# Patient Record
Sex: FEMALE | Race: WHITE | Employment: UNEMPLOYED | URBAN - METROPOLITAN AREA
[De-identification: names, ages, dates, MRNs, and addresses within clinical notes are randomized per-mention and may not be internally consistent; named-entity substitution may affect disease eponyms.]

---

## 2017-02-09 PROBLEM — S62.336A CLOSED DISPLACED FRACTURE OF NECK OF FIFTH METACARPAL BONE OF RIGHT HAND: Status: ACTIVE | Noted: 2017-02-09

## 2018-12-03 ENCOUNTER — OFFICE VISIT (OUTPATIENT)
Dept: ORTHOPEDIC SURGERY | Age: 48
End: 2018-12-03
Payer: COMMERCIAL

## 2018-12-03 VITALS
DIASTOLIC BLOOD PRESSURE: 88 MMHG | WEIGHT: 139 LBS | TEMPERATURE: 98.2 F | BODY MASS INDEX: 26.24 KG/M2 | HEART RATE: 101 BPM | SYSTOLIC BLOOD PRESSURE: 140 MMHG | HEIGHT: 61 IN

## 2018-12-03 DIAGNOSIS — M18.12 ARTHRITIS OF CARPOMETACARPAL (CMC) JOINT OF LEFT THUMB: Primary | ICD-10-CM

## 2018-12-03 DIAGNOSIS — G56.03 BILATERAL CARPAL TUNNEL SYNDROME: ICD-10-CM

## 2018-12-03 PROCEDURE — 99214 OFFICE O/P EST MOD 30 MIN: CPT | Performed by: ORTHOPAEDIC SURGERY

## 2018-12-03 PROCEDURE — 20600 DRAIN/INJ JOINT/BURSA W/O US: CPT | Performed by: ORTHOPAEDIC SURGERY

## 2018-12-03 RX ORDER — PREDNISONE 10 MG/1
TABLET ORAL
Refills: 0 | COMMUNITY
Start: 2018-11-27 | End: 2018-12-28 | Stop reason: ALTCHOICE

## 2018-12-03 RX ORDER — TRIAMCINOLONE ACETONIDE 40 MG/ML
20 INJECTION, SUSPENSION INTRA-ARTICULAR; INTRAMUSCULAR ONCE
Status: COMPLETED | OUTPATIENT
Start: 2018-12-03 | End: 2018-12-03

## 2018-12-03 RX ADMIN — TRIAMCINOLONE ACETONIDE 20 MG: 40 INJECTION, SUSPENSION INTRA-ARTICULAR; INTRAMUSCULAR at 10:10

## 2018-12-03 NOTE — PROGRESS NOTES
Chief Complaint:   Chief Complaint   Patient presents with    Hand Pain     Bilateral hand pain x 1 year  EMG's done 2014. X-rays Lt hand 11/30/2018 @ [facility]. Hx of  Rt CTR years ago in [place].  Wrist Pain     Bilateral wrist pain x 1 year       HPI 49-year-old woman with history of previously diagnosed carpal tunnel syndrome based on EMGs in 2014. She also has lower extremity neurologic involvement lumbar radiculopathy. She presents today complaining of progressing difficulty with left thumb weakness and pain. She had x-rays at [facility]'s last week.     Patient Active Problem List   Diagnosis    Osteoarthritis    Status post right hip replacement    Gait difficulty    Acute blood loss anemia    Leg length discrepancy    Bipolar affective disorder (HCC)    GERD (gastroesophageal reflux disease)    Constipation    Elevated liver enzymes    Protein calorie malnutrition (HCC)    Low back pain    Closed displaced fracture of neck of fifth metacarpal bone of right hand    Arthritis of carpometacarpal (CMC) joint of left thumb    Bilateral carpal tunnel syndrome       Past Medical History:   Diagnosis Date    Anxiety     dr Apoorva Colindres 63 in [facility] counseling    Arthritis     Asthma     [facility] 2 disorder [facility]     Bruising tendency ([facility] [facility]     Chronic back pain     Insomnia        Past Surgical History:   Procedure Laterality Date    CARPAL TUNNEL RELEASE      right    COLONOSCOPY      FINGER SURGERY      right pinkie    HERNIA REPAIR  1970    HIP ARTHROTOMY  1973    right     TOTAL HIP ARTHROPLASTY Right 2/25/14    TUBAL LIGATION  10/1994    TUMOR REMOVAL      right shoulder fatty tumor       Current Outpatient Prescriptions   Medication Sig Dispense Refill    predniSONE (DELTASONE) 10 MG tablet TAKE 1 TABLET BY MOUTH 4 TIMES DAILY WITH FOOD  0    PROVENTIL  (90 Base) MCG/ACT inhaler INHALE 2 PUFFS EVERY 4 HOURS AS NEEDED  2    lamoTRIgine (LAMICTAL) 200 MG

## 2018-12-17 ENCOUNTER — HOSPITAL ENCOUNTER (OUTPATIENT)
Dept: NEUROLOGY | Age: 48
Discharge: HOME | End: 2018-12-17
Payer: COMMERCIAL

## 2018-12-17 PROCEDURE — 95913 NRV CNDJ TEST 13/> STUDIES: CPT

## 2018-12-17 PROCEDURE — 95886 MUSC TEST DONE W/N TEST COMP: CPT

## 2019-01-14 ENCOUNTER — OFFICE VISIT (OUTPATIENT)
Dept: ORTHOPEDIC SURGERY | Age: 49
End: 2019-01-14
Payer: COMMERCIAL

## 2019-01-14 VITALS
BODY MASS INDEX: 27 KG/M2 | SYSTOLIC BLOOD PRESSURE: 140 MMHG | DIASTOLIC BLOOD PRESSURE: 82 MMHG | HEIGHT: 61 IN | WEIGHT: 143 LBS | TEMPERATURE: 98.6 F | HEART RATE: 116 BPM

## 2019-01-14 DIAGNOSIS — M43.16 SPONDYLOLISTHESIS OF LUMBAR REGION: ICD-10-CM

## 2019-01-14 DIAGNOSIS — M18.12 ARTHRITIS OF CARPOMETACARPAL (CMC) JOINT OF LEFT THUMB: Primary | ICD-10-CM

## 2019-01-14 PROCEDURE — L3807 WHFO W/O JOINTS PRE CST: HCPCS | Performed by: ORTHOPAEDIC SURGERY

## 2019-01-14 PROCEDURE — 99214 OFFICE O/P EST MOD 30 MIN: CPT | Performed by: ORTHOPAEDIC SURGERY

## 2019-02-03 ENCOUNTER — APPOINTMENT (OUTPATIENT)
Dept: GENERAL RADIOLOGY | Age: 49
End: 2019-02-03
Payer: COMMERCIAL

## 2019-02-03 ENCOUNTER — HOSPITAL ENCOUNTER (EMERGENCY)
Age: 49
Discharge: HOME | End: 2019-02-03
Payer: COMMERCIAL

## 2019-02-03 ENCOUNTER — APPOINTMENT (OUTPATIENT)
Dept: CT IMAGING | Age: 49
End: 2019-02-03
Payer: COMMERCIAL

## 2019-02-03 VITALS
DIASTOLIC BLOOD PRESSURE: 85 MMHG | HEIGHT: 61 IN | SYSTOLIC BLOOD PRESSURE: 132 MMHG | WEIGHT: 144 LBS | HEART RATE: 78 BPM | OXYGEN SATURATION: 95 % | TEMPERATURE: 98.4 F | RESPIRATION RATE: 16 BRPM | BODY MASS INDEX: 27.19 KG/M2

## 2019-02-03 DIAGNOSIS — S16.1XXA STRAIN OF NECK MUSCLE, INITIAL ENCOUNTER: ICD-10-CM
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
## 2019-02-03 DIAGNOSIS — S39.012A STRAIN OF LUMBAR REGION, INITIAL ENCOUNTER: ICD-10-CM

## 2019-02-03 PROCEDURE — 72125 CT NECK SPINE W/O DYE: CPT

## 2019-02-03 PROCEDURE — 72170 X-RAY EXAM OF PELVIS: CPT

## 2019-02-03 PROCEDURE — 72110 X-RAY EXAM L-2 SPINE 4/>VWS: CPT

## 2019-02-03 PROCEDURE — 99284 EMERGENCY DEPT VISIT MOD MDM: CPT

## 2019-02-03 PROCEDURE — 72074 X-RAY EXAM THORAC SPINE4/>VW: CPT

## 2019-02-03 PROCEDURE — 71046 X-RAY EXAM CHEST 2 VIEWS: CPT

## 2019-02-03 PROCEDURE — 6370000000 HC RX 637 (ALT 250 FOR IP): Performed by: PHYSICIAN ASSISTANT

## 2019-02-03 PROCEDURE — 70450 CT HEAD/BRAIN W/O DYE: CPT

## 2019-02-03 RX ORDER — CYCLOBENZAPRINE HCL 10 MG
10 TABLET ORAL 3 TIMES DAILY PRN
Qty: 30 TABLET | Refills: 0 | Status: SHIPPED | OUTPATIENT
Start: 2019-02-03 | End: 2019-02-13

## 2019-02-03 RX ORDER — OXYCODONE HYDROCHLORIDE AND ACETAMINOPHEN 5; 325 MG/1; MG/1
1 TABLET ORAL ONCE
Status: COMPLETED | OUTPATIENT
Start: 2019-02-03 | End: 2019-02-03

## 2019-02-03 RX ORDER — TRAMADOL HYDROCHLORIDE 50 MG/1
50 TABLET ORAL EVERY 6 HOURS PRN
Qty: 20 TABLET | Refills: 0 | Status: SHIPPED | OUTPATIENT
Start: 2019-02-03 | End: 2019-02-08

## 2019-02-03 RX ADMIN — OXYCODONE AND ACETAMINOPHEN 1 TABLET: 5; 325 TABLET ORAL at 16:00

## 2019-02-03 ASSESSMENT — PAIN DESCRIPTION - LOCATION: LOCATION: BACK;NECK

## 2019-02-03 ASSESSMENT — PAIN SCALES - GENERAL
PAINLEVEL_OUTOF10: 10
PAINLEVEL_OUTOF10: 10

## 2019-02-03 ASSESSMENT — PAIN DESCRIPTION - PAIN TYPE: TYPE: ACUTE PAIN

## 2019-02-22 ENCOUNTER — TELEPHONE (OUTPATIENT)
Dept: ORTHOPEDIC SURGERY | Age: 49
End: 2019-02-22

## 2019-02-22 DIAGNOSIS — M18.12 ARTHRITIS OF CARPOMETACARPAL (CMC) JOINT OF LEFT THUMB: Primary | ICD-10-CM

## 2019-02-25 ENCOUNTER — OFFICE VISIT (OUTPATIENT)
Dept: ORTHOPEDIC SURGERY | Age: 49
End: 2019-02-25
Payer: COMMERCIAL

## 2019-02-25 VITALS
TEMPERATURE: 98.2 F | RESPIRATION RATE: 16 BRPM | HEART RATE: 89 BPM | SYSTOLIC BLOOD PRESSURE: 117 MMHG | DIASTOLIC BLOOD PRESSURE: 82 MMHG

## 2019-02-25 DIAGNOSIS — M18.12 ARTHRITIS OF CARPOMETACARPAL (CMC) JOINT OF LEFT THUMB: Primary | ICD-10-CM

## 2019-02-25 PROCEDURE — 99204 OFFICE O/P NEW MOD 45 MIN: CPT | Performed by: ORTHOPAEDIC SURGERY

## 2019-02-25 PROCEDURE — L3924 HFO WITHOUT JOINTS PRE OTS: HCPCS | Performed by: ORTHOPAEDIC SURGERY

## 2019-03-20 ENCOUNTER — PREP FOR PROCEDURE (OUTPATIENT)
Dept: ORTHOPEDIC SURGERY | Age: 49
End: 2019-03-20

## 2019-03-20 RX ORDER — SODIUM CHLORIDE 0.9 % (FLUSH) 0.9 %
10 SYRINGE (ML) INJECTION PRN
Status: CANCELLED | OUTPATIENT
Start: 2019-03-20

## 2019-03-20 RX ORDER — SODIUM CHLORIDE 0.9 % (FLUSH) 0.9 %
10 SYRINGE (ML) INJECTION EVERY 12 HOURS SCHEDULED
Status: CANCELLED | OUTPATIENT
Start: 2019-03-20

## 2019-03-20 RX ORDER — SODIUM CHLORIDE 9 MG/ML
INJECTION, SOLUTION INTRAVENOUS CONTINUOUS
Status: CANCELLED | OUTPATIENT
Start: 2019-03-20

## 2019-04-10 NOTE — PROGRESS NOTES
[facility] PRE-ADMISSION TESTING INSTRUCTIONS    The Preadmission Testing patient is instructed accordingly using the following criteria (check applicable):    ARRIVAL INSTRUCTIONS:  [x] Parking the day of Surgery is located in the Main Entrance lot. Upon entering the door, make an immediate right to the surgery reception desk    [x] [facility] Park[facility] is available Monday through Friday 6 am to 6 pm    [x] Bring photo ID and insurance card    [] Bring in a copy of Living will or Durable Power of  papers. [x] Please be sure to arrange for responsible adult to provide transportation to and from the hospital    [x] Please arrange for responsible adult to be with you for the 24 hour period post procedure due to having anesthesia      GENERAL INSTRUCTIONS:    [x] Nothing by mouth after midnight, including gum, candy, mints or water    [x] You may brush your teeth, but do not swallow any water    [x] Take medications as instructed with 1-2 oz of water    [x] Stop herbal supplements and vitamins 5 days prior to procedure    [x] Follow preop dosing of blood thinners per physician instructions    [] Take 1/2 dose of evening insulin, but no insulin after midnight    [] No oral diabetic medications after midnight    [] If diabetic and have low blood sugar or feel symptomatic, take 1-2oz apple juice only    [] Bring inhalers day of surgery    [] Bring C-PAP/ Bi-Pap day of surgery    [] Bring urine specimen day of surgery    [x] Shower or bath with soap, lather and rinse well, AM of Surgery, no lotion, powders or creams to surgical site    [] Follow bowel prep as instructed per surgeon    [x] No tobacco products within 24 hours of surgery     [x] No alcohol or illegal drug use within 24 hours of surgery.     [x] Jewelry, body piercing's, eyeglasses, contact lenses and dentures are not permitted into surgery (bring cases)      [x] Please do not wear any nail polish, make up or hair products on the day of surgery    [x] If not already done, you can expect a call from registration    [x] You can expect a call the business day prior to procedure to notify you if your arrival time changes    [x] If you receive a survey after surgery we would greatly appreciate your comments    [] Parent/guardian of a minor must accompany their child and remain on the premises  the entire time they are under our care     [] Pediatric patients may bring favorite toy, blanket or comfort item with them    [] A caregiver or family member must remain with the patient during their stay if they are mentally handicapped, have dementia, disoriented or unable to use a call light or would be a safety concern if left unattended    [x] Please notify surgeon if you develop any illness between now and time of surgery (cold, cough, sore throat, fever, nausea, vomiting) or any signs of infections  including skin, wounds, and dental.    [x]  The Outpatient Pharmacy is available to fill your prescription here on your day of surgery, ask your preop nurse for details    [x] Other instructions  EDUCATIONAL MATERIALS PROVIDED:    [] PAT Preoperative Education Packet/Booklet     [] Medication List    [] Fluoroscopy Information Pamphlet    [] Transfusion bracelet applied with instructions    [] Joint replacement video reviewed    [] Shower with soap, lather and rinse well, and use CHG wipes provided the evening before surgery as instructed

## 2019-04-16 ENCOUNTER — HOSPITAL ENCOUNTER (OUTPATIENT)
Age: 49
Setting detail: OUTPATIENT SURGERY
Discharge: HOME | End: 2019-04-16
Attending: ORTHOPAEDIC SURGERY | Admitting: ORTHOPAEDIC SURGERY
Payer: COMMERCIAL

## 2019-04-16 ENCOUNTER — ANESTHESIA (OUTPATIENT)
Dept: OPERATING ROOM | Age: 49
End: 2019-04-16
Payer: COMMERCIAL

## 2019-04-16 ENCOUNTER — ANESTHESIA EVENT (OUTPATIENT)
Dept: OPERATING ROOM | Age: 49
End: 2019-04-16
Payer: COMMERCIAL

## 2019-04-16 VITALS
TEMPERATURE: 97.6 F | SYSTOLIC BLOOD PRESSURE: 105 MMHG | RESPIRATION RATE: 16 BRPM | HEIGHT: 61 IN | WEIGHT: 135 LBS | BODY MASS INDEX: 25.49 KG/M2 | HEART RATE: 82 BPM | DIASTOLIC BLOOD PRESSURE: 59 MMHG | OXYGEN SATURATION: 94 %

## 2019-04-16 VITALS — DIASTOLIC BLOOD PRESSURE: 63 MMHG | SYSTOLIC BLOOD PRESSURE: 109 MMHG | OXYGEN SATURATION: 97 %

## 2019-04-16 DIAGNOSIS — G89.18 POST-OPERATIVE PAIN: Primary | ICD-10-CM

## 2019-04-16 PROCEDURE — 64721 CARPAL TUNNEL SURGERY: CPT | Performed by: ORTHOPAEDIC SURGERY

## 2019-04-16 PROCEDURE — 6370000000 HC RX 637 (ALT 250 FOR IP): Performed by: ANESTHESIOLOGY

## 2019-04-16 PROCEDURE — 3600000003 HC SURGERY LEVEL 3 BASE: Performed by: ORTHOPAEDIC SURGERY

## 2019-04-16 PROCEDURE — 3700000000 HC ANESTHESIA ATTENDED CARE: Performed by: ORTHOPAEDIC SURGERY

## 2019-04-16 PROCEDURE — 6360000002 HC RX W HCPCS: Performed by: NURSE ANESTHETIST, CERTIFIED REGISTERED

## 2019-04-16 PROCEDURE — 7100000001 HC PACU RECOVERY - ADDTL 15 MIN: Performed by: ORTHOPAEDIC SURGERY

## 2019-04-16 PROCEDURE — 2500000003 HC RX 250 WO HCPCS: Performed by: NURSE ANESTHETIST, CERTIFIED REGISTERED

## 2019-04-16 PROCEDURE — 7100000011 HC PHASE II RECOVERY - ADDTL 15 MIN: Performed by: ORTHOPAEDIC SURGERY

## 2019-04-16 PROCEDURE — 2580000003 HC RX 258: Performed by: PHYSICIAN ASSISTANT

## 2019-04-16 PROCEDURE — 6360000002 HC RX W HCPCS: Performed by: ANESTHESIOLOGY

## 2019-04-16 PROCEDURE — 2500000003 HC RX 250 WO HCPCS: Performed by: ORTHOPAEDIC SURGERY

## 2019-04-16 PROCEDURE — 7100000010 HC PHASE II RECOVERY - FIRST 15 MIN: Performed by: ORTHOPAEDIC SURGERY

## 2019-04-16 PROCEDURE — 3700000001 HC ADD 15 MINUTES (ANESTHESIA): Performed by: ORTHOPAEDIC SURGERY

## 2019-04-16 PROCEDURE — 3600000013 HC SURGERY LEVEL 3 ADDTL 15MIN: Performed by: ORTHOPAEDIC SURGERY

## 2019-04-16 PROCEDURE — 25447 ARTHRP NTRCRP/CRP/MTCR NTRPS: CPT | Performed by: PHYSICIAN ASSISTANT

## 2019-04-16 PROCEDURE — 2709999900 HC NON-CHARGEABLE SUPPLY: Performed by: ORTHOPAEDIC SURGERY

## 2019-04-16 PROCEDURE — 25447 ARTHRP NTRCRP/CRP/MTCR NTRPS: CPT | Performed by: ORTHOPAEDIC SURGERY

## 2019-04-16 PROCEDURE — 6360000002 HC RX W HCPCS: Performed by: PHYSICIAN ASSISTANT

## 2019-04-16 PROCEDURE — 7100000000 HC PACU RECOVERY - FIRST 15 MIN: Performed by: ORTHOPAEDIC SURGERY

## 2019-04-16 RX ORDER — OXYCODONE HYDROCHLORIDE AND ACETAMINOPHEN 5; 325 MG/1; MG/1
1 TABLET ORAL EVERY 6 HOURS PRN
Qty: 28 TABLET | Refills: 0 | Status: SHIPPED | OUTPATIENT
Start: 2019-04-16 | End: 2019-04-26 | Stop reason: SDUPTHER

## 2019-04-16 RX ORDER — LIDOCAINE HYDROCHLORIDE 20 MG/ML
INJECTION, SOLUTION EPIDURAL; INFILTRATION; INTRACAUDAL; PERINEURAL PRN
Status: DISCONTINUED | OUTPATIENT
Start: 2019-04-16 | End: 2019-04-16 | Stop reason: SDUPTHER

## 2019-04-16 RX ORDER — MIDAZOLAM HYDROCHLORIDE 1 MG/ML
INJECTION INTRAMUSCULAR; INTRAVENOUS PRN
Status: DISCONTINUED | OUTPATIENT
Start: 2019-04-16 | End: 2019-04-16 | Stop reason: SDUPTHER

## 2019-04-16 RX ORDER — OXYCODONE HYDROCHLORIDE AND ACETAMINOPHEN 5; 325 MG/1; MG/1
1 TABLET ORAL
Status: COMPLETED | OUTPATIENT
Start: 2019-04-16 | End: 2019-04-16

## 2019-04-16 RX ORDER — PROPOFOL 10 MG/ML
INJECTION, EMULSION INTRAVENOUS PRN
Status: DISCONTINUED | OUTPATIENT
Start: 2019-04-16 | End: 2019-04-16 | Stop reason: SDUPTHER

## 2019-04-16 RX ORDER — SODIUM CHLORIDE 0.9 % (FLUSH) 0.9 %
10 SYRINGE (ML) INJECTION PRN
Status: DISCONTINUED | OUTPATIENT
Start: 2019-04-16 | End: 2019-04-16 | Stop reason: HOSPADM

## 2019-04-16 RX ORDER — FENTANYL CITRATE 50 UG/ML
INJECTION, SOLUTION INTRAMUSCULAR; INTRAVENOUS PRN
Status: DISCONTINUED | OUTPATIENT
Start: 2019-04-16 | End: 2019-04-16 | Stop reason: SDUPTHER

## 2019-04-16 RX ORDER — SODIUM CHLORIDE 9 MG/ML
INJECTION, SOLUTION INTRAVENOUS CONTINUOUS
Status: DISCONTINUED | OUTPATIENT
Start: 2019-04-16 | End: 2019-04-16 | Stop reason: HOSPADM

## 2019-04-16 RX ORDER — BUPIVACAINE HYDROCHLORIDE AND EPINEPHRINE 5; 5 MG/ML; UG/ML
INJECTION, SOLUTION EPIDURAL; INTRACAUDAL; PERINEURAL PRN
Status: DISCONTINUED | OUTPATIENT
Start: 2019-04-16 | End: 2019-04-16 | Stop reason: ALTCHOICE

## 2019-04-16 RX ORDER — PROMETHAZINE HYDROCHLORIDE 25 MG/ML
6.25 INJECTION, SOLUTION INTRAMUSCULAR; INTRAVENOUS
Status: DISCONTINUED | OUTPATIENT
Start: 2019-04-16 | End: 2019-04-16 | Stop reason: HOSPADM

## 2019-04-16 RX ORDER — SODIUM CHLORIDE 0.9 % (FLUSH) 0.9 %
10 SYRINGE (ML) INJECTION EVERY 12 HOURS SCHEDULED
Status: DISCONTINUED | OUTPATIENT
Start: 2019-04-16 | End: 2019-04-16 | Stop reason: HOSPADM

## 2019-04-16 RX ORDER — DEXAMETHASONE SODIUM PHOSPHATE 4 MG/ML
INJECTION, SOLUTION INTRA-ARTICULAR; INTRALESIONAL; INTRAMUSCULAR; INTRAVENOUS; SOFT TISSUE PRN
Status: DISCONTINUED | OUTPATIENT
Start: 2019-04-16 | End: 2019-04-16 | Stop reason: SDUPTHER

## 2019-04-16 RX ORDER — ONDANSETRON 2 MG/ML
INJECTION INTRAMUSCULAR; INTRAVENOUS PRN
Status: DISCONTINUED | OUTPATIENT
Start: 2019-04-16 | End: 2019-04-16 | Stop reason: SDUPTHER

## 2019-04-16 RX ADMIN — FENTANYL CITRATE 100 MCG: 50 INJECTION, SOLUTION INTRAMUSCULAR; INTRAVENOUS at 08:33

## 2019-04-16 RX ADMIN — Medication 2 G: at 08:32

## 2019-04-16 RX ADMIN — LIDOCAINE HYDROCHLORIDE 100 MG: 20 INJECTION, SOLUTION EPIDURAL; INFILTRATION; INTRACAUDAL; PERINEURAL at 08:33

## 2019-04-16 RX ADMIN — ONDANSETRON HYDROCHLORIDE 4 MG: 2 INJECTION, SOLUTION INTRAMUSCULAR; INTRAVENOUS at 08:42

## 2019-04-16 RX ADMIN — DEXAMETHASONE SODIUM PHOSPHATE 10 MG: 4 INJECTION, SOLUTION INTRAMUSCULAR; INTRAVENOUS at 08:42

## 2019-04-16 RX ADMIN — MIDAZOLAM HYDROCHLORIDE 2 MG: 1 INJECTION, SOLUTION INTRAMUSCULAR; INTRAVENOUS at 08:32

## 2019-04-16 RX ADMIN — HYDROMORPHONE HYDROCHLORIDE 0.5 MG: 1 INJECTION, SOLUTION INTRAMUSCULAR; INTRAVENOUS; SUBCUTANEOUS at 10:03

## 2019-04-16 RX ADMIN — OXYCODONE HYDROCHLORIDE AND ACETAMINOPHEN 1 TABLET: 5; 325 TABLET ORAL at 11:44

## 2019-04-16 RX ADMIN — HYDROMORPHONE HYDROCHLORIDE 0.5 MG: 1 INJECTION, SOLUTION INTRAMUSCULAR; INTRAVENOUS; SUBCUTANEOUS at 10:13

## 2019-04-16 RX ADMIN — PROPOFOL 200 MG: 10 INJECTION, EMULSION INTRAVENOUS at 08:33

## 2019-04-16 RX ADMIN — SODIUM CHLORIDE: 9 INJECTION, SOLUTION INTRAVENOUS at 08:32

## 2019-04-16 ASSESSMENT — PAIN DESCRIPTION - ORIENTATION
ORIENTATION: LEFT

## 2019-04-16 ASSESSMENT — PAIN SCALES - GENERAL
PAINLEVEL_OUTOF10: 5
PAINLEVEL_OUTOF10: 0
PAINLEVEL_OUTOF10: 5
PAINLEVEL_OUTOF10: 5
PAINLEVEL_OUTOF10: 7
PAINLEVEL_OUTOF10: 6
PAINLEVEL_OUTOF10: 6
PAINLEVEL_OUTOF10: 7
PAINLEVEL_OUTOF10: 5
PAINLEVEL_OUTOF10: 0
PAINLEVEL_OUTOF10: 4

## 2019-04-16 ASSESSMENT — PULMONARY FUNCTION TESTS
PIF_VALUE: 15
PIF_VALUE: 18
PIF_VALUE: 15
PIF_VALUE: 15
PIF_VALUE: 18
PIF_VALUE: 18
PIF_VALUE: 12
PIF_VALUE: 12
PIF_VALUE: 18
PIF_VALUE: 17
PIF_VALUE: 15
PIF_VALUE: 12
PIF_VALUE: 12
PIF_VALUE: 1
PIF_VALUE: 17
PIF_VALUE: 15
PIF_VALUE: 17
PIF_VALUE: 7
PIF_VALUE: 17
PIF_VALUE: 17
PIF_VALUE: 12
PIF_VALUE: 18
PIF_VALUE: 17
PIF_VALUE: 12
PIF_VALUE: 12
PIF_VALUE: 18
PIF_VALUE: 0
PIF_VALUE: 17
PIF_VALUE: 20
PIF_VALUE: 0
PIF_VALUE: 0
PIF_VALUE: 12
PIF_VALUE: 18
PIF_VALUE: 15
PIF_VALUE: 18
PIF_VALUE: 12
PIF_VALUE: 18
PIF_VALUE: 0
PIF_VALUE: 17
PIF_VALUE: 12
PIF_VALUE: 15
PIF_VALUE: 14
PIF_VALUE: 15
PIF_VALUE: 18
PIF_VALUE: 12
PIF_VALUE: 18
PIF_VALUE: 1
PIF_VALUE: 18
PIF_VALUE: 17
PIF_VALUE: 15
PIF_VALUE: 17
PIF_VALUE: 12
PIF_VALUE: 18
PIF_VALUE: 15
PIF_VALUE: 17
PIF_VALUE: 18
PIF_VALUE: 15
PIF_VALUE: 12
PIF_VALUE: 18
PIF_VALUE: 15
PIF_VALUE: 12
PIF_VALUE: 18
PIF_VALUE: 15
PIF_VALUE: 18
PIF_VALUE: 12
PIF_VALUE: 15
PIF_VALUE: 17
PIF_VALUE: 12

## 2019-04-16 ASSESSMENT — PAIN DESCRIPTION - LOCATION
LOCATION: HAND
LOCATION: WRIST;HAND
LOCATION: HAND;WRIST
LOCATION: HAND
LOCATION: WRIST;HAND
LOCATION: HAND
LOCATION: HAND;WRIST
LOCATION: HAND
LOCATION: WRIST;HAND
LOCATION: WRIST;HAND
LOCATION: HAND

## 2019-04-16 ASSESSMENT — PAIN DESCRIPTION - PROGRESSION
CLINICAL_PROGRESSION: GRADUALLY WORSENING
CLINICAL_PROGRESSION: GRADUALLY IMPROVING
CLINICAL_PROGRESSION: NOT CHANGED
CLINICAL_PROGRESSION: NOT CHANGED

## 2019-04-16 ASSESSMENT — PAIN DESCRIPTION - FREQUENCY
FREQUENCY: CONTINUOUS

## 2019-04-16 ASSESSMENT — PAIN DESCRIPTION - DESCRIPTORS
DESCRIPTORS: DISCOMFORT
DESCRIPTORS: DISCOMFORT
DESCRIPTORS: DISCOMFORT;ACHING
DESCRIPTORS: DISCOMFORT
DESCRIPTORS: ACHING
DESCRIPTORS: DISCOMFORT
DESCRIPTORS: DISCOMFORT;ACHING
DESCRIPTORS: DISCOMFORT;ACHING

## 2019-04-16 ASSESSMENT — PAIN DESCRIPTION - PAIN TYPE
TYPE: SURGICAL PAIN

## 2019-04-16 ASSESSMENT — PAIN - FUNCTIONAL ASSESSMENT
PAIN_FUNCTIONAL_ASSESSMENT: PREVENTS OR INTERFERES SOME ACTIVE ACTIVITIES AND ADLS
PAIN_FUNCTIONAL_ASSESSMENT: 0-10
PAIN_FUNCTIONAL_ASSESSMENT: PREVENTS OR INTERFERES SOME ACTIVE ACTIVITIES AND ADLS

## 2019-04-16 NOTE — ANESTHESIA POSTPROCEDURE EVALUATION
Department of Anesthesiology  Postprocedure Note    Patient: Tova Briscoe  MRN: [id number]  YOB: 1970  Date of evaluation: 4/16/2019  Time:  10:21 AM     Procedure Summary     Date:  04/16/19 Room / Location:  [facility] OR 02 / [facility] OR    Anesthesia Start:  0832 Anesthesia Stop:  7861    Procedure:  LEFT THUMB TRAPEZIECTOMY WITH LIGAMENT RECONSTRUCTION LEFT PARTIAL TRAPEZOIDECTOMY LEFT DEQUERVAINS RELEASE LEFT CARPAL TUNNEL RELEASE (Left ) Diagnosis:  (ARTHRITIS CARPOMETACARPAL JOINT LEFT THUMB LEFT CARPAL TUNNEL SYNDROME )    Surgeon:  Apolinar Israel MD Responsible Provider:  Natalya Suarez MD    Anesthesia Type:  general ASA Status:  2          Anesthesia Type: general    Alistair Phase I: Alistair Score: 9    Alistair Phase II:      Last vitals: Reviewed and per EMR flowsheets.        Anesthesia Post Evaluation    Patient location during evaluation: PACU  Patient participation: complete - patient participated  Level of consciousness: awake and alert  Pain score: 0  Airway patency: patent  Nausea & Vomiting: no vomiting and no nausea  Complications: no  Cardiovascular status: hemodynamically stable  Respiratory status: spontaneous ventilation  Hydration status: stable

## 2019-04-16 NOTE — ANESTHESIA PRE PROCEDURE
Department of Anesthesiology  Preprocedure Note       Name:  Sara Adam   Age:  46 y.o.  :  1970                                          MRN:  [id number]         Date:  2019      Surgeon: Bhavna Stevens):  Quique Wolf MD    Procedure: LEFT THUMB TRAPEZIECTOMY WITH LIGAMENT RECONSTRUCTION LEFT PARTIAL TRAPEZOIDECTOMY LEFT DEQUERVAINS RELEASE LEFT CARPAL TUNNEL RELEASE (Left )    Medications prior to admission:   Prior to Admission medications    Medication Sig Start Date End Date Taking? Authorizing Provider   ibuprofen (ADVIL;MOTRIN) 600 MG tablet TAKE 1 TABLET BY MOUTH TWICE A DAY TAKE WITH FOOD 18  Yes Historical Provider, MD   PROVENTIL  (90 Base) MCG/ACT inhaler INHALE 2 PUFFS EVERY 4 HOURS AS NEEDED 17  Yes Historical Provider, MD   lamoTRIgine (LAMICTAL) 200 MG tablet Take 200 mg by mouth 2 tabs at night 17  Yes Historical Provider, MD   venlafaxine (EFFEXOR XR) 75 MG extended release capsule TAKE ONE CAPSULE BY MOUTH AT BEDTIME 10/25/17  Yes Historical Provider, MD   zolpidem (AMBIEN) 10 MG tablet Take  by mouth nightly as needed for Sleep. Yes Historical Provider, MD   traZODone (DESYREL) 150 MG tablet Take 150 mg by mouth nightly. Yes Historical Provider, MD   alprazolam Onel Seek) 0.5 MG tablet Take 1 mg by mouth 2 times daily as needed.  .   Yes Historical Provider, MD   cyclobenzaprine (FLEXERIL) 5 MG tablet TAKE 1 TABLET BY MOUTH EVERY 8 HOURS AS NEEDED 18   Historical Provider, MD       Current medications:    Current Facility-Administered Medications   Medication Dose Route Frequency Provider Last Rate Last Dose    0.9 % sodium chloride infusion   Intravenous Continuous GLORIA Rankin        ceFAZolin (ANCEF) 2 g in dextrose 5 % 100 mL IVPB  2 g Intravenous On Call to [address]        sodium chloride flush 0.9 % injection 10 mL  10 mL Intravenous 2 times per day GLORIA Rankin        sodium chloride flush 0.9 % injection 10 mL  10 mL Intravenous PRN GLORIA Reynoso           Allergies:  No Known Allergies    Problem List:    Patient Active Problem List   Diagnosis Code    Osteoarthritis M19.90    Status post right hip replacement Z96.641    Gait difficulty R26.9    Acute blood loss anemia D62    Leg length discrepancy M21.70    Bipolar affective disorder (HCC) F31.9    GERD (gastroesophageal reflux disease) K21.9    Constipation K59.00    Elevated liver enzymes R74.8    Protein calorie malnutrition (HCC) E46    Low back pain M54.5    Closed displaced fracture of neck of fifth metacarpal bone of right hand S62.336A    Arthritis of carpometacarpal (CMC) joint of left thumb M18.12    Bilateral carpal tunnel syndrome G56.03       Past Medical History:        Diagnosis Date    Anxiety     dr Nohemy Pittman 63 in [facility]    Arthritis     Asthma     B[facility] 2 disorder ([facility]     Bruising tendency ([facility] [facility]     Chronic back pain     Insomnia     Thumb pain     left - for OR 19        Past Surgical History:        Procedure Laterality Date    ABDOMEN SURGERY      fatty tumor removed     CARPAL TUNNEL RELEASE      right    COLONOSCOPY      FINGER SURGERY      right pinkie    HERNIA REPAIR  1970    HIP ARTHROTOMY  1973    right    NERVE BLOCK      x4     TOTAL HIP ARTHROPLASTY Right 14    TUBAL LIGATION  10/1994    TUMOR REMOVAL      right shoulder fatty tumor       Social History:    Social History     Tobacco Use    Smoking status: Former Smoker     Packs/day: 1.00     Years: 30.00     Pack years: 30.00     Last attempt to quit: 2014     Years since quittin.2    Smokeless tobacco: Never Used    Tobacco comment: stop    Substance Use Topics    Alcohol use:  Yes     Alcohol/week: 1.8 oz     Types: 3 Cans of beer per week     Comment: SOCIAL                                Counseling given: Not Answered  Comment: stop       Vital Signs (Current):   Vitals:    04/10/19 1101 04/16/19 0703   BP:  111/63   Pulse:  80   Resp:  20   Temp:  97.3 °F (36.3 °C)   TempSrc:  Temporal   SpO2:  93%   Weight: 135 lb (61.2 kg) 135 lb (61.2 kg)   Height: 5' 1\" (1.549 m) 5' 1\" (1.549 m)                                              BP Readings from Last 3 Encounters:   04/16/19 111/63   02/25/19 117/82   02/03/19 132/85       NPO Status: Time of last liquid consumption: 2100                        Time of last solid consumption: 2100                        Date of last liquid consumption: 04/15/19                        Date of last solid food consumption: 04/15/19    BMI:   Wt Readings from Last 3 Encounters:   04/16/19 135 lb (61.2 kg)   02/03/19 144 lb (65.3 kg)   01/14/19 143 lb (64.9 kg)     Body mass index is 25.51 kg/m². CBC:   Lab Results   Component Value Date    WBC 6.7 03/03/2014    RBC 3.22 03/03/2014    HGB 9.8 03/03/2014    HCT 28.9 03/03/2014    MCV 89.6 03/03/2014    RDW 14.7 03/03/2014     03/03/2014       CMP:   Lab Results   Component Value Date     03/03/2014    K 4.4 03/03/2014    CL 99 03/03/2014    CO2 29 03/03/2014    BUN 10 03/03/2014    CREATININE 0.8 03/03/2014    LABGLOM >60 03/03/2014    GLUCOSE 104 03/03/2014    PROT 6.4 03/03/2014    CALCIUM 8.8 03/03/2014    BILITOT 0.3 03/03/2014    ALKPHOS 222 03/03/2014    AST 67 03/03/2014    ALT 45 03/03/2014       POC Tests: No results for input(s): POCGLU, POCNA, POCK, POCCL, POCBUN, POCHEMO, POCHCT in the last 72 hours.     Coags: No results found for: PROTIME, INR, APTT    HCG (If Applicable):   Lab Results   Component Value Date    PREGTESTUR NEGATIVE 02/25/2014        ABGs: No results found for: PHART, PO2ART, JQS0MZJ, BFL5DRF, BEART, I9OMPAIE     Type & Screen (If Applicable):  No results found for: LABABO, [address]    Anesthesia Evaluation  Patient summary reviewed and Nursing notes reviewed no history of anesthetic complications:   Airway: Mallampati: II  TM distance: >3 FB   Neck ROM: full  Mouth opening: > = 3 FB Dental:      Comment: \"I have a lot of teeth that are chipped but not loose\"    Pulmonary: breath sounds clear to auscultation  (+) asthma: seasonal asthma,                            Cardiovascular:  Exercise tolerance: good (>4 METS),           Rhythm: regular  Rate: normal                 ROS comment: PACs and PVCs     Neuro/Psych:   (+) neuromuscular disease:, psychiatric history: stable with treatment            GI/Hepatic/Renal:   (+) GERD: no interval change,           Endo/Other: Negative Endo/Other ROS                    Abdominal:           Vascular: negative vascular ROS. Anesthesia Plan      general     ASA 2       Induction: intravenous. MIPS: Postoperative opioids intended and Prophylactic antiemetics administered. Anesthetic plan and risks discussed with patient. Plan discussed with CRNA.                   Loy Calvert MD   4/16/2019

## 2019-04-16 NOTE — OP NOTE
[address] [address]     OPERATIVE REPORT         DATE OF PROCEDURE: 4/16/2019     PREOPERATIVE DIAGNOSIS:   1. LEFT thumb basal joint arthritis. 2. Left [place] Q[place]veins tenosynovitis  3. Left Carpal tunnel syndrome    POSTOPERATIVE DIAGNOSIS:   1. LEFT thumb basal joint arthritis. 2. Left [place] Q[place]veins tenosynovitis  3. Left Carpal tunnel syndrome     PROCEDURES:  1. LEFT thumb trapeziectomy. 2 .LEFT thumb volar oblique ligament reconstruction using a split flexor  carpi radialis tendon transfer. 3. Pella of split flexor carpi radialis tendon for ligament reconstruction. 4. Left partial trapezoidectomy  5. Left carpal tunnel release     ANESTHESIA:  1. General.  2. Local anesthetic by surgeon consisting of approximately 20 mL of 0.25%  Marcaine with epinephrine.     ASSISTANT: Dr Mirela Alexis, orthopedic surgery resident. Mookie Prieto, CAIN. She was present throughout the procedure. Her assistance was used for preoperative positioning, intraoperative retraction, closure, and dressing application. Her assistance expedited the case and decreased the surgical time.      TOURNIQUET TIME: Approximately 45 minutes at 250 mmHg brachial tourniquet.     FINDINGS:  1. Significant end-stage arthrosis involving the thumb basal joint. 2. The thumb scaphotrapezoidal joint was with arthritic  changes. 3. Status post trapeziectomy with volar oblique ligament reconstruction,  intraoperative carpometacarpal grind test was negative. 4. Evidence of median nerve compression beneath the transverse carpal  ligament. It was adequately decompressed with release of the transverse  carpal ligament.   5. Status post decompression, the median nerve was fully decompressed  throughout its course including distal forearm fascia through the carpal  tunnel to its trifurcation distally     COMPLICATIONS: None.     SPECIMENS: None.     DISPOSITION: The patient was stable throughout procedure.     OPERATIVE INDICATIONS: The patient presents with  persistent recalcitrant LEFT thumb basal joint arthrosis. After failing  conservative management, the patient wished to proceed with surgical intervention. The risks, the benefits, alternatives, and complications of surgical  intervention were explained and include but are not limited to the risks of  infection; damage to nerves, vessels, tendons; failure to improve  symptoms; worsening of symptoms; recurrence of symptoms; persistent symptoms;  scar sensitivity; need for compliance with postoperative immobilization and  skilled therapy. The patient voiced understanding and all questions were answered.     SURGERY IN DETAIL: The patient was identified in the holding area. The LEFT  upper extremity was identified as the surgical site. The patient was then seen by  Anesthesia and taken to the operating room, placed supine on the table, underwent  general anesthesia per anesthesia department. All bony prominences were well  padded. A well-padded arm tourniquet was placed. The right upper extremity  was prepped and draped in standard sterile fashion. The arm was exsanguinated  and the tourniquet was inflated to 250 mmHg.     An incision in line with the radial border of the ring finger, extending from  Sloan's cardinal line to within 1 cm of the volar wrist flexion crease was  then created followed by blunt dissection and identification of the  superficial palmar fascia, underlying median nerve, and transverse carpal  ligament. Dissection was performed proximally to identify the contents of  Guyon canal, which was reflected off of the distal forearm fascia and  transverse carpal ligament. The transverse carpal ligament was then clearly  identified and released from a proximal to distal direction, decompressing  the carpal tunnel. The median nerve was inspected. There was flattening and  hyperemia to the nerve, consistent with median nerve compression. The  remaining portion of the proximal transverse carpal ligament and the distal  forearm fascia was then released using blunt-tip Metzenbaum scissors with a  distal to proximal slide technique while visualizing and protecting the  underlying median nerve. The median nerve was fully decompressed  throughout its visualized course, including the distal forearm fascia,  through the carpal tunnel and to its level of trifurcation distally. The wound was copiously irrigated out. The skin was closed with nylon suture followed by a soft sterile dressing.         An incision was made over the radial styloid of the wrist longitudinally  followed by blunt dissection, identification, and preservation of the  radial sensory nerve branches. These were retracted. The first extensor  compartment was then clearly identified. An incision over the dorsal ulnar aspect of the  extensor reticulum to the first compartment was then created. There was  multiple slips of the extensors present. Decompression was taken from the  myotendinous origin to the distal insertion distally. There was evidence  of stenosis that was adequately decompressed with release of the  compartment. There was a separate compartment to the APL and EPB  Tendons that was fully released.       The incision was then extended distally over the dorsal aspect of the thumb carpometacarpal  articulation extending from the base of the thumb metacarpal proximally to the  level of the radial styloid. Following blunt dissection, identification and  preservation of the radial sensory nerve branches. The radial sensory nerve  branches were preserved. An arthrotomy was made  dorsally over the thumb carpometacarpal articulation. Synovitis was  encountered and evacuated. The thumb carpometacarpal articulation was then  found to be arthritic.  Trapeziectomy was then undertaken using a combination  of sharp and blunt dissection to remove the trapezium and preserve copiously irrigated out. The tendon was anchovied  and placed deep within the wound. The dorsal capsule was then tenodesed using  0 Vicryl suture while protecting the radial artery. At this point, the  tourniquet was deflated. Good hemostasis achieved with bipolar cautery. All  wounds were copiously irrigated out. Local anesthetic infiltrated in the soft  tissues. The donor site was then closed with 4-0 nylon suture and the  arthroplasty site was closed with 3-0 Vicryl and a 4-0 Monocryl subcuticular  stitch. Mastisol and Steri-Strips, bulky sterile dressing, and thumb spica  splint was applied. The patient tolerated the procedure well and was taken to  recovery room.           Yoly Jones MD

## 2019-04-16 NOTE — PROGRESS NOTES
1100 admit to phase 2 pacu from phase one on cart. Oriented to room and call light within reach. Elba Santiago    here with patient. Po clear fluids/crackers provided. Elba Santiago   1220 ambulated to bathroom with assistance, gait steady. Voided. Returned to recliner chair by Chris Burch rn.  remains with patient. Elba Santiago  1300 alert. Vss. Pain controlled after po pain med. Denies any post op nausea. Po fluids/crackers taken well. Up, dressed with seter elevation pillow maintained in place. Discharge criteria met.  Elba Santiago

## 2019-04-16 NOTE — BRIEF OP NOTE
Brief Postoperative Note  ______________________________________________________________    Patient: Sindi Aguirre  YOB: 1970  MRN: [id number]  Date of Procedure: 4/16/2019    Pre-Op Diagnosis: ARTHRITIS CARPOMETACARPAL JOINT LEFT THUMB LEFT CARPAL TUNNEL SYNDROME     Post-Op Diagnosis: Same       Procedure(s):  LEFT THUMB TRAPEZIECTOMY WITH LIGAMENT RECONSTRUCTION LEFT PARTIAL TRAPEZOIDECTOMY LEFT DEQUERVAINS RELEASE LEFT CARPAL TUNNEL RELEASE    Anesthesia: General    Surgeon(s):  Mary Lou Gómez MD    Assistant: Courtney CASTRO    Estimated Blood Loss (mL): less than 50     Complications: None    Specimens:   * No specimens in log *    Implants:  * No implants in log *      Drains: * No LDAs found *    Findings: See op note    Lacie Green DO  Date: 4/16/2019  Time: 9:45 AM

## 2019-04-16 NOTE — H&P
known allergies.     Social History:   TOBACCO:   reports that she quit smoking about 5 years ago. She has a 30.00 pack-year smoking history. She has never used smokeless tobacco.  ETOH:   reports that she drinks about 1.8 oz of alcohol per week . DRUGS:   reports that she does not use drugs. ACTIVITIES OF DAILY LIVING:    OCCUPATION:    Family History:   Family History         Family History   Problem Relation Age of Onset    Cancer Father           LEUKEMIA    Cancer Maternal Grandmother           lung and brain ca            REVIEW OF SYSTEMS:  CONSTITUTIONAL:  negative  EYES:  negative  HEENT:  negative  RESPIRATORY:  Asthma  CARDIOVASCULAR:  negative  GASTROINTESTINAL:  negative  GENITOURINARY:  negative  INTEGUMENT/BREAST:  negative  HEMATOLOGIC/LYMPHATIC:  negative  ALLERGIC/IMMUNOLOGIC:  negative  ENDOCRINE:  negative  MUSCULOSKELETAL:  pain  NEUROLOGICAL:  N/T  BEHAVIOR/PSYCH:  bipolar     PHYSICAL EXAM:    VITALS:  /82 (Site: Left Upper Arm, Position: Sitting)   Pulse 89   Temp 98.2 °F (36.8 °C)   Resp 16   LMP 12/04/2015 (LMP Unknown)   CONSTITUTIONAL:  awake, alert, cooperative, no apparent distress, and appears stated age  EYES:  Lids and lashes normal, pupils equal, round and reactive to light, extra ocular muscles intact, sclera clear, conjunctiva normal  ENT:  Normocephalic, without obvious abnormality, atraumatic, sinuses nontender on palpation, external ears without lesions, oral pharynx with moist mucus membranes, tonsils without erythema or exudates, gums normal and good dentition. NECK:  Supple, symmetrical, trachea midline, no adenopathy, thyroid symmetric, not enlarged and no tenderness, skin normal  LUNGS:  CTA  CARDIOVASCULAR:  2+ radial pulses, extremities warm and well perfused  ABDOMEN:  NTTP  CHEST:  Atraumatic   GENITAL/URINARY:  deferred  NEUROLOGIC:  Awake, alert, oriented to name, place and time. Cranial nerves II-XII are grossly intact.   Motor is 5 out of 5 bilaterally. Sensory is intact.  gait is normal.  MUSCULOSKELETAL:     Left upper extremity: Nontender about the shoulder and elbow with good range of motion. Negative Tinel's of the cubital tunnel, positive Tinel's of the carpal tunnel, positive Ananya's, positive Finkelstein's, positive CMC grind. Negative tenderness over the A1 pulley with no active triggering. Full flexion-extension of the fingers. Median, ulnar, radial nerves intact light touch. Brisk capillary refill. Gross motor 5 out of 5     DATA:    CBC:         Lab Results   Component Value Date     WBC 6.7 03/03/2014     RBC 3.22 03/03/2014     HGB 9.8 03/03/2014     HCT 28.9 03/03/2014     MCV 89.6 03/03/2014     MCH 30.5 03/03/2014     MCHC 34.0 03/03/2014     RDW 14.7 03/03/2014      03/03/2014     MPV 8.5 03/03/2014      PT/INR:  No results found for: PROTIME, INR     Radiology Review:  X-rays of the left thumb were obtained today in the office in reviewed patient. 3 views: AP, lateral, oblique demonstrate Eaton Littler stage IV thumb CMC joint arthritis. Impression: Left thumb CMC joint arthritis     IMPRESSION:  · Left thumb CMC joint arthritis with STT joint arthritis   · Left carpal tunnel syndrome  · Left DeQuervain's tenosynovitis     PLAN:  Discussed findings with the patient. Discussed conservative and surgical management of this patient. Patient received 2 weeks of relief with cortisone injections to the left thumb, no relief with bracing. She is interested in pursuing with surgical management. Discussed we'll plan for a left thumb trapeziectomy with partial trapezoidectomy with ligament reconstruction, left DeQuervains release, left carpal tunnel release. Postoperative course explained to the patient. Patient like to proceed. All questions answered.        I have seen and evaluated the patient and agree with the above assessment and plan on today's visit.  I have performed the key components of the history and physical examination with significant findings of left de Quervain's tenosynovitis, left thumb CMC arthritis with STT joint involvement, and left carpal tunnel syndrome. Plan for surgical intervention. All questions answered. . I concur with the findings and plan as documented.     I explained the risks, benefits, alternatives and complications of surgery with the patient including but not limited to the risks of infection, possible damage to nerves, vessels, or tendons, stiffness, loss of range of motion, scar sensitivity, wound healing complications, worsening symptoms, possible need for therapy, as well as the possible need further surgery and unanticipated complications. The patient voiced understanding and all questions were answered. The patient elected to proceed with surgical intervention. History and Physical Update     Patient was seen and examined. Patient's history and physical was reviewed with the patient. There has been no significant interval changes.       Electronically signed by Jd Esposito MD on 4/16/2019 at 8:23 AM

## 2019-04-18 ENCOUNTER — OFFICE VISIT (OUTPATIENT)
Dept: ORTHOPEDIC SURGERY | Age: 49
End: 2019-04-18

## 2019-04-18 VITALS
WEIGHT: 130 LBS | HEIGHT: 61 IN | DIASTOLIC BLOOD PRESSURE: 67 MMHG | TEMPERATURE: 98.6 F | HEART RATE: 88 BPM | RESPIRATION RATE: 22 BRPM | SYSTOLIC BLOOD PRESSURE: 98 MMHG | BODY MASS INDEX: 24.55 KG/M2

## 2019-04-18 DIAGNOSIS — L76.82 OTHER POSTOPERATIVE COMPLICATION OF SKIN: Primary | ICD-10-CM

## 2019-04-18 DIAGNOSIS — M18.12 ARTHRITIS OF CARPOMETACARPAL (CMC) JOINT OF LEFT THUMB: ICD-10-CM

## 2019-04-18 PROCEDURE — 99024 POSTOP FOLLOW-UP VISIT: CPT | Performed by: ORTHOPAEDIC SURGERY

## 2019-04-18 RX ORDER — DOXYCYCLINE HYCLATE 100 MG/1
100 CAPSULE ORAL 2 TIMES DAILY
Qty: 20 CAPSULE | Refills: 0 | Status: SHIPPED | OUTPATIENT
Start: 2019-04-18 | End: 2019-04-28

## 2019-04-18 RX ORDER — METHYLPREDNISOLONE 4 MG/1
TABLET ORAL
Qty: 1 KIT | Refills: 0 | Status: SHIPPED | OUTPATIENT
Start: 2019-04-18 | End: 2019-04-24

## 2019-04-18 NOTE — PROGRESS NOTES
Yulissa Rodriguez is here for a post op visit for left thumb CMC arthroplasty with LR TI. Patient was having redness and pain to the incision site. Subjective fever today. No drainage or notedclean, dry, no drainage    Exam    The wound is clean, dry, no drainage. Diffuse erythema over the mid to distal forearm. Diffuse tenderness superficially over the forearm. The  Sutures in place. .  Range of motion: range of motion limited pain. The swelling is deminished. Encounter Diagnoses   Name Primary?  Other postoperative complication of skin Yes    Arthritis of carpometacarpal (CMC) joint of left thumb        Impression  Postop day 2 left thumb CMC arthroplasty with LRTI with possible underlying dermatitis    Will treat most likely underlying dermatitis with a steroid Dosepak and also prescribed a round of doxycycline. We'll place patient back in a removable thumb spica splint to monitor him changes. We'll see patient back in 1 week for her routine postop visit. I have seen and evaluated the patient and agree with the above assessment and plan on today's visit. I have performed the key components of the history and physical examination with significant findings of two days postop with probable contact dermatitis. Patient was placed on doxycycline prophylactically. Also provided a Medrol Dosepak. . I concur with the findings and plan as documented.     Fady Chery MD  4/18/2019

## 2019-04-26 DIAGNOSIS — G89.18 POST-OPERATIVE PAIN: ICD-10-CM

## 2019-04-26 RX ORDER — OXYCODONE HYDROCHLORIDE AND ACETAMINOPHEN 5; 325 MG/1; MG/1
1 TABLET ORAL EVERY 6 HOURS PRN
Qty: 28 TABLET | Refills: 0 | Status: SHIPPED | OUTPATIENT
Start: 2019-04-26 | End: 2019-05-03

## 2019-04-29 ENCOUNTER — OFFICE VISIT (OUTPATIENT)
Dept: ORTHOPEDIC SURGERY | Age: 49
End: 2019-04-29

## 2019-04-29 VITALS
DIASTOLIC BLOOD PRESSURE: 72 MMHG | TEMPERATURE: 97.5 F | WEIGHT: 130.07 LBS | SYSTOLIC BLOOD PRESSURE: 118 MMHG | HEART RATE: 87 BPM | HEIGHT: 61 IN | BODY MASS INDEX: 24.56 KG/M2 | RESPIRATION RATE: 18 BRPM

## 2019-04-29 DIAGNOSIS — M18.12 ARTHRITIS OF CARPOMETACARPAL (CMC) JOINT OF LEFT THUMB: Primary | ICD-10-CM

## 2019-04-29 PROCEDURE — 99024 POSTOP FOLLOW-UP VISIT: CPT | Performed by: ORTHOPAEDIC SURGERY

## 2019-04-29 NOTE — PROGRESS NOTES
Nearly 2 weeks out left thumb basal joint arthroplasty doing well. No complaints. Physical exam: Incisions healing nicely. No signs of infection. Previous dermatitis has resolved. Remains neurovascular intact. Assessment: Nearly 2 weeks out left thumb basal joint arthroplasty doing well    Plan    Sutures removed. Continue bracing. Scar management was explained. Follow-up in 3-4 weeks.

## 2019-05-30 ENCOUNTER — OFFICE VISIT (OUTPATIENT)
Dept: ORTHOPEDIC SURGERY | Age: 49
End: 2019-05-30

## 2019-05-30 VITALS — DIASTOLIC BLOOD PRESSURE: 78 MMHG | RESPIRATION RATE: 16 BRPM | HEART RATE: 83 BPM | SYSTOLIC BLOOD PRESSURE: 123 MMHG

## 2019-05-30 DIAGNOSIS — M18.12 ARTHRITIS OF CARPOMETACARPAL (CMC) JOINT OF LEFT THUMB: Primary | ICD-10-CM

## 2019-05-30 DIAGNOSIS — Z91.199 NONCOMPLIANCE: ICD-10-CM

## 2019-05-30 PROCEDURE — 99024 POSTOP FOLLOW-UP VISIT: CPT | Performed by: ORTHOPAEDIC SURGERY

## 2019-05-30 RX ORDER — GABAPENTIN 300 MG/1
CAPSULE ORAL
Refills: 0 | COMMUNITY
Start: 2019-04-25 | End: 2021-02-03

## 2019-05-30 RX ORDER — ALPRAZOLAM 1 MG/1
2 TABLET ORAL 3 TIMES DAILY
Refills: 1 | COMMUNITY
Start: 2019-04-20

## 2019-05-30 NOTE — PROGRESS NOTES
One month postop left thumb basal joint arthroplasty. Overall she reports she is not been wearing her brace. She'll use her thumb for all activities. She is pleased with the results. Only complaint is that she does have some MCP joint hyperextension but is not bothersome to her. Physical exam: Scar mature. Negative CMC grind test. MCP joint hyperextension of 45° in the office. Remains neurovascular intact. Assessment: One month postop left thumb basal joint arthroplasty and noncompliance    Plan    She is pleased the results. May increase use the hand as tolerated at she's already been doing. Discussed MCP joint hyperextension and possible tenodesis if needed. Overall she reports this is not bothersome to her. Follow-up if needed.

## 2020-01-22 ENCOUNTER — HOSPITAL ENCOUNTER (OUTPATIENT)
Age: 50
Discharge: HOME | End: 2020-01-24

## 2020-01-22 LAB
ABO/RH: NORMAL
ANTIBODY SCREEN: NORMAL

## 2020-01-22 PROCEDURE — 86850 RBC ANTIBODY SCREEN: CPT

## 2020-01-22 PROCEDURE — 86901 BLOOD TYPING SEROLOGIC RH(D): CPT

## 2020-01-22 PROCEDURE — 86900 BLOOD TYPING SEROLOGIC ABO: CPT

## 2020-01-22 PROCEDURE — 87081 CULTURE SCREEN ONLY: CPT

## 2020-01-24 LAB — MRSA CULTURE ONLY: NORMAL

## 2020-01-30 ENCOUNTER — HOSPITAL ENCOUNTER (OUTPATIENT)
Age: 50
Discharge: HOME | End: 2020-02-01

## 2020-01-30 LAB
ANION GAP SERPL CALCULATED.3IONS-SCNC: 11 MMOL/L (ref 7–16)
BUN BLDV-MCNC: 9 MG/DL (ref 6–20)
CALCIUM SERPL-MCNC: 9.3 MG/DL (ref 8.6–10.2)
CHLORIDE BLD-SCNC: 103 MMOL/L (ref 98–107)
CO2: 27 MMOL/L (ref 22–29)
CREAT SERPL-MCNC: 0.6 MG/DL (ref 0.5–1)
GFR AFRICAN AMERICAN: >60
GFR NON-AFRICAN AMERICAN: >60 ML/MIN/1.73
GLUCOSE BLD-MCNC: 137 MG/DL (ref 74–99)
HCT VFR BLD CALC: 40.1 % (ref 34–48)
HEMOGLOBIN: 13.2 G/DL (ref 11.5–15.5)
MCH RBC QN AUTO: 30.6 PG (ref 26–35)
MCHC RBC AUTO-ENTMCNC: 32.9 % (ref 32–34.5)
MCV RBC AUTO: 92.8 FL (ref 80–99.9)
PDW BLD-RTO: 12.5 FL (ref 11.5–15)
PLATELET # BLD: 193 E9/L (ref 130–450)
PMV BLD AUTO: 12.5 FL (ref 7–12)
POTASSIUM SERPL-SCNC: 3.8 MMOL/L (ref 3.5–5)
RBC # BLD: 4.32 E12/L (ref 3.5–5.5)
SODIUM BLD-SCNC: 141 MMOL/L (ref 132–146)
WBC # BLD: 9.8 E9/L (ref 4.5–11.5)

## 2020-01-30 PROCEDURE — 85027 COMPLETE CBC AUTOMATED: CPT

## 2020-01-30 PROCEDURE — 80048 BASIC METABOLIC PNL TOTAL CA: CPT

## 2020-05-19 ENCOUNTER — HOSPITAL ENCOUNTER (OUTPATIENT)
Age: 50
Discharge: HOME | End: 2020-05-21
Payer: COMMERCIAL

## 2020-05-19 LAB
ABO/RH: NORMAL
ANTIBODY SCREEN: NORMAL

## 2020-05-19 PROCEDURE — 87081 CULTURE SCREEN ONLY: CPT

## 2020-05-19 PROCEDURE — 86850 RBC ANTIBODY SCREEN: CPT

## 2020-05-19 PROCEDURE — 86901 BLOOD TYPING SEROLOGIC RH(D): CPT

## 2020-05-19 PROCEDURE — 86900 BLOOD TYPING SEROLOGIC ABO: CPT

## 2020-05-21 LAB — MRSA CULTURE ONLY: NORMAL

## 2020-05-27 ENCOUNTER — HOSPITAL ENCOUNTER (OUTPATIENT)
Age: 50
Discharge: HOME | End: 2020-05-29
Payer: COMMERCIAL

## 2020-05-27 LAB
ANION GAP SERPL CALCULATED.3IONS-SCNC: 9 MMOL/L (ref 7–16)
BUN BLDV-MCNC: 8 MG/DL (ref 6–20)
CALCIUM SERPL-MCNC: 9.5 MG/DL (ref 8.6–10.2)
CHLORIDE BLD-SCNC: 101 MMOL/L (ref 98–107)
CO2: 31 MMOL/L (ref 22–29)
CREAT SERPL-MCNC: 0.6 MG/DL (ref 0.5–1)
GFR AFRICAN AMERICAN: >60
GFR NON-AFRICAN AMERICAN: >60 ML/MIN/1.73
GLUCOSE BLD-MCNC: 105 MG/DL (ref 74–99)
HCT VFR BLD CALC: 38 % (ref 34–48)
HEMOGLOBIN: 12.2 G/DL (ref 11.5–15.5)
MCH RBC QN AUTO: 30.6 PG (ref 26–35)
MCHC RBC AUTO-ENTMCNC: 32.1 % (ref 32–34.5)
MCV RBC AUTO: 95.2 FL (ref 80–99.9)
PDW BLD-RTO: 13.6 FL (ref 11.5–15)
PLATELET # BLD: 201 E9/L (ref 130–450)
PMV BLD AUTO: 12.2 FL (ref 7–12)
POTASSIUM SERPL-SCNC: 3.9 MMOL/L (ref 3.5–5)
RBC # BLD: 3.99 E12/L (ref 3.5–5.5)
SODIUM BLD-SCNC: 141 MMOL/L (ref 132–146)
WBC # BLD: 9.7 E9/L (ref 4.5–11.5)

## 2020-05-27 PROCEDURE — 85027 COMPLETE CBC AUTOMATED: CPT

## 2020-05-27 PROCEDURE — 80048 BASIC METABOLIC PNL TOTAL CA: CPT

## 2020-05-28 ENCOUNTER — HOSPITAL ENCOUNTER (OUTPATIENT)
Age: 50
Discharge: HOME | End: 2020-05-30
Payer: COMMERCIAL

## 2020-05-28 LAB
ANION GAP SERPL CALCULATED.3IONS-SCNC: 9 MMOL/L (ref 7–16)
BUN BLDV-MCNC: 7 MG/DL (ref 6–20)
CALCIUM SERPL-MCNC: 9.3 MG/DL (ref 8.6–10.2)
CHLORIDE BLD-SCNC: 104 MMOL/L (ref 98–107)
CO2: 29 MMOL/L (ref 22–29)
CREAT SERPL-MCNC: 0.6 MG/DL (ref 0.5–1)
GFR AFRICAN AMERICAN: >60
GFR NON-AFRICAN AMERICAN: >60 ML/MIN/1.73
GLUCOSE BLD-MCNC: 96 MG/DL (ref 74–99)
HCT VFR BLD CALC: 35.6 % (ref 34–48)
HEMOGLOBIN: 11.4 G/DL (ref 11.5–15.5)
MCH RBC QN AUTO: 30.6 PG (ref 26–35)
MCHC RBC AUTO-ENTMCNC: 32 % (ref 32–34.5)
MCV RBC AUTO: 95.7 FL (ref 80–99.9)
PDW BLD-RTO: 14 FL (ref 11.5–15)
PLATELET # BLD: 182 E9/L (ref 130–450)
PMV BLD AUTO: 12.3 FL (ref 7–12)
POTASSIUM SERPL-SCNC: 3.8 MMOL/L (ref 3.5–5)
RBC # BLD: 3.72 E12/L (ref 3.5–5.5)
SODIUM BLD-SCNC: 142 MMOL/L (ref 132–146)
WBC # BLD: 7.7 E9/L (ref 4.5–11.5)

## 2020-05-28 PROCEDURE — 80048 BASIC METABOLIC PNL TOTAL CA: CPT

## 2020-05-28 PROCEDURE — 85027 COMPLETE CBC AUTOMATED: CPT

## 2020-05-29 ENCOUNTER — HOSPITAL ENCOUNTER (OUTPATIENT)
Age: 50
Discharge: HOME | End: 2020-05-31
Payer: COMMERCIAL

## 2020-05-29 LAB
ANION GAP SERPL CALCULATED.3IONS-SCNC: 9 MMOL/L (ref 7–16)
BACTERIA: ABNORMAL /HPF
BILIRUBIN URINE: NEGATIVE
BLOOD, URINE: NEGATIVE
BUN BLDV-MCNC: 8 MG/DL (ref 6–20)
CALCIUM SERPL-MCNC: 9.6 MG/DL (ref 8.6–10.2)
CHLORIDE BLD-SCNC: 101 MMOL/L (ref 98–107)
CLARITY: CLEAR
CO2: 30 MMOL/L (ref 22–29)
COLOR: YELLOW
CREAT SERPL-MCNC: 0.5 MG/DL (ref 0.5–1)
EPITHELIAL CELLS, UA: ABNORMAL /HPF
GFR AFRICAN AMERICAN: >60
GFR NON-AFRICAN AMERICAN: >60 ML/MIN/1.73
GLUCOSE BLD-MCNC: 134 MG/DL (ref 74–99)
GLUCOSE URINE: NEGATIVE MG/DL
HCT VFR BLD CALC: 37.1 % (ref 34–48)
HEMOGLOBIN: 12 G/DL (ref 11.5–15.5)
KETONES, URINE: NEGATIVE MG/DL
LEUKOCYTE ESTERASE, URINE: ABNORMAL
MCH RBC QN AUTO: 30.5 PG (ref 26–35)
MCHC RBC AUTO-ENTMCNC: 32.3 % (ref 32–34.5)
MCV RBC AUTO: 94.2 FL (ref 80–99.9)
NITRITE, URINE: NEGATIVE
PDW BLD-RTO: 13.2 FL (ref 11.5–15)
PH UA: 7.5 (ref 5–9)
PLATELET # BLD: 224 E9/L (ref 130–450)
PMV BLD AUTO: 11.9 FL (ref 7–12)
POTASSIUM SERPL-SCNC: 4.2 MMOL/L (ref 3.5–5)
PROTEIN UA: NEGATIVE MG/DL
RBC # BLD: 3.94 E12/L (ref 3.5–5.5)
RBC UA: ABNORMAL /HPF (ref 0–2)
SODIUM BLD-SCNC: 140 MMOL/L (ref 132–146)
SPECIFIC GRAVITY UA: 1.01 (ref 1–1.03)
UROBILINOGEN, URINE: 0.2 E.U./DL
WBC # BLD: 8.3 E9/L (ref 4.5–11.5)
WBC UA: ABNORMAL /HPF (ref 0–5)

## 2020-05-29 PROCEDURE — 87088 URINE BACTERIA CULTURE: CPT

## 2020-05-29 PROCEDURE — 85027 COMPLETE CBC AUTOMATED: CPT

## 2020-05-29 PROCEDURE — 80048 BASIC METABOLIC PNL TOTAL CA: CPT

## 2020-05-29 PROCEDURE — 81001 URINALYSIS AUTO W/SCOPE: CPT

## 2020-05-31 LAB — URINE CULTURE, ROUTINE: NORMAL

## 2020-06-15 ENCOUNTER — OFFICE VISIT (OUTPATIENT)
Dept: NEUROLOGY | Age: 50
End: 2020-06-15
Payer: COMMERCIAL

## 2020-06-15 VITALS
SYSTOLIC BLOOD PRESSURE: 120 MMHG | DIASTOLIC BLOOD PRESSURE: 70 MMHG | BODY MASS INDEX: 28.32 KG/M2 | HEIGHT: 61 IN | WEIGHT: 150 LBS | TEMPERATURE: 98.4 F

## 2020-06-15 PROBLEM — G31.84 MILD COGNITIVE IMPAIRMENT WITH MEMORY LOSS: Chronic | Status: ACTIVE | Noted: 2020-06-15

## 2020-06-15 PROBLEM — R73.02 GLUCOSE INTOLERANCE (IMPAIRED GLUCOSE TOLERANCE): Status: ACTIVE | Noted: 2020-06-15

## 2020-06-15 PROBLEM — R41.3 MEMORY DIFFICULTY: Status: ACTIVE | Noted: 2020-06-15

## 2020-06-15 PROCEDURE — 99215 OFFICE O/P EST HI 40 MIN: CPT | Performed by: PSYCHIATRY & NEUROLOGY

## 2020-06-15 RX ORDER — OXYCODONE HYDROCHLORIDE 5 MG/1
5 TABLET ORAL EVERY 4 HOURS PRN
COMMUNITY
Start: 2020-05-29 | End: 2021-01-05

## 2020-06-15 ASSESSMENT — ENCOUNTER SYMPTOMS
ALLERGIC/IMMUNOLOGIC NEGATIVE: 1
BACK PAIN: 1
EYES NEGATIVE: 1
RESPIRATORY NEGATIVE: 1
GASTROINTESTINAL NEGATIVE: 1

## 2020-11-18 ENCOUNTER — HOSPITAL ENCOUNTER (EMERGENCY)
Age: 50
Discharge: LEFT BEFORE DISCHARGE | End: 2020-11-18
Payer: COMMERCIAL

## 2020-11-18 NOTE — ED NOTES
Pt. Stated she is going back to [facility] where she had the surgery, waiver signed.      Mel Botello RN  11/18/20 [id number]

## 2021-02-03 ENCOUNTER — OFFICE VISIT (OUTPATIENT)
Dept: ORTHOPEDIC SURGERY | Age: 51
End: 2021-02-03
Payer: COMMERCIAL

## 2021-02-03 VITALS — BODY MASS INDEX: 26.11 KG/M2 | WEIGHT: 133 LBS | TEMPERATURE: 97.3 F | HEIGHT: 60 IN

## 2021-02-03 DIAGNOSIS — M25.551 BILATERAL HIP PAIN: ICD-10-CM

## 2021-02-03 DIAGNOSIS — M25.552 BILATERAL HIP PAIN: ICD-10-CM

## 2021-02-03 DIAGNOSIS — M16.7 OTHER SECONDARY OSTEOARTHRITIS OF LEFT HIP: Primary | ICD-10-CM

## 2021-02-03 DIAGNOSIS — Z96.641 H/O TOTAL HIP ARTHROPLASTY, RIGHT: ICD-10-CM

## 2021-02-03 DIAGNOSIS — M47.816 LUMBAR SPONDYLOSIS: ICD-10-CM

## 2021-02-03 DIAGNOSIS — M32.9 LUPUS (HCC): ICD-10-CM

## 2021-02-03 PROCEDURE — 99214 OFFICE O/P EST MOD 30 MIN: CPT | Performed by: ORTHOPAEDIC SURGERY

## 2021-02-03 NOTE — LETTER
Charbel Hernandes M.D.   [address] Brad Barrow M.D. [facility], [address] [place] [address]     March 2, 2021  Surgery Date:                                                                  Regarding Prescription & Non-Prescription Medications:    Medication Name to Discontinue:   Days before surgery  Last dose  Naprosyn or Naproxen     7   2/22/2021                                                                                                                                                                                                                                                                                                                                                                                                                                                                                                                                                                                                                                                                                                                                                                                                                                                                                                                                                                                                                                                                                                                                                                                                                                                                                                                                             ** All medications discontinued prior to surgery may be resumed after your surgery is completed, unless otherwise specified by the physician. If you have any questions or concerns, please contact our nurse at [phone], Option 2:  Monday through Thursday 9:00 am - 4:00 pm  Friday 10:00 am - 12 noon

## 2021-02-03 NOTE — PROGRESS NOTES
Chief Complaint:   Chief Complaint   Patient presents with    Hip Pain     Pain in both hips. Left worse than right. History of right ROLA 2/25/2014. No xrays. HPI   24-year-old woman with chronic progressively disabling left hip pain no longer manageable with conservative measures. 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 Came to right total hip arthroplasty by me in 2014. Other problems include cervical and lumbar spondylosis with cervical and lumbar fusions last year. Also under treatment with rheumatology for lupus and fibromyalgia on Plaquenil and naproxen. She while she has some right hip pain is primarily lateral.  Left hip pain is more severe including groin pain with mechanical cracking popping and stiffening and sometimes loss of balance. Ambulating with a cane.     Patient Active Problem List   Diagnosis    DJD (degenerative joint disease)    Status post right hip replacement    Gait difficulty    Acute blood loss anemia    Leg length discrepancy    Bipolar affective disorder (HCC)    GERD (gastroesophageal reflux disease)    Constipation    Elevated liver enzymes    Protein calorie malnutrition (HCC)    Low back pain    Closed displaced fracture of neck of fifth metacarpal bone of right hand    Arthritis of carpometacarpal (CMC) joint of left thumb    Bilateral carpal tunnel syndrome    Memory difficulty    Glucose intolerance (impaired glucose tolerance)    Mild cognitive impairment with memory loss    Lupus ([address]    Lumbar spondylosis       Past Medical History:   Diagnosis Date    Anxiety     dr Isma Kebede 63 [facility]    Arthritis     Asthma     Bipolar 2 disorder ([address]     Bruising tendency ([address]     Chronic back pain     Fibromyalgia     Glucose intolerance (impaired glucose tolerance) 6/15/2020    hyperglycemia    Insomnia     Lupus ([address]     Memory difficulty 6/15/2020    Mild cognitive impairment with memory loss 6/15/2020    Thumb pain     left - for OR 4-16-19        Past Surgical History:   Procedure Laterality Date    ABDOMEN SURGERY      fatty tumor removed     ARTHROPLASTY Left 4/16/2019    LEFT THUMB TRAPEZIECTOMY WITH LIGAMENT RECONSTRUCTION LEFT PARTIAL TRAPEZOIDECTOMY LEFT DEQUERVAINS RELEASE LEFT CARPAL TUNNEL RELEASE performed by Damian Cantu MD at [address]      right    COLONOSCOPY      FINGER SURGERY      right pinkie    HERNIA REPAIR  1970    HIP ARTHROTOMY  1973    right    NERVE BLOCK      x4     TOTAL HIP ARTHROPLASTY Right 2/25/14    TUBAL LIGATION  10/1994    TUMOR REMOVAL      right shoulder fatty tumor       Current Outpatient Medications   Medication Sig Dispense Refill    brexpiprazole (REXULTI) 1 MG TABS tablet Take 1 mg by mouth daily       naproxen (NAPROSYN) 500 MG tablet Take 500 mg by mouth 3 times daily (with meals)       vitamin D (CHOLECALCIFEROL) 25 MCG (1000 UT) TABS tablet Take by mouth daily       calcium carbonate 600 MG TABS tablet Take by mouth daily       hydroxychloroquine (PLAQUENIL) 200 MG tablet Take by mouth 2 times daily       ALPRAZolam (XANAX) 1 MG tablet Take 1 mg by mouth 3 times daily as needed. 1    PROVENTIL  (90 Base) MCG/ACT inhaler INHALE 2 PUFFS EVERY 4 HOURS AS NEEDED  2    lamoTRIgine (LAMICTAL) 200 MG tablet Take 200 mg by mouth 2 tabs at night      venlafaxine (EFFEXOR XR) 75 MG extended release capsule Take 75 mg by mouth daily   0    zolpidem (AMBIEN) 10 MG tablet Take  by mouth nightly as needed for Sleep.  traZODone (DESYREL) 150 MG tablet Take 150 mg by mouth nightly. No current facility-administered medications for this visit.         No Known Allergies    Social History     Socioeconomic History    Marital status:      Spouse name: None    Number of children: None    Years of education: None    Highest education level: None   Occupational History    None   Social Needs    Financial resource strain: None    Food insecurity     Worry: None     Inability: None    Transportation needs     Medical: None     Non-medical: None   Tobacco Use    Smoking status: Former Smoker     Packs/day: 1.00     Years: 30.00     Pack years: 30.00     Quit date: 2014     Years since quittin.0    Smokeless tobacco: Never Used    Tobacco comment: stop    Substance and Sexual Activity    Alcohol use: Yes     Alcohol/week: 3.0 standard drinks     Types: 3 Cans of beer per week     Comment: SOCIAL    Drug use: No    Sexual activity: Yes     Partners: Male   Lifestyle    Physical activity     Days per week: None     Minutes per session: None    Stress: None   Relationships    Social connections     Talks on phone: None     Gets together: None     Attends Scientologist service: None     Active member of club or organization: None     Attends meetings of clubs or organizations: None     Relationship status: None    Intimate partner violence     Fear of current or ex partner: None     Emotionally abused: None     Physically abused: None     Forced sexual activity: None   Other Topics Concern    None   Social History Narrative    None       Family History   Problem Relation Age of Onset    Cancer Father         LEUKEMIA    Cancer Maternal Grandmother         lung and brain ca         Review of Systems   No fever, chills, or other constitutionalsymptoms. No numbness or other neuro symptoms. No chest pain. No dyspnea. [unfilled]   No acute distress at rest.  Ambulates with a cane full weightbearing. 5 mm shortening left lower extremity compared to the right. There is no joint pain with full right hip rotation. Left groin and thigh pain are reproduced with internal rotation on the left to only 20 degrees. Distal neurologic and motor exam is grossly intact left lower extremity. Physical Exam    Patient is alert and oriented.   Well-developed well-nourished. BMI 26. Pupils equal and reactive. Scleraeanicteric. Neck supple  Lungs clear. Cardiac rate and rhythm regular. Abdomen soft and nontender. Skin warm and dry. XRAY: X-ray today AP pelvis with AP and lateral views bilateral hips. On the AP pelvis view there is noted to be lumbar pedicle instrumentation in place. Right total hip arthroplasty in place with previous cable fixation also noted. There is no lucencies subsidence or visible wear. Left hip shows degenerative narrowing minimal joint space remaining femoral acetabular joint space. Especially on the lateral view there is noted to be loss of sphericity of the femoral head with superior anterior segment collapse. Impression: Intact right total hip arthroplasty  Significant osteoarthritic changes left hip secondary related to Perthes disease and AVN. ASSESSMENT/PLAN:    Srinivas Fisher was seen today for hip pain. Diagnoses and all orders for this visit:    Other secondary osteoarthritis of left hip    Bilateral hip pain  -     XR HIP BILATERAL W AP PELVIS (2 VIEWS); Future    H/O total hip arthroplasty, right    Lumbar spondylosis    Lupus ([address]    Options reviewed with the patient. She is having progressively disabling symptoms and is ready to proceed with left hip arthroplasty. Preoperative teaching given today. The procedure, indications, risks, limitations and recovery time were all reviewed with patient, who requests to proceed. Return for TBA postop left total hip.        Scott Strong MD    2/3/2021  3:34 PM

## 2021-02-03 NOTE — PROGRESS NOTES
LEFT HIP TOTAL JOINT ARTHROPLASTY, [place], general versus spinal anesthesia, Date: Tuesday, March 2, 2021, Time: 8:00 am, Place: [address], Surgeon: Balta Corona. Reviewed medications with patient / holding the following medications for surgery: Naprosyn or Naproxen 7 days pre op. Will inform PCP: Tomasa Steele of plans for surgery. Patient does not have regular checkups with the dentist. Patient will make appointment and see the DDS before surgery for cleaning and exam. Patient states she has a couple of broken teeth in the back. Dietary Handout: Patient Education Guide Regarding Iron Replacement given to and reviewed with patient. Patient instructed to begin eating foods rich in Iron and Vitamin C one month pre op and continue for one month post op. Pre op instructions and Total Joint Folder given to the patient. Patient informed: A hospital nurse from Pre Admission Testing will contact the patient with a date for Pre Admission Testing and Covid test. Patient wishes to defer Mandatory Joint Class d/t hx of TRH in the past. Patient expresses understanding and is in agreement with the plan. After review of the pre op information at home; patient will call office with any questions, concerns or problems.     Patient has no DME needs    Post Op Appointment: Thursday, March 11 th at 10:15 am

## 2021-02-03 NOTE — LETTER
Freddy Dolan M.D. / Bruna Jansen. Walter Montemayor M.D. Orthopaedic Surgeons - Board Certified  [address] [facility]  [address], [address] [facility] [address]  [facility], [address] [place] [address]  Phone:  [phone]    Fax:   [phone]    Brien Mckeon  Type of Procedure: LEFT HIP REPLACEMENT  [facility] . Date of Surgery: Tuesday, March 2, 2021  Time of Surgery: 8:00 am      PREOPERATIVE INSTRUCTIONS FOR TOTAL JOINT REPLACEMENT  1. Read all the information provided for you in this packet and joint replacement folder; retain it for 2 years following surgery. 2.  Follow Dietary Handout: Patient Education Guide: Iron Replacement   Begin eating foods rich in iron one month before your surgery and continue for one month after surgery. 3.  Optional:  Not required by Dr. Walter Montemayor. You may choose an over the counter iron supplement and start taking it at least one month prior to surgery and continue taking the iron supplement for one month following surgery. Feel free to contact your primary care physician for his / her approval or for a prescription. 4.  A nurse from [facility] will contact you and inform you of a date and time for your Pre-Admission Testing Day. You may contact them at [phone] [facility]) or [phone] [facility] for any special requests. 5. Inform your family doctor as soon as your surgery date has been scheduled. See your doctor before surgery and obtain a letter of medical clearance for our office. Notify any specialists you are seeing and obtain a letter of clearance. You must see your pulmonary specialist prior to surgery if you have a condition called Sleep Apnea or use a CPAP machine at night. Please inform us if you have had problems with anesthesia in the past; especially with intubation. 6.  If you do not see a dentist regularly, see your dentist prior to surgery for a dental checkup and cleaning. Please have all dental procedures completed prior to surgery. Your teeth and gums must be in good condition for surgery. Have cavities filled, broken teeth repaired and root canals and / or teeth extractions completed prior to surgery. If teeth extractions are needed prior to surgery, a note from your dentist is required stating your mouth is healed and free of infection. 7. Inform our office if you are ill, have an infection, or if any doctor has prescribed an antibiotic for you. You must be free of infection for surgery. If you become ill within 2 weeks of your surgery date (cold, congestion, flu), surgery will be postponed. A letter of medical clearance from your family doctor will then be required to re-schedule your surgery on the next available date. 8.  If you have private insurance, call customer service and check your benefits and any deductibles / co-pays. 9.  The goal is to discharge you home to your family. If you feel inpatient rehab will be needed, contact your insurance carrier prior to surgery for a list of inpatient rehab facilities. Visiting one or two facilities before surgery may help with your decision. 10.  After surgery, continue regular checkups with your dentist. Please inform your dentist of your joint replacement surgery upon scheduling appointments. Your dentist is to follow The American Dental Association Guidelines for Premedication / Antibiotics prior to dental procedures. 11.  All requests for pain medication refills from our office must be called in to [phone], Ext [phone]. Please speak clearly when leaving your message with the following information:  Your name, spelling of last name, date of birth, name of medication, your pharmacy name and phone number. Please do not wait until the last minute to call in your prescription requests. There is a 24 - 72 hour turn-around time for all refills. If you call on Friday, your request will not begin processing until the next business day. You will be informed when your prescription has been printed and ready for pickup at the doctor's office.     Any questions, problems or concerns regarding your surgery should be addressed to our nurse by calling the office Monday through Thursday 10:00 am - 3:00 pm and Friday 10:00 am - 2:00 pm.    Dory Gordon RN, BSN, ONC, scribe for Dr. Sheeba Hurtado MD - Orthopedic Surgeon

## 2021-02-05 ENCOUNTER — TELEPHONE (OUTPATIENT)
Dept: ORTHOPEDIC SURGERY | Age: 51
End: 2021-02-05

## 2021-02-05 NOTE — TELEPHONE ENCOUNTER
2/05/2021 12:43 pm Fax message to Dr. Rene Fuel [phone]: Notification of plans for surgery - [facility] 3/02/2021. Request medical clearance for surgery.  Holding the following medications for surgery: Naprosyn or Naproxen

## 2021-02-09 ENCOUNTER — TELEPHONE (OUTPATIENT)
Dept: ORTHOPEDIC SURGERY | Age: 51
End: 2021-02-09

## 2021-02-09 NOTE — TELEPHONE ENCOUNTER
Fax'd prior auth form along with clinicals for Lt ROLA ([id number]) 3/2/2021 RAFAEL RATLIFF to [employer] [place].

## 2021-02-11 NOTE — TELEPHONE ENCOUNTER
Auth received from [employer] [place] for Lt Juanita De Luna 3/2/2021 RAFAEL RATLIFF  Approved for 7 day INPT stay.   Case# [id number]

## 2021-02-15 NOTE — PROGRESS NOTES
Patient agreed to COVID test on 02/23/2021 at the  [facility]  located at  [address] [place] [address]. between the hours of 6 am- 2:30 pm, instructed to bring ID. Patient instructed to self isolate until day of surgery.

## 2021-02-17 ENCOUNTER — TELEPHONE (OUTPATIENT)
Dept: ORTHOPEDIC SURGERY | Age: 51
End: 2021-02-17

## 2021-02-22 NOTE — PROGRESS NOTES
supplements and vitamins 5 days before your surgery. [x] Use your inhalers the morning of surgery if needed. Bring your emergency inhaler with you day of surgery. [x] Follow physician instructions regarding any blood thinners you may be taking. WHAT TO EXPECT:  [x] The day of surgery you will be greeted and checked in by the [facility].  In addition, you will be registered in the [facility] by a Patient Access Representative. Please bring your photo ID and insurance card. A nurse will greet you in accordance to the time you are needed in the pre-op area to prepare you for surgery. Please do not be discouraged if you are not greeted in the order you arrive as there are many variables that are involved in patient preparation. Your patience is greatly appreciated as you wait for your nurse. Please bring in items such as: books, magazines, newspapers, electronics, or any other items  to occupy your time in the waiting area. [x]  Delays may occur with surgery and staff will make a sincere effort to keep you informed of delays. If any delays occur with your procedure, we apologize ahead of time for your inconvenience as we recognize the value of your time.

## 2021-02-23 ENCOUNTER — HOSPITAL ENCOUNTER (OUTPATIENT)
Dept: PREADMISSION TESTING | Age: 51
Discharge: HOME | End: 2021-02-23
Payer: COMMERCIAL

## 2021-02-23 ENCOUNTER — HOSPITAL ENCOUNTER (OUTPATIENT)
Age: 51
Discharge: HOME | End: 2021-02-25
Payer: COMMERCIAL

## 2021-02-23 ENCOUNTER — HOSPITAL ENCOUNTER (OUTPATIENT)
Dept: GENERAL RADIOLOGY | Age: 51
Discharge: HOME | End: 2021-02-25
Payer: COMMERCIAL

## 2021-02-23 VITALS
TEMPERATURE: 97.9 F | OXYGEN SATURATION: 94 % | HEART RATE: 85 BPM | DIASTOLIC BLOOD PRESSURE: 60 MMHG | WEIGHT: 128 LBS | BODY MASS INDEX: 25.13 KG/M2 | SYSTOLIC BLOOD PRESSURE: 110 MMHG | HEIGHT: 60 IN | RESPIRATION RATE: 12 BRPM

## 2021-02-23 DIAGNOSIS — M16.12 PRIMARY OSTEOARTHRITIS OF LEFT HIP: ICD-10-CM

## 2021-02-23 DIAGNOSIS — Z01.818 PREOP TESTING: ICD-10-CM

## 2021-02-23 DIAGNOSIS — U07.1 COVID-19: ICD-10-CM

## 2021-02-23 LAB
ABO/RH: NORMAL
ALBUMIN SERPL-MCNC: 3.9 G/DL (ref 3.5–5.2)
ALP BLD-CCNC: 201 U/L (ref 35–104)
ALT SERPL-CCNC: 13 U/L (ref 0–32)
ANION GAP SERPL CALCULATED.3IONS-SCNC: 8 MMOL/L (ref 7–16)
ANTIBODY SCREEN: NORMAL
AST SERPL-CCNC: 17 U/L (ref 0–31)
BACTERIA: ABNORMAL /HPF
BASOPHILS ABSOLUTE: 0.04 E9/L (ref 0–0.2)
BASOPHILS RELATIVE PERCENT: 0.6 % (ref 0–2)
BILIRUB SERPL-MCNC: 0.2 MG/DL (ref 0–1.2)
BILIRUBIN URINE: NEGATIVE
BLOOD, URINE: NEGATIVE
BUN BLDV-MCNC: 8 MG/DL (ref 6–20)
CALCIUM SERPL-MCNC: 9.5 MG/DL (ref 8.6–10.2)
CHLORIDE BLD-SCNC: 104 MMOL/L (ref 98–107)
CLARITY: CLEAR
CO2: 27 MMOL/L (ref 22–29)
COLOR: YELLOW
CREAT SERPL-MCNC: 0.8 MG/DL (ref 0.5–1)
EOSINOPHILS ABSOLUTE: 0.45 E9/L (ref 0.05–0.5)
EOSINOPHILS RELATIVE PERCENT: 6.3 % (ref 0–6)
EPITHELIAL CELLS, UA: ABNORMAL /HPF
GFR AFRICAN AMERICAN: >60
GFR NON-AFRICAN AMERICAN: >60 ML/MIN/1.73
GLUCOSE BLD-MCNC: 95 MG/DL (ref 74–99)
GLUCOSE URINE: NEGATIVE MG/DL
HCT VFR BLD CALC: 38 % (ref 34–48)
HEMOGLOBIN: 12.2 G/DL (ref 11.5–15.5)
IMMATURE GRANULOCYTES #: 0.02 E9/L
IMMATURE GRANULOCYTES %: 0.3 % (ref 0–5)
KETONES, URINE: NEGATIVE MG/DL
LEUKOCYTE ESTERASE, URINE: ABNORMAL
LYMPHOCYTES ABSOLUTE: 1.64 E9/L (ref 1.5–4)
LYMPHOCYTES RELATIVE PERCENT: 23.1 % (ref 20–42)
MCH RBC QN AUTO: 29.8 PG (ref 26–35)
MCHC RBC AUTO-ENTMCNC: 32.1 % (ref 32–34.5)
MCV RBC AUTO: 92.7 FL (ref 80–99.9)
MONOCYTES ABSOLUTE: 0.44 E9/L (ref 0.1–0.95)
MONOCYTES RELATIVE PERCENT: 6.2 % (ref 2–12)
NEUTROPHILS ABSOLUTE: 4.51 E9/L (ref 1.8–7.3)
NEUTROPHILS RELATIVE PERCENT: 63.5 % (ref 43–80)
NITRITE, URINE: NEGATIVE
PDW BLD-RTO: 12.7 FL (ref 11.5–15)
PH UA: 6 (ref 5–9)
PLATELET # BLD: 239 E9/L (ref 130–450)
PMV BLD AUTO: 11.3 FL (ref 7–12)
POTASSIUM SERPL-SCNC: 4 MMOL/L (ref 3.5–5)
PROTEIN UA: NEGATIVE MG/DL
RBC # BLD: 4.1 E12/L (ref 3.5–5.5)
RBC UA: ABNORMAL /HPF (ref 0–2)
SODIUM BLD-SCNC: 139 MMOL/L (ref 132–146)
SPECIFIC GRAVITY UA: 1.02 (ref 1–1.03)
TOTAL PROTEIN: 6.9 G/DL (ref 6.4–8.3)
UROBILINOGEN, URINE: 0.2 E.U./DL
WBC # BLD: 7.1 E9/L (ref 4.5–11.5)
WBC UA: ABNORMAL /HPF (ref 0–5)

## 2021-02-23 PROCEDURE — 86901 BLOOD TYPING SEROLOGIC RH(D): CPT

## 2021-02-23 PROCEDURE — 86900 BLOOD TYPING SEROLOGIC ABO: CPT

## 2021-02-23 PROCEDURE — 85025 COMPLETE CBC W/AUTO DIFF WBC: CPT

## 2021-02-23 PROCEDURE — 87088 URINE BACTERIA CULTURE: CPT

## 2021-02-23 PROCEDURE — 36415 COLL VENOUS BLD VENIPUNCTURE: CPT

## 2021-02-23 PROCEDURE — 81001 URINALYSIS AUTO W/SCOPE: CPT

## 2021-02-23 PROCEDURE — 87081 CULTURE SCREEN ONLY: CPT

## 2021-02-23 PROCEDURE — 86850 RBC ANTIBODY SCREEN: CPT

## 2021-02-23 PROCEDURE — U0003 INFECTIOUS AGENT DETECTION BY NUCLEIC ACID (DNA OR RNA); SEVERE ACUTE RESPIRATORY SYNDROME CORONAVIRUS 2 (SARS-COV-2) (CORONAVIRUS DISEASE [COVID-19]), AMPLIFIED PROBE TECHNIQUE, MAKING USE OF HIGH THROUGHPUT TECHNOLOGIES AS DESCRIBED BY CMS-2020-01-R: HCPCS

## 2021-02-23 PROCEDURE — 80053 COMPREHEN METABOLIC PANEL: CPT

## 2021-02-23 PROCEDURE — 71046 X-RAY EXAM CHEST 2 VIEWS: CPT

## 2021-02-24 LAB
MRSA CULTURE ONLY: NORMAL
URINE CULTURE, ROUTINE: NORMAL

## 2021-02-25 LAB
SARS-COV-2: NOT DETECTED
SOURCE: NORMAL

## 2021-03-01 ENCOUNTER — ANESTHESIA EVENT (OUTPATIENT)
Dept: OPERATING ROOM | Age: 51
DRG: 470 | End: 2021-03-01
Payer: COMMERCIAL

## 2021-03-01 NOTE — H&P
Flo Dancer, MD   Physician   Specialty:  Orthopedic Surgery                                      Chief Complaint   Patient presents with    Hip Pain       Pain in both hips. Left worse than right. History of right ROLA 2/25/2014. No xrays.           HPI   64-year-old woman with chronic progressively disabling left hip pain no longer manageable with conservative measures. 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 Came to right total hip arthroplasty by me in 2014.     Other problems include cervical and lumbar spondylosis with cervical and lumbar fusions last year. Also under treatment with rheumatology for lupus and fibromyalgia on Plaquenil and naproxen.     She while she has some right hip pain is primarily lateral.  Left hip pain is more severe including groin pain with mechanical cracking popping and stiffening and sometimes loss of balance.   Ambulating with a cane.         Patient Active Problem List   Diagnosis    DJD (degenerative joint disease)    Status post right hip replacement    Gait difficulty    Acute blood loss anemia    Leg length discrepancy    Bipolar affective disorder (HCC)    GERD (gastroesophageal reflux disease)    Constipation    Elevated liver enzymes    Protein calorie malnutrition (HCC)    Low back pain    Closed displaced fracture of neck of fifth metacarpal bone of right hand    Arthritis of carpometacarpal (CMC) joint of left thumb    Bilateral carpal tunnel syndrome    Memory difficulty    Glucose intolerance (impaired glucose tolerance)    Mild cognitive impairment with memory loss    Lupus ([address]    Lumbar spondylosis         Past Medical History        Past Medical History:   Diagnosis Date    Anxiety       dr Shalom Schuler 63 in [facility] counseling    Arthritis      Asthma      Bipolar 2 disorder ([address]      Bruising tendency (HCC)      Chronic back pain      Fibromyalgia      Glucose intolerance (impaired glucose tolerance) 6/15/2020     hyperglycemia    Insomnia      Lupus ([address]      Memory difficulty 6/15/2020    Mild cognitive impairment with memory loss 6/15/2020    Thumb pain       left - for OR 4-16-19             Past Surgical History         Past Surgical History:   Procedure Laterality Date    ABDOMEN SURGERY         fatty tumor removed     ARTHROPLASTY Left 4/16/2019     LEFT THUMB TRAPEZIECTOMY WITH LIGAMENT RECONSTRUCTION LEFT PARTIAL TRAPEZOIDECTOMY LEFT DEQUERVAINS RELEASE LEFT CARPAL TUNNEL RELEASE performed by [facility]y [facility] at [facility]         right    COLONOSCOPY        FINGER SURGERY         right pinkie    HERNIA REPAIR   1970    HIP ARTHROTOMY   1973     right    NERVE BLOCK         x4     TOTAL HIP ARTHROPLASTY Right 2/25/14    TUBAL LIGATION   10/1994    TUMOR REMOVAL         right shoulder fatty tumor            Current Facility-Administered Medications          Current Outpatient Medications   Medication Sig Dispense Refill    brexpiprazole (REXULTI) 1 MG TABS tablet Take 1 mg by mouth daily         naproxen (NAPROSYN) 500 MG tablet Take 500 mg by mouth 3 times daily (with meals)         vitamin D (CHOLECALCIFEROL) 25 MCG (1000 UT) TABS tablet Take by mouth daily         calcium carbonate 600 MG TABS tablet Take by mouth daily         hydroxychloroquine (PLAQUENIL) 200 MG tablet Take by mouth 2 times daily         ALPRAZolam (XANAX) 1 MG tablet Take 1 mg by mouth 3 times daily as needed.    1    PROVENTIL  (90 Base) MCG/ACT inhaler INHALE 2 PUFFS EVERY 4 HOURS AS NEEDED   2    lamoTRIgine (LAMICTAL) 200 MG tablet Take 200 mg by mouth 2 tabs at night        venlafaxine (EFFEXOR XR) 75 MG extended release capsule Take 75 mg by mouth daily    0    zolpidem (AMBIEN) 10 MG tablet Take  by mouth nightly as needed for Sleep.        traZODone (DESYREL) 150 MG tablet Take 150 mg by mouth nightly.          No current facility-administered medications for this visit.             No Known Allergies     Social History   Social History            Socioeconomic History    Marital status:        Spouse name: None    Number of children: None    Years of education: None    Highest education level: None   Occupational History    None   Social Needs    Financial resource strain: None    Food insecurity       Worry: None       Inability: None    Transportation needs       Medical: None       Non-medical: None   Tobacco Use    Smoking status: Former Smoker       Packs/day: 1.00       Years: 30.00       Pack years: 30.00       Quit date: 2014       Years since quittin.0    Smokeless tobacco: Never Used    Tobacco comment: stop    Substance and Sexual Activity    Alcohol use: Yes       Alcohol/week: 3.0 standard drinks       Types: 3 Cans of beer per week       Comment: SOCIAL    Drug use: No    Sexual activity: Yes       Partners: Male   Lifestyle    Physical activity       Days per week: None       Minutes per session: None    Stress: None   Relationships    Social connections       Talks on phone: None       Gets together: None       Attends Jewish service: None       Active member of club or organization: None       Attends meetings of clubs or organizations: None       Relationship status: None    Intimate partner violence       Fear of current or ex partner: None       Emotionally abused: None       Physically abused: None       Forced sexual activity: None   Other Topics Concern    None   Social History Narrative    None            Family History         Family History   Problem Relation Age of Onset    Cancer Father           LEUKEMIA    Cancer Maternal Grandmother           lung and brain ca             Review of Systems   No fever, chills, or other constitutionalsymptoms. No numbness or other neuro symptoms. No chest pain.   No dyspnea.     [unfilled]   No acute distress at rest. Ambulates with a cane full weightbearing. 5 mm shortening left lower extremity compared to the right. There is no joint pain with full right hip rotation. Left groin and thigh pain are reproduced with internal rotation on the left to only 20 degrees. Distal neurologic and motor exam is grossly intact left lower extremity.     Physical Exam    Patient is alert and oriented. Well-developed well-nourished. BMI 26. Pupils equal and reactive. Scleraeanicteric. Neck supple  Lungs clear. Cardiac rate and rhythm regular. Abdomen soft and nontender. Skin warm and dry.         XRAY: X-ray today AP pelvis with AP and lateral views bilateral hips. On the AP pelvis view there is noted to be lumbar pedicle instrumentation in place. Right total hip arthroplasty in place with previous cable fixation also noted. There is no lucencies subsidence or visible wear.     Left hip shows degenerative narrowing minimal joint space remaining femoral acetabular joint space. Especially on the lateral view there is noted to be loss of sphericity of the femoral head with superior anterior segment collapse.     Impression: Intact right total hip arthroplasty  Significant osteoarthritic changes left hip secondary related to Perthes disease and AVN.    ASSESSMENT/PLAN:     Luis Caro was seen today for hip pain.     Diagnoses and all orders for this visit:     Other secondary osteoarthritis of left hip     Bilateral hip pain  -     XR HIP BILATERAL W AP PELVIS (2 VIEWS); Future     H/O total hip arthroplasty, right     Lumbar spondylosis     Lupus ([facility]     Options reviewed with the patient. She is having progressively disabling symptoms and is ready to proceed with left hip arthroplasty. Preoperative teaching given today.   The procedure, indications, risks, limitations and recovery time were all reviewed with patient, who requests to proceed.        Anali Ferrell MD

## 2021-03-02 ENCOUNTER — APPOINTMENT (OUTPATIENT)
Dept: GENERAL RADIOLOGY | Age: 51
DRG: 470 | End: 2021-03-02
Attending: ORTHOPAEDIC SURGERY
Payer: COMMERCIAL

## 2021-03-02 ENCOUNTER — HOSPITAL ENCOUNTER (INPATIENT)
Age: 51
LOS: 2 days | Discharge: HOME | DRG: 470 | End: 2021-03-04
Attending: ORTHOPAEDIC SURGERY | Admitting: ORTHOPAEDIC SURGERY
Payer: COMMERCIAL

## 2021-03-02 ENCOUNTER — ANESTHESIA (OUTPATIENT)
Dept: OPERATING ROOM | Age: 51
DRG: 470 | End: 2021-03-02
Payer: COMMERCIAL

## 2021-03-02 VITALS — TEMPERATURE: 95.7 F | OXYGEN SATURATION: 100 % | DIASTOLIC BLOOD PRESSURE: 88 MMHG | SYSTOLIC BLOOD PRESSURE: 150 MMHG

## 2021-03-02 DIAGNOSIS — Z01.818 PREOP TESTING: ICD-10-CM

## 2021-03-02 DIAGNOSIS — M16.12 PRIMARY OSTEOARTHRITIS OF LEFT HIP: Primary | ICD-10-CM

## 2021-03-02 DIAGNOSIS — U07.1 COVID-19: ICD-10-CM

## 2021-03-02 DIAGNOSIS — Z98.890 HISTORY OF SURGERY: ICD-10-CM

## 2021-03-02 PROBLEM — Z96.642 STATUS POST TOTAL HIP REPLACEMENT, LEFT: Status: ACTIVE | Noted: 2021-03-02

## 2021-03-02 PROBLEM — M32.9 LUPUS (HCC): Chronic | Status: ACTIVE | Noted: 2021-02-03

## 2021-03-02 LAB — METER GLUCOSE: 167 MG/DL (ref 74–99)

## 2021-03-02 PROCEDURE — 2500000003 HC RX 250 WO HCPCS: Performed by: ORTHOPAEDIC SURGERY

## 2021-03-02 PROCEDURE — 97530 THERAPEUTIC ACTIVITIES: CPT

## 2021-03-02 PROCEDURE — 3600000005 HC SURGERY LEVEL 5 BASE: Performed by: ORTHOPAEDIC SURGERY

## 2021-03-02 PROCEDURE — 97162 PT EVAL MOD COMPLEX 30 MIN: CPT

## 2021-03-02 PROCEDURE — 82962 GLUCOSE BLOOD TEST: CPT

## 2021-03-02 PROCEDURE — 2580000003 HC RX 258: Performed by: ORTHOPAEDIC SURGERY

## 2021-03-02 PROCEDURE — 3600000015 HC SURGERY LEVEL 5 ADDTL 15MIN: Performed by: ORTHOPAEDIC SURGERY

## 2021-03-02 PROCEDURE — 6360000002 HC RX W HCPCS: Performed by: ORTHOPAEDIC SURGERY

## 2021-03-02 PROCEDURE — 0SRB02A REPLACEMENT OF LEFT HIP JOINT WITH METAL ON POLYETHYLENE SYNTHETIC SUBSTITUTE, UNCEMENTED, OPEN APPROACH: ICD-10-PCS | Performed by: STUDENT IN AN ORGANIZED HEALTH CARE EDUCATION/TRAINING PROGRAM

## 2021-03-02 PROCEDURE — 88304 TISSUE EXAM BY PATHOLOGIST: CPT

## 2021-03-02 PROCEDURE — C1713 ANCHOR/SCREW BN/BN,TIS/BN: HCPCS | Performed by: ORTHOPAEDIC SURGERY

## 2021-03-02 PROCEDURE — 2580000003 HC RX 258: Performed by: NURSE ANESTHETIST, CERTIFIED REGISTERED

## 2021-03-02 PROCEDURE — 73501 X-RAY EXAM HIP UNI 1 VIEW: CPT

## 2021-03-02 PROCEDURE — 1200000000 HC SEMI PRIVATE

## 2021-03-02 PROCEDURE — 2709999900 HC NON-CHARGEABLE SUPPLY: Performed by: ORTHOPAEDIC SURGERY

## 2021-03-02 PROCEDURE — C1776 JOINT DEVICE (IMPLANTABLE): HCPCS | Performed by: ORTHOPAEDIC SURGERY

## 2021-03-02 PROCEDURE — 6360000002 HC RX W HCPCS: Performed by: NURSE ANESTHETIST, CERTIFIED REGISTERED

## 2021-03-02 PROCEDURE — 3700000000 HC ANESTHESIA ATTENDED CARE: Performed by: ORTHOPAEDIC SURGERY

## 2021-03-02 PROCEDURE — 97165 OT EVAL LOW COMPLEX 30 MIN: CPT

## 2021-03-02 PROCEDURE — 2500000003 HC RX 250 WO HCPCS: Performed by: NURSE ANESTHETIST, CERTIFIED REGISTERED

## 2021-03-02 PROCEDURE — 7100000001 HC PACU RECOVERY - ADDTL 15 MIN: Performed by: ORTHOPAEDIC SURGERY

## 2021-03-02 PROCEDURE — 27130 TOTAL HIP ARTHROPLASTY: CPT | Performed by: ORTHOPAEDIC SURGERY

## 2021-03-02 PROCEDURE — 3700000001 HC ADD 15 MINUTES (ANESTHESIA): Performed by: ORTHOPAEDIC SURGERY

## 2021-03-02 PROCEDURE — 7100000000 HC PACU RECOVERY - FIRST 15 MIN: Performed by: ORTHOPAEDIC SURGERY

## 2021-03-02 PROCEDURE — 6370000000 HC RX 637 (ALT 250 FOR IP): Performed by: ORTHOPAEDIC SURGERY

## 2021-03-02 PROCEDURE — 88311 DECALCIFY TISSUE: CPT

## 2021-03-02 PROCEDURE — 2720000010 HC SURG SUPPLY STERILE: Performed by: ORTHOPAEDIC SURGERY

## 2021-03-02 PROCEDURE — 2700000000 HC OXYGEN THERAPY PER DAY

## 2021-03-02 DEVICE — SCREW BNE L35MM DIA65MM LO PROF HEX TRIDENT LL: Type: IMPLANTABLE DEVICE | Site: HIP | Status: FUNCTIONAL

## 2021-03-02 DEVICE — IMPLANTABLE DEVICE: Type: IMPLANTABLE DEVICE | Site: HIP | Status: FUNCTIONAL

## 2021-03-02 DEVICE — SCREW BNE L20MM DIA65MM LO PROF HEX TRIDENT LL: Type: IMPLANTABLE DEVICE | Site: HIP | Status: FUNCTIONAL

## 2021-03-02 DEVICE — HEAD FEM DIA32MM +0MM OFFSET HIP BIOLOX DELT CERAMIC TAPR: Type: IMPLANTABLE DEVICE | Site: HIP | Status: FUNCTIONAL

## 2021-03-02 DEVICE — STEM FEM SZ 4 L105MM NK L35MM 42MM OFFSET 127DEG HIP TI: Type: IMPLANTABLE DEVICE | Site: HIP | Status: FUNCTIONAL

## 2021-03-02 DEVICE — SHELL ACET SZ E DIA52MM 5 CLUS H TRITANIUM PRESSFIT PRI: Type: IMPLANTABLE DEVICE | Site: HIP | Status: FUNCTIONAL

## 2021-03-02 RX ORDER — MIDAZOLAM HYDROCHLORIDE 1 MG/ML
INJECTION INTRAMUSCULAR; INTRAVENOUS PRN
Status: DISCONTINUED | OUTPATIENT
Start: 2021-03-02 | End: 2021-03-02 | Stop reason: SDUPTHER

## 2021-03-02 RX ORDER — VANCOMYCIN HYDROCHLORIDE 1 G/20ML
INJECTION, POWDER, LYOPHILIZED, FOR SOLUTION INTRAVENOUS PRN
Status: DISCONTINUED | OUTPATIENT
Start: 2021-03-02 | End: 2021-03-02 | Stop reason: HOSPADM

## 2021-03-02 RX ORDER — ACETAMINOPHEN 500 MG
1000 TABLET ORAL ONCE
Status: COMPLETED | OUTPATIENT
Start: 2021-03-02 | End: 2021-03-02

## 2021-03-02 RX ORDER — SODIUM CHLORIDE 9 MG/ML
INJECTION, SOLUTION INTRAVENOUS CONTINUOUS
Status: ACTIVE | OUTPATIENT
Start: 2021-03-02 | End: 2021-03-03

## 2021-03-02 RX ORDER — SODIUM CHLORIDE 9 MG/ML
INJECTION, SOLUTION INTRAVENOUS CONTINUOUS
Status: DISCONTINUED | OUTPATIENT
Start: 2021-03-02 | End: 2021-03-02

## 2021-03-02 RX ORDER — PROMETHAZINE HYDROCHLORIDE 25 MG/ML
6.25 INJECTION, SOLUTION INTRAMUSCULAR; INTRAVENOUS
Status: DISCONTINUED | OUTPATIENT
Start: 2021-03-02 | End: 2021-03-02 | Stop reason: HOSPADM

## 2021-03-02 RX ORDER — SODIUM CHLORIDE 0.9 % (FLUSH) 0.9 %
10 SYRINGE (ML) INJECTION EVERY 12 HOURS SCHEDULED
Status: DISCONTINUED | OUTPATIENT
Start: 2021-03-02 | End: 2021-03-04 | Stop reason: HOSPADM

## 2021-03-02 RX ORDER — GABAPENTIN 300 MG/1
600 CAPSULE ORAL ONCE
Status: COMPLETED | OUTPATIENT
Start: 2021-03-02 | End: 2021-03-02

## 2021-03-02 RX ORDER — NEOSTIGMINE METHYLSULFATE 1 MG/ML
INJECTION, SOLUTION INTRAVENOUS PRN
Status: DISCONTINUED | OUTPATIENT
Start: 2021-03-02 | End: 2021-03-02 | Stop reason: SDUPTHER

## 2021-03-02 RX ORDER — ONDANSETRON 4 MG/1
4 TABLET, ORALLY DISINTEGRATING ORAL EVERY 8 HOURS PRN
Status: DISCONTINUED | OUTPATIENT
Start: 2021-03-02 | End: 2021-03-04 | Stop reason: HOSPADM

## 2021-03-02 RX ORDER — MEPERIDINE HYDROCHLORIDE 25 MG/ML
12.5 INJECTION INTRAMUSCULAR; INTRAVENOUS; SUBCUTANEOUS EVERY 5 MIN PRN
Status: DISCONTINUED | OUTPATIENT
Start: 2021-03-02 | End: 2021-03-02 | Stop reason: HOSPADM

## 2021-03-02 RX ORDER — SODIUM CHLORIDE 0.9 % (FLUSH) 0.9 %
10 SYRINGE (ML) INJECTION PRN
Status: DISCONTINUED | OUTPATIENT
Start: 2021-03-02 | End: 2021-03-04 | Stop reason: HOSPADM

## 2021-03-02 RX ORDER — MORPHINE SULFATE 4 MG/ML
4 INJECTION, SOLUTION INTRAMUSCULAR; INTRAVENOUS
Status: DISCONTINUED | OUTPATIENT
Start: 2021-03-02 | End: 2021-03-04 | Stop reason: HOSPADM

## 2021-03-02 RX ORDER — LIDOCAINE HYDROCHLORIDE 20 MG/ML
INJECTION, SOLUTION INTRAVENOUS PRN
Status: DISCONTINUED | OUTPATIENT
Start: 2021-03-02 | End: 2021-03-02 | Stop reason: SDUPTHER

## 2021-03-02 RX ORDER — ZOLPIDEM TARTRATE 5 MG/1
5 TABLET ORAL NIGHTLY PRN
Status: DISCONTINUED | OUTPATIENT
Start: 2021-03-02 | End: 2021-03-04 | Stop reason: HOSPADM

## 2021-03-02 RX ORDER — ALPRAZOLAM 1 MG/1
2 TABLET ORAL 3 TIMES DAILY
Status: DISCONTINUED | OUTPATIENT
Start: 2021-03-02 | End: 2021-03-04 | Stop reason: HOSPADM

## 2021-03-02 RX ORDER — OXYCODONE HYDROCHLORIDE 5 MG/1
5 TABLET ORAL EVERY 4 HOURS PRN
Status: DISCONTINUED | OUTPATIENT
Start: 2021-03-02 | End: 2021-03-04 | Stop reason: HOSPADM

## 2021-03-02 RX ORDER — LAMOTRIGINE 100 MG/1
200 TABLET ORAL DAILY
Status: DISCONTINUED | OUTPATIENT
Start: 2021-03-02 | End: 2021-03-02

## 2021-03-02 RX ORDER — ASPIRIN 81 MG/1
81 TABLET ORAL 2 TIMES DAILY
Status: DISCONTINUED | OUTPATIENT
Start: 2021-03-02 | End: 2021-03-04 | Stop reason: HOSPADM

## 2021-03-02 RX ORDER — ONDANSETRON 2 MG/ML
INJECTION INTRAMUSCULAR; INTRAVENOUS PRN
Status: DISCONTINUED | OUTPATIENT
Start: 2021-03-02 | End: 2021-03-02 | Stop reason: SDUPTHER

## 2021-03-02 RX ORDER — MORPHINE SULFATE 2 MG/ML
2 INJECTION, SOLUTION INTRAMUSCULAR; INTRAVENOUS
Status: DISCONTINUED | OUTPATIENT
Start: 2021-03-02 | End: 2021-03-04 | Stop reason: HOSPADM

## 2021-03-02 RX ORDER — VENLAFAXINE HYDROCHLORIDE 75 MG/1
75 CAPSULE, EXTENDED RELEASE ORAL DAILY
Status: DISCONTINUED | OUTPATIENT
Start: 2021-03-02 | End: 2021-03-04 | Stop reason: HOSPADM

## 2021-03-02 RX ORDER — ACETAMINOPHEN 325 MG/1
650 TABLET ORAL EVERY 6 HOURS
Status: DISCONTINUED | OUTPATIENT
Start: 2021-03-02 | End: 2021-03-04 | Stop reason: HOSPADM

## 2021-03-02 RX ORDER — FENTANYL CITRATE 50 UG/ML
INJECTION, SOLUTION INTRAMUSCULAR; INTRAVENOUS PRN
Status: DISCONTINUED | OUTPATIENT
Start: 2021-03-02 | End: 2021-03-02 | Stop reason: SDUPTHER

## 2021-03-02 RX ORDER — LABETALOL HYDROCHLORIDE 5 MG/ML
5 INJECTION, SOLUTION INTRAVENOUS EVERY 10 MIN PRN
Status: DISCONTINUED | OUTPATIENT
Start: 2021-03-02 | End: 2021-03-02 | Stop reason: HOSPADM

## 2021-03-02 RX ORDER — SODIUM CHLORIDE 9 MG/ML
INJECTION, SOLUTION INTRAVENOUS CONTINUOUS PRN
Status: DISCONTINUED | OUTPATIENT
Start: 2021-03-02 | End: 2021-03-02 | Stop reason: SDUPTHER

## 2021-03-02 RX ORDER — VITAMIN B COMPLEX
1000 TABLET ORAL DAILY
Status: DISCONTINUED | OUTPATIENT
Start: 2021-03-02 | End: 2021-03-04 | Stop reason: HOSPADM

## 2021-03-02 RX ORDER — DEXAMETHASONE SODIUM PHOSPHATE 10 MG/ML
INJECTION INTRAMUSCULAR; INTRAVENOUS PRN
Status: DISCONTINUED | OUTPATIENT
Start: 2021-03-02 | End: 2021-03-02 | Stop reason: SDUPTHER

## 2021-03-02 RX ORDER — CELECOXIB 100 MG/1
200 CAPSULE ORAL ONCE
Status: COMPLETED | OUTPATIENT
Start: 2021-03-02 | End: 2021-03-02

## 2021-03-02 RX ORDER — PROPOFOL 10 MG/ML
INJECTION, EMULSION INTRAVENOUS PRN
Status: DISCONTINUED | OUTPATIENT
Start: 2021-03-02 | End: 2021-03-02 | Stop reason: SDUPTHER

## 2021-03-02 RX ORDER — ONDANSETRON 2 MG/ML
4 INJECTION INTRAMUSCULAR; INTRAVENOUS EVERY 6 HOURS PRN
Status: DISCONTINUED | OUTPATIENT
Start: 2021-03-02 | End: 2021-03-04 | Stop reason: HOSPADM

## 2021-03-02 RX ORDER — GLYCOPYRROLATE 1 MG/5 ML
SYRINGE (ML) INTRAVENOUS PRN
Status: DISCONTINUED | OUTPATIENT
Start: 2021-03-02 | End: 2021-03-02 | Stop reason: SDUPTHER

## 2021-03-02 RX ORDER — LAMOTRIGINE 100 MG/1
400 TABLET ORAL NIGHTLY
Status: DISCONTINUED | OUTPATIENT
Start: 2021-03-02 | End: 2021-03-04 | Stop reason: HOSPADM

## 2021-03-02 RX ORDER — OXYCODONE HYDROCHLORIDE 10 MG/1
10 TABLET ORAL EVERY 4 HOURS PRN
Status: DISCONTINUED | OUTPATIENT
Start: 2021-03-02 | End: 2021-03-04 | Stop reason: HOSPADM

## 2021-03-02 RX ORDER — HYDRALAZINE HYDROCHLORIDE 20 MG/ML
5 INJECTION INTRAMUSCULAR; INTRAVENOUS EVERY 10 MIN PRN
Status: DISCONTINUED | OUTPATIENT
Start: 2021-03-02 | End: 2021-03-02 | Stop reason: HOSPADM

## 2021-03-02 RX ORDER — CALCIUM GLUCONATE 94 MG/ML
INJECTION, SOLUTION INTRAVENOUS PRN
Status: DISCONTINUED | OUTPATIENT
Start: 2021-03-02 | End: 2021-03-02 | Stop reason: HOSPADM

## 2021-03-02 RX ORDER — TRAZODONE HYDROCHLORIDE 150 MG/1
150 TABLET ORAL NIGHTLY
Status: DISCONTINUED | OUTPATIENT
Start: 2021-03-02 | End: 2021-03-04 | Stop reason: HOSPADM

## 2021-03-02 RX ORDER — SODIUM CHLORIDE 0.9 % (FLUSH) 0.9 %
10 SYRINGE (ML) INJECTION PRN
Status: DISCONTINUED | OUTPATIENT
Start: 2021-03-02 | End: 2021-03-02 | Stop reason: HOSPADM

## 2021-03-02 RX ORDER — SODIUM CHLORIDE 0.9 % (FLUSH) 0.9 %
10 SYRINGE (ML) INJECTION EVERY 12 HOURS SCHEDULED
Status: DISCONTINUED | OUTPATIENT
Start: 2021-03-02 | End: 2021-03-02 | Stop reason: HOSPADM

## 2021-03-02 RX ORDER — DEXAMETHASONE SODIUM PHOSPHATE 4 MG/ML
8 INJECTION, SOLUTION INTRA-ARTICULAR; INTRALESIONAL; INTRAMUSCULAR; INTRAVENOUS; SOFT TISSUE ONCE
Status: COMPLETED | OUTPATIENT
Start: 2021-03-02 | End: 2021-03-02

## 2021-03-02 RX ORDER — SENNA AND DOCUSATE SODIUM 50; 8.6 MG/1; MG/1
1 TABLET, FILM COATED ORAL 2 TIMES DAILY
Status: DISCONTINUED | OUTPATIENT
Start: 2021-03-02 | End: 2021-03-03

## 2021-03-02 RX ORDER — ROCURONIUM BROMIDE 10 MG/ML
INJECTION, SOLUTION INTRAVENOUS PRN
Status: DISCONTINUED | OUTPATIENT
Start: 2021-03-02 | End: 2021-03-02 | Stop reason: SDUPTHER

## 2021-03-02 RX ADMIN — TRANEXAMIC ACID 1000 G: 1 INJECTION, SOLUTION INTRAVENOUS at 08:06

## 2021-03-02 RX ADMIN — FENTANYL CITRATE 50 MCG: 50 INJECTION, SOLUTION INTRAMUSCULAR; INTRAVENOUS at 08:35

## 2021-03-02 RX ADMIN — Medication 0.4 MG: at 09:54

## 2021-03-02 RX ADMIN — SODIUM CHLORIDE: 9 INJECTION, SOLUTION INTRAVENOUS at 08:27

## 2021-03-02 RX ADMIN — TRANEXAMIC ACID 1000 MG: 1 INJECTION, SOLUTION INTRAVENOUS at 10:47

## 2021-03-02 RX ADMIN — ACETAMINOPHEN 1000 MG: 500 TABLET ORAL at 06:46

## 2021-03-02 RX ADMIN — ALPRAZOLAM 2 MG: 1 TABLET ORAL at 21:06

## 2021-03-02 RX ADMIN — OXYCODONE HYDROCHLORIDE 10 MG: 10 TABLET ORAL at 18:24

## 2021-03-02 RX ADMIN — SODIUM CHLORIDE: 9 INJECTION, SOLUTION INTRAVENOUS at 09:53

## 2021-03-02 RX ADMIN — LAMOTRIGINE 400 MG: 100 TABLET ORAL at 21:06

## 2021-03-02 RX ADMIN — FENTANYL CITRATE 50 MCG: 50 INJECTION, SOLUTION INTRAMUSCULAR; INTRAVENOUS at 08:30

## 2021-03-02 RX ADMIN — FENTANYL CITRATE 50 MCG: 50 INJECTION, SOLUTION INTRAMUSCULAR; INTRAVENOUS at 08:31

## 2021-03-02 RX ADMIN — ACETAMINOPHEN 650 MG: 325 TABLET ORAL at 14:17

## 2021-03-02 RX ADMIN — ROCURONIUM BROMIDE 40 MG: 10 INJECTION, SOLUTION INTRAVENOUS at 07:55

## 2021-03-02 RX ADMIN — FENTANYL CITRATE 150 MCG: 50 INJECTION, SOLUTION INTRAMUSCULAR; INTRAVENOUS at 07:55

## 2021-03-02 RX ADMIN — SODIUM CHLORIDE: 9 INJECTION, SOLUTION INTRAVENOUS at 06:49

## 2021-03-02 RX ADMIN — OXYCODONE HYDROCHLORIDE 5 MG: 5 TABLET ORAL at 14:17

## 2021-03-02 RX ADMIN — TRAZODONE HYDROCHLORIDE 150 MG: 150 TABLET ORAL at 21:05

## 2021-03-02 RX ADMIN — ACETAMINOPHEN 650 MG: 325 TABLET ORAL at 20:08

## 2021-03-02 RX ADMIN — LIDOCAINE HYDROCHLORIDE 80 MG: 20 INJECTION, SOLUTION INTRAVENOUS at 07:55

## 2021-03-02 RX ADMIN — SODIUM CHLORIDE: 9 INJECTION, SOLUTION INTRAVENOUS at 07:48

## 2021-03-02 RX ADMIN — Medication 2000 MG: at 07:59

## 2021-03-02 RX ADMIN — GABAPENTIN 600 MG: 300 CAPSULE ORAL at 06:47

## 2021-03-02 RX ADMIN — PHENYLEPHRINE HYDROCHLORIDE 50 MCG: 10 INJECTION INTRAVENOUS at 08:50

## 2021-03-02 RX ADMIN — ALPRAZOLAM 2 MG: 1 TABLET ORAL at 14:17

## 2021-03-02 RX ADMIN — MIDAZOLAM 1 MG: 1 INJECTION INTRAMUSCULAR; INTRAVENOUS at 07:55

## 2021-03-02 RX ADMIN — Medication 2 MG: at 09:54

## 2021-03-02 RX ADMIN — ASPIRIN 81 MG: 81 TABLET, COATED ORAL at 21:06

## 2021-03-02 RX ADMIN — ROCURONIUM BROMIDE 20 MG: 10 INJECTION, SOLUTION INTRAVENOUS at 08:26

## 2021-03-02 RX ADMIN — DOCUSATE SODIUM 50 MG AND SENNOSIDES 8.6 MG 1 TABLET: 8.6; 5 TABLET, FILM COATED ORAL at 14:17

## 2021-03-02 RX ADMIN — Medication 1000 UNITS: at 16:26

## 2021-03-02 RX ADMIN — ROCURONIUM BROMIDE 15 MG: 10 INJECTION, SOLUTION INTRAVENOUS at 09:15

## 2021-03-02 RX ADMIN — PHENYLEPHRINE HYDROCHLORIDE 50 MCG: 10 INJECTION INTRAVENOUS at 09:39

## 2021-03-02 RX ADMIN — DEXAMETHASONE SODIUM PHOSPHATE 8 MG: 4 INJECTION, SOLUTION INTRAMUSCULAR; INTRAVENOUS at 06:49

## 2021-03-02 RX ADMIN — CELECOXIB 200 MG: 100 CAPSULE ORAL at 06:48

## 2021-03-02 RX ADMIN — FENTANYL CITRATE 50 MCG: 50 INJECTION, SOLUTION INTRAMUSCULAR; INTRAVENOUS at 09:16

## 2021-03-02 RX ADMIN — SODIUM CHLORIDE: 9 INJECTION, SOLUTION INTRAVENOUS at 14:18

## 2021-03-02 RX ADMIN — ASPIRIN 81 MG: 81 TABLET, COATED ORAL at 16:25

## 2021-03-02 RX ADMIN — PHENYLEPHRINE HYDROCHLORIDE 100 MCG: 10 INJECTION INTRAVENOUS at 09:22

## 2021-03-02 RX ADMIN — DEXAMETHASONE SODIUM PHOSPHATE 10 MG: 10 INJECTION INTRAMUSCULAR; INTRAVENOUS at 08:20

## 2021-03-02 RX ADMIN — PHENYLEPHRINE HYDROCHLORIDE 100 MCG: 10 INJECTION INTRAVENOUS at 09:00

## 2021-03-02 RX ADMIN — CEFAZOLIN 2000 MG: 10 INJECTION, POWDER, FOR SOLUTION INTRAVENOUS at 16:27

## 2021-03-02 RX ADMIN — PHENYLEPHRINE HYDROCHLORIDE 100 MCG: 10 INJECTION INTRAVENOUS at 09:13

## 2021-03-02 RX ADMIN — PROPOFOL 150 MG: 10 INJECTION, EMULSION INTRAVENOUS at 07:55

## 2021-03-02 RX ADMIN — DOCUSATE SODIUM 50 MG AND SENNOSIDES 8.6 MG 1 TABLET: 8.6; 5 TABLET, FILM COATED ORAL at 21:05

## 2021-03-02 RX ADMIN — ONDANSETRON HYDROCHLORIDE 4 MG: 2 INJECTION, SOLUTION INTRAMUSCULAR; INTRAVENOUS at 09:47

## 2021-03-02 RX ADMIN — PHENYLEPHRINE HYDROCHLORIDE 50 MCG: 10 INJECTION INTRAVENOUS at 09:35

## 2021-03-02 RX ADMIN — PHENYLEPHRINE HYDROCHLORIDE 50 MCG: 10 INJECTION INTRAVENOUS at 09:19

## 2021-03-02 ASSESSMENT — PAIN DESCRIPTION - ORIENTATION: ORIENTATION: LEFT

## 2021-03-02 ASSESSMENT — PULMONARY FUNCTION TESTS
PIF_VALUE: 15
PIF_VALUE: 19
PIF_VALUE: 17
PIF_VALUE: 20
PIF_VALUE: 19
PIF_VALUE: 20
PIF_VALUE: 20
PIF_VALUE: 21
PIF_VALUE: 31
PIF_VALUE: 16
PIF_VALUE: 20
PIF_VALUE: 21
PIF_VALUE: 18
PIF_VALUE: 20
PIF_VALUE: 17
PIF_VALUE: 16
PIF_VALUE: 0
PIF_VALUE: 16
PIF_VALUE: 21
PIF_VALUE: 19
PIF_VALUE: 16
PIF_VALUE: 21
PIF_VALUE: 16
PIF_VALUE: 19
PIF_VALUE: 18
PIF_VALUE: 21
PIF_VALUE: 21
PIF_VALUE: 17
PIF_VALUE: 19
PIF_VALUE: 18
PIF_VALUE: 21
PIF_VALUE: 0
PIF_VALUE: 20
PIF_VALUE: 20
PIF_VALUE: 21
PIF_VALUE: 23
PIF_VALUE: 17
PIF_VALUE: 18
PIF_VALUE: 21
PIF_VALUE: 20
PIF_VALUE: 21
PIF_VALUE: 19
PIF_VALUE: 20
PIF_VALUE: 2
PIF_VALUE: 20
PIF_VALUE: 15
PIF_VALUE: 16
PIF_VALUE: 19
PIF_VALUE: 0
PIF_VALUE: 19
PIF_VALUE: 0
PIF_VALUE: 17
PIF_VALUE: 19
PIF_VALUE: 18
PIF_VALUE: 16
PIF_VALUE: 21
PIF_VALUE: 20
PIF_VALUE: 16
PIF_VALUE: 25
PIF_VALUE: 19
PIF_VALUE: 17
PIF_VALUE: 22
PIF_VALUE: 20
PIF_VALUE: 20
PIF_VALUE: 21
PIF_VALUE: 1
PIF_VALUE: 16
PIF_VALUE: 16
PIF_VALUE: 17
PIF_VALUE: 19
PIF_VALUE: 19
PIF_VALUE: 28
PIF_VALUE: 16
PIF_VALUE: 18
PIF_VALUE: 21
PIF_VALUE: 21

## 2021-03-02 ASSESSMENT — PAIN DESCRIPTION - ONSET
ONSET: ON-GOING

## 2021-03-02 ASSESSMENT — PAIN DESCRIPTION - PAIN TYPE
TYPE: ACUTE PAIN;SURGICAL PAIN
TYPE: ACUTE PAIN;SURGICAL PAIN

## 2021-03-02 ASSESSMENT — PAIN DESCRIPTION - LOCATION: LOCATION: HIP

## 2021-03-02 ASSESSMENT — PAIN - FUNCTIONAL ASSESSMENT
PAIN_FUNCTIONAL_ASSESSMENT: PREVENTS OR INTERFERES SOME ACTIVE ACTIVITIES AND ADLS
PAIN_FUNCTIONAL_ASSESSMENT: PREVENTS OR INTERFERES WITH MANY ACTIVE NOT PASSIVE ACTIVITIES

## 2021-03-02 ASSESSMENT — PAIN DESCRIPTION - DESCRIPTORS
DESCRIPTORS: SHARP;SHOOTING
DESCRIPTORS: SHARP;SHOOTING

## 2021-03-02 ASSESSMENT — PAIN SCALES - GENERAL
PAINLEVEL_OUTOF10: 6
PAINLEVEL_OUTOF10: 7

## 2021-03-02 ASSESSMENT — PAIN DESCRIPTION - PROGRESSION: CLINICAL_PROGRESSION: NOT CHANGED

## 2021-03-02 NOTE — ANESTHESIA POSTPROCEDURE EVALUATION
Department of Anesthesiology  Postprocedure Note    Patient: Prabhu [facility]  MRN: [id number]  YOB: 1970  Date of evaluation: 3/2/2021  Time:  2:35 PM     Procedure Summary     Date: 03/02/21 Room / Location: [facility] OR  / [facility]    Anesthesia Start: 0391 Anesthesia Stop: 1018    Procedure: LEFT HIP TOTAL ARTHROPLASTY (Left Hip) Diagnosis: (OSTEOARTHRITIS)    Surgeons: Anali Ferrell MD Responsible Provider: Teresa Loja MD    Anesthesia Type: general ASA Status: 3          Anesthesia Type: general    Alistair Phase I: Alisatir Score: 8    Alistair Phase II:      Last vitals: Reviewed and per EMR flowsheets.        Anesthesia Post Evaluation    Patient location during evaluation: PACU  Patient participation: complete - patient participated  Level of consciousness: awake and alert  Airway patency: patent  Nausea & Vomiting: no nausea and no vomiting  Complications: no  Cardiovascular status: hemodynamically stable and blood pressure returned to baseline  Respiratory status: acceptable  Hydration status: euvolemic

## 2021-03-02 NOTE — PROGRESS NOTES
Physical Therapy  Physical Therapy Initial Assessment     Name: Gerard Hughes  : 1970  MRN: [id number]    Referring Provider:  Elizabeth Ramos DO    Date of Service: 3/2/2021    Evaluating PT:  Linhalok EMMETT Lujan   Room #:  7672/0859-D  Diagnosis: Arthritis of left hip [M16.12]  Status post total hip replacement, left [Z96.642]     PMHx/PSHx:   has a past medical history of Anxiety, Arthritis, Asthma, Bipolar 2 disorder ([facility] Bruising tendency ([facility] Chronic back pain, Fibromyalgia, Glucose intolerance (impaired glucose tolerance), Insomnia, Lupus ([facility] Memory difficulty, Mild cognitive impairment with memory loss, and Thumb pain. has a past surgical history that includes Tubal ligation (10/1994); Carpal tunnel release; tumor removal; Finger surgery; Hip Arthrotomy (); Colonoscopy; hernia repair (); Total hip arthroplasty (Right, 14); Abdomen surgery; Nerve Block; and arthroplasty (Left, 2019). Procedure/Surgery:  3/2  LEFT HIP TOTAL ARTHROPLASTY   Precautions:  Falls, anterior hip precautions , WBAT (weight bearing as tolerated),   Equipment Needs:  Wheeled Walker      SUBJECTIVE:    Patient lives with spouse  in a ranch home  with 3 + 1 steps to enter with 1Rail  Bed is on 1 floor and bath is on 1 floor. Patient ambulated independently  PTA. Equipment owned: None,      OBJECTIVE:   Initial Evaluation  Date: 3/2/21 Treatment Short Term/ Long Term   Goals   AM-PAC 6 Clicks      Was pt agreeable to Eval/treatment? yes     Does pt have pain? Yes 9/10 L hip     Bed Mobility  Rolling: NT  Supine to sit: Min  Sit to supine: Min for LLE  Scooting: Min  Rolling: Ind  Supine to sit: Ind  Sit to supine: Ind  Scooting: Ind   Transfers Sit to stand: Min to fww  Stand to sit: Min  Stand pivot: Min  Sit to stand: Mod Ind  Stand to sit: Mod Ind  Stand pivot: Mod Ind   Ambulation    25 feet with fww Min cues for sequencing.    100+ feet with fww Mod Ind   Stair negotiation: ascended and descended  NT  3 steps with 1 rail Min   ROM BUE:  See OT eval  BLE:  wfl     Strength BUE: See OT eval   RLE:  4/5  LLE:   4-/5  4/5   Balance Sitting EOB:  Sup  Dynamic Standing:  Min with fww  Sitting EOB:  Ind  Dynamic Standing: Mod Ind     Patient is Alert & Oriented x person, place, time and situation and follows directions   Sensation:  Pt denies numbness and tingling to extremities  Edema:  none    Vitals:  Blood Pressure at rest - Blood Pressure post session -   Heart Rate at rest - Heart Rate post session -   SPO2 at rest 96% RA SPO2 post session -%     Therapeutic Exercises:  AROM to promote circulation    Patient education  Patient educated on role of Physical Therapy, risks of immobility, safety and plan of care,  importance of mobility while in hospital  and hip precautions     Patient response to education:   Pt verbalized understanding Pt demonstrated skill Pt requires further education in this area   yes yes reminders     ASSESSMENT:    Comments:  Patient was seen this date for PT evaluation. . Patient was agreeable to intervention. Results of the functional assessment are noted above. Upon entering the room patient was found supine in bed. . Assisted to EOB adhering to hip precautions. Sat EOB x 10 minutes to increase dynamic sitting balance and activity tolerance. Initially dizzy. Transfer to bedside chair completed with fww. Gait then completed with cues for sequencing with fww. Patient then returned to bed stating she was sleepy. Advised to have staff assist her up again for all meals. Patient completing inspirometer upon leaving the room. At end of session, patient in bed with  call light and phone within reach,  all lines and tubes intact, nursing notified. This patient can benefit from the continuation of skilled PT  to maximize functional level and return to PLOF.      Treatment:  Patient practiced and was instructed in the following treatment:    · Bed mobility training - pt given verbal and tactile cues to facilitate proper sequencing and safety during rolling and supine>sit as well as provided with physical assistance to complete task    · Assistive device training - pt educated on using [address] during gait, [address] approximation/negotiation, and hand placement during sit<>stand to [address]  · STS and transfer training - educated on hand/foot placement, safety, and sequencing during STS and pivot transfers using assistive device  · Gait training - verbal cues for [address] approximation/negotiation, upright posture, and safety during 90 and 180 degree turns during gait     Pt's/ family goals   1. Return to home if able. Patient and or family understand(s) diagnosis, prognosis, and plan of care. yes    PLAN OF CARE:    PT care will be provided in accordance with the objectives noted above. Exercises and functional mobility practice will be used as well as appropriate assistive devices or modalities to obtain goals. Patient and family education will also be administered as needed. Current Treatment Recommendations     [x] Strengthening     [x] ROM   [x] Balance Training   [x] Endurance Training   [x] Transfer Training   [x] Gait Training   [x] Stair Training   [] Positioning   [] Safety and Education Training   [] Patient/Caregiver Education   [] HEP  [] Other     Frequency of treatments: BID x 1-2 weeks. Time in  1335  Time out  1400    Total Treatment Time  25 minutes     Evaluation Time includes thorough review of current medical information, gathering information on past medical history/social history and prior level of function, completion of standardized testing/informal observation of tasks, assessment of data and education on plan of care and goals.     CPT codes:  [] Low Complexity PT evaluation 21837  [x] Moderate Complexity PT evaluation 25395  [] High Complexity PT evaluation 43160  [] PT Re-evaluation 57769  [] Gait training 26072 - minutes  [] Manual therapy 24862 - minutes  [x] Therapeutic activities  15 minutes  [] Therapeutic exercises 88975 - minutes  [] Neuromuscular reeducation 82585 - minutes       Honorio Crocker, [address] [facility]

## 2021-03-02 NOTE — PROGRESS NOTES
OCCUPATIONAL THERAPY INITIAL EVALUATION      Date:3/2/2021  Patient Name: Richardson Mcgee  MRN: [id number]  : 1970  Room: [address] [place], [place] [address]A  Referring Provider:  Milly Siddiqi DO    Evaluating OT: [employer] OTR/L   License #  [id number]     AM-PAC Daily Activity Raw Score: 15/24    Recommended Adaptive Equipment:  Sock aide (pt. Owns other A.E. & DME)    Diagnosis:  OSTEOARTHRITIS L hip    Surgery: 3-2-21:  LEFT HIP TOTAL ARTHROPLASTY  Pertinent Medical History:   Past Medical History:   Diagnosis Date    Anxiety     dr Sue Mendez [facility] in [facility]    Arthritis     Asthma     B[facility] 2 disorder ([facility]     Bruising tendency ([facility]     Chronic back pain     Fibromyalgia     Glucose intolerance (impaired glucose tolerance) 6/15/2020    hyperglycemia    Insomnia     Lupus ([facility]     Memory difficulty 6/15/2020    Mild cognitive impairment with memory loss 6/15/2020    Thumb pain     left - for OR 19       Precautions:  Falls, L LE Full WBAT, anterolateral hip prec. L LE     Home Living: Pt lives with spouse, dtr. & grandsons in a 1 story home with 3+1 steps to enter and 1 HR. Bathroom setup: tub/shower, raised toilet   Equipment owned: raised toilet seat, shower bench, ww, sp cane, LH shoe horn, reacher    Prior Level of Function: Ind. with ADLs , Ind. with IADLs; ambulated with ww vs. Furniture walking  Driving: active  Occupation: was a  at [address] [place] [address]    Pain Level: moderate L hip pain  Cognition: A&O: 4/4; Follows 1-2 step directions   Memory:  fair   Sequencing:  fair   Problem solving:  fair   Judgement/safety:  Fair with reminders RE: prec.      Functional Assessment:   Initial Eval Status  Date: 3-2-2021 Treatment Status  Date: Short Term Goals = Long Term Goals  Treatment frequency: 3-5 days   Feeding Independent       Grooming SBA seated  Modified [place]    UB Dressing SBA seated EOB  Modified [place]    LB Dressing Maximal Assist/Dep  Pt. states she owns A.E.   Modified [place]    Bathing Maximal Assist with sim. task  Modified [place]    Toileting NT  Modified [place]    Bed Mobility  Supine to sit: Min A  Sit to supine: Min A  Supine to sit: Modified [place]   Sit to supine: Modified [place]    Functional Transfers Minimal Assist with sit <> stand, SPT with ww  Modified [place]    Functional Mobility Minimal Assist with ww <household distance within room. Slightly limited d/t dizziness. Modified [place]    Balance Sitting:     Static:  Sup    Dynamic:SBA  Standing: Min A     Activity Tolerance Fair with lt. ax.  spO2 98% on 2L  HR 95 bpm  Good with mod. Ax. Visual/  Perceptual Glasses: yes        Safety Awareness fair                    good     Hand dominance: R     Strength ROM Additional Info:    RUE  4/5  WFL good  and wfl FMC/dexterity noted during ADL tasks     LUE 4/5  WFL good  and wfl FMC/dexterity noted during ADL tasks       Hearing: WFL   Sensation:  Pt. c/o no numbness/ tingling B UE  Tone: WFL   Edema: none noted B UE                            Comments: Upon arrival, patient supine in bed, cleared by Nursing, agreeable to OT. Pt demonstrating fair+ understanding of education/techniques, requiring additional training / education. At end of session, patient returned to bed d/t slight dizziness with upright ax., RN notified, all needs met, with call light and phone within reach, all lines and tubes intact. Pt would benefit from continued skilled OT to increase safety and independence with completion of ADL/iADL tasks, functional transfers/mobility to improve independence and quality of life.     Treatment:  OT services provided include: facilitation of safe ADLs/functional transfer training, skilled monitoring of vitals & pt.'s response to treatment, instruction/training on safe & adapted techniques within precautions for completion of ADLs, proper posture and/or positioning, facilitation of functional seated & standing tolerance & balance ax. during ADLs and functional ax., skilled cuing on hand placement & proper body mechanics during functional transfer/mobility training with focus on safety, technique, precautions. Pt.  also Instructed RE: safe transfers/mobility, ADL training, role of OT, E.C. techniques, treatment plan, recs. , prec. Eval Complexity: Low    Assessment of current deficits:    Functional mobility [x]  ADLs [x] Strength [x]  Cognition []  Functional transfers  [x] IADLs [x] Safety Awareness [x]  Endurance [x]  Fine Motor Coordination [] Balance [x] Vision/perception [] Sensation []   Gross Motor Coordination [] ROM [] Delirium []                  Motor Control []   Plan of Care: 2-4days/week for 1-2 weeks PRN   ADL retraining/adapted techniques and AE recommendations to increase functional independence within precautions                    Energy conservation techniques to improve tolerance for selfcare routine   Functional transfer/mobility training/DME recommendations for increased independence, safety and fall prevention         Patient/family education to increase safety and functional independence             Environmental modifications for safe mobility and completion of ADLs                      Therapeutic activity to improve functional performance during ADLs. Therapeutic exercise to improve tolerance and functional strength for ADLs   Balance retraining/tolerance tasks for facilitation of postural control with dynamic challenges during ADLs . Rehab Potential:  Good for established goals     Patient / Family Goal: to return home soon with family     Patient and/or family were instructed on functional diagnosis, prognosis/goals and OT plan of care. Demonstrated fair+ understanding.      Eval Complexity: Low      Time In: 13:36  Time Out: 13:56  Total Treatment Time: 10    Min Units   OT Eval Low 03969  x     OT Eval Medium [id number]

## 2021-03-02 NOTE — PROGRESS NOTES
Covid Test done: yes 2/23    Results: negative     Self-quarantine guidelines followed since tested? Yes     Any unusual S/S or concerns expressed or observed? None        Povidine-iodine 5% nasal antiseptic pre-op prep completed 15 seconds per nare and repeated. 2% CHG pre-op skin prep completed in appropriate order using all 6 cloths:    With 1st cloth, wipe the neck, chest, and abdomen. Scrub inside nd  around the navel/belly-button area.  With 2nd cloth, wipe both arms, starting each with the shoulder  and ending at the fingertips. Be sure to thoroughly wipe the arm  pit area.  With 3rd cloth, wipe the right and left hip followed by the groin. Be sure to wipe folds in the abdominal and groin areas.  With 4th cloth, wipe both legs, starting at the thigh and ending   at the toes. Be sure to thoroughly wipe behind the knees.  With 5th cloth, wipe the back starting at the base of the neck and   Ending at the waist line.  With 6th cloth, wipe the buttocks.

## 2021-03-02 NOTE — OP NOTE
Operative Note      Patient: Blanca Gunn  YOB: 1970  MRN: [id number]    Date of Procedure: 3/2/2021    Pre-Op Diagnosis: OSTEOARTHRITIS    Post-Op Diagnosis: Same       Procedure(s):  LEFT HIP TOTAL ARTHROPLASTY    Surgeon(s):  Abigail Schneider MD    Assistant:   Resident: Fadia Ho DO; Ruth Neumann DO; Tayla Hickey DO    Anesthesia: General    Estimated Blood Loss (mL): 085     Complications: None    Specimens:   ID Type Source Tests Collected by Time Destination   A : left femoral head Tissue Hip SURGICAL PATHOLOGY Abigail Schneider MD 3/2/2021 0908        Implants:  Implant Name Type Inv. Item Serial No.  Lot No. LRB No. Used Action   SHELL ACET SZ E [id number] CLUS H TRITANIUM PRESSFIT MARYAM  SHELL ACET SZ E [id number] CLUS H TRITANIUM PRESSFIT MARYAM  [employer] [facility] [id number] Left 1 Implanted   LINER ACET SZ E [id number] ID32MM THK7.9MM 0DEG HIP X3  LINER ACET SZ E [id number] ID32MM THK7.9MM 0DEG HIP X3  [employer] [facility] [id number] Left 1 Implanted   STEM FEM SZ 4 L105MM NK L35MM 42MM OFFSET 127DEG HIP TI  STEM FEM SZ 4 L105MM NK L35MM 42MM OFFSET 127DEG HIP TI  [employer] [facility] [id number] Left 1 Implanted   SCREW BNE L20MM [id number] LO PROF HEX TRIDENT LL  SCREW BNE L20MM [id number] LO PROF HEX TRIDENT LL  HELLEN [employer] 2AH Left 1 Implanted   SCREW BNE L35MM [id number] LO PROF HEX TRIDENT LL  SCREW BNE L35MM [id number] LO PROF HEX TRIDENT LL  HELLEN MALCOM-WD [id number] Left 1 Implanted   LINER ACET SZ E [id number] ID32MM THK7. 9MM 10DEG HIP X3  LINER ACET SZ E [id number] ID32MM THK7. 9MM 10DEG HIP X3  [employer] [facility] [id number] Left 1 Implanted   HEAD FEM [id number] +0MM OFFSET HIP BIOLOX DELT CERAMIC TAPR  HEAD FEM [id number] +0MM OFFSET HIP BIOLOX DELT CERAMIC TAPR  [employer] HOWM-WD [id number] Left 1 Implanted         Drains: * No LDAs found *    Findings: Osteoarthritis left femoral head and left acetabulum with sclerosis and osteophytes.      Detailed Description of Procedure:     Brief Hospital Course:  Rosanne Saldana is a patient known to [facility] Fox MD practice with persistent complaints of Left hip pain. Hip pain has failed to be relieved by non-operative conservative measures, and has began affecting daily activities of living. After examination of the patient, review of the radiologic studies, and appropriate pre-operative risk assessment, Vilma Fox MD recommended Left hip arthroplasty, which the patient was agreeable towards. Operative Course: Outside the operating suite, the patient was seen and identified. The operative site was marked and identified as appropriate by the patient, staff, surgeon, and Anesthesia. The patient was taken into the operating room, placed on the operative table, and placed under the appropriate amount of sedation under the care of the anesthesia team. The patient was placed into a lateral decubitus position. All bony prominences and neurovascular structures were well padded and protected. The operative site was then prepped and draped in a standard sterile fashion. An incision was made over the lateral trochanteric region and carried down to the level of the fascia graciela maintaining appropriate hemostasis with electrocautery. A small rent was placed in the fascia graciela. Hatfield scissors were used to extend the fascia graciela incision in line with its fibers to the length of the skin incision. A Charnley retractor was then placed in the anterior and posterior margin of the tensor fascia graciela incision, taking care not to violate any neurovascular structures. The anterior one-third of the gluteus medius tendon was identified. In line with its fibers, it was reflected using electrocautery off its trochanteric insertion. The gluteus medius and minimus tendons were reflected anteriorly down to the level of the hip capsule.  The hip capsule was T'd up to the acetabulum and around the posterior medial and inferior aspects of the hip. The hip was dislocated carefully. An oscillating saw was then used to make the femoral neck cut at a 45-degree angle, 1 cm proximal to the lesser trochanter. The femoral head was taken for sizing and specimen. At this point, the acetabulum was then inspected. Posterior and anterior acetabular retractors were then placed, taking care not to violate any neurovascular structures. All bony fragments and soft tissue interposed were then sharply excised. The labrum was sharply debrided. Reaming of the acetabulum was started, and carried sequentially up to the appropriate size. Copious irrigation was performed of the wound. An 52 mm sized acetabular component was then impacted into the appropriate position. Next a 30 mm screw was placed through the acetabular cup and a good bite was obtained. An appropriate polyethylene liner impacted, and it was found to be appropriately stable afterward. Attention was then turned to the femur. A Woody retractor was placed under the femoral neck cut. A box osteotome was introduced into the femoral neck which was removed. Then a T-handle canal finder was then introduced and removed. Broaching began at a size 0 and was carried up sequentially to an appropriately sized broach, where we found appropriate stability with a size 4 femoral broach, both axially and rotationally. The broach was then removed. The corresponding sized femoral component was then impacted in the appropriate position, which was found to have appropriate stable fit. An appropriately sized femoral head trial was then placed. The hip was reduced, taken through a range of motion, and was found to be unstable posteriorly with flexion and abduction. .  The hip was then dislocated. The femoral head trial component was then removed. Attention was then turned back to the acetabular side and it was thought that the acetabular component was slightly retroverted.   The polyethylene liner was removed from the acetabular cup and the 30 mm screw was also removed and discarded. This allowed an is to antevert the cup more. 20 mm screw and a second 35 mm screw was placed through the acetabular liner good purchase on both screws. A trial acetabular liner was placed. The hip was reduced and taken through range of motion and was found to still be unstable posteriorly. A 10 degree liner was then inserted into the acetabulum and the hip was again reduced and taken through range of motion at this time it was found to be very stable. The acetabular cup was then copiously irrigated and the final 10 degree hooded polyethylene liner was impacted into the cup and found to be stable. The actual femoral head component was impacted into the appropriate position. The hip was then reduced, taken through a range of motion, and was found to be appropriately stable. Copious irrigation was performed of the joint. Platelet-rich plasma gel was then injected into the joint. Also, #1 Ethibond was used to reapproximate the gluteus medius tendon to its trochanteric insertion. Copious irrigation was performed once more. The tensor fascia graciela was then closed with an #0 Vicryl and then a running strata fix suture. . Copious irrigation was performed once more. Platelet-poor plasma gel was injected into the subcutaneous tissues. 2-0 Vicryl were used to close the subcutaneous layer. Staples were used for the skin. A sterile layered dressing was placed over the wound. The patient was awakened from anesthesia, transferred to a hospital bed, and taken to the PACU in stable condition. Disposition: The patient was taken to PACU in stable condition. Once stable, the patient will be transferred to the floor. Orders have been provided to begin physical therapy, weight bear as tolerated Left lower extremity. Patient received a dose of 2 g Ancef preoperatively. We will continue this for 24 hours postoperatively for infection prophylaxis.  The patient will also be started on 81 mg aspirin twice daily for DVT prophylaxis. We have consulted  and case management for discharge planning and consulted the PCP for medical management. It should be noted that Dr. Marrianne Bosworth was present for the entirety of the procedure.         Electronically signed by Tal Mabry DO on 3/2/2021 at 10:02 AM

## 2021-03-02 NOTE — ANESTHESIA PRE PROCEDURE
Department of Anesthesiology  Preprocedure Note       Name:  Zaida North   Age:  44 y.o.  :  1970                                          MRN:  [id number]         Date:  3/2/2021      Surgeon: Yisel Tena):  Ayse Truong MD    Procedure: Procedure(s):  LEFT HIP TOTAL ARTHROPLASTY--HELLEN    Medications prior to admission:   Prior to Admission medications    Medication Sig Start Date End Date Taking? Authorizing Provider   brexpiprazole (REXULTI) 1 MG TABS tablet Take 1 mg by mouth daily    Yes Historical Provider, MD   naproxen (NAPROSYN) 500 MG tablet Take 500 mg by mouth 3 times daily (with meals)  20  Yes Historical Provider, MD   vitamin D (CHOLECALCIFEROL) 25 MCG (1000 UT) TABS tablet Take by mouth daily    Yes Historical Provider, MD   calcium carbonate 600 MG TABS tablet Take by mouth daily    Yes Historical Provider, MD   hydroxychloroquine (PLAQUENIL) 200 MG tablet Take by mouth 2 times daily    Yes Historical Provider, MD   ALPRAZolam (XANAX) 1 MG tablet Take 2 mg by mouth 3 times daily. 19  Yes Historical Provider, MD   PROVENTIL  (90 Base) MCG/ACT inhaler INHALE 2 PUFFS EVERY 4 HOURS AS NEEDED 17  Yes Historical Provider, MD   lamoTRIgine (LAMICTAL) 200 MG tablet Take 200 mg by mouth 2 tabs at night 17  Yes Historical Provider, MD   venlafaxine (EFFEXOR XR) 75 MG extended release capsule Take 75 mg by mouth daily  10/25/17  Yes Historical Provider, MD   zolpidem (AMBIEN) 10 MG tablet Take  by mouth nightly as needed for Sleep. Yes Historical Provider, MD   traZODone (DESYREL) 150 MG tablet Take 150 mg by mouth nightly.    Yes Historical Provider, MD       Current medications:    Current Facility-Administered Medications   Medication Dose Route Frequency Provider Last Rate Last Admin    0.9 % sodium chloride infusion   Intravenous Continuous Ayse Truong  mL/hr at 21 0649 New Bag at 21 0649    sodium chloride flush 0.9 % injection 10 mL  10 mL Intravenous 2 times per day Fernando Jose MD        sodium chloride flush 0.9 % injection 10 mL  10 mL Intravenous PRN Fernando Jose MD        ceFAZolin (ANCEF) 2000 mg in sterile water 20 mL IV syringe  2,000 mg Intravenous On Call to 51 Luz Siddiqui MD           Allergies:  No Known Allergies    Problem List:    Patient Active Problem List   Diagnosis Code    DJD (degenerative joint disease) M19.90    Status post right hip replacement Z96.641    Gait difficulty R26.9    Acute blood loss anemia D62    Leg length discrepancy M21.70    Bipolar affective disorder (HCC) F31.9    GERD (gastroesophageal reflux disease) K21.9    Constipation K59.00    Elevated liver enzymes R74.8    Protein calorie malnutrition (HCC) E46    Low back pain M54.5    Closed displaced fracture of neck of fifth metacarpal bone of right hand S62.336A    Arthritis of carpometacarpal (CMC) joint of left thumb M18.12    Bilateral carpal tunnel syndrome G56.03    Memory difficulty R41.3    Glucose intolerance (impaired glucose tolerance) R73.02    Mild cognitive impairment with memory loss G31.84    Lupus ([address] M32.9    Lumbar spondylosis M47.816    Arthritis of left hip M16.12       Past Medical History:        Diagnosis Date    Anxiety     dr Brock [facility] 63 [facility]    Arthritis     Asthma     Bipolar 2 disorder ([address]     Bruising tendency ([address]     Chronic back pain     Fibromyalgia     Glucose intolerance (impaired glucose tolerance) 6/15/2020    hyperglycemia    Insomnia     Lupus ([address]     Memory difficulty 6/15/2020    Mild cognitive impairment with memory loss 6/15/2020    Thumb pain     left - for OR 4-16-19        Past Surgical History:        Procedure Laterality Date    ABDOMEN SURGERY      fatty tumor removed     ARTHROPLASTY Left 4/16/2019    LEFT THUMB TRAPEZIECTOMY WITH LIGAMENT RECONSTRUCTION LEFT PARTIAL TRAPEZOIDECTOMY LEFT [place] RELEASE LEFT CARPAL TUNNEL RELEASE performed by Zeke Bean MD at [place] [address]      right    COLONOSCOPY      FINGER SURGERY      right pinkie    HERNIA REPAIR  1970    HIP ARTHROTOMY  1973    right    NERVE BLOCK      x4     TOTAL HIP ARTHROPLASTY Right 14    TUBAL LIGATION  10/1994    TUMOR REMOVAL      right shoulder fatty tumor       Social History:    Social History     Tobacco Use    Smoking status: Former Smoker     Packs/day: 1.00     Years: 30.00     Pack years: 30.00     Quit date: 2014     Years since quittin.0    Smokeless tobacco: Never Used    Tobacco comment: stop    Substance Use Topics    Alcohol use: Yes     Alcohol/week: 3.0 standard drinks     Types: 3 Cans of beer per week     Comment: SOCIAL                                Counseling given: Not Answered  Comment: stop       Vital Signs (Current):   Vitals:    21 1323 21 0624   BP:  122/71   Pulse:  98   Resp:  16   Temp:  97.5 °F (36.4 °C)   SpO2:  94%   Weight: 130 lb (59 kg) 128 lb (58.1 kg)   Height:  5' 0.25\" (1.53 m)                                              BP Readings from Last 3 Encounters:   21 122/71   21 110/60   21 102/71       NPO Status: Time of last liquid consumption: 0400                        Time of last solid consumption: 2300                        Date of last liquid consumption: 21                        Date of last solid food consumption: 21    BMI:   Wt Readings from Last 3 Encounters:   21 128 lb (58.1 kg)   21 128 lb (58.1 kg)   21 133 lb (60.3 kg)     Body mass index is 24.79 kg/m².     CBC:   Lab Results   Component Value Date    WBC 7.1 2021    RBC 4.10 2021    HGB 12.2 2021    HCT 38.0 2021    MCV 92.7 2021    RDW 12.7 2021     2021       CMP:   Lab Results   Component Value Date     2021    K 4.0 2021     02/23/2021    CO2 27 02/23/2021    BUN 8 02/23/2021    CREATININE 0.8 02/23/2021    GFRAA >60 02/23/2021    LABGLOM >60 02/23/2021    GLUCOSE 95 02/23/2021    PROT 6.9 02/23/2021    CALCIUM 9.5 02/23/2021    BILITOT 0.2 02/23/2021    ALKPHOS 201 02/23/2021    AST 17 02/23/2021    ALT 13 02/23/2021       POC Tests: No results for input(s): POCGLU, POCNA, POCK, POCCL, POCBUN, POCHEMO, POCHCT in the last 72 hours. Coags: No results found for: PROTIME, INR, APTT    HCG (If Applicable):   Lab Results   Component Value Date    PREGTESTUR NEGATIVE 02/25/2014        ABGs: No results found for: PHART, PO2ART, WDH3SOV, XEO6FYU, BEART, A1OZWGKJ     Type & Screen (If Applicable):  No results found for: LABABO, LABRH    Drug/Infectious Status (If Applicable):  No results found for: HIV, HEPCAB    COVID-19 Screening (If Applicable):   Lab Results   Component Value Date    COVID19 Not Detected 02/23/2021         Anesthesia Evaluation  Patient summary reviewed and Nursing notes reviewed no history of anesthetic complications:   Airway: Mallampati: II  TM distance: >3 FB   Neck ROM: full  Mouth opening: > = 3 FB Dental:      Comment: Chipped teeth    Pulmonary: breath sounds clear to auscultation  (+) asthma:                           ROS comment: Quit smoking 7 years ago   Cardiovascular:  Exercise tolerance: good (>4 METS),           Rhythm: regular  Rate: normal                 ROS comment: Admits to skipped heart beats. Family doctor aware     Neuro/Psych:   (+) psychiatric history: stable with treatment             ROS comment: Joint and muscle pain related to Lupus    Bipolar GI/Hepatic/Renal:   (+) GERD: well controlled,           Endo/Other:    (+) : arthritis:., .                  ROS comment: Lupus. No steroids Abdominal:           Vascular: negative vascular ROS. Anesthesia Plan      general     ASA 3       Induction: intravenous.     MIPS: Postoperative opioids intended and Prophylactic antiemetics administered. Anesthetic plan and risks discussed with patient and spouse. Plan discussed with CRNA. DOS STAFF ADDENDUM:    Patient seen and chart reviewed. Physical exam and history updated as indicated. NPO status confirmed. Anesthesia options and plan discussed including risks benefits with patient/legal guardian and family as available. Concerns and questions addressed. Consent verbalized to proceed.   Anesthesia plan, options and intraoperative/postoperative concerns discussed with care team.    Remedios Holliday MD  3/2/2021  7:13 AM          Remedios Holliday MD   3/2/2021

## 2021-03-02 NOTE — PROGRESS NOTES
Inpatient consult for med. mgmt. called to Dr. Manny Carey.     Electronically signed by Colton Green RN on 3/2/2021 at 1:07 PM

## 2021-03-03 LAB
ANION GAP SERPL CALCULATED.3IONS-SCNC: 12 MMOL/L (ref 7–16)
BUN BLDV-MCNC: 7 MG/DL (ref 6–20)
CALCIUM SERPL-MCNC: 8.5 MG/DL (ref 8.6–10.2)
CHLORIDE BLD-SCNC: 103 MMOL/L (ref 98–107)
CO2: 23 MMOL/L (ref 22–29)
CREAT SERPL-MCNC: 0.6 MG/DL (ref 0.5–1)
GFR AFRICAN AMERICAN: >60
GFR NON-AFRICAN AMERICAN: >60 ML/MIN/1.73
GLUCOSE BLD-MCNC: 117 MG/DL (ref 74–99)
HCT VFR BLD CALC: 29.8 % (ref 34–48)
HEMOGLOBIN: 9.5 G/DL (ref 11.5–15.5)
MCH RBC QN AUTO: 29.7 PG (ref 26–35)
MCHC RBC AUTO-ENTMCNC: 31.9 % (ref 32–34.5)
MCV RBC AUTO: 93.1 FL (ref 80–99.9)
PDW BLD-RTO: 12.8 FL (ref 11.5–15)
PLATELET # BLD: 258 E9/L (ref 130–450)
PMV BLD AUTO: 11.6 FL (ref 7–12)
POTASSIUM REFLEX MAGNESIUM: 3.7 MMOL/L (ref 3.5–5)
RBC # BLD: 3.2 E12/L (ref 3.5–5.5)
SODIUM BLD-SCNC: 138 MMOL/L (ref 132–146)
WBC # BLD: 12 E9/L (ref 4.5–11.5)

## 2021-03-03 PROCEDURE — 97535 SELF CARE MNGMENT TRAINING: CPT

## 2021-03-03 PROCEDURE — 2700000000 HC OXYGEN THERAPY PER DAY

## 2021-03-03 PROCEDURE — 6370000000 HC RX 637 (ALT 250 FOR IP): Performed by: STUDENT IN AN ORGANIZED HEALTH CARE EDUCATION/TRAINING PROGRAM

## 2021-03-03 PROCEDURE — 6370000000 HC RX 637 (ALT 250 FOR IP): Performed by: ORTHOPAEDIC SURGERY

## 2021-03-03 PROCEDURE — 97530 THERAPEUTIC ACTIVITIES: CPT

## 2021-03-03 PROCEDURE — 36415 COLL VENOUS BLD VENIPUNCTURE: CPT

## 2021-03-03 PROCEDURE — 2580000003 HC RX 258: Performed by: ORTHOPAEDIC SURGERY

## 2021-03-03 PROCEDURE — 99024 POSTOP FOLLOW-UP VISIT: CPT | Performed by: ORTHOPAEDIC SURGERY

## 2021-03-03 PROCEDURE — 2500000003 HC RX 250 WO HCPCS: Performed by: ORTHOPAEDIC SURGERY

## 2021-03-03 PROCEDURE — 1200000000 HC SEMI PRIVATE

## 2021-03-03 PROCEDURE — 80048 BASIC METABOLIC PNL TOTAL CA: CPT

## 2021-03-03 PROCEDURE — 6360000002 HC RX W HCPCS: Performed by: ORTHOPAEDIC SURGERY

## 2021-03-03 PROCEDURE — 85027 COMPLETE CBC AUTOMATED: CPT

## 2021-03-03 RX ORDER — BISACODYL 10 MG
10 SUPPOSITORY, RECTAL RECTAL DAILY PRN
Status: DISCONTINUED | OUTPATIENT
Start: 2021-03-03 | End: 2021-03-04 | Stop reason: HOSPADM

## 2021-03-03 RX ORDER — ASPIRIN 81 MG/1
81 TABLET ORAL 2 TIMES DAILY
Qty: 56 TABLET | Refills: 0 | Status: ON HOLD | OUTPATIENT
Start: 2021-03-03 | End: 2021-08-20

## 2021-03-03 RX ORDER — DOCUSATE SODIUM 100 MG/1
100 CAPSULE, LIQUID FILLED ORAL 2 TIMES DAILY
Status: DISCONTINUED | OUTPATIENT
Start: 2021-03-03 | End: 2021-03-04 | Stop reason: HOSPADM

## 2021-03-03 RX ORDER — OXYCODONE HYDROCHLORIDE AND ACETAMINOPHEN 5; 325 MG/1; MG/1
1 TABLET ORAL EVERY 6 HOURS PRN
Qty: 28 TABLET | Refills: 0 | Status: SHIPPED | OUTPATIENT
Start: 2021-03-03 | End: 2021-03-10

## 2021-03-03 RX ADMIN — LAMOTRIGINE 400 MG: 100 TABLET ORAL at 21:37

## 2021-03-03 RX ADMIN — OXYCODONE HYDROCHLORIDE 10 MG: 10 TABLET ORAL at 08:20

## 2021-03-03 RX ADMIN — ALPRAZOLAM 2 MG: 1 TABLET ORAL at 09:14

## 2021-03-03 RX ADMIN — DOCUSATE SODIUM 100 MG: 100 CAPSULE ORAL at 21:37

## 2021-03-03 RX ADMIN — OXYCODONE HYDROCHLORIDE 10 MG: 10 TABLET ORAL at 17:19

## 2021-03-03 RX ADMIN — SODIUM CHLORIDE, PRESERVATIVE FREE 10 ML: 5 INJECTION INTRAVENOUS at 21:41

## 2021-03-03 RX ADMIN — VENLAFAXINE HYDROCHLORIDE 75 MG: 75 CAPSULE, EXTENDED RELEASE ORAL at 09:14

## 2021-03-03 RX ADMIN — Medication 1000 UNITS: at 09:14

## 2021-03-03 RX ADMIN — OXYCODONE HYDROCHLORIDE 5 MG: 5 TABLET ORAL at 00:04

## 2021-03-03 RX ADMIN — BREXPIPRAZOLE 1 MG: 2 TABLET ORAL at 09:14

## 2021-03-03 RX ADMIN — ASPIRIN 81 MG: 81 TABLET, COATED ORAL at 21:37

## 2021-03-03 RX ADMIN — ACETAMINOPHEN 650 MG: 325 TABLET ORAL at 21:36

## 2021-03-03 RX ADMIN — SODIUM CHLORIDE, PRESERVATIVE FREE 10 ML: 5 INJECTION INTRAVENOUS at 09:14

## 2021-03-03 RX ADMIN — ALPRAZOLAM 2 MG: 1 TABLET ORAL at 21:37

## 2021-03-03 RX ADMIN — DOCUSATE SODIUM 100 MG: 100 CAPSULE ORAL at 09:14

## 2021-03-03 RX ADMIN — MORPHINE SULFATE 2 MG: 2 INJECTION, SOLUTION INTRAMUSCULAR; INTRAVENOUS at 11:45

## 2021-03-03 RX ADMIN — OXYCODONE HYDROCHLORIDE 10 MG: 10 TABLET ORAL at 12:56

## 2021-03-03 RX ADMIN — TRAZODONE HYDROCHLORIDE 150 MG: 150 TABLET ORAL at 21:37

## 2021-03-03 RX ADMIN — CEFAZOLIN 2000 MG: 10 INJECTION, POWDER, FOR SOLUTION INTRAVENOUS at 00:05

## 2021-03-03 RX ADMIN — CEFAZOLIN 2000 MG: 10 INJECTION, POWDER, FOR SOLUTION INTRAVENOUS at 08:20

## 2021-03-03 RX ADMIN — ALPRAZOLAM 2 MG: 1 TABLET ORAL at 15:15

## 2021-03-03 RX ADMIN — ACETAMINOPHEN 650 MG: 325 TABLET ORAL at 06:26

## 2021-03-03 RX ADMIN — ACETAMINOPHEN 650 MG: 325 TABLET ORAL at 12:54

## 2021-03-03 RX ADMIN — OXYCODONE HYDROCHLORIDE 10 MG: 10 TABLET ORAL at 04:01

## 2021-03-03 RX ADMIN — ASPIRIN 81 MG: 81 TABLET, COATED ORAL at 09:14

## 2021-03-03 ASSESSMENT — PAIN SCALES - GENERAL
PAINLEVEL_OUTOF10: 3
PAINLEVEL_OUTOF10: 7
PAINLEVEL_OUTOF10: 7
PAINLEVEL_OUTOF10: 8
PAINLEVEL_OUTOF10: 6
PAINLEVEL_OUTOF10: 7
PAINLEVEL_OUTOF10: 6
PAINLEVEL_OUTOF10: 8
PAINLEVEL_OUTOF10: 8
PAINLEVEL_OUTOF10: 3
PAINLEVEL_OUTOF10: 6
PAINLEVEL_OUTOF10: 6

## 2021-03-03 ASSESSMENT — PAIN DESCRIPTION - PAIN TYPE
TYPE: ACUTE PAIN;SURGICAL PAIN
TYPE: SURGICAL PAIN
TYPE: SURGICAL PAIN

## 2021-03-03 ASSESSMENT — PAIN DESCRIPTION - DESCRIPTORS
DESCRIPTORS: ACHING;DISCOMFORT;TENDER
DESCRIPTORS: ACHING;CONSTANT;SORE;TENDER
DESCRIPTORS: ACHING;CONSTANT;DISCOMFORT;DULL
DESCRIPTORS: ACHING;CONSTANT;DISCOMFORT

## 2021-03-03 ASSESSMENT — PAIN DESCRIPTION - FREQUENCY
FREQUENCY: CONTINUOUS

## 2021-03-03 ASSESSMENT — PAIN DESCRIPTION - ONSET
ONSET: ON-GOING
ONSET: ON-GOING
ONSET: AWAKENED FROM SLEEP
ONSET: ON-GOING
ONSET: ON-GOING

## 2021-03-03 ASSESSMENT — PAIN DESCRIPTION - LOCATION
LOCATION: HIP

## 2021-03-03 ASSESSMENT — PAIN - FUNCTIONAL ASSESSMENT
PAIN_FUNCTIONAL_ASSESSMENT: PREVENTS OR INTERFERES SOME ACTIVE ACTIVITIES AND ADLS
PAIN_FUNCTIONAL_ASSESSMENT: PREVENTS OR INTERFERES SOME ACTIVE ACTIVITIES AND ADLS

## 2021-03-03 ASSESSMENT — PAIN DESCRIPTION - ORIENTATION
ORIENTATION: LEFT

## 2021-03-03 ASSESSMENT — PAIN DESCRIPTION - PROGRESSION
CLINICAL_PROGRESSION: NOT CHANGED
CLINICAL_PROGRESSION: NOT CHANGED

## 2021-03-03 NOTE — PROGRESS NOTES
to stand: SBA to fww  Stand to sit: SBA  Stand pivot: SBA with fww. Cues for sequencing. Sit to stand: Ind to fww  Stand to sit: Ind  Stand pivot: Ind with fww. States she has been using restroom Ind. Discussed car transfer. Sit to stand: Mod Ind  Stand to sit: Mod Ind  Stand pivot: Mod Ind   Ambulation    25 feet with fww Min cues for sequencing. 60 feet with fww SBA cues for sequencing. Seated rest then 75 feet with SBA fww.  60 feet with fww SBA  100+ feet with fww Mod Ind   Stair negotiation: ascended and descended  NT 11 steps with CGA cues for sequencing. Declined sore from am 3 steps with 1 rail Min   ROM BUE:  See OT eval  BLE:  wfl      Strength BUE: See OT eval   RLE:  4/5  LLE:   4-/5   4/5   Balance Sitting EOB:  Sup  Dynamic Standing:  Min with fww   Sitting EOB:  Ind  Dynamic Standing: Mod Ind     Patient is Alert & Oriented x person, place, time and situation and follows directions   Sensation:  Pt denies numbness and tingling to extremities  Edema:  none    Vitals:  Blood Pressure at rest - Blood Pressure post session -   Heart Rate at rest - Heart Rate post session -   SPO2 at rest 94% RA SPO2 post session -%     Therapeutic Exercises:  AROM to promote circulation    Patient education  Patient educated on role of Physical Therapy, risks of immobility, safety and plan of care,  importance of mobility while in hospital  and hip precautions Reviewed hip precautions with  [employer] who was in the room. Patient response to education:   Pt verbalized understanding Pt demonstrated skill Pt requires further education in this area   yes yes reminders     ASSESSMENT:    Comments:  Patient was seen this date for PT treatment. . Patient was agreeable to intervention. Results of the functional assessment are noted above. Upon entering the room patient was found sitting in bedside chair Reviewed sequencing for car transfer. Gait then completed with cues for sequencing with fww.  Patient seated rest then gait again to stairwell and was instructed in stair negotiation.  observed step negotiation. Advised to have staff assist her up again for all meals. Patient returned to bed had been in chair prior to breakfast. Ind for bed mobility. Patient seen in pm. Ind for bed mobility and transfers with fww. States she is sore in L hip area and thigh. Willing to complete gait with fww SBA. At end of session, patient in bed with  call light and phone within reach,  all lines and tubes intact, nursing notified. This patient can benefit from the continuation of skilled PT  to maximize functional level and return to PLOF. Treatment AM and PM:  Patient practiced and was instructed in the following treatment:    · Bed mobility training - pt given verbal and tactile cues to facilitate proper sequencing and safety during rolling and supine>sit as well as provided with physical assistance to complete task    · Assistive device training - pt educated on using [employer] during gait, [employer] approximation/negotiation, and hand placement during sit<>stand to [employer]  · STS and transfer training - educated on hand/foot placement, safety, and sequencing during STS and pivot transfers using assistive device  · Gait training - verbal cues for [employer] approximation/negotiation, upright posture, and safety during 90 and 180 degree turns during gait   · Stair negotiation training completed with patient and . Pt's/ family goals   1. Return to home if able. Patient and or family understand(s) diagnosis, prognosis, and plan of care. yes    PLAN OF CARE:    PT care will be provided in accordance with the objectives noted above. Exercises and functional mobility practice will be used as well as appropriate assistive devices or modalities to obtain goals. Patient and family education will also be administered as needed.     Current Treatment Recommendations     [x] Strengthening     [x] ROM   [x] Balance Training   [x] Endurance Training   [x] Transfer Training   [x] Gait Training   [x] Stair Training   [] Positioning   [] Safety and Education Training   [] Patient/Caregiver Education   [] HEP  [] Other     Frequency of treatments: BID x 1-2 weeks. Time in  0945  Time out  1010  Time in  1445  Time out  1500  Total Treatment Time  40 minutes     Evaluation Time includes thorough review of current medical information, gathering information on past medical history/social history and prior level of function, completion of standardized testing/informal observation of tasks, assessment of data and education on plan of care and goals.     CPT codes:  [] Low Complexity PT evaluation 61679  [] Moderate Complexity PT evaluation 34105  [] High Complexity PT evaluation 29786  [] PT Re-evaluation 63194  [] Gait training 91764 - minutes  [] Manual therapy 95680 - minutes  [x] Therapeutic activities 21623 40 minutes  [] Therapeutic exercises 96474 - minutes  [] Neuromuscular reeducation 20966 - minutes       K[facility], [address] [facility]

## 2021-03-03 NOTE — PROGRESS NOTES
Department of Orthopedic Surgery  Resident Progress Note    Patient seen and examined. Pain controlled. No new complaints. Denies chest pain, shortness of breath, dizziness/lightheadedness. Doing well today. Has been up to the bathroom several times. Has not passed any gas yet or had BM. No abdominal pain    VITALS:  BP (!) 122/56   Pulse 96   Temp 97.6 °F (36.4 °C) (Temporal)   Resp 16   Ht 5' 0.25\" (1.53 m)   Wt 128 lb (58.1 kg)   LMP 12/04/2015 (LMP Unknown)   SpO2 92%   BMI 24.79 kg/m²     General: alert and oriented to person, place and time, well-developed and well-nourished, in no acute distress    MUSCULOSKELETAL:   left lower extremity:  · Dressing C/D/I  · Compartments soft and compressible  · +PF/DF/EHL  · +2/4 DP & PT pulses, Brisk Cap refill, Toes warm and perfused  · Distal sensation grossly intact to Peroneals, Sural, Saphenous, and tibial nrs    CBC:   Lab Results   Component Value Date    WBC 7.1 02/23/2021    HGB 12.2 02/23/2021    HCT 38.0 02/23/2021     02/23/2021     PT/INR:  No results found for: PROTIME, INR      ASSESSMENT  · S/P L ROLA    PLAN      · Continue physical therapy and protocol: WBAT - LLE  · 24 hour abx coverage  · Deep venous thrombosis prophylaxis - 81 mg ASA BID, early mobilization  · Will monitor bowel function, Dulcolax for constipation.    · PT/OT  · Pain Control: IV and PO  · Monitor H&H  · D/C Plan:  Planning, Home with Home health once doing well with PT.

## 2021-03-03 NOTE — PROGRESS NOTES
NURSES NOTE    Blood Work    Pre  Op 2/23/2021:  Hgb. 12.2  Hct. 38.0  Post Op 3/03/2021:  Hgb.  9.5  Hct. 29.8  EBL in  ml. Acute Blood Loss / Post Op Anemia  Hgb was low pre op    Plan:  Patient to continue eating foods rich in iron and Vitamin C for one month post op  Patient to continue following Dietary Handout included in Joint Folder given to patient pre op  Pulmonary Hygiene: IS, cough and deep breathe every 1 to 2 hours post op while awake (reason: WBC's 12.0 elevated post op)    Jacinta Phillip RN, BSN, ONC, scribe for Dr. Annette Valdez.  Ck RÍOS - Orthopedic Surgeon

## 2021-03-03 NOTE — PROGRESS NOTES
Postop day 1 left total hip arthroplasty. Pt seen and examined by me. Findings and plan as documented per Orthopaedic Resident Surgeon note. Postop x-rays left hip show good implant fit and alignment without complicating feature. Patient states her preop pain is improved. Plan: Mobilize with physical therapy. Home when independent.

## 2021-03-03 NOTE — PROGRESS NOTES
OT BEDSIDE TREATMENT NOTE      Date:3/3/2021  Patient Name: Eze Zurita  MRN: [id number]  : 1970  Room: [address] [place] [address]     Per OT Eval:    Evaluating OT: Riri Burger OTR/L   License #  [id number]      -PAC Daily Activity Raw Score: 15/24     Recommended Adaptive Equipment:  Sock aide (pt. Owns other A.E. & DME)     Diagnosis:  OSTEOARTHRITIS L hip     Surgery: 3-2-21:  LEFT HIP TOTAL ARTHROPLASTY  Pertinent Medical History:   Past Medical History        Past Medical History:   Diagnosis Date    Anxiety       dr Brina Ortez 334 in [facility]    Arthritis      Asthma      B[facility]      Bruising tendency ([facility]      Chronic back pain      Fibromyalgia      Glucose intolerance (impaired glucose tolerance) 6/15/2020     hyperglycemia    Insomnia      Lupus ([facility]      Memory difficulty 6/15/2020    Mild cognitive impairment with memory loss 6/15/2020    Thumb pain       left - for OR 19          Precautions:  Falls, L LE Full WBAT, anterolateral hip prec. L LE     Home Living: Pt lives with spouse, dtr. & grandsons in a 1 story home with 3+1 steps to enter and 1 HR. Bathroom setup: tub/shower, raised toilet   Equipment owned: raised toilet seat, shower bench, ww, sp cane, LH shoe horn, reacher     Prior Level of Function: Ind. with ADLs , Ind. with IADLs; ambulated with ww vs. Furniture walking  Driving: active  Occupation: was a  at [employer]     Pain Level: Pt complained of 7/10 L hip pain with movement  Cognition: A&O: 4/4; Follows 1-2 step directions              Memory:  fair              Sequencing:  fair              Problem solving:  fair              Judgement/safety:  Fair with reminders RE: prec.                 Functional Assessment:    Initial Eval Status  Date: 3-2-2021 Treatment Status  Date: 3/3/21 Short Term Goals = Long Term Goals  Treatment frequency: 3-5 days   Feeding Independent  Independent      Grooming SBA seated SBA  Pt washed face, applied deodorant, combed hair and brushed teeth seated/standing at sink  Modified [place]    UB Dressing SBA seated EOB  Setup  Pt donned button up shirt Modified [place]    LB Dressing Maximal Assist/Dep  Pt. states she owns A.E.   SBA  Pt donned pants and socks Modified [place]    Bathing Maximal Assist with sim. task SBA  Pt completed sponge bathing task seated/standing at sink, with pt able to wash of UB/LB and stand to wash of buttocks/ronald area Modified [place]    Toileting NT  DNT Modified [place]    Bed Mobility  Supine to sit: Min A  Sit to supine: Min A  SBA  Supine<>EOB Supine to sit: Modified [place]   Sit to supine: Modified [place]    Functional Transfers Minimal Assist with sit <> stand, SPT with ww SBA  Sit to Stand  Stand to Sit      Modified [place]    Functional Mobility Minimal Assist with ww <household distance within room. Slightly limited d/t dizziness.  SBA  Pt ambulated short hosuehold distance in room EOB<>Bathroom, and within quijano per pt's request with w.w. Modified [place]    Balance Sitting:     Static:  Sup    Dynamic:SBA  Standing: Min A  Sitting:  Independent    Standing:  SBA     Activity Tolerance Fair with lt. ax.  spO2 98% on 2L  HR 95 bpm Fair  Good with mod. Ax.    Visual/  Perceptual Glasses: yes          Safety Awareness fair                     good      Hand dominance: R       Strength ROM Additional Info:    RUE  4/5  WFL good  and wfl FMC/dexterity noted during ADL tasks      LUE 4/5  WFL good  and wfl FMC/dexterity noted during ADL tasks         Hearing: WFL   Sensation:  Pt. c/o no numbness/ tingling B UE  Tone: WFL   Edema: none noted B UE        Education:  Pt was educated through out treatment regarding precautions to follow, proper technique & safety with bed mobility, functional transfers & mobility, techniques to assist with LB dressing tasks to improve safety & prevent falls and allow pt to return home safely. Comments: Upon arrival pt supine in bed, agreeable to therapy, nursing pesent okaying pt to be seen this session. Pt completed of bed mobility, functional mobility, transfers and ADL tasks this session. At end of session, pt seated upright in chair, all lines and tubes intact, call light within reach. · Pt has made good progress towards set goals.    · Continue with current plan of care focusing on increasing of independency with transfers and ADL tasks      Treatment Time In: 8:00am            Treatment Time Out: 8:25am                Treatment Charges: Mins Units   Ther Ex  18750     Manual Therapy 52171     Thera Activities 76065     ADL/Home Mgt 67109 25 2   Neuro Re-ed 41503     Group Therapy      Orthotic manage/training  09611     Non-Billable Time     Total Timed Treatment 25 2        Fl[place] RUIZ/KAI [id number]

## 2021-03-03 NOTE — CONSULTS
[address]                  [address] [place],  [facility]                                  CONSULTATION    PATIENT NAME: Trudi Catherine                     :        1970  MED REC NO:   [id number]                            ROOM:       5204  ACCOUNT NO:   [de-identified]                           ADMIT DATE: 2021  PROVIDER:     Jacinta Gaspar DO    CONSULT DATE:  2021    MEDICAL CONSULTATION    ADMITTING PROVIDER:  Jam Grady MD    REASON FOR CONSULTATION:  Medical management. PRIMARY CARE PROVIDER:  Edgard Klein MD    CHIEF COMPLAINT:  Postop left total hip arthroplasty. HISTORY OF PRESENT ILLNESS:  The patient is a 53-year-old   female who was admitted to the hospital after undergoing a left total  hip arthroplasty per Dr. Jam Grady on 2021. PAST MEDICAL HISTORY:  Bipolar disorder, osteoarthritis, lupus,  fibromyalgia. PAST SURGICAL HISTORY:  Right total hip arthroplasty; fatty tumor  removed, abdomen; left thumb trapeziectomy; left De Quervain's release;  left carpal tunnel release; right carpal tunnel release; right fifth  finger surgery; hernia repair; tubal ligation; cervical fusion; lumbar  fusion; tumor removal, right shoulder. MEDICATIONS PRIOR TO ADMISSION:  Rexulti, Naprosyn, vitamin D, calcium,  Plaquenil, Xanax, Proventil inhaler, Lamictal, Effexor XR, Ambien,  Desyrel. REVIEW OF SYSTEMS:  Remarkable for above-stated chief complaint plus  allergies none known. SOCIAL HISTORY:  The patient quit tobacco.  Occasional alcohol. PHYSICAL EXAMINATION:  GENERAL APPEARANCE:  Reveals a 53-year-old  female who is alert  and oriented x3, cooperative and a good historian. VITAL SIGNS:  Temperature 97.6, pulse 96, respirations 16, blood  pressure 122/56. HEENT:  Head:  Normocephalic, atraumatic.   Eyes:  Pupils are equal and

## 2021-03-04 VITALS
TEMPERATURE: 99 F | OXYGEN SATURATION: 98 % | SYSTOLIC BLOOD PRESSURE: 98 MMHG | BODY MASS INDEX: 25.13 KG/M2 | WEIGHT: 128 LBS | HEART RATE: 98 BPM | DIASTOLIC BLOOD PRESSURE: 55 MMHG | RESPIRATION RATE: 16 BRPM | HEIGHT: 60 IN

## 2021-03-04 LAB
HCT VFR BLD CALC: 27.4 % (ref 34–48)
HEMOGLOBIN: 8.5 G/DL (ref 11.5–15.5)
MCH RBC QN AUTO: 29.3 PG (ref 26–35)
MCHC RBC AUTO-ENTMCNC: 31 % (ref 32–34.5)
MCV RBC AUTO: 94.5 FL (ref 80–99.9)
PDW BLD-RTO: 13.1 FL (ref 11.5–15)
PLATELET # BLD: 187 E9/L (ref 130–450)
PMV BLD AUTO: 11.5 FL (ref 7–12)
RBC # BLD: 2.9 E12/L (ref 3.5–5.5)
WBC # BLD: 7 E9/L (ref 4.5–11.5)

## 2021-03-04 PROCEDURE — 6370000000 HC RX 637 (ALT 250 FOR IP): Performed by: ORTHOPAEDIC SURGERY

## 2021-03-04 PROCEDURE — 6360000002 HC RX W HCPCS: Performed by: ORTHOPAEDIC SURGERY

## 2021-03-04 PROCEDURE — 99024 POSTOP FOLLOW-UP VISIT: CPT | Performed by: ORTHOPAEDIC SURGERY

## 2021-03-04 PROCEDURE — 97530 THERAPEUTIC ACTIVITIES: CPT

## 2021-03-04 PROCEDURE — 6370000000 HC RX 637 (ALT 250 FOR IP): Performed by: STUDENT IN AN ORGANIZED HEALTH CARE EDUCATION/TRAINING PROGRAM

## 2021-03-04 PROCEDURE — 36415 COLL VENOUS BLD VENIPUNCTURE: CPT

## 2021-03-04 PROCEDURE — 2580000003 HC RX 258: Performed by: ORTHOPAEDIC SURGERY

## 2021-03-04 PROCEDURE — 85027 COMPLETE CBC AUTOMATED: CPT

## 2021-03-04 PROCEDURE — 97535 SELF CARE MNGMENT TRAINING: CPT

## 2021-03-04 RX ORDER — BISACODYL 10 MG
10 SUPPOSITORY, RECTAL RECTAL DAILY
Status: DISCONTINUED | OUTPATIENT
Start: 2021-03-04 | End: 2021-03-04 | Stop reason: HOSPADM

## 2021-03-04 RX ADMIN — BREXPIPRAZOLE 1 MG: 2 TABLET ORAL at 09:00

## 2021-03-04 RX ADMIN — OXYCODONE HYDROCHLORIDE 10 MG: 10 TABLET ORAL at 12:30

## 2021-03-04 RX ADMIN — OXYCODONE HYDROCHLORIDE 5 MG: 5 TABLET ORAL at 00:11

## 2021-03-04 RX ADMIN — ACETAMINOPHEN 650 MG: 325 TABLET ORAL at 00:11

## 2021-03-04 RX ADMIN — ACETAMINOPHEN 650 MG: 325 TABLET ORAL at 06:26

## 2021-03-04 RX ADMIN — ALPRAZOLAM 2 MG: 1 TABLET ORAL at 09:00

## 2021-03-04 RX ADMIN — OXYCODONE HYDROCHLORIDE 10 MG: 10 TABLET ORAL at 07:22

## 2021-03-04 RX ADMIN — MORPHINE SULFATE 2 MG: 2 INJECTION, SOLUTION INTRAMUSCULAR; INTRAVENOUS at 06:26

## 2021-03-04 RX ADMIN — DOCUSATE SODIUM 100 MG: 100 CAPSULE ORAL at 09:00

## 2021-03-04 RX ADMIN — ACETAMINOPHEN 650 MG: 325 TABLET ORAL at 12:30

## 2021-03-04 RX ADMIN — SODIUM CHLORIDE, PRESERVATIVE FREE 10 ML: 5 INJECTION INTRAVENOUS at 06:26

## 2021-03-04 RX ADMIN — SODIUM CHLORIDE, PRESERVATIVE FREE 10 ML: 5 INJECTION INTRAVENOUS at 08:59

## 2021-03-04 RX ADMIN — VENLAFAXINE HYDROCHLORIDE 75 MG: 75 CAPSULE, EXTENDED RELEASE ORAL at 09:00

## 2021-03-04 RX ADMIN — ASPIRIN 81 MG: 81 TABLET, COATED ORAL at 09:00

## 2021-03-04 RX ADMIN — BISACODYL 10 MG: 10 SUPPOSITORY RECTAL at 09:01

## 2021-03-04 RX ADMIN — Medication 1000 UNITS: at 09:00

## 2021-03-04 ASSESSMENT — PAIN DESCRIPTION - ONSET: ONSET: ON-GOING

## 2021-03-04 ASSESSMENT — PAIN SCALES - GENERAL
PAINLEVEL_OUTOF10: 5
PAINLEVEL_OUTOF10: 7
PAINLEVEL_OUTOF10: 2
PAINLEVEL_OUTOF10: 7

## 2021-03-04 ASSESSMENT — PAIN DESCRIPTION - PAIN TYPE: TYPE: SURGICAL PAIN;ACUTE PAIN

## 2021-03-04 ASSESSMENT — PAIN DESCRIPTION - PROGRESSION: CLINICAL_PROGRESSION: NOT CHANGED

## 2021-03-04 NOTE — PROGRESS NOTES
OT BEDSIDE TREATMENT NOTE      Date:3/4/2021  Patient Name: Aleshia Bey  MRN: [id number]  : 1970  Room: [address] [place] [address]     Per OT Eval:    Evaluating OT: Maryam Giles. JOHN BurgerR/L   License #  [id number]      AM-PAC Daily Activity Raw Score: 15/24     Recommended Adaptive Equipment:  Sock aide (pt. Owns other A.E. & DME)     Diagnosis:  OSTEOARTHRITIS L hip     Surgery: 3-2-21:  LEFT HIP TOTAL ARTHROPLASTY  Pertinent Medical History:   Past Medical History        Past Medical History:   Diagnosis Date    Anxiety       dr Glendy Canales 057 [facility]    Arthritis      Asthma      Bipolar 2 disorder ([facility] [address] [facility]      Bruising tendency ([facility] [facility]      Chronic back pain      Fibromyalgia      Glucose intolerance (impaired glucose tolerance) 6/15/2020     hyperglycemia    Insomnia      Lupus ([facility] [facility]      Memory difficulty 6/15/2020    Mild cognitive impairment with memory loss 6/15/2020    Thumb pain       left - for OR 19          Precautions:  Falls, L LE Full WBAT, anterolateral hip prec. L LE     Home Living: Pt lives with spouse, dtr. & grandsons in a 1 story home with 3+1 steps to enter and 1 HR. Bathroom setup: tub/shower, raised toilet   Equipment owned: raised toilet seat, shower bench, ww, sp cane, LH shoe horn, reacher     Prior Level of Function: Ind. with ADLs , Ind. with IADLs; ambulated with ww vs. Furniture walking  Driving: active  Occupation: was a  at [employer]     Pain Level: Pt did not complain of pain this session  Cognition: A&O: 4/4; Follows 1-2 step directions              Memory:  fair              Sequencing:  fair              Problem solving:  fair              Judgement/safety:  Fair with reminders RE: prec.                 Functional Assessment:    Initial Eval Status  Date: 3-2-2021 Treatment Status  Date: 3/4/21 Short Term Goals = Long Term Goals  Treatment frequency: 3-5 days   Feeding Independent  Independent      Grooming SBA seated Supervision  Pt stood at sink and washed hands  Modified [place]    UB Dressing SBA seated EOB  Setup  Pt corbin/doff button up shirt Modified [place]    LB Dressing Maximal Assist/Dep  Pt. states she owns A.E.   SBA  Pt doffed socks seated EOB, using of sockaide to corbin socks      Sockaide issued 3/4/21 Modified [place]    Bathing Maximal Assist with sim. task DNT  SBA per previous level Modified [place]    Toileting NT Supervision    Commode transfer on standard commode, pt denying need to complete toileting task this date Modified [place]    Bed Mobility  Supine to sit: Min A  Sit to supine: Min A Radha  Supine<>EOB    Use of bed rails Supine to sit: Modified [place]   Sit to supine: Modified [place]    Functional Transfers Minimal Assist with sit <> stand, SPT with ww Supervision  Sit to Stand  Stand to Sit      Modified [place]    Functional Mobility Minimal Assist with ww <household distance within room. Slightly limited d/t dizziness.  Supervision  Pt ambulated short hosuehold distance in room EOB<>Bathroom with w.w. Modified [place]    Balance Sitting:     Static:  Sup    Dynamic:SBA  Standing: Min A  Sitting:  Independent    Standing:  Supervision     Activity Tolerance Fair with lt. ax.  spO2 98% on 2L  HR 95 bpm Fair  Good with mod. Ax.    Visual/  Perceptual Glasses: yes          Safety Awareness fair                     good      Hand dominance: R       Strength ROM Additional Info:    RUE  4/5  WFL good  and wfl FMC/dexterity noted during ADL tasks      LUE 4/5  WFL good  and wfl FMC/dexterity noted during ADL tasks         Hearing: WFL   Sensation:  Pt. c/o no numbness/ tingling B UE  Tone: WFL   Edema: none noted B UE        Education:  Pt was educated through out treatment regarding precautions to follow, proper technique & safety with bed mobility, functional transfers & mobility, use of AE to assist with LB dressing tasks to improve safety & prevent falls and allow pt to return home safely. Comments: Upon arrival pt supine in bed, agreeable to therapy, nursing pesent okaying pt to be seen this session. Pt completed of bed mobility, functional mobility, transfers and ADL tasks this session. At end of session, pt supine in bed, all lines and tubes intact, call light within reach. · Pt has made good progress towards set goals.    · Continue with current plan of care focusing on increasing of independency with transfers and ADL tasks      Treatment Time In: 10:55am            Treatment Time Out: 11:05am              Treatment Charges: Mins Units   Ther Ex  83872     Manual Therapy 26652     Thera Activities 35172     ADL/Home Mgt 27159 10 1   Neuro Re-ed 01184     Group Therapy      Orthotic manage/training  72042     Non-Billable Time     Total Timed Treatment 10 1        Fl[place] RUIZ/KAI [id number]

## 2021-03-04 NOTE — PLAN OF CARE
Problem: Falls - Risk of:  Goal: Will remain free from falls  Description: Will remain free from falls  3/3/2021 2324 by Rose Mary Zaragoza RN  Outcome: Met This Shift  3/3/2021 1556 by Ria Holliday RN  Outcome: Met This Shift     Problem: Falls - Risk of:  Goal: Absence of physical injury  Description: Absence of physical injury  3/3/2021 2324 by Rose Mary Zaragoza RN  Outcome: Met This Shift  3/3/2021 1556 by Ria Holliday RN  Outcome: Met This Shift     Problem: Infection:  Goal: Will remain free from infection  Description: Will remain free from infection  Outcome: Met This Shift

## 2021-03-04 NOTE — CARE COORDINATION
Met with pt and . Pt would like [facility] at discharge. Referral made to [facility]. Face sheet faxed. Message sent to Orthopedic resident for DME order for sock aid. Order received for sock aid. Florence with OT notified.     Pt is accepted with [facility]

## 2021-03-04 NOTE — PLAN OF CARE
Problem: Falls - Risk of:  Goal: Will remain free from falls  Description: Will remain free from falls  Outcome: Met This Shift  Goal: Absence of physical injury  Description: Absence of physical injury  Outcome: Met This Shift     Problem: Infection:  Goal: Will remain free from infection  Description: Will remain free from infection  Outcome: Met This Shift     Problem: Daily Care:  Goal: Daily care needs are met  Description: Daily care needs are met  Outcome: Met This Shift     Problem: Skin Integrity:  Goal: Skin integrity will stabilize  Description: Skin integrity will stabilize  Outcome: Met This Shift     Problem: Discharge Planning:  Goal: Patients continuum of care needs are met  Description: Patients continuum of care needs are met  Outcome: Met This Shift     Problem: Pain:  Goal: Pain level will decrease  Description: Pain level will decrease  Outcome: Ongoing  Goal: Control of acute pain  Description: Control of acute pain  Outcome: Ongoing  Goal: Control of chronic pain  Description: Control of chronic pain  Outcome: Ongoing

## 2021-03-04 NOTE — PROGRESS NOTES
Physical Therapy    Name: Melo Zepeda  : 1970  MRN: [id number]    Referring Provider:  Argentina Harrison DO    Date of Service: 3/4/2021    Evaluating PT:  Kaycee Darby, PT   Room #:  2236/5795-X  Diagnosis: Arthritis of left hip [M16.12]  Status post total hip replacement, left [Z96.642]     PMHx/PSHx:   has a past medical history of Anxiety, Arthritis, Asthma, Bipolar 2 disorder ([address] Bruising tendency ([address] Chronic back pain, Fibromyalgia, Glucose intolerance (impaired glucose tolerance), Insomnia, Lupus ([facility] Memory difficulty, Mild cognitive impairment with memory loss, and Thumb pain. has a past surgical history that includes Tubal ligation (10/1994); Carpal tunnel release; tumor removal; Finger surgery; Hip Arthrotomy (); Colonoscopy; hernia repair (); Total hip arthroplasty (Right, 14); Abdomen surgery; Nerve Block; arthroplasty (Left, 2019); and Total hip arthroplasty (Left, 3/2/2021). Procedure/Surgery:  3/2  LEFT HIP TOTAL ARTHROPLASTY   Precautions:  Falls, anterior hip precautions , WBAT (weight bearing as tolerated),   Equipment Needs:  Wheeled Walker      SUBJECTIVE:    Patient lives with spouse  in a [place] home  with 3 + 1 steps to enter with 1Rail  Bed is on 1 floor and bath is on 1 floor. Patient ambulated independently  PTA. Equipment owned: None,      OBJECTIVE:   Initial Evaluation  Date: 3/2/21 Treatment  3/4/21 AM  Treatment  3/4/21  PM Short Term/ Long Term   Goals   AM-PAC 6 Clicks 18 57/90     Was pt agreeable to Eval/treatment? yes yes     Does pt have pain? Yes 9/10 L hip 7/10 L hip     Bed Mobility  Rolling: NT  Supine to sit: Min  Sit to supine: Min for LLE  Scooting: Min Rolling: Ind  Supine to sit: Ind  Sit to supine: Ind  Scooting: Ind  Rolling: Ind  Supine to sit: Ind  Sit to supine: Ind  Scooting: Ind   Transfers Sit to stand: Min to fww  Stand to sit: Min  Stand pivot: Min Sit to stand: Ind to fww  Stand to sit: Ind  Stand pivot:  Ind with PLOF.     Treatment AM and PM:  Patient practiced and was instructed in the following treatment:    · Bed mobility training - pt given verbal and tactile cues to facilitate proper sequencing and safety during rolling and supine>sit as well as provided with physical assistance to complete task    · Assistive device training - pt educated on using [employer] during gait, [employer] approximation/negotiation, and hand placement during sit<>stand to [employer]  · STS and transfer training - educated on hand/foot placement, safety, and sequencing during STS and pivot transfers using assistive device  · Gait training - verbal cues for [employer] approximation/negotiation, upright posture, and safety during 90 and 180 degree turns during gait   · Stair negotiation training completed with patient and . Pt's/ family goals   1. Return to home if able. Patient and or family understand(s) diagnosis, prognosis, and plan of care. yes    PLAN OF CARE:    PT care will be provided in accordance with the objectives noted above. Exercises and functional mobility practice will be used as well as appropriate assistive devices or modalities to obtain goals. Patient and family education will also be administered as needed. Current Treatment Recommendations     [x] Strengthening     [x] ROM   [x] Balance Training   [x] Endurance Training   [x] Transfer Training   [x] Gait Training   [x] Stair Training   [] Positioning   [] Safety and Education Training   [] Patient/Caregiver Education   [] HEP  [] Other     Frequency of treatments: BID x 1-2 weeks. Time in  0955  Time out  1020    Total Treatment Time  25 minutes     Evaluation Time includes thorough review of current medical information, gathering information on past medical history/social history and prior level of function, completion of standardized testing/informal observation of tasks, assessment of data and education on plan of care and goals.     CPT codes:  [] Low Complexity PT evaluation

## 2021-03-04 NOTE — PROGRESS NOTES
[facility]    Subjective:  Pt alert conversive pain controlled      Current Facility-Administered Medications:     bisacodyl (DULCOLAX) suppository 10 mg, 10 mg, Rectal, Daily, Magnolia Morales MD    docusate sodium (COLACE) capsule 100 mg, 100 mg, Oral, BID, Olga Lidia Chino DO, 100 mg at 03/03/21 2137    bisacodyl (DULCOLAX) suppository 10 mg, 10 mg, Rectal, Daily PRN, Olga Lidia Chino DO    brexpiprazole (REXULTI) tablet 1 mg, 1 mg, Oral, Daily, Brenda Ruby DO, 1 mg at 03/03/21 2511    ALPRAZolam Dayana Merritts) tablet 2 mg, 2 mg, Oral, TID, Brenda Ruby DO, 2 mg at 03/03/21 2137    traZODone (DESYREL) tablet 150 mg, 150 mg, Oral, Nightly, Brenda Ruby DO, 150 mg at 03/03/21 2137    venlafaxine (EFFEXOR XR) extended release capsule 75 mg, 75 mg, Oral, Daily, Brenda Ruby DO, 75 mg at 03/03/21 4317    vitamin D (CHOLECALCIFEROL) tablet 1,000 Units, 1,000 Units, Oral, Daily, Brenda Ruby DO, 1,000 Units at 03/03/21 0914    zolpidem (AMBIEN) tablet 5 mg, 5 mg, Oral, Nightly PRN, Brenda Ruby DO    sodium chloride flush 0.9 % injection 10 mL, 10 mL, Intravenous, 2 times per day, Brenda Ruby DO, 10 mL at 03/03/21 2141    sodium chloride flush 0.9 % injection 10 mL, 10 mL, Intravenous, PRN, Brenda Ruby DO, 10 mL at 03/04/21 0626    acetaminophen (TYLENOL) tablet 650 mg, 650 mg, Oral, Q6H, Brenda Ruby DO, 650 mg at 03/04/21 5733    oxyCODONE (ROXICODONE) immediate release tablet 5 mg, 5 mg, Oral, Q4H PRN, 5 mg at 03/04/21 0011 **OR** oxyCODONE HCl (OXY-IR) immediate release tablet 10 mg, 10 mg, Oral, Q4H PRN, Brenda Ruby DO, 10 mg at 03/04/21 7759    morphine (PF) injection 2 mg, 2 mg, Intravenous, Q3H PRN, 2 mg at 03/04/21 0626 **OR** morphine sulfate (PF) injection 4 mg, 4 mg, Intravenous, Q3H PRN, Brenda Ruby DO    magnesium hydroxide (MILK OF MAGNESIA) 400 MG/5ML suspension 30 mL, 30 mL, Oral, Daily PRN, Brenda Ruby, DO    ondansetron (ZOFRAN-ODT) disintegrating tablet 4 mg, 4 mg, Oral, Q8H PRN **OR** ondansetron (ZOFRAN) injection 4 mg, 4 mg, Intravenous, Q6H PRN, Lina Medina DO    aspirin EC tablet 81 mg, 81 mg, Oral, BID, Lina Mdeina DO, 81 mg at 03/03/21 2137    lamoTRIgine (LAMICTAL) tablet 400 mg, 400 mg, Oral, Nightly, Clint Alpers, MD, 400 mg at 03/03/21 2137    Objective:    BP (!) 96/52   Pulse 102   Temp 97.2 °F (36.2 °C) (Temporal)   Resp 14   Ht 5' 0.25\" (1.53 m)   Wt 128 lb (58.1 kg)   LMP 12/04/2015 (LMP Unknown)   SpO2 92%   BMI 24.79 kg/m²     Heart:  reg  Lungs:  ctab  Abd: + bs soft nontender  Extrem:  W/o edema    CBC with Differential:    Lab Results   Component Value Date    WBC 7.0 03/04/2021    RBC 2.90 03/04/2021    HGB 8.5 03/04/2021    HCT 27.4 03/04/2021     03/04/2021    MCV 94.5 03/04/2021    MCH 29.3 03/04/2021    MCHC 31.0 03/04/2021    RDW 13.1 03/04/2021    SEGSPCT 58 03/03/2014    LYMPHOPCT 23.1 02/23/2021    MONOPCT 6.2 02/23/2021    BASOPCT 0.6 02/23/2021    MONOSABS 0.44 02/23/2021    LYMPHSABS 1.64 02/23/2021    EOSABS 0.45 02/23/2021    BASOSABS 0.04 02/23/2021     CMP:    Lab Results   Component Value Date     03/03/2021    K 3.7 03/03/2021     03/03/2021    CO2 23 03/03/2021    BUN 7 03/03/2021    CREATININE 0.6 03/03/2021    GFRAA >60 03/03/2021    LABGLOM >60 03/03/2021    GLUCOSE 117 03/03/2021    PROT 6.9 02/23/2021    LABALBU 3.9 02/23/2021    CALCIUM 8.5 03/03/2021    BILITOT 0.2 02/23/2021    ALKPHOS 201 02/23/2021    AST 17 02/23/2021    ALT 13 02/23/2021     Warfarin PT/INR:  No results found for: INR, PROTIME    Assessment:    Principal Problem:    Arthritis of left hip  Active Problems:    Bipolar affective disorder (HCC)    Lupus (HCC)    Status post total hip replacement, left  Resolved Problems:    * No resolved hospital problems.  *  postop anemia acute blood loss    Plan:  Dc planning as per sandeep Kahn Mike Tobias  7:48 AM  3/4/2021

## 2021-03-04 NOTE — PROGRESS NOTES
NURSES NOTE [facility]  POD # 2 LEFT HIP TOTAL JOINT ARTHROPLASTY     Subjective: \"I am ready to go home today. \" \"I am feeling good. \"                                                                                                                                           Objective/Assessment: Patient sitting in the chair at the bedside, watching TV    Visited patient with Dr. Chelsea Morley who examined patient and discussed discharge plans with the patient    Neuro:  Alert and oriented, pleasant and conversant. Listened and was receptive to discharge instructions  Appearance:  No distress   Pain Control:  Effective with prescribed oral pain reliever  Breathing:  Patient denies CP or SOB   Saline montse:  Maintained   Appetite:  Restored   Voiding   Without difficulty in BR   Abdomen:  Soft   Expelling Flatus:  Yes   BM:  No   Dressing:  Aquacel maintained, C/D/I. Resolving ecchymosis present  Motion: Patient reports she ambulated in the hallway with therapy and successfully navigated the steps   Compartments:  Thigh / calf soft and non tender   Neuro / Circulatory:  Intact          BP (!) 96/52   Pulse 102   Temp 97.2 °F (36.2 °C) (Temporal)   Resp 14   Ht 5' 0.25\" (1.53 m)   Wt 128 lb (58.1 kg)   LMP 12/04/2015 (LMP Unknown)   SpO2 92%   BMI 24.79 kg/m²     Recent Labs     03/03/21  0827 03/04/21  0524   WBC 12.0* 7.0   HGB 9.5* 8.5*   HCT 29.8* 27.4*    187   Acute Blood Loss / Post Op Anemia  Patient denies dizziness or lightheadedness    Plan:   Dulcolax R/S this am (reason: hx constipation - no BM since pre op)  Discharge home today with Home Physical Therapy 3 times a week until seen in office on Thursday, March 11 th.   Home Physical Therapy is needed due to:  1.) osteoarthritis hip, 2.) fresh post op LEFT HIP TOTAL JOINT ARTHROPLASTY, 3.)  post operative weakness and / or pain, 4.) for balance,gait and transfer, 5.) unable to leave home without maximum effort and requires assistance of wheelchair or walker  [facility] for Home PT: evaluate and treat for strength training, gait training, balance training, safety training, functional mobility, home assessment due to fall risk. adaptive equipment training due to weakness and recent loss of function. Patient has no DME needs  Discharge instructions discussed with patient  S/S Infection and DVT discussed with patient - Instructed patient to call office  S/S PE discussed with patient - Patient instructed to go to ER immediately  Follow Patient Education Guide: Iron Replacement given to patient pre op. Patient instructed to continue eating foods rich in iron and vitamin C for one month post op   To prevent constipation while taking opioid pain relievers; patient instructed to drink plenty of water, eat fruits and vegetables, increase the fiber in her diet, drink prune, plum or apple juice, eat dried fruit such as prunes. May take OTC aids: Stool softeners, suppositories, Fleet enema. If constipation persists: Contact PCP    Remove Aquacel dressing on Tuesday, March 9 th  Patient expresses understanding and is in agreement with the plan  Prescription for pain reliever - Percocet 5/325 (28 tablets / 0 refills) take one tablet every 6 hours as needed for moderate to severe pain up to 7 days.  May take Tylenol for less pain, not to exceed 3000 mg / 24 hr  DVT Prophylaxis: Continue lower extremity circulation exercises when at rest, frequent ambulation and medication - one adult enteric coated 81 mg aspirin tablet twice a day  Patient will call office for any questions, concerns or problems [phone], ext. [phone]  Follow up with Dr. Ayse Truong on Thursday, March 11 th at 10:15 am for staple or stitch removal, x-ray, exam by the surgeon and a prescription for Out Patient Physical Therapy    Geraldo Manuel RN, BSN, ONC, scribe for Dr. Ayse Truong MD - Orthopedic Surgeon  3/4/2021 7:38 AM

## 2021-03-11 ENCOUNTER — OFFICE VISIT (OUTPATIENT)
Dept: ORTHOPEDIC SURGERY | Age: 51
End: 2021-03-11

## 2021-03-11 VITALS — HEIGHT: 60 IN | WEIGHT: 128 LBS | BODY MASS INDEX: 25.13 KG/M2

## 2021-03-11 DIAGNOSIS — Z96.642 STATUS POST TOTAL HIP REPLACEMENT, LEFT: Primary | ICD-10-CM

## 2021-03-11 PROCEDURE — 99024 POSTOP FOLLOW-UP VISIT: CPT | Performed by: ORTHOPAEDIC SURGERY

## 2021-03-11 RX ORDER — OXYCODONE HYDROCHLORIDE AND ACETAMINOPHEN 5; 325 MG/1; MG/1
1 TABLET ORAL EVERY 8 HOURS PRN
Qty: 30 TABLET | Refills: 0 | Status: SHIPPED | OUTPATIENT
Start: 2021-03-11 | End: 2021-03-25

## 2021-03-11 RX ORDER — OXYCODONE HYDROCHLORIDE AND ACETAMINOPHEN 5; 325 MG/1; MG/1
1 TABLET ORAL EVERY 6 HOURS PRN
COMMUNITY
End: 2021-04-22 | Stop reason: ALTCHOICE

## 2021-03-11 NOTE — PROGRESS NOTES
[facility]    right    NERVE BLOCK      x4     TOTAL HIP ARTHROPLASTY Right 2/25/14    TOTAL HIP ARTHROPLASTY Left 3/2/2021    LEFT HIP TOTAL ARTHROPLASTY performed by Vilma Fox MD at [facility]  10/1994    TUMOR REMOVAL      right shoulder fatty tumor       Current Outpatient Medications   Medication Sig Dispense Refill    oxyCODONE-acetaminophen (PERCOCET) 5-325 MG per tablet Take 1 tablet by mouth every 6 hours as needed for Pain.  oxyCODONE-acetaminophen (PERCOCET) 5-325 MG per tablet Take 1 tablet by mouth every 8 hours as needed for Pain for up to 14 days. 30 tablet 0    aspirin EC 81 MG EC tablet Take 1 tablet by mouth 2 times daily for 28 days 56 tablet 0    brexpiprazole (REXULTI) 1 MG TABS tablet Take 1 mg by mouth daily       vitamin D (CHOLECALCIFEROL) 25 MCG (1000 UT) TABS tablet Take by mouth daily       calcium carbonate 600 MG TABS tablet Take by mouth daily       hydroxychloroquine (PLAQUENIL) 200 MG tablet Take by mouth 2 times daily       ALPRAZolam (XANAX) 1 MG tablet Take 2 mg by mouth 3 times daily. 1    PROVENTIL  (90 Base) MCG/ACT inhaler INHALE 2 PUFFS EVERY 4 HOURS AS NEEDED  2    lamoTRIgine (LAMICTAL) 200 MG tablet Take 200 mg by mouth 2 tabs at night      venlafaxine (EFFEXOR XR) 75 MG extended release capsule Take 75 mg by mouth daily   0    zolpidem (AMBIEN) 10 MG tablet Take  by mouth nightly as needed for Sleep.  traZODone (DESYREL) 150 MG tablet Take 150 mg by mouth nightly.  naproxen (NAPROSYN) 500 MG tablet Take 500 mg by mouth 3 times daily (with meals)        No current facility-administered medications for this visit.         No Known Allergies    Social History     Socioeconomic History    Marital status:      Spouse name: None    Number of children: None    Years of education: None    Highest education level: None   Occupational History    None   Social Needs    Financial resource strain: None    Food insecurity     Worry: None     Inability: None    Transportation needs     Medical: None     Non-medical: None   Tobacco Use    Smoking status: Former Smoker     Packs/day: 1.00     Years: 30.00     Pack years: 30.00     Quit date: 2014     Years since quittin.1    Smokeless tobacco: Never Used    Tobacco comment: stop    Substance and Sexual Activity    Alcohol use: Yes     Alcohol/week: 3.0 standard drinks     Types: 3 Cans of beer per week     Comment: SOCIAL    Drug use: No    Sexual activity: Yes     Partners: Male   Lifestyle    Physical activity     Days per week: None     Minutes per session: None    Stress: None   Relationships    Social connections     Talks on phone: None     Gets together: None     Attends [employer] service: None     Active member of club or organization: None     Attends meetings of clubs or organizations: None     Relationship status: None    Intimate partner violence     Fear of current or ex partner: None     Emotionally abused: None     Physically abused: None     Forced sexual activity: None   Other Topics Concern    None   Social History Narrative    None       Family History   Problem Relation Age of Onset    Cancer Father         LEUKEMIA    Cancer Maternal Grandmother         lung and brain ca         Review of Systems   No fever, chills, or other constitutionalsymptoms. No numbness or other neuro symptoms. No chest pain. No dyspnea. [unfilled]   Able to stand and walk with 1 hand assist full weightbearing and good balance. Leg lengths clinically equal.  No joint pain with left hip rotation. Left hip incision healing cleanly with minimal ecchymosis and edema no erythema no drainage or fluctuance. Staples removed from incision. Physical Exam    Patient is alert and oriented. Well-developed well-nourished. Pupils equal and reactive. Scleraeanicteric. Neck supple  Lungs clear.   Cardiac rate and rhythm regular. Abdomen soft and nontender. Skin warm and dry. XRAY: X-ray today AP pelvis with AP and lateral views left hip. Left total hip arthroplasty with good fit and alignment unchanged from postop. No subluxation no lucencies or other position change. Prior right total hip with cerclage cable again noted without complicating feature. Impression: Intact left total hip arthroplasty in good alignment. ASSESSMENT/PLAN:    Edwin Vazquez was seen today for post-op check. Diagnoses and all orders for this visit:    Status post total hip replacement, left  -     XR HIP 2-3 VW W PELVIS LEFT; Future  -     Amb External Referral To Physical Therapy  -     oxyCODONE-acetaminophen (PERCOCET) 5-325 MG per tablet; Take 1 tablet by mouth every 8 hours as needed for Pain for up to 14 days. Doing well. Progress to outpatient therapy with cane. Percocet refilled with instructions on weaning to OTC analgesics. Return in about 6 weeks (around 4/22/2021) for exam and xray left total hip.        Sheeba Hurtado MD    3/11/2021  11:08 AM

## 2021-03-12 NOTE — DISCHARGE SUMMARY
600 MG Tabs tablet     hydroxychloroquine 200 MG tablet  Commonly known as: PLAQUENIL     lamoTRIgine 200 MG tablet  Commonly known as: LAMICTAL     naproxen 500 MG tablet  Commonly known as: NAPROSYN     Proventil  (90 Base) MCG/ACT inhaler  Generic drug: albuterol sulfate HFA     Rexulti 1 MG Tabs tablet  Generic drug: brexpiprazole     traZODone 150 MG tablet  Commonly known as: DESYREL     venlafaxine 75 MG extended release capsule  Commonly known as: EFFEXOR XR     vitamin D 25 MCG (1000 UT) Tabs tablet  Commonly known as: CHOLECALCIFEROL     zolpidem 10 MG tablet  Commonly known as: AMBIEN        ASK your doctor about these medications    oxyCODONE-acetaminophen 5-325 MG per tablet  Commonly known as: Percocet  Take 1 tablet by mouth every 6 hours as needed for Pain for up to 7 days. Intended supply: 7 days. Take lowest dose possible to manage pain  Ask about: Should I take this medication?            Where to Get Your Medications      You can get these medications from any pharmacy    Bring a paper prescription for each of these medications  · aspirin EC 81 MG EC tablet  · oxyCODONE-acetaminophen 5-325 MG per tablet             Signed:  Cordell Kennedy  3/12/2021  8:28 AM

## 2021-04-22 ENCOUNTER — OFFICE VISIT (OUTPATIENT)
Dept: ORTHOPEDIC SURGERY | Age: 51
End: 2021-04-22

## 2021-04-22 VITALS — HEIGHT: 60 IN | BODY MASS INDEX: 23.56 KG/M2 | WEIGHT: 120 LBS

## 2021-04-22 DIAGNOSIS — Z96.642 STATUS POST TOTAL HIP REPLACEMENT, LEFT: Primary | ICD-10-CM

## 2021-04-22 PROCEDURE — 99024 POSTOP FOLLOW-UP VISIT: CPT | Performed by: ORTHOPAEDIC SURGERY

## 2021-04-22 NOTE — PROGRESS NOTES
Chief Complaint:   Chief Complaint   Patient presents with    Post-Op Check     FU Left ROLA 3/2/2021. Doing well. Finished PT last week. No complaints       HPI 7 weeks after left total hip. Previous right total hip. Patient states she is feeling fine and is very pleased with her progress. No limitations or pain reported.     Patient Active Problem List   Diagnosis    DJD (degenerative joint disease)    Status post right hip replacement    Gait difficulty    Acute blood loss anemia    Leg length discrepancy    Bipolar affective disorder (HCC)    GERD (gastroesophageal reflux disease)    Constipation    Elevated liver enzymes    Protein calorie malnutrition (HCC)    Low back pain    Closed displaced fracture of neck of fifth metacarpal bone of right hand    Arthritis of carpometacarpal (CMC) joint of left thumb    Bilateral carpal tunnel syndrome    Memory difficulty    Glucose intolerance (impaired glucose tolerance)    Mild cognitive impairment with memory loss    Lupus ([address]    Lumbar spondylosis    Arthritis of left hip    Status post total hip replacement, left       Past Medical History:   Diagnosis Date    [facility] 63 [facility]    Arthritis     Asthma     Bipolar 2 disorder ([address]     Bruising tendency ([address]     Chronic back pain     Fibromyalgia     Glucose intolerance (impaired glucose tolerance) 6/15/2020    hyperglycemia    Insomnia     Lupus ([address]     Memory difficulty 6/15/2020    Mild cognitive impairment with memory loss 6/15/2020    Thumb pain     left - for OR 4-16-19        Past Surgical History:   Procedure Laterality Date    ABDOMEN SURGERY      fatty tumor removed     ARTHROPLASTY Left 4/16/2019    LEFT THUMB TRAPEZIECTOMY WITH LIGAMENT RECONSTRUCTION LEFT PARTIAL TRAPEZOIDECTOMY LEFT DEQUERVAINS RELEASE LEFT CARPAL TUNNEL RELEASE performed by Ozzy Carlos MD at [facility]    COLONOSCOPY      FINGER SURGERY      right pinkie    HERNIA REPAIR  1970    HIP ARTHROTOMY  1973    right    NERVE BLOCK      x4     TOTAL HIP ARTHROPLASTY Right 2/25/14    TOTAL HIP ARTHROPLASTY Left 3/2/2021    LEFT HIP TOTAL [facility]THROPLASTY [facility] Gust[facility]  10/1994    TUMOR REMOVAL      right shoulder fatty tumor       Current Outpatient Medications   Medication Sig Dispense Refill    brexpiprazole (REXULTI) 1 MG TABS tablet Take 1 mg by mouth daily       vitamin D (CHOLECALCIFEROL) 25 MCG (1000 UT) TABS tablet Take by mouth daily       calcium carbonate 600 MG TABS tablet Take by mouth daily       hydroxychloroquine (PLAQUENIL) 200 MG tablet Take by mouth 2 times daily       ALPRAZolam (XANAX) 1 MG tablet Take 2 mg by mouth 3 times daily. 1    PROVENTIL  (90 Base) MCG/ACT inhaler INHALE 2 PUFFS EVERY 4 HOURS AS NEEDED  2    lamoTRIgine (LAMICTAL) 200 MG tablet Take 200 mg by mouth 2 tabs at night      venlafaxine (EFFEXOR XR) 75 MG extended release capsule Take 75 mg by mouth daily   0    zolpidem (AMBIEN) 10 MG tablet Take  by mouth nightly as needed for Sleep.  traZODone (DESYREL) 150 MG tablet Take 150 mg by mouth nightly.  aspirin EC 81 MG EC tablet Take 1 tablet by mouth 2 times daily for 28 days 56 tablet 0    naproxen (NAPROSYN) 500 MG tablet Take 500 mg by mouth 3 times daily (with meals)        No current facility-administered medications for this visit.         No Known Allergies    Social History     Socioeconomic History    Marital status:      Spouse name: None    Number of children: None    Years of education: None    Highest education level: None   Occupational History    None   Social Needs    Financial resource strain: None    Food insecurity     Worry: None     Inability: None    Transportation needs     Medical: None     Non-medical: None   Tobacco Use    Smoking status: Former Smoker Packs/day: 1.00     Years: 30.00     Pack years: 30.00     Quit date: 2014     Years since quittin.2    Smokeless tobacco: Never Used    Tobacco comment: stop    Substance and Sexual Activity    Alcohol use: Yes     Alcohol/week: 3.0 standard drinks     Types: 3 Cans of beer per week     Comment: SOCIAL    Drug use: No    Sexual activity: Yes     Partners: Male   Lifestyle    Physical activity     Days per week: None     Minutes per session: None    Stress: None   Relationships    Social connections     Talks on phone: None     Gets together: None     Attends Yazdanism service: None     Active member of club or organization: None     Attends meetings of clubs or organizations: None     Relationship status: None    Intimate partner violence     Fear of current or ex partner: None     Emotionally abused: None     Physically abused: None     Forced sexual activity: None   Other Topics Concern    None   Social History Narrative    None       Family History   Problem Relation Age of Onset    Cancer Father         LEUKEMIA    Cancer Maternal Grandmother         lung and brain ca         Review of Systems   No fever, chills, or other constitutionalsymptoms. No numbness or other neuro symptoms. No chest pain. No dyspnea. [unfilled]   Stands independently from the chair briskly walks without a limp. Negative Trendelenburg test bilaterally. No pain with rotation of either hip or with resisted hip flexion or abduction. Physical Exam    Patient is alert and oriented. Well-developed well-nourished. Pupils equal and reactive. Scleraeanicteric. Neck supple  Lungs clear. Cardiac rate and rhythm regular. Abdomen soft and nontender. Skin warm and dry. XRAY: X-ray today AP pelvis with AP and lateral views left hip. Left total hip arthroplasty with good fit and alignment unchanged from prior no subsidence lucency or other adverse change.   Prior right total hip

## 2021-08-20 ENCOUNTER — HOSPITAL ENCOUNTER (INPATIENT)
Age: 51
LOS: 1 days | Discharge: LEFT BEFORE DISCHARGE | DRG: 281 | End: 2021-08-21
Attending: EMERGENCY MEDICINE | Admitting: FAMILY MEDICINE
Payer: COMMERCIAL

## 2021-08-20 ENCOUNTER — APPOINTMENT (OUTPATIENT)
Dept: GENERAL RADIOLOGY | Age: 51
DRG: 281 | End: 2021-08-20
Payer: COMMERCIAL

## 2021-08-20 ENCOUNTER — APPOINTMENT (OUTPATIENT)
Dept: CT IMAGING | Age: 51
DRG: 281 | End: 2021-08-20
Payer: COMMERCIAL

## 2021-08-20 DIAGNOSIS — I21.4 NSTEMI (NON-ST ELEVATED MYOCARDIAL INFARCTION) (HCC): Primary | ICD-10-CM

## 2021-08-20 DIAGNOSIS — R10.13 ABDOMINAL PAIN, EPIGASTRIC: ICD-10-CM

## 2021-08-20 PROBLEM — J45.909 ASTHMA: Chronic | Status: ACTIVE | Noted: 2021-08-20

## 2021-08-20 PROBLEM — R19.5 OCCULT BLOOD POSITIVE STOOL: Status: ACTIVE | Noted: 2021-08-20

## 2021-08-20 PROBLEM — M79.7 FIBROMYALGIA: Chronic | Status: ACTIVE | Noted: 2021-08-20

## 2021-08-20 PROBLEM — R63.4 WEIGHT LOSS, ABNORMAL: Chronic | Status: ACTIVE | Noted: 2021-08-20

## 2021-08-20 PROBLEM — F32.A DEPRESSION: Chronic | Status: ACTIVE | Noted: 2021-08-20

## 2021-08-20 PROBLEM — R79.89 ELEVATED BRAIN NATRIURETIC PEPTIDE (BNP) LEVEL: Status: ACTIVE | Noted: 2021-08-20

## 2021-08-20 PROBLEM — R10.9 ABDOMINAL PAIN: Status: ACTIVE | Noted: 2021-08-20

## 2021-08-20 PROBLEM — F41.9 ANXIETY: Chronic | Status: ACTIVE | Noted: 2021-08-20

## 2021-08-20 LAB
ALBUMIN SERPL-MCNC: 4 G/DL (ref 3.5–5.2)
ALBUMIN SERPL-MCNC: 4.3 G/DL (ref 3.5–5.2)
ALP BLD-CCNC: 138 U/L (ref 35–104)
ALP BLD-CCNC: 153 U/L (ref 35–104)
ALT SERPL-CCNC: 12 U/L (ref 0–32)
ALT SERPL-CCNC: 15 U/L (ref 0–32)
ANION GAP SERPL CALCULATED.3IONS-SCNC: 10 MMOL/L (ref 7–16)
ANION GAP SERPL CALCULATED.3IONS-SCNC: 12 MMOL/L (ref 7–16)
APTT: 31 SEC (ref 24.5–35.1)
AST SERPL-CCNC: 20 U/L (ref 0–31)
AST SERPL-CCNC: 21 U/L (ref 0–31)
BACTERIA: ABNORMAL /HPF
BASOPHILS ABSOLUTE: 0.05 E9/L (ref 0–0.2)
BASOPHILS RELATIVE PERCENT: 0.9 % (ref 0–2)
BILIRUB SERPL-MCNC: 0.4 MG/DL (ref 0–1.2)
BILIRUB SERPL-MCNC: 0.5 MG/DL (ref 0–1.2)
BILIRUBIN URINE: NEGATIVE
BLOOD, URINE: NEGATIVE
BUN BLDV-MCNC: 7 MG/DL (ref 6–20)
BUN BLDV-MCNC: 7 MG/DL (ref 6–20)
CALCIUM SERPL-MCNC: 8.9 MG/DL (ref 8.6–10.2)
CALCIUM SERPL-MCNC: 9.8 MG/DL (ref 8.6–10.2)
CHLORIDE BLD-SCNC: 102 MMOL/L (ref 98–107)
CHLORIDE BLD-SCNC: 105 MMOL/L (ref 98–107)
CLARITY: CLEAR
CO2: 26 MMOL/L (ref 22–29)
CO2: 28 MMOL/L (ref 22–29)
COLOR: YELLOW
CREAT SERPL-MCNC: 0.8 MG/DL (ref 0.5–1)
CREAT SERPL-MCNC: 0.9 MG/DL (ref 0.5–1)
EKG ATRIAL RATE: 96 BPM
EKG ATRIAL RATE: 96 BPM
EKG P AXIS: 35 DEGREES
EKG P AXIS: 46 DEGREES
EKG P-R INTERVAL: 140 MS
EKG P-R INTERVAL: 142 MS
EKG Q-T INTERVAL: 424 MS
EKG Q-T INTERVAL: 458 MS
EKG QRS DURATION: 68 MS
EKG QRS DURATION: 74 MS
EKG QTC CALCULATION (BAZETT): 535 MS
EKG QTC CALCULATION (BAZETT): 578 MS
EKG R AXIS: 48 DEGREES
EKG R AXIS: 54 DEGREES
EKG T AXIS: -105 DEGREES
EKG T AXIS: -137 DEGREES
EKG VENTRICULAR RATE: 96 BPM
EKG VENTRICULAR RATE: 96 BPM
EOSINOPHILS ABSOLUTE: 0.33 E9/L (ref 0.05–0.5)
EOSINOPHILS RELATIVE PERCENT: 5.7 % (ref 0–6)
EPITHELIAL CELLS, UA: ABNORMAL /HPF
GFR AFRICAN AMERICAN: >60
GFR AFRICAN AMERICAN: >60
GFR NON-AFRICAN AMERICAN: >60 ML/MIN/1.73
GFR NON-AFRICAN AMERICAN: >60 ML/MIN/1.73
GLUCOSE BLD-MCNC: 125 MG/DL (ref 74–99)
GLUCOSE BLD-MCNC: 97 MG/DL (ref 74–99)
GLUCOSE URINE: NEGATIVE MG/DL
HCT VFR BLD CALC: 40.1 % (ref 34–48)
HEMOGLOBIN: 13.4 G/DL (ref 11.5–15.5)
IMMATURE GRANULOCYTES #: 0.01 E9/L
IMMATURE GRANULOCYTES %: 0.2 % (ref 0–5)
INR BLD: 1.1
KETONES, URINE: NEGATIVE MG/DL
LACTIC ACID: 1.2 MMOL/L (ref 0.5–2.2)
LEUKOCYTE ESTERASE, URINE: ABNORMAL
LIPASE: 27 U/L (ref 13–60)
LIPASE: 29 U/L (ref 13–60)
LYMPHOCYTES ABSOLUTE: 1.41 E9/L (ref 1.5–4)
LYMPHOCYTES RELATIVE PERCENT: 24.5 % (ref 20–42)
MCH RBC QN AUTO: 30.5 PG (ref 26–35)
MCHC RBC AUTO-ENTMCNC: 33.4 % (ref 32–34.5)
MCV RBC AUTO: 91.3 FL (ref 80–99.9)
MONOCYTES ABSOLUTE: 0.59 E9/L (ref 0.1–0.95)
MONOCYTES RELATIVE PERCENT: 10.3 % (ref 2–12)
MUCUS: PRESENT /LPF
NEUTROPHILS ABSOLUTE: 3.36 E9/L (ref 1.8–7.3)
NEUTROPHILS RELATIVE PERCENT: 58.4 % (ref 43–80)
NITRITE, URINE: NEGATIVE
PDW BLD-RTO: 14.7 FL (ref 11.5–15)
PH UA: 6 (ref 5–9)
PLATELET # BLD: 235 E9/L (ref 130–450)
PMV BLD AUTO: 11.4 FL (ref 7–12)
POTASSIUM REFLEX MAGNESIUM: 4.1 MMOL/L (ref 3.5–5)
POTASSIUM SERPL-SCNC: 3.9 MMOL/L (ref 3.5–5)
PRO-BNP: 2845 PG/ML (ref 0–125)
PROTEIN UA: NEGATIVE MG/DL
PROTHROMBIN TIME: 12.2 SEC (ref 9.3–12.4)
RBC # BLD: 4.39 E12/L (ref 3.5–5.5)
RBC UA: ABNORMAL /HPF (ref 0–2)
SODIUM BLD-SCNC: 140 MMOL/L (ref 132–146)
SODIUM BLD-SCNC: 143 MMOL/L (ref 132–146)
SPECIFIC GRAVITY UA: 1.01 (ref 1–1.03)
TOTAL PROTEIN: 6.6 G/DL (ref 6.4–8.3)
TOTAL PROTEIN: 7.3 G/DL (ref 6.4–8.3)
TROPONIN, HIGH SENSITIVITY: 66 NG/L (ref 0–9)
TROPONIN, HIGH SENSITIVITY: 76 NG/L (ref 0–9)
UROBILINOGEN, URINE: 0.2 E.U./DL
WBC # BLD: 5.8 E9/L (ref 4.5–11.5)
WBC UA: ABNORMAL /HPF (ref 0–5)

## 2021-08-20 PROCEDURE — 2580000003 HC RX 258: Performed by: FAMILY MEDICINE

## 2021-08-20 PROCEDURE — 6370000000 HC RX 637 (ALT 250 FOR IP): Performed by: EMERGENCY MEDICINE

## 2021-08-20 PROCEDURE — 85025 COMPLETE CBC W/AUTO DIFF WBC: CPT

## 2021-08-20 PROCEDURE — 6360000004 HC RX CONTRAST MEDICATION: Performed by: RADIOLOGY

## 2021-08-20 PROCEDURE — 6360000002 HC RX W HCPCS: Performed by: EMERGENCY MEDICINE

## 2021-08-20 PROCEDURE — 93005 ELECTROCARDIOGRAM TRACING: CPT | Performed by: NURSE PRACTITIONER

## 2021-08-20 PROCEDURE — 85730 THROMBOPLASTIN TIME PARTIAL: CPT

## 2021-08-20 PROCEDURE — 93010 ELECTROCARDIOGRAM REPORT: CPT | Performed by: INTERNAL MEDICINE

## 2021-08-20 PROCEDURE — 83605 ASSAY OF LACTIC ACID: CPT

## 2021-08-20 PROCEDURE — 96374 THER/PROPH/DIAG INJ IV PUSH: CPT

## 2021-08-20 PROCEDURE — 85610 PROTHROMBIN TIME: CPT

## 2021-08-20 PROCEDURE — C9113 INJ PANTOPRAZOLE SODIUM, VIA: HCPCS | Performed by: EMERGENCY MEDICINE

## 2021-08-20 PROCEDURE — 80053 COMPREHEN METABOLIC PANEL: CPT

## 2021-08-20 PROCEDURE — 96372 THER/PROPH/DIAG INJ SC/IM: CPT

## 2021-08-20 PROCEDURE — 81001 URINALYSIS AUTO W/SCOPE: CPT

## 2021-08-20 PROCEDURE — 2060000000 HC ICU INTERMEDIATE R&B

## 2021-08-20 PROCEDURE — 6370000000 HC RX 637 (ALT 250 FOR IP): Performed by: FAMILY MEDICINE

## 2021-08-20 PROCEDURE — 36415 COLL VENOUS BLD VENIPUNCTURE: CPT

## 2021-08-20 PROCEDURE — 74177 CT ABD & PELVIS W/CONTRAST: CPT

## 2021-08-20 PROCEDURE — 83880 ASSAY OF NATRIURETIC PEPTIDE: CPT

## 2021-08-20 PROCEDURE — 93005 ELECTROCARDIOGRAM TRACING: CPT | Performed by: EMERGENCY MEDICINE

## 2021-08-20 PROCEDURE — 99285 EMERGENCY DEPT VISIT HI MDM: CPT

## 2021-08-20 PROCEDURE — 83690 ASSAY OF LIPASE: CPT

## 2021-08-20 PROCEDURE — 71045 X-RAY EXAM CHEST 1 VIEW: CPT

## 2021-08-20 PROCEDURE — 84484 ASSAY OF TROPONIN QUANT: CPT

## 2021-08-20 RX ORDER — SODIUM CHLORIDE 9 MG/ML
10 INJECTION INTRAVENOUS DAILY
Status: DISCONTINUED | OUTPATIENT
Start: 2021-08-21 | End: 2021-08-21 | Stop reason: HOSPADM

## 2021-08-20 RX ORDER — HYDROXYCHLOROQUINE SULFATE 200 MG/1
200 TABLET, FILM COATED ORAL 2 TIMES DAILY
Status: DISCONTINUED | OUTPATIENT
Start: 2021-08-20 | End: 2021-08-21 | Stop reason: HOSPADM

## 2021-08-20 RX ORDER — ZOLPIDEM TARTRATE 5 MG/1
10 TABLET ORAL NIGHTLY PRN
Status: DISCONTINUED | OUTPATIENT
Start: 2021-08-20 | End: 2021-08-21 | Stop reason: HOSPADM

## 2021-08-20 RX ORDER — ACETAMINOPHEN 325 MG/1
650 TABLET ORAL EVERY 6 HOURS PRN
Status: DISCONTINUED | OUTPATIENT
Start: 2021-08-20 | End: 2021-08-21 | Stop reason: HOSPADM

## 2021-08-20 RX ORDER — ASPIRIN 81 MG/1
81 TABLET ORAL DAILY
Status: DISCONTINUED | OUTPATIENT
Start: 2021-08-21 | End: 2021-08-21 | Stop reason: HOSPADM

## 2021-08-20 RX ORDER — ASPIRIN 81 MG/1
243 TABLET, CHEWABLE ORAL ONCE
Status: COMPLETED | OUTPATIENT
Start: 2021-08-20 | End: 2021-08-20

## 2021-08-20 RX ORDER — SODIUM CHLORIDE 9 MG/ML
25 INJECTION, SOLUTION INTRAVENOUS PRN
Status: DISCONTINUED | OUTPATIENT
Start: 2021-08-20 | End: 2021-08-21 | Stop reason: HOSPADM

## 2021-08-20 RX ORDER — SODIUM CHLORIDE 0.9 % (FLUSH) 0.9 %
5-40 SYRINGE (ML) INJECTION PRN
Status: DISCONTINUED | OUTPATIENT
Start: 2021-08-20 | End: 2021-08-21 | Stop reason: HOSPADM

## 2021-08-20 RX ORDER — PANTOPRAZOLE SODIUM 40 MG/10ML
40 INJECTION, POWDER, LYOPHILIZED, FOR SOLUTION INTRAVENOUS DAILY
Status: DISCONTINUED | OUTPATIENT
Start: 2021-08-21 | End: 2021-08-21 | Stop reason: HOSPADM

## 2021-08-20 RX ORDER — PANTOPRAZOLE SODIUM 40 MG/10ML
40 INJECTION, POWDER, LYOPHILIZED, FOR SOLUTION INTRAVENOUS ONCE
Status: COMPLETED | OUTPATIENT
Start: 2021-08-20 | End: 2021-08-20

## 2021-08-20 RX ORDER — ACETAMINOPHEN 650 MG/1
650 SUPPOSITORY RECTAL EVERY 6 HOURS PRN
Status: DISCONTINUED | OUTPATIENT
Start: 2021-08-20 | End: 2021-08-21 | Stop reason: HOSPADM

## 2021-08-20 RX ORDER — POLYETHYLENE GLYCOL 3350 17 G/17G
17 POWDER, FOR SOLUTION ORAL DAILY PRN
Status: DISCONTINUED | OUTPATIENT
Start: 2021-08-20 | End: 2021-08-21 | Stop reason: HOSPADM

## 2021-08-20 RX ORDER — ALBUTEROL SULFATE 2.5 MG/3ML
SOLUTION RESPIRATORY (INHALATION) EVERY 4 HOURS PRN
Status: DISCONTINUED | OUTPATIENT
Start: 2021-08-20 | End: 2021-08-21 | Stop reason: HOSPADM

## 2021-08-20 RX ORDER — SODIUM CHLORIDE 0.9 % (FLUSH) 0.9 %
5-40 SYRINGE (ML) INJECTION EVERY 12 HOURS SCHEDULED
Status: DISCONTINUED | OUTPATIENT
Start: 2021-08-20 | End: 2021-08-21 | Stop reason: HOSPADM

## 2021-08-20 RX ORDER — TRAZODONE HYDROCHLORIDE 150 MG/1
150 TABLET ORAL NIGHTLY
Status: DISCONTINUED | OUTPATIENT
Start: 2021-08-20 | End: 2021-08-21 | Stop reason: HOSPADM

## 2021-08-20 RX ORDER — NITROGLYCERIN 0.4 MG/1
0.4 TABLET SUBLINGUAL EVERY 5 MIN PRN
Status: DISCONTINUED | OUTPATIENT
Start: 2021-08-20 | End: 2021-08-21 | Stop reason: HOSPADM

## 2021-08-20 RX ORDER — LAMOTRIGINE 100 MG/1
400 TABLET ORAL DAILY
Status: DISCONTINUED | OUTPATIENT
Start: 2021-08-21 | End: 2021-08-21 | Stop reason: HOSPADM

## 2021-08-20 RX ORDER — ALPRAZOLAM 1 MG/1
1 TABLET ORAL 3 TIMES DAILY
Status: DISCONTINUED | OUTPATIENT
Start: 2021-08-20 | End: 2021-08-21 | Stop reason: HOSPADM

## 2021-08-20 RX ORDER — VENLAFAXINE HYDROCHLORIDE 75 MG/1
75 CAPSULE, EXTENDED RELEASE ORAL DAILY
Status: DISCONTINUED | OUTPATIENT
Start: 2021-08-21 | End: 2021-08-21 | Stop reason: HOSPADM

## 2021-08-20 RX ORDER — ZOLPIDEM TARTRATE 5 MG/1
10 TABLET ORAL NIGHTLY PRN
Status: DISCONTINUED | OUTPATIENT
Start: 2021-08-21 | End: 2021-08-20

## 2021-08-20 RX ORDER — VITAMIN B COMPLEX
1000 TABLET ORAL DAILY
Status: DISCONTINUED | OUTPATIENT
Start: 2021-08-21 | End: 2021-08-21 | Stop reason: HOSPADM

## 2021-08-20 RX ADMIN — SODIUM CHLORIDE, PRESERVATIVE FREE 10 ML: 5 INJECTION INTRAVENOUS at 23:14

## 2021-08-20 RX ADMIN — METOPROLOL TARTRATE 25 MG: 25 TABLET, FILM COATED ORAL at 16:18

## 2021-08-20 RX ADMIN — IOPAMIDOL 75 ML: 755 INJECTION, SOLUTION INTRAVENOUS at 13:43

## 2021-08-20 RX ADMIN — ENOXAPARIN SODIUM 50 MG: 60 INJECTION SUBCUTANEOUS at 16:18

## 2021-08-20 RX ADMIN — HYDROXYCHLOROQUINE SULFATE 200 MG: 200 TABLET ORAL at 23:04

## 2021-08-20 RX ADMIN — ZOLPIDEM TARTRATE 10 MG: 5 TABLET ORAL at 23:14

## 2021-08-20 RX ADMIN — ALPRAZOLAM 1 MG: 1 TABLET ORAL at 23:04

## 2021-08-20 RX ADMIN — PANTOPRAZOLE SODIUM 40 MG: 40 INJECTION, POWDER, FOR SOLUTION INTRAVENOUS at 16:18

## 2021-08-20 RX ADMIN — ASPIRIN 243 MG: 81 TABLET, CHEWABLE ORAL at 16:18

## 2021-08-20 RX ADMIN — TRAZODONE HYDROCHLORIDE 150 MG: 150 TABLET ORAL at 23:04

## 2021-08-20 RX ADMIN — NITROGLYCERIN 0.5 INCH: 20 OINTMENT TOPICAL at 16:22

## 2021-08-20 ASSESSMENT — PAIN DESCRIPTION - FREQUENCY
FREQUENCY: CONTINUOUS
FREQUENCY: CONTINUOUS

## 2021-08-20 ASSESSMENT — PAIN DESCRIPTION - DESCRIPTORS
DESCRIPTORS: PRESSURE
DESCRIPTORS: STABBING

## 2021-08-20 ASSESSMENT — PAIN DESCRIPTION - PAIN TYPE
TYPE: ACUTE PAIN
TYPE: ACUTE PAIN

## 2021-08-20 ASSESSMENT — PAIN SCALES - GENERAL
PAINLEVEL_OUTOF10: 0
PAINLEVEL_OUTOF10: 6
PAINLEVEL_OUTOF10: 4

## 2021-08-20 ASSESSMENT — PAIN DESCRIPTION - LOCATION
LOCATION: CHEST
LOCATION: ABDOMEN

## 2021-08-20 NOTE — ED PROVIDER NOTES
HPI:  8/20/21, Time: 12:59 PM EDT         Gretta Buck is a 46 y.o. female presenting to the ED for patient was sent in by PCPs office today because of epigastric pain nausea 35 to 40 pound weight loss since October last year. Patient also drinks 222 ounce beers a day. Also positive fever cough blood on Wednesday. She did have abdominal tenderness suprapubic rating in the epigastrium. Her secondary complaints also complains of chest pain midsternal nonradiating. She does have some shortness of breath. And nausea. No fevers or chills or cough reported. Covid vaccine status is unknown at this time. , beginning several days ago. The complaint has been persistent, severe in severity, and worsened by nothing. She denies melena medic easier diarrhea. States abdominal pain is worse after eating. Has had abdominal surgeries. She denies any urinary complaints. She has no headache or neurological complaints. ROS:   Pertinent positives and negatives are stated within HPI, all other systems reviewed and are negative.  --------------------------------------------- PAST HISTORY ---------------------------------------------  Past Medical History:  has a past medical history of Anxiety, Arthritis, Asthma, Bipolar 2 disorder ([facility] Bruising tendency ([facility] Chronic back pain, Fibromyalgia, Glucose intolerance (impaired glucose tolerance), Insomnia, Lupus ([facility] Memory difficulty, Mild cognitive impairment with memory loss, and Thumb pain. Past Surgical History:  has a past surgical history that includes Tubal ligation (10/1994); Carpal tunnel release; tumor removal; Finger surgery; Hip Arthrotomy (1973); Colonoscopy; hernia repair (1970); Total hip arthroplasty (Right, 2/25/14); Abdomen surgery; Nerve Block; arthroplasty (Left, 4/16/2019); and Total hip arthroplasty (Left, 3/2/2021). Social History:  reports that she quit smoking about 7 years ago. She has a 30.00 pack-year smoking history.  She has never used 26.0 - 35.0 pg    MCHC 33.4 32.0 - 34.5 %    RDW 14.7 11.5 - 15.0 fL    Platelets 690 349 - 737 E9/L    MPV 11.4 7.0 - 12.0 fL    Neutrophils % 58.4 43.0 - 80.0 %    Immature Granulocytes % 0.2 0.0 - 5.0 %    Lymphocytes % 24.5 20.0 - 42.0 %    Monocytes % 10.3 2.0 - 12.0 %    Eosinophils % 5.7 0.0 - 6.0 %    Basophils % 0.9 0.0 - 2.0 %    Neutrophils Absolute 3.36 1.80 - 7.30 E9/L    Immature Granulocytes # 0.01 E9/L    Lymphocytes Absolute 1.41 (L) 1.50 - 4.00 E9/L    Monocytes Absolute 0.59 0.10 - 0.95 E9/L    Eosinophils Absolute 0.33 0.05 - 0.50 E9/L    Basophils Absolute 0.05 0.00 - 0.20 E9/L   Comprehensive Metabolic Panel w/ Reflex to MG   Result Value Ref Range    Sodium 143 132 - 146 mmol/L    Potassium reflex Magnesium 4.1 3.5 - 5.0 mmol/L    Chloride 105 98 - 107 mmol/L    CO2 28 22 - 29 mmol/L    Anion Gap 10 7 - 16 mmol/L    Glucose 125 (H) 74 - 99 mg/dL    BUN 7 6 - 20 mg/dL    CREATININE 0.9 0.5 - 1.0 mg/dL    GFR Non-African American >60 >=60 mL/min/1.73    GFR African American >60     Calcium 9.8 8.6 - 10.2 mg/dL    Total Protein 7.3 6.4 - 8.3 g/dL    Albumin 4.3 3.5 - 5.2 g/dL    Total Bilirubin 0.5 0.0 - 1.2 mg/dL    Alkaline Phosphatase 153 (H) 35 - 104 U/L    ALT 15 0 - 32 U/L    AST 21 0 - 31 U/L   Lipase   Result Value Ref Range    Lipase 27 13 - 60 U/L   Troponin   Result Value Ref Range    Troponin, High Sensitivity 66 (H) 0 - 9 ng/L   Urinalysis, reflex to microscopic   Result Value Ref Range    Color, UA Yellow Straw/Yellow    Clarity, UA Clear Clear    Glucose, Ur Negative Negative mg/dL    Bilirubin Urine Negative Negative    Ketones, Urine Negative Negative mg/dL    Specific Gravity, UA 1.010 1.005 - 1.030    Blood, Urine Negative Negative    pH, UA 6.0 5.0 - 9.0    Protein, UA Negative Negative mg/dL    Urobilinogen, Urine 0.2 <2.0 E.U./dL    Nitrite, Urine Negative Negative    Leukocyte Esterase, Urine TRACE (A) Negative   Lactic Acid, Plasma   Result Value Ref Range    Lactic Acid evaluation. Enlarged left gonadal veins in the pelvis, a nonspecific finding which can be   seen with pelvic congestion syndrome. Clinical correlation recommended. XR CHEST PORTABLE   Final Result   No acute process. EKG Interpretation  Interpreted by emergency department physician    Rhythm: normal sinus   Rate: normal 96   Axis: normal  Conduction: normal  ST Segments: nonspecific changes  T Waves: inversion in  I, anterior leads and inferior leads    Clinical Impression: myocardial ischemia  Comparison to prior EKG: changes compared to previous EKG      ------------------------- NURSING NOTES AND VITALS REVIEWED ---------------------------   The nursing notes within the ED encounter and vital signs as below have been reviewed by myself. /85   Pulse 113   Temp 98.5 °F (36.9 °C) (Oral)   Resp 18   Ht 5' (1.524 m)   Wt 112 lb (50.8 kg)   LMP 12/04/2015 (LMP Unknown)   SpO2 98%   BMI 21.87 kg/m²   Oxygen Saturation Interpretation: Normal    The patients available past medical records and past encounters were reviewed. ------------------------------ ED COURSE/MEDICAL DECISION MAKING----------------------  Medications   aspirin chewable tablet 243 mg (has no administration in time range)   nitroglycerin (NITRO-BID) 2 % ointment 0.5 inch (has no administration in time range)   pantoprazole (PROTONIX) injection 40 mg (has no administration in time range)   metoprolol tartrate (LOPRESSOR) tablet 25 mg (has no administration in time range)   enoxaparin (LOVENOX) injection 50 mg (has no administration in time range)   iopamidol (ISOVUE-370) 76 % injection 75 mL (75 mLs Intravenous Given 8/20/21 1343)             Medical Decision Making:    Consultation was made with patient's PCP Dr. Julieta Adam discussed new EKG findings concerning for diffuse inferior and anterolateral ischemic changes. Patient started on aspirin Nitropaste and Lovenox.   I did speak with cardiology about the patient's stable for telemetry here. Patient's chest pain has improved. She still complains of abdominal pain. CT scan shows dilatation of the pancreatic ducts with uncertain etiology. Lipase was ordered and is pending. Patient be admitted to telemetry. Re-Evaluations:             Re-evaluation. Patients symptoms show no change      Consultations:             Dr. Lanny Villarreal and Dr. Дмитрий Carter:   CRITICAL CARE:   28 MINUTES. Please note that the withdrawal or failure to initiate urgent interventions for this patient would likely result in a life threatening deterioration or permanent disability. Accordingly this patient received the above mentioned time, excluding separately billable procedures. This patient's ED course included: a personal history and physicial examination, re-evaluation prior to disposition, multiple bedside re-evaluations, IV medications, cardiac monitoring, continuous pulse oximetry, complex medical decision making and emergency management and a personal history and physicial eaxmination    This patient has remained hemodynamically stable, improved and been closely monitored during their ED course. Counseling: The emergency provider has spoken with the patient and spouse/SO and discussed todays results, in addition to providing specific details for the plan of care and counseling regarding the diagnosis and prognosis. Questions are answered at this time and they are agreeable with the plan.       --------------------------------- IMPRESSION AND DISPOSITION ---------------------------------    IMPRESSION  1. NSTEMI (non-ST elevated myocardial infarction) ([facility] [address]    2. Abdominal pain, epigastric        DISPOSITION  Disposition: Admit to telemetry  Patient condition is serious        NOTE: This report was transcribed using voice recognition software.  Every effort was made to ensure accuracy; however, inadvertent computerized transcription errors may be present        Shaye Ko DO  08/20/21 1462

## 2021-08-20 NOTE — ED NOTES
FIRST PROVIDER CONTACT ASSESSMENT NOTE      Department of Emergency Medicine   8/20/21  11:51 AM EDT    Chief Complaint: Fatigue (pt saw PCP this morning who told patient to come to ED) and Abdominal Pain      History of Present Illness:   Abby Szymanski is a 46 y.o. female who presents to the ED for epigastric and umbilical abdominal pain has been ongoing but now having weight loss tachycardic and diarrhea. She denies any fevers, chills, back pain or nausea or vomiting. Medical History:  has a past medical history of Anxiety, Arthritis, Asthma, Bipolar 2 disorder ([address] Bruising tendency ([address] Chronic back pain, Fibromyalgia, Glucose intolerance (impaired glucose tolerance), Insomnia, Lupus ([address] Memory difficulty, Mild cognitive impairment with memory loss, and Thumb pain. Surgical History:  has a past surgical history that includes Tubal ligation (10/1994); Carpal tunnel release; tumor removal; Finger surgery; Hip Arthrotomy (1973); Colonoscopy; hernia repair (1970); Total hip arthroplasty (Right, 2/25/14); Abdomen surgery; Nerve Block; arthroplasty (Left, 4/16/2019); and Total hip arthroplasty (Left, 3/2/2021). Social History:  reports that she quit smoking about 7 years ago. She has a 30.00 pack-year smoking history. She has never used smokeless tobacco. She reports current alcohol use of about 3.0 standard drinks of alcohol per week. She reports that she does not use drugs. Family History: family history includes Cancer in her father and maternal grandmother. *ALLERGIES*     Patient has no known allergies.      Physical Exam:      VS:  /85   Pulse 113   Temp 98.5 °F (36.9 °C) (Oral)   Resp 18   Ht 5' (1.524 m)   Wt 112 lb (50.8 kg)   LMP 12/04/2015 (LMP Unknown)   SpO2 98%   BMI 21.87 kg/m²      Initial Plan of Care:  Initiate Treatment-Testing, Proceed toTreatment Area When Bed Available for ED Attending/MLP to Continue Care    -----------------END OF FIRST PROVIDER CONTACT ASSESSMENT

## 2021-08-20 NOTE — ED NOTES
Bed: 19  Expected date:   Expected time:   Means of arrival:   Comments:     Austin Cruz, RN  66/61/80 [id number]

## 2021-08-21 VITALS
BODY MASS INDEX: 21.99 KG/M2 | TEMPERATURE: 98 F | RESPIRATION RATE: 16 BRPM | OXYGEN SATURATION: 96 % | SYSTOLIC BLOOD PRESSURE: 98 MMHG | WEIGHT: 112 LBS | DIASTOLIC BLOOD PRESSURE: 55 MMHG | HEART RATE: 75 BPM | HEIGHT: 60 IN

## 2021-08-21 LAB
HCT VFR BLD CALC: 37.3 % (ref 34–48)
HEMOGLOBIN: 11.9 G/DL (ref 11.5–15.5)
LV EF: 33 %
LVEF MODALITY: NORMAL
TROPONIN, HIGH SENSITIVITY: 52 NG/L (ref 0–9)

## 2021-08-21 PROCEDURE — 6370000000 HC RX 637 (ALT 250 FOR IP): Performed by: PHYSICIAN ASSISTANT

## 2021-08-21 PROCEDURE — 6360000002 HC RX W HCPCS: Performed by: FAMILY MEDICINE

## 2021-08-21 PROCEDURE — 93306 TTE W/DOPPLER COMPLETE: CPT

## 2021-08-21 PROCEDURE — 6370000000 HC RX 637 (ALT 250 FOR IP): Performed by: FAMILY MEDICINE

## 2021-08-21 PROCEDURE — 99233 SBSQ HOSP IP/OBS HIGH 50: CPT | Performed by: INTERNAL MEDICINE

## 2021-08-21 PROCEDURE — 93005 ELECTROCARDIOGRAM TRACING: CPT | Performed by: INTERNAL MEDICINE

## 2021-08-21 PROCEDURE — 85018 HEMOGLOBIN: CPT

## 2021-08-21 PROCEDURE — C9113 INJ PANTOPRAZOLE SODIUM, VIA: HCPCS | Performed by: FAMILY MEDICINE

## 2021-08-21 PROCEDURE — 99255 IP/OBS CONSLTJ NEW/EST HI 80: CPT | Performed by: INTERNAL MEDICINE

## 2021-08-21 PROCEDURE — 6370000000 HC RX 637 (ALT 250 FOR IP): Performed by: INTERNAL MEDICINE

## 2021-08-21 PROCEDURE — 85014 HEMATOCRIT: CPT

## 2021-08-21 PROCEDURE — 2580000003 HC RX 258: Performed by: FAMILY MEDICINE

## 2021-08-21 PROCEDURE — 84484 ASSAY OF TROPONIN QUANT: CPT

## 2021-08-21 PROCEDURE — APPSS60 APP SPLIT SHARED TIME 46-60 MINUTES: Performed by: PHYSICIAN ASSISTANT

## 2021-08-21 PROCEDURE — 36415 COLL VENOUS BLD VENIPUNCTURE: CPT

## 2021-08-21 RX ADMIN — SODIUM CHLORIDE, PRESERVATIVE FREE 10 ML: 5 INJECTION INTRAVENOUS at 08:54

## 2021-08-21 RX ADMIN — VENLAFAXINE HYDROCHLORIDE 75 MG: 75 CAPSULE, EXTENDED RELEASE ORAL at 08:54

## 2021-08-21 RX ADMIN — HYDROXYCHLOROQUINE SULFATE 200 MG: 200 TABLET ORAL at 08:53

## 2021-08-21 RX ADMIN — NITROGLYCERIN 0.5 INCH: 20 OINTMENT TOPICAL at 14:21

## 2021-08-21 RX ADMIN — BREXPIPRAZOLE 1 MG: 2 TABLET ORAL at 08:53

## 2021-08-21 RX ADMIN — ALPRAZOLAM 1 MG: 1 TABLET ORAL at 14:21

## 2021-08-21 RX ADMIN — ASPIRIN 81 MG: 81 TABLET, COATED ORAL at 08:54

## 2021-08-21 RX ADMIN — ALPRAZOLAM 1 MG: 1 TABLET ORAL at 08:54

## 2021-08-21 RX ADMIN — PANTOPRAZOLE SODIUM 40 MG: 40 INJECTION, POWDER, FOR SOLUTION INTRAVENOUS at 08:54

## 2021-08-21 RX ADMIN — SODIUM CHLORIDE 10 ML: 9 INJECTION, SOLUTION INTRAMUSCULAR; INTRAVENOUS; SUBCUTANEOUS at 08:55

## 2021-08-21 RX ADMIN — METOPROLOL TARTRATE 12.5 MG: 25 TABLET, FILM COATED ORAL at 11:02

## 2021-08-21 RX ADMIN — Medication 1000 UNITS: at 08:54

## 2021-08-21 RX ADMIN — ENOXAPARIN SODIUM 40 MG: 40 INJECTION SUBCUTANEOUS at 08:54

## 2021-08-21 ASSESSMENT — PAIN SCALES - GENERAL: PAINLEVEL_OUTOF10: 0

## 2021-08-21 NOTE — PROGRESS NOTES
[facility] Progress Note    Admitting Date and Time: 8/20/2021 12:38 PM  Admit Dx: Abdominal pain, epigastric [R10.13]  NSTEMI (non-ST elevated myocardial infarction) ([facility] [I21.4]    Subjective:  Patient is being followed for Abdominal pain, epigastric [R10.13]  NSTEMI (non-ST elevated myocardial infarction) ([facility] [I21.4]     Patient does state that she has intermittent diarrhea as well as nausea, and abdominal pain. No acute events overnight. She denies any diaphoresis, Shortness of breath associated with this. Patient states that the chest pain has improved after nitro. ROS: denies fever, chills, cp, sob, n/v, HA unless stated above.       metoprolol tartrate  12.5 mg Oral BID    nitroglycerin  0.5 inch Topical 3 times per day    sodium chloride flush  5-40 mL Intravenous 2 times per day    enoxaparin  40 mg Subcutaneous Daily    aspirin  81 mg Oral Daily    pantoprazole  40 mg Intravenous Daily    And    sodium chloride (PF)  10 mL Intravenous Daily    ALPRAZolam  1 mg Oral TID    brexpiprazole  1 mg Oral Daily    lamoTRIgine  400 mg Oral Daily    hydroxychloroquine  200 mg Oral BID    traZODone  150 mg Oral Nightly    venlafaxine  75 mg Oral Daily    Vitamin D  1,000 Units Oral Daily     perflutren lipid microspheres, 1.5 mL, ONCE PRN  sodium chloride flush, 5-40 mL, PRN  sodium chloride, 25 mL, PRN  polyethylene glycol, 17 g, Daily PRN  acetaminophen, 650 mg, Q6H PRN   Or  acetaminophen, 650 mg, Q6H PRN  nitroGLYCERIN, 0.4 mg, Q5 Min PRN  albuterol, , Q4H PRN  zolpidem, 10 mg, Nightly PRN         Objective:    BP (!) 98/55   Pulse 75   Temp 98 °F (36.7 °C) (Oral)   Resp 16   Ht 5' (1.524 m)   Wt 112 lb (50.8 kg)   LMP 12/04/2015 (LMP Unknown)   SpO2 96%   BMI 21.87 kg/m²     General Appearance: alert and oriented to person, place and time and in no acute distress  Skin: warm and dry  Head: normocephalic and atraumatic  Eyes: pupils equal, round, and reactive to light, extraocular eye movements intact, conjunctivae normal  Neck: neck supple and non tender without mass   Pulmonary/Chest: clear to auscultation bilaterally- no wheezes, rales or rhonchi, normal air movement, no respiratory distress  Cardiovascular: normal rate, normal S1 and S2 and no carotid bruits  Abdomen: soft, non-tender, non-distended, normal bowel sounds, no masses or organomegaly  Extremities: no cyanosis, no clubbing and no edema  Neurologic: no cranial nerve deficit and speech normal        Recent Labs     08/20/21  1202 08/20/21  1415    140   K 4.1 3.9    102   CO2 28 26   BUN 7 7   CREATININE 0.9 0.8   GLUCOSE 125* 97   CALCIUM 9.8 8.9       Recent Labs     08/20/21  1202 08/21/21  1105   WBC 5.8  --    RBC 4.39  --    HGB 13.4 11.9   HCT 40.1 37.3   MCV 91.3  --    MCH 30.5  --    MCHC 33.4  --    RDW 14.7  --      --    MPV 11.4  --        Radiology: no new imaging studies    Assessment:    Principal Problem:    NSTEMI (non-ST elevated myocardial infarction) ([facility])  Active Problems:    Bipolar affective disorder ([facility])    Mild cognitive impairment with memory loss    Lupus ([facility])    Asthma    Fibromyalgia    Anxiety    Depression    Weight loss, abnormal    Elevated brain natriuretic peptide (BNP) level    Abdominal pain    Occult blood positive stool  Resolved Problems:    * No resolved hospital problems. *      Plan:  1. NSTEMI rule out ACS -initial high-sensitivity troponins are slightly elevated. EKG is unremarkable for any ischemic changes. Patient states that after Nitropaste her chest pressure has improved. At this time unable to place heparin drip by cardiology due to questionable GI bleed. Patient is currently on aspirin, Lopressor. Echocardiogram pending. Cardiology plans to do a left heart cath once GI bleed has been evaluated by surgery. 2.  Positive occult stool -hold heparin drip. Continue Protonix. We will follow up with further surgical recommendations. Patient states that she had a colonoscopy as well as EGD within the last 10 years however does not remember the exact timing. She states that as far as what she can remember results were normal.  3.  Mild pancreatic duct dilation - It appears patient has had elevated alkaline phosphatase since March 2014. No previous CAT scan of the abdomen or pelvis to compare these findings with. Patient states that she is always had abdominal issues. However recently she has had increasing diarrhea as well as nausea. Will defer to surgery to order possible MRCP  4. Bipolar, depression, fibromyalgia -continue Xanax, Rexulti, Lamictal, trazodone, Effexor  5. Asthma -stable  6. Systemic lupus erythematosus -continue Plaquenil  7. Coronary artery disease -patient states she has never had a stress test or left heart cath in the past   8. History of bilateral hip replacement  9. History of alcohol abuse  10. History of tobacco abuse  11. DVT prophylaxis -Lovenox        NOTE: This report was transcribed using voice recognition software. Every effort was made to ensure accuracy; however, inadvertent computerized transcription errors may be present.   Electronically signed by Eyal Blanchard DO on 8/21/2021 at 5:02 PM

## 2021-08-21 NOTE — PROGRESS NOTES
8/21/2021 dr Carina Anguiano aware dr Sandro Cordero states pt not ok for discharge. Pt states she wants to leave AMA. Dr Carina Anguiano called spoke with pt about plan and risk for leavind. Pt aware she would be leaving against medical advice. Pt verbalize understanding iv removed tele removed.

## 2021-08-21 NOTE — PROGRESS NOTES
Pt states she wants to leave and follow up outpt. Pt states she spoke with MD and was told that she would not get a stress test today. Pt voice concerns about remaining in hospital over the weekend. Dr Eladia Arredondo called spoke with covering  Aware that pt would like to leave AMA. Dr Helene Clark then called floor asked RN to call and notify DR Natividad Roth about pt wanting to leave.  Dr Natividad Roth notified on perfect serve

## 2021-08-21 NOTE — H&P
[id number] [address]                              HISTORY AND PHYSICAL    PATIENT NAME: Tracie Denny                     :        1970  MED REC NO:   [id number]                            ROOM:       [id number]  ACCOUNT NO:   [de-identified]                           ADMIT DATE: 2021  PROVIDER:     Tarsha Olmedo MD    The patient is admitted to my service on 2021. PRINCIPAL DIAGNOSES:  Abdominal pain, weight loss, and blood in the  stool. HISTORY OF PRESENT ILLNESS:  The patient is a white female who states  for the past few months, she has had weight loss associated with  positive stress for which she had seen a psychiatrist for anxiety,  depression, bipolar disorder. She states for the past few weeks, she  has had some abdominal pain and weakness. She was noted to recently  have an occult blood positive stool. She was seen in the office for  followup of this, found to be tachycardic with a pulse of 120, blood  pressure of 108/78, and seemed to be somewhat fatigued. She was sent to  the emergency room, where she was found after evaluation to have a  non-STEMI and with an abnormal EKG and elevated troponin, subsequently  admitted for further evaluation and treatment. PAST MEDICAL HISTORY:  SURGERIES:  Bilateral hip replacement in the past, cervical lumbar  fusions in the past.    HOSPITALIZATIONS:  States nothing recent. ALLERGIES:  None known. MEDICAL PROBLEMS:  Include asthma, lupus, fibromyalgia, anxiety,  depression, bipolar disorder, recent weight loss, and recent abdominal  pain. She has a history of a sleep disorder also as well as  degenerative joint disease. MEDICATIONS:  Gleaned from the chart are trazodone, Ambien, Xanax,  venlafaxine, ProAir as needed, Lamictal, calcium, vitamin D, naproxen,  Plaquenil, and Rexulti. SOCIAL HISTORY:  Lives with her . Nonsmoker. Does have a 25- to  30-pack-year history of smoking and she drinks less than three drinks of  alcohol per day. No drug use. FAMILY HISTORY:  Positive lung and brain cancer, diabetes mellitus. REVIEW OF SYSTEMS:  Anxiety, depression with recent increased stress for  which she sees a psychiatrist, recent abdominal pain, and recent weight  loss. PHYSICAL EXAMINATION:  GENERAL:  Emaciated female in no distress. HEENT:  Atraumatic and normocephalic. Ears are unremarkable. TMs are  unremarkable. No nasal discharge. Pupils are equal, round, and  reactive to light bilaterally. Throat without erythema or exudate. NECK:  Supple. No JVD. No carotid bruit. Thyroid without enlargement  or nodule. CHEST:  Clear to auscultation. HEART:  Regular rhythm with increased rate. No murmur. ABDOMEN. Soft. Mild tenderness with some fullness in the mid and  epigastric area. No guarding. No rebound. No masses or organomegaly. RECTAL:  Declined. BREASTS:  Deferred. PELVIC:  Deferred. EXTREMITIES:  No dependent edema. Able to move all well. NEUROLOGIC:  No gross or focal deficits. ASSESSMENT:  Non-STEMI per ER with elevated troponin. Secondary  diagnoses include abdominal pain with severe weight loss, 35 to 40  pounds documented in the office since October, etiology to be  determined, also occult blood positive stool associated with this  perhaps. Other secondary diagnoses include asthma, systemic lupus  erythematosus, fibromyalgia, anxiety, depression, bipolar disorder,  history of sleep disorder, elevated BNP. PLAN:  Admit per orders. Have Cardiology and Surgery reassess. Continue workup.         Obdulio Mosher MD    D: 08/20/2021 17:28:36       T: 08/20/2021 17:32:23     [email or web address]  Job#: [id number]     Doc#: [id number]    CC:

## 2021-08-21 NOTE — CONSULTS
Inpatient Cardiology Consultation      Reason for Consult: Elevated troponin, abnormal EKG    Consulting Physician: Dr. Vargas England    Requesting Physician: Dr. Salina Lee    Date of Consultation: 8/21/2021    HISTORY OF PRESENT ILLNESS:   Patient is a 46year old WF who is not previously known to [facility]. She is being seen in consultation this hospital admission by Dr. Vargas England for evaluation and recommendations regarding elevated troponin and abnormal EKG. Patient has a known past medical history of anxiety, manic depression, panic disorder, bipolar disorder, asthma, fibromyalgia, systemic lupus erythematosus, alcohol abuse, former tobacco abuse, and vitamin D deficiency. Patient denies any previous past medical history of coronary artery disease, myocardial infarction, heart failure, valvular heart disease or cardiac arrhythmia. She denies ever being evaluated by a cardiologist in the past, and denies any prior cardiac evaluation with stress testing, echocardiogram or left heart catheterization. She presented to [facility] on August 20, 2021 after being advised by her PCP to go to the ED for further evaluation given persistent abdominal discomfort and +FOBT as an outpatient. Patient states that over the past several months, she has been noticing constant abdominal discomfort. This has worsened over the past couple of weeks. The discomfort is diffuse, sharp in nature and constant. She additionally notes of noticing dark-colored stools over the past one to two weeks, and presented to her primary care provider with this complaint. Fecal occult blood test was ordered, and per the patient was noted to be positive as of yesterday. She was thus advised by her PCP to go to the ER for further evaluation.   Patient additionally admits of approximately 40 pound weight loss over the past seven months, unintentional.  She admits to poor appetite, and intermittent episodes of LEFT THUMB TRAPEZIECTOMY WITH LIGAMENT RECONSTRUCTION LEFT PARTIAL TRAPEZOIDECTOMY LEFT DEQUERVAINS RELEASE LEFT CARPAL TUNNEL RELEASE performed by Yasmeen Kincaid MD at [facility]      right    COLONOSCOPY      FINGER SURGERY      right pinkie    HERNIA REPAIR  1970    HIP ARTHROTOMY  1973    right    NERVE BLOCK      x4     TOTAL HIP ARTHROPLASTY Right 2/25/14    TOTAL HIP ARTHROPLASTY Left 3/2/2021    LEFT HIP TOTAL ARTHROPLASTY performed by Ryan Holliday MD at [facility]  10/1994    TUMOR REMOVAL      right shoulder fatty tumor       HOME MEDICATIONS:  Prior to Admission medications    Medication Sig Start Date End Date Taking? Authorizing Provider   brexpiprazole (REXULTI) 1 MG TABS tablet Take 1 mg by mouth daily    Yes Historical Provider, MD   vitamin D (CHOLECALCIFEROL) 25 MCG (1000 UT) TABS tablet Take by mouth daily    Yes Historical Provider, MD   hydroxychloroquine (PLAQUENIL) 200 MG tablet Take by mouth 2 times daily    Yes Historical Provider, MD   ALPRAZolam Fabian Ferreira) 1 MG tablet Take 2 mg by mouth 3 times daily. 4/20/19  Yes Historical Provider, MD   PROVENTIL  (90 Base) MCG/ACT inhaler INHALE 2 PUFFS EVERY 4 HOURS AS NEEDED 12/29/17  Yes Historical Provider, MD   lamoTRIgine (LAMICTAL) 200 MG tablet Take 200 mg by mouth 2 tabs at night 12/11/17  Yes Historical Provider, MD   venlafaxine (EFFEXOR XR) 75 MG extended release capsule Take 75 mg by mouth daily  10/25/17  Yes Historical Provider, MD   zolpidem (AMBIEN) 10 MG tablet Take  by mouth nightly as needed for Sleep.    Yes Historical Provider, MD   traZODone (DESYREL) 150 MG tablet Take 300 mg by mouth nightly    Yes Historical Provider, MD       CURRENT MEDICATIONS:      Current Facility-Administered Medications:     sodium chloride flush 0.9 % injection 5-40 mL, 5-40 mL, Intravenous, 2 times per day, Oliva Jansen MD, 10 mL at 08/20/21 3348    sodium chloride flush 0.9 % injection 5-40 mL, 5-40 mL, Intravenous, PRN, Carey Bonner MD    0.9 % sodium chloride infusion, 25 mL, Intravenous, PRN, Carey Bonner MD    enoxaparin (LOVENOX) injection 40 mg, 40 mg, Subcutaneous, Daily, Carey Bonner MD    polyethylene glycol [facility]) packet 17 g, 17 g, Oral, Daily PRN, Carey Bonner MD    acetaminophen (TYLENOL) tablet 650 mg, 650 mg, Oral, Q6H PRN **OR** acetaminophen (TYLENOL) suppository 650 mg, 650 mg, Rectal, Q6H PRN, Carey Bonner MD    nitroGLYCERIN (NITROSTAT) SL tablet 0.4 mg, 0.4 mg, Sublingual, Q5 Min PRN, Carey Bonner MD    aspirin EC tablet 81 mg, 81 mg, Oral, Daily, Carey Bonner MD    pantoprazole (PROTONIX) injection 40 mg, 40 mg, Intravenous, Daily **AND** sodium chloride (PF) 0.9 % injection 10 mL, 10 mL, Intravenous, Daily, Carey Bonner MD    albuterol (PROVENTIL) nebulizer solution, , Nebulization, Q4H PRN, Carey Bonner MD    ALPRAZolam Belvin Blare) tablet 1 mg, 1 mg, Oral, TID, Carey Bonner MD, 1 mg at 08/20/21 2304    brexpiprazole (REXULTI) tablet 1 mg, 1 mg, Oral, Daily, Carey Bonner MD    lamoTRIgine (LAMICTAL) tablet 400 mg, 400 mg, Oral, Daily, Carey Bonner MD    hydroxychloroquine (PLAQUENIL) tablet 200 mg, 200 mg, Oral, BID, Carey Bonner MD, 200 mg at 08/20/21 2304    traZODone (DESYREL) tablet 150 mg, 150 mg, Oral, Nightly, Carey Bonner MD, 150 mg at 08/20/21 2304    venlafaxine (EFFEXOR XR) extended release capsule 75 mg, 75 mg, Oral, Daily, Carey Bonner MD    vitamin D (CHOLECALCIFEROL) tablet 1,000 Units, 1,000 Units, Oral, Daily, Carey Bonner MD    zolpidem [facility]) tablet 10 mg, 10 mg, Oral, Nightly PRN, Carey Bonner MD, 10 mg at 08/20/21 8933      ALLERGIES:  Patient has no known allergies. SOCIAL HISTORY:    Former tobacco smoker, quit approximately seven years ago. Smoked two packs/day for 15+ years.   States she drinks on average a couple of beers per day, however sometimes this is an every other day occurrence. Denies former or current illicit drug use. FAMILY HISTORY:   Denies any pertinent cardiac family medical history to me at this time. REVIEW OF SYSTEMS:     · Constitutional: +weight loss, +poor appetite. Denies fevers, chills, night sweats, and generalized fatigue. · HEENT: Denies headaches, nose bleeds, rhinorrhea, sore throat. Denies blurred vision. Denies dysphagia, odynophagia. · Musculoskeletal: Denies falls, pain to BLE with ambulation. · Neurological: Denies dizziness/lightheadedness, numbness and tingling. Denies focal neurological deficits. · Cardiovascular: +chest pain, +palpitations. Denies diaphoresis, near syncope, syncope. Denies PND, orthopnea, peripheral edema. · Respiratory: +shortness of breath. Denies cough, hemoptysis. · Gastrointestinal: +abdominal pain, +constipation, +diarrhea, +dark colored stools. Denies nausea, emesis. · Genitourinary: Denies dysuria and hematuria. · Endocrine: Denies excessive thirst. Denies intolerance to hot and cold. · Psychiatric: +anxiety, +depression, +bipolar disorder. PHYSICAL EXAM:   /65   Pulse 84   Temp 98.7 °F (37.1 °C) (Oral)   Resp 16   Ht 5' (1.524 m)   Wt 112 lb (50.8 kg)   LMP 12/04/2015 (LMP Unknown)   SpO2 95%   BMI 21.87 kg/m²   CONST:  Well developed, well nourished WF who appears stated age. Awake, alert, cooperative, anxious. HEENT:   Head- Normocephalic, atraumatic. Eyes- Conjunctivae pink, anicteric. Neck-  No stridor, trachea midline, no apparent jugular venous distention. CHEST: Chest symmetrical. +tender to palpation of anterior chest. No accessory muscle use or intercostal retractions. RESPIRATORY: Lung sounds - clear throughout fields. No wheezing, rales or rhonchi. CARDIOVASCULAR:     No noted carotid bruit. Heart Ausculation- Regular rhythm with mildly fast rate, questionable murmur. PV: No lower extremity edema.  Pedal pulses palpable, no clubbing or cyanosis. ABDOMEN: Soft. +mildly tender to palpation. Bowel sounds present. MS: Good muscle strength and tone. No atrophy or abnormal movements. SKIN: Warm and dry. No statis dermatitis or ulcers. NEURO / PSYCH: Oriented to person, place and time. Speech clear and appropriate. Follows all commands. Pleasant affect. DATA:    Telemetry: NSR HR in the 90s, ST with HR in the low 100s    Diagnostic:  All diagnostic testing and lab work thus far this admission reviewed in detail. CXR 8/20/2021:  Impression:  No acute process. Abdomen/Pelvis CT 8/20/2021:  Impression:  Mild pancreatic duct dilatation, of uncertain etiology.  MRI/MRCP may be  obtained for further evaluation. Enlarged left gonadal veins in the pelvis, a nonspecific finding which can be  seen with pelvic congestion syndrome.  Clinical correlation recommended.         No intake or output data in the 24 hours ending 08/21/21 0731    Labs:   CBC:   Recent Labs     08/20/21  1202   WBC 5.8   HGB 13.4   HCT 40.1        BMP:   Recent Labs     08/20/21  1202 08/20/21  1415    140   K 4.1 3.9   CO2 28 26   BUN 7 7   CREATININE 0.9 0.8   LABGLOM >60 >60   CALCIUM 9.8 8.9     PT/INR:   Recent Labs     08/20/21  1415   PROTIME 12.2   INR 1.1     APTT:  Recent Labs     08/20/21  1415   APTT 31.0     LIVER PROFILE:  Recent Labs     08/20/21  1202 08/20/21  1415   AST 21 20   ALT 15 12   LABALBU 4.3 4.0      Ref Range & Units 08/20/21 1415 08/20/21 1202   Troponin, High Sensitivity 0 - 9 ng/L 76High   66High  CM      Ref Range & Units 08/20/21 1415    Pro-BNP 0 - 125 pg/mL 2,845High       Ref Range & Units 08/20/21 1202    Lactic Acid 0.5 - 2.2 mmol/L 1.2      08/20/21 1202     Color, UA Straw/Yellow Yellow    Clarity, UA Clear Clear    Glucose, Ur Negative mg/dL Negative    Bilirubin Urine Negative Negative    Ketones, Urine Negative mg/dL Negative    Specific Gravity, UA 1.005 - 1.030 1.010    Blood, Urine Negative Negative pH, UA 5.0 - 9.0 6.0    Protein, UA Negative mg/dL Negative    Urobilinogen, Urine <2.0 E.U./dL 0.2    Nitrite, Urine Negative Negative    Leukocyte Esterase, Urine Negative TRACEAbnormal             BRIEF ASSESSMENT/RECOMMENDATIONS:  1. NSTEMI. Hs-troponin 66 --> 76 --> repeat pending. ST-T wave changes noted on EKG concerning for ischemia. Currently chest pain-free. 2. Possible GI bleed with reported +FOBT, abdominal pain, weight loss and poor appetite. Mild pancreatic duct dilation of uncertain etiology on abdomen/pelvis CT this admission. General surgery following. 3. Former tobacco abuse. 4. Alcohol abuse. 5. Severe anxiety, panic disorder, bipolar disorder and manic depression. 6. Systemic lupus erythematosus, follows with rheumatology per the patient. 7. Fibromyalgia. 8. Vitamin D deficiency. 9. Asthma. · Trend troponin. · Repeat EKG. · Echocardiogram for evaluation of LV/RV function and valvular heart disease. · Agree with aspirin 81 mg daily. We will hold off on further anticoagulation at this time in setting of possible GI bleed. Repeat H/H pending. · Initiate Lopressor 12.5 mg twice daily. · Patient will need ischemic evaluation pending GI evaluation/possible GI bleed. · Further recommendations to follow as per Dr. Laurence Rios. Above case and recommendations have been discussed and made in collaboration with Dr. Laurence Rios. Further recommendations to follow as per him. Asael Garnica, [address] [place] [address], [place] [address] [facility]iology    Electronically signed by Gonsalo Simmons PA-C on 8/21/2021 at 7:31 AM   ______________________________________________________________________  Cardiology attending attestation:  I have independently interviewed and examined the patient. I personally reviewed pertinent laboratory values and diagnostic testing (including, if applicable, chest xray, electrocardiogram, telemetry, echocardiogram, stress testing, and coronary angiography).   I have reviewed the above documentation completed by Quique Moses PA-C including past medical, social, and family history and agree with the findings, assessment and plan except where noted. Plan formulated under my direct supervision. I participated in all aspects of the medical decision making. Please see my additional contributions to the HPI, physical exam, and assessment / medical decision making:  _______________________________________________________________________    Still with ongoing chest heaviness at this time described as feeling like somebody sitting on her chest.    Physical Exam:  /74   Pulse 89   Temp 98.1 °F (36.7 °C) (Oral)   Resp 18   Ht 5' (1.524 m)   Wt 112 lb (50.8 kg)   LMP 12/04/2015 (LMP Unknown)   SpO2 96%   BMI 21.87 kg/m²   General appearance: Petite, awake, alert, no acute respiratory distress  Skin: Intact, no rash  ENMT: Moist mucous membranes  Neck: Supple, no carotid bruits. Normal jugular venous pressure  Lungs: Clear to auscultation bilaterally. No wheezes, rales, or rhonchi  Cardiac: Regular rhythm with a normal rate, normal S1&S2, no murmurs apparent  Abdomen: Soft, positive bowel sounds, nontender  Extremities: Moves all extremities x 4, no lower extremity edema  Neurologic: No focal motor deficits apparent, normal mood and affect    EKG: Sinus rhythm 90 bpm with normal axis. Anterior lateral T wave inversions suggestive of ischemia. Inferior T wave changes as well. Impression:   1. NSTEMI  2. GI bleed  3. Weight loss    Recommendations:     Continue aspirin   No heparin for now   Statin, beta-blocker, transdermal nitro   Echo   Ideally left heart catheterization once GI bleed issues sorted out if bleeding risk acceptable   Surgery consult pending   Aggressive risk factor modification    Thank you for the consultation. Please do not hesitate to call with questions.     Sara Kilpatrick MD, [facility]

## 2021-08-22 LAB
EKG ATRIAL RATE: 90 BPM
EKG P AXIS: 12 DEGREES
EKG P-R INTERVAL: 128 MS
EKG Q-T INTERVAL: 402 MS
EKG QRS DURATION: 72 MS
EKG QTC CALCULATION (BAZETT): 491 MS
EKG R AXIS: 14 DEGREES
EKG T AXIS: 95 DEGREES
EKG VENTRICULAR RATE: 90 BPM

## 2021-08-23 ENCOUNTER — TELEPHONE (OUTPATIENT)
Dept: ADMINISTRATIVE | Age: 51
End: 2021-08-23

## 2021-08-25 NOTE — TELEPHONE ENCOUNTER
Left AMA without meds. If she will follow up with us can start meds. We can repeat limited echo to see if EF still low. I suspect she has takotsubo cardiomyopathy. If EF low still will get CCTA.

## 2021-09-01 ENCOUNTER — HOSPITAL ENCOUNTER (INPATIENT)
Age: 51
LOS: 2 days | Discharge: HOME | DRG: 438 | End: 2021-09-03
Attending: EMERGENCY MEDICINE | Admitting: FAMILY MEDICINE
Payer: COMMERCIAL

## 2021-09-01 ENCOUNTER — APPOINTMENT (OUTPATIENT)
Dept: GENERAL RADIOLOGY | Age: 51
DRG: 438 | End: 2021-09-01
Payer: COMMERCIAL

## 2021-09-01 ENCOUNTER — APPOINTMENT (OUTPATIENT)
Dept: CT IMAGING | Age: 51
DRG: 438 | End: 2021-09-01
Payer: COMMERCIAL

## 2021-09-01 DIAGNOSIS — R94.31 PROLONGED QT INTERVAL: ICD-10-CM

## 2021-09-01 DIAGNOSIS — R10.13 EPIGASTRIC PAIN: Primary | ICD-10-CM

## 2021-09-01 DIAGNOSIS — R94.31 ABNORMAL ECG: ICD-10-CM

## 2021-09-01 LAB
ALBUMIN SERPL-MCNC: 4.2 G/DL (ref 3.5–5.2)
ALP BLD-CCNC: 135 U/L (ref 35–104)
ALT SERPL-CCNC: 20 U/L (ref 0–32)
AMPHETAMINE SCREEN, URINE: NOT DETECTED
ANION GAP SERPL CALCULATED.3IONS-SCNC: 9 MMOL/L (ref 7–16)
AST SERPL-CCNC: 23 U/L (ref 0–31)
BACTERIA: ABNORMAL /HPF
BARBITURATE SCREEN URINE: NOT DETECTED
BASOPHILS ABSOLUTE: 0.04 E9/L (ref 0–0.2)
BASOPHILS RELATIVE PERCENT: 0.9 % (ref 0–2)
BENZODIAZEPINE SCREEN, URINE: POSITIVE
BILIRUB SERPL-MCNC: 0.3 MG/DL (ref 0–1.2)
BILIRUBIN DIRECT: <0.2 MG/DL (ref 0–0.3)
BILIRUBIN URINE: NEGATIVE
BILIRUBIN, INDIRECT: ABNORMAL MG/DL (ref 0–1)
BLOOD, URINE: NEGATIVE
BUN BLDV-MCNC: 8 MG/DL (ref 6–20)
CALCIUM SERPL-MCNC: 9.3 MG/DL (ref 8.6–10.2)
CANNABINOID SCREEN URINE: POSITIVE
CHLORIDE BLD-SCNC: 107 MMOL/L (ref 98–107)
CLARITY: CLEAR
CO2: 26 MMOL/L (ref 22–29)
COCAINE METABOLITE SCREEN URINE: NOT DETECTED
COLOR: YELLOW
CREAT SERPL-MCNC: 0.8 MG/DL (ref 0.5–1)
EKG ATRIAL RATE: 86 BPM
EKG P AXIS: 61 DEGREES
EKG P-R INTERVAL: 138 MS
EKG Q-T INTERVAL: 430 MS
EKG QRS DURATION: 82 MS
EKG QTC CALCULATION (BAZETT): 514 MS
EKG R AXIS: 90 DEGREES
EKG T AXIS: -110 DEGREES
EKG VENTRICULAR RATE: 86 BPM
EOSINOPHILS ABSOLUTE: 0.31 E9/L (ref 0.05–0.5)
EOSINOPHILS RELATIVE PERCENT: 7 % (ref 0–6)
EPITHELIAL CELLS, UA: ABNORMAL /HPF
ETHANOL: <10 MG/DL (ref 0–0.08)
FENTANYL SCREEN, URINE: POSITIVE
GFR AFRICAN AMERICAN: >60
GFR NON-AFRICAN AMERICAN: >60 ML/MIN/1.73
GLUCOSE BLD-MCNC: 101 MG/DL (ref 74–99)
GLUCOSE URINE: NEGATIVE MG/DL
HCT VFR BLD CALC: 39.8 % (ref 34–48)
HEMOGLOBIN: 12.8 G/DL (ref 11.5–15.5)
IMMATURE GRANULOCYTES #: 0.01 E9/L
IMMATURE GRANULOCYTES %: 0.2 % (ref 0–5)
KETONES, URINE: NEGATIVE MG/DL
LACTIC ACID: 0.8 MMOL/L (ref 0.5–2.2)
LEUKOCYTE ESTERASE, URINE: ABNORMAL
LIPASE: 69 U/L (ref 13–60)
LYMPHOCYTES ABSOLUTE: 1.27 E9/L (ref 1.5–4)
LYMPHOCYTES RELATIVE PERCENT: 28.7 % (ref 20–42)
Lab: ABNORMAL
MAGNESIUM: 2.1 MG/DL (ref 1.6–2.6)
MCH RBC QN AUTO: 29.8 PG (ref 26–35)
MCHC RBC AUTO-ENTMCNC: 32.2 % (ref 32–34.5)
MCV RBC AUTO: 92.8 FL (ref 80–99.9)
METHADONE SCREEN, URINE: NOT DETECTED
MONOCYTES ABSOLUTE: 0.38 E9/L (ref 0.1–0.95)
MONOCYTES RELATIVE PERCENT: 8.6 % (ref 2–12)
NEUTROPHILS ABSOLUTE: 2.41 E9/L (ref 1.8–7.3)
NEUTROPHILS RELATIVE PERCENT: 54.6 % (ref 43–80)
NITRITE, URINE: NEGATIVE
OPIATE SCREEN URINE: NOT DETECTED
OXYCODONE URINE: NOT DETECTED
PDW BLD-RTO: 13.5 FL (ref 11.5–15)
PH UA: 6 (ref 5–9)
PHENCYCLIDINE SCREEN URINE: NOT DETECTED
PLATELET # BLD: 231 E9/L (ref 130–450)
PMV BLD AUTO: 11 FL (ref 7–12)
POTASSIUM SERPL-SCNC: 4.4 MMOL/L (ref 3.5–5)
PROTEIN UA: NEGATIVE MG/DL
RBC # BLD: 4.29 E12/L (ref 3.5–5.5)
RBC UA: ABNORMAL /HPF (ref 0–2)
SODIUM BLD-SCNC: 142 MMOL/L (ref 132–146)
SPECIFIC GRAVITY UA: 1.01 (ref 1–1.03)
TOTAL PROTEIN: 6.9 G/DL (ref 6.4–8.3)
TROPONIN, HIGH SENSITIVITY: 31 NG/L (ref 0–9)
TROPONIN, HIGH SENSITIVITY: 36 NG/L (ref 0–9)
TROPONIN, HIGH SENSITIVITY: 37 NG/L (ref 0–9)
UROBILINOGEN, URINE: 0.2 E.U./DL
WBC # BLD: 4.4 E9/L (ref 4.5–11.5)
WBC UA: ABNORMAL /HPF (ref 0–5)

## 2021-09-01 PROCEDURE — G0378 HOSPITAL OBSERVATION PER HR: HCPCS

## 2021-09-01 PROCEDURE — 80076 HEPATIC FUNCTION PANEL: CPT

## 2021-09-01 PROCEDURE — 82077 ASSAY SPEC XCP UR&BREATH IA: CPT

## 2021-09-01 PROCEDURE — 83605 ASSAY OF LACTIC ACID: CPT

## 2021-09-01 PROCEDURE — 96375 TX/PRO/DX INJ NEW DRUG ADDON: CPT

## 2021-09-01 PROCEDURE — 6370000000 HC RX 637 (ALT 250 FOR IP): Performed by: FAMILY MEDICINE

## 2021-09-01 PROCEDURE — 6360000004 HC RX CONTRAST MEDICATION: Performed by: RADIOLOGY

## 2021-09-01 PROCEDURE — 36415 COLL VENOUS BLD VENIPUNCTURE: CPT

## 2021-09-01 PROCEDURE — 6370000000 HC RX 637 (ALT 250 FOR IP): Performed by: STUDENT IN AN ORGANIZED HEALTH CARE EDUCATION/TRAINING PROGRAM

## 2021-09-01 PROCEDURE — 96365 THER/PROPH/DIAG IV INF INIT: CPT

## 2021-09-01 PROCEDURE — 80048 BASIC METABOLIC PNL TOTAL CA: CPT

## 2021-09-01 PROCEDURE — 84484 ASSAY OF TROPONIN QUANT: CPT

## 2021-09-01 PROCEDURE — 83690 ASSAY OF LIPASE: CPT

## 2021-09-01 PROCEDURE — 96366 THER/PROPH/DIAG IV INF ADDON: CPT

## 2021-09-01 PROCEDURE — 6360000002 HC RX W HCPCS: Performed by: FAMILY MEDICINE

## 2021-09-01 PROCEDURE — 83735 ASSAY OF MAGNESIUM: CPT

## 2021-09-01 PROCEDURE — 71046 X-RAY EXAM CHEST 2 VIEWS: CPT

## 2021-09-01 PROCEDURE — 85025 COMPLETE CBC W/AUTO DIFF WBC: CPT

## 2021-09-01 PROCEDURE — 6360000002 HC RX W HCPCS: Performed by: EMERGENCY MEDICINE

## 2021-09-01 PROCEDURE — 80307 DRUG TEST PRSMV CHEM ANLYZR: CPT

## 2021-09-01 PROCEDURE — 87088 URINE BACTERIA CULTURE: CPT

## 2021-09-01 PROCEDURE — 2580000003 HC RX 258: Performed by: EMERGENCY MEDICINE

## 2021-09-01 PROCEDURE — 6360000002 HC RX W HCPCS: Performed by: STUDENT IN AN ORGANIZED HEALTH CARE EDUCATION/TRAINING PROGRAM

## 2021-09-01 PROCEDURE — 93005 ELECTROCARDIOGRAM TRACING: CPT | Performed by: PHYSICIAN ASSISTANT

## 2021-09-01 PROCEDURE — 93010 ELECTROCARDIOGRAM REPORT: CPT | Performed by: INTERNAL MEDICINE

## 2021-09-01 PROCEDURE — 81001 URINALYSIS AUTO W/SCOPE: CPT

## 2021-09-01 PROCEDURE — 2060000000 HC ICU INTERMEDIATE R&B

## 2021-09-01 PROCEDURE — 74177 CT ABD & PELVIS W/CONTRAST: CPT

## 2021-09-01 PROCEDURE — 99285 EMERGENCY DEPT VISIT HI MDM: CPT

## 2021-09-01 RX ORDER — NITROGLYCERIN 0.4 MG/1
0.4 TABLET SUBLINGUAL ONCE
Status: COMPLETED | OUTPATIENT
Start: 2021-09-01 | End: 2021-09-01

## 2021-09-01 RX ORDER — MAGNESIUM SULFATE IN WATER 40 MG/ML
2000 INJECTION, SOLUTION INTRAVENOUS ONCE
Status: COMPLETED | OUTPATIENT
Start: 2021-09-01 | End: 2021-09-01

## 2021-09-01 RX ORDER — FENTANYL CITRATE 50 UG/ML
25 INJECTION, SOLUTION INTRAMUSCULAR; INTRAVENOUS ONCE
Status: COMPLETED | OUTPATIENT
Start: 2021-09-01 | End: 2021-09-01

## 2021-09-01 RX ORDER — HYDROXYCHLOROQUINE SULFATE 200 MG/1
200 TABLET, FILM COATED ORAL 2 TIMES DAILY
Status: DISCONTINUED | OUTPATIENT
Start: 2021-09-01 | End: 2021-09-03 | Stop reason: HOSPADM

## 2021-09-01 RX ORDER — SODIUM CHLORIDE 9 MG/ML
INJECTION, SOLUTION INTRAVENOUS ONCE
Status: DISCONTINUED | OUTPATIENT
Start: 2021-09-01 | End: 2021-09-01

## 2021-09-01 RX ORDER — NITROGLYCERIN 0.4 MG/1
0.4 TABLET SUBLINGUAL EVERY 5 MIN PRN
Status: DISCONTINUED | OUTPATIENT
Start: 2021-09-01 | End: 2021-09-03

## 2021-09-01 RX ORDER — PANTOPRAZOLE SODIUM 40 MG/10ML
40 INJECTION, POWDER, LYOPHILIZED, FOR SOLUTION INTRAVENOUS DAILY
Status: DISCONTINUED | OUTPATIENT
Start: 2021-09-01 | End: 2021-09-03 | Stop reason: HOSPADM

## 2021-09-01 RX ORDER — VITAMIN B COMPLEX
1000 TABLET ORAL DAILY
Status: DISCONTINUED | OUTPATIENT
Start: 2021-09-02 | End: 2021-09-03 | Stop reason: HOSPADM

## 2021-09-01 RX ORDER — ALPRAZOLAM 1 MG/1
2 TABLET ORAL 3 TIMES DAILY
Status: DISCONTINUED | OUTPATIENT
Start: 2021-09-01 | End: 2021-09-03 | Stop reason: HOSPADM

## 2021-09-01 RX ORDER — PANTOPRAZOLE SODIUM 40 MG/1
40 TABLET, DELAYED RELEASE ORAL
Status: DISCONTINUED | OUTPATIENT
Start: 2021-09-02 | End: 2021-09-01

## 2021-09-01 RX ORDER — ASPIRIN 81 MG/1
81 TABLET ORAL DAILY
COMMUNITY
End: 2021-12-14 | Stop reason: ALTCHOICE

## 2021-09-01 RX ORDER — SODIUM CHLORIDE AND POTASSIUM CHLORIDE .9; .15 G/100ML; G/100ML
SOLUTION INTRAVENOUS CONTINUOUS
Status: DISCONTINUED | OUTPATIENT
Start: 2021-09-01 | End: 2021-09-02

## 2021-09-01 RX ORDER — SUCRALFATE 1 G/1
1 TABLET ORAL EVERY 6 HOURS SCHEDULED
Status: DISCONTINUED | OUTPATIENT
Start: 2021-09-02 | End: 2021-09-03 | Stop reason: HOSPADM

## 2021-09-01 RX ORDER — MAGNESIUM HYDROXIDE/ALUMINUM HYDROXICE/SIMETHICONE 120; 1200; 1200 MG/30ML; MG/30ML; MG/30ML
30 SUSPENSION ORAL EVERY 6 HOURS PRN
Status: DISCONTINUED | OUTPATIENT
Start: 2021-09-01 | End: 2021-09-03 | Stop reason: HOSPADM

## 2021-09-01 RX ORDER — ALBUTEROL SULFATE 2.5 MG/3ML
2.5 SOLUTION RESPIRATORY (INHALATION) EVERY 4 HOURS PRN
Status: DISCONTINUED | OUTPATIENT
Start: 2021-09-01 | End: 2021-09-03 | Stop reason: HOSPADM

## 2021-09-01 RX ORDER — VENLAFAXINE HYDROCHLORIDE 75 MG/1
75 CAPSULE, EXTENDED RELEASE ORAL DAILY
Status: DISCONTINUED | OUTPATIENT
Start: 2021-09-02 | End: 2021-09-03 | Stop reason: HOSPADM

## 2021-09-01 RX ORDER — ZOLPIDEM TARTRATE 5 MG/1
5 TABLET ORAL NIGHTLY PRN
Status: DISCONTINUED | OUTPATIENT
Start: 2021-09-02 | End: 2021-09-03 | Stop reason: HOSPADM

## 2021-09-01 RX ORDER — ASPIRIN 81 MG/1
324 TABLET, CHEWABLE ORAL ONCE
Status: COMPLETED | OUTPATIENT
Start: 2021-09-01 | End: 2021-09-01

## 2021-09-01 RX ORDER — ALBUTEROL SULFATE 90 UG/1
1 AEROSOL, METERED RESPIRATORY (INHALATION) EVERY 4 HOURS PRN
Status: DISCONTINUED | OUTPATIENT
Start: 2021-09-01 | End: 2021-09-01 | Stop reason: CLARIF

## 2021-09-01 RX ORDER — OMEPRAZOLE 20 MG/1
20 CAPSULE, DELAYED RELEASE ORAL DAILY
COMMUNITY

## 2021-09-01 RX ORDER — ACETAMINOPHEN 325 MG/1
650 TABLET ORAL EVERY 4 HOURS PRN
Status: DISCONTINUED | OUTPATIENT
Start: 2021-09-01 | End: 2021-09-03 | Stop reason: HOSPADM

## 2021-09-01 RX ORDER — 0.9 % SODIUM CHLORIDE 0.9 %
500 INTRAVENOUS SOLUTION INTRAVENOUS ONCE
Status: COMPLETED | OUTPATIENT
Start: 2021-09-01 | End: 2021-09-01

## 2021-09-01 RX ORDER — LAMOTRIGINE 100 MG/1
200 TABLET ORAL NIGHTLY
Status: DISCONTINUED | OUTPATIENT
Start: 2021-09-01 | End: 2021-09-03 | Stop reason: HOSPADM

## 2021-09-01 RX ORDER — TRAZODONE HYDROCHLORIDE 150 MG/1
300 TABLET ORAL NIGHTLY
Status: DISCONTINUED | OUTPATIENT
Start: 2021-09-01 | End: 2021-09-03 | Stop reason: HOSPADM

## 2021-09-01 RX ORDER — ASPIRIN 81 MG/1
81 TABLET ORAL DAILY
Status: DISCONTINUED | OUTPATIENT
Start: 2021-09-02 | End: 2021-09-03 | Stop reason: HOSPADM

## 2021-09-01 RX ORDER — SODIUM CHLORIDE 9 MG/ML
10 INJECTION INTRAVENOUS DAILY
Status: DISCONTINUED | OUTPATIENT
Start: 2021-09-02 | End: 2021-09-03 | Stop reason: HOSPADM

## 2021-09-01 RX ADMIN — NITROGLYCERIN 0.5 INCH: 20 OINTMENT TOPICAL at 17:58

## 2021-09-01 RX ADMIN — ACETAMINOPHEN 650 MG: 325 TABLET ORAL at 22:14

## 2021-09-01 RX ADMIN — ASPIRIN 81 MG CHEWABLE TABLET 324 MG: 81 TABLET CHEWABLE at 14:51

## 2021-09-01 RX ADMIN — IOPAMIDOL 75 ML: 755 INJECTION, SOLUTION INTRAVENOUS at 13:35

## 2021-09-01 RX ADMIN — MAGNESIUM SULFATE HEPTAHYDRATE 2000 MG: 40 INJECTION, SOLUTION INTRAVENOUS at 12:46

## 2021-09-01 RX ADMIN — HYDROXYCHLOROQUINE SULFATE 200 MG: 200 TABLET ORAL at 22:16

## 2021-09-01 RX ADMIN — METOPROLOL TARTRATE 25 MG: 25 TABLET, FILM COATED ORAL at 17:58

## 2021-09-01 RX ADMIN — FENTANYL CITRATE 25 MCG: 50 INJECTION, SOLUTION INTRAMUSCULAR; INTRAVENOUS at 12:45

## 2021-09-01 RX ADMIN — TRAZODONE HYDROCHLORIDE 300 MG: 150 TABLET ORAL at 22:16

## 2021-09-01 RX ADMIN — ALPRAZOLAM 2 MG: 1 TABLET ORAL at 22:15

## 2021-09-01 RX ADMIN — LAMOTRIGINE 200 MG: 100 TABLET ORAL at 22:16

## 2021-09-01 RX ADMIN — POTASSIUM CHLORIDE AND SODIUM CHLORIDE: 900; 150 INJECTION, SOLUTION INTRAVENOUS at 22:13

## 2021-09-01 RX ADMIN — NITROGLYCERIN 0.4 MG: 0.4 TABLET SUBLINGUAL at 12:46

## 2021-09-01 RX ADMIN — SODIUM CHLORIDE 500 ML: 9 INJECTION, SOLUTION INTRAVENOUS at 12:45

## 2021-09-01 ASSESSMENT — ENCOUNTER SYMPTOMS
PHOTOPHOBIA: 0
NAUSEA: 1
DIARRHEA: 1
BACK PAIN: 0
SHORTNESS OF BREATH: 0
COUGH: 0
VOMITING: 0
SORE THROAT: 0
ABDOMINAL PAIN: 1
CHEST TIGHTNESS: 1

## 2021-09-01 ASSESSMENT — PAIN SCALES - GENERAL
PAINLEVEL_OUTOF10: 6

## 2021-09-01 ASSESSMENT — PAIN DESCRIPTION - PAIN TYPE: TYPE: ACUTE PAIN

## 2021-09-01 ASSESSMENT — PAIN DESCRIPTION - LOCATION: LOCATION: CHEST

## 2021-09-01 NOTE — ED NOTES
FIRST PROVIDER CONTACT ASSESSMENT NOTE      Department of Emergency Medicine   9/1/21  11:24 AM EDT    Chief Complaint: Abdominal Pain (sent in to r/o pancreatitis) and Diarrhea      History of Present Illness:   Dinh Hobbs is a 46 y.o. female who presents to the ED for abdominal pain. States that she has had this pain for a year. She was admitted recently for an NSTEMI but left AMA. She denies chest pain or shortness of breath. She states that she drinks nonalcoholic beer. She describes the pain as epigastric. Denies any other symptoms at this time. Medical History:  has a past medical history of Anxiety, Arthritis, Asthma, Bipolar 2 disorder ([address] Bruising tendency ([address] Chronic back pain, Fibromyalgia, Glucose intolerance (impaired glucose tolerance), Insomnia, Lupus ([address] Memory difficulty, Mild cognitive impairment with memory loss, and Thumb pain. Surgical History:  has a past surgical history that includes Tubal ligation (10/1994); Carpal tunnel release; tumor removal; Finger surgery; Hip Arthrotomy (1973); Colonoscopy; hernia repair (1970); Total hip arthroplasty (Right, 2/25/14); Abdomen surgery; Nerve Block; arthroplasty (Left, 4/16/2019); and Total hip arthroplasty (Left, 3/2/2021). Social History:  reports that she quit smoking about 7 years ago. She has a 30.00 pack-year smoking history. She has never used smokeless tobacco. She reports current alcohol use of about 3.0 standard drinks of alcohol per week. She reports that she does not use drugs. Family History: family history includes Cancer in her father and maternal grandmother. *ALLERGIES*     Patient has no known allergies.      Physical Exam:      VS:  /67   Pulse 99   Temp 97.6 °F (36.4 °C) (Temporal)   Resp 14   Ht 5' (1.524 m)   Wt 112 lb (50.8 kg)   LMP 12/04/2015 (LMP Unknown)   SpO2 96%   BMI 21.87 kg/m²      Initial Plan of Care:  Initiate Treatment-Testing, Proceed toTreatment Area When Bed Available for ED Attending/MLP to Continue Care    Nonsurgical abdomen; no rebound, guarding, rigidity    -----------------END OF FIRST PROVIDER CONTACT ASSESSMENT NOTE--------------  Electronically signed by Fredo Vázquez PA-C   DD: 9/1/21             Norma Kelly PA-C  09/01/21 1124

## 2021-09-01 NOTE — ED PROVIDER NOTES
64-year-old female recently hospitalized for NSTEMI sent for epigastric pain and elevated lipase. Patient states she was just admitted here recently and started signed out AMA as she did not want to wait for testing. She states she received a phone call today from her doctor and that she needed to come to the ED. She states she has had abdominal pain for over a year that is worsened with eating. She states that she has not had much of an appetite due to this and has lost 40 pounds in the past year. She admits to drinking a 12 pack of beer weekly. She complains of intermittent diarrhea and has had nausea. She complains of chest tightness that started yesterday and had an episode of lightheadedness as well. She denies shortness of breath. Review of Systems   Constitutional: Positive for appetite change. Negative for chills and fever. HENT: Negative for congestion and sore throat. Eyes: Negative for photophobia and visual disturbance. Respiratory: Positive for chest tightness. Negative for cough and shortness of breath. Cardiovascular: Negative for chest pain and leg swelling. Gastrointestinal: Positive for abdominal pain, diarrhea and nausea. Negative for vomiting. Genitourinary: Negative for flank pain. Musculoskeletal: Negative for back pain and myalgias. Skin: Negative for rash and wound. Neurological: Positive for light-headedness. Negative for dizziness and headaches. Physical Exam  Constitutional:       General: She is not in acute distress. Comments: Thin, no distress   HENT:      Head: Normocephalic. Eyes:      General: No scleral icterus. Cardiovascular:      Rate and Rhythm: Normal rate and regular rhythm. Abdominal:      General: Abdomen is flat. Palpations: Abdomen is soft. Tenderness: There is abdominal tenderness. There is no guarding or rebound.       Comments: Generalized tenderness to palpation, especially in epigastric region   Skin: General: Skin is warm and dry. Neurological:      General: No focal deficit present. Mental Status: She is alert. Psychiatric:         Mood and Affect: Mood normal.         Behavior: Behavior normal.          Procedures     MDM  Number of Diagnoses or Management Options  Abnormal ECG  Epigastric pain  Diagnosis management comments: 45 yo female sent for abdominal pain. I spoke to , who was concerned that her last lipase had been >600 and she had had mild pancreatic ductal dilatation on CT abdomen on 8/20. However, lipase normal and CT abdomen unremarkable without ductal dilatation today. Patient recently hospitalized for NSTEMI and EKG today showed T wave inversions in inferior and anterolateral leads. Troponins 31, 37, 36 respectively, which were less than previous. Cardiology was consulted but do not have any plans for intervention at this time. They recommended one time dose of lopressor 25 mg and 0.5 inch nitropaste at this time and will reassess patient tomorrow. Dr. Kaleb Mcknight called and updated on results. Gen surg consult to Dr. Yojana Andres placed at his request. Patient given aspirin in the ED. I discussed results with her and she was agreeable to admission. She remained hemodynamically stable in the ED and was admitted to telemetry. Amount and/or Complexity of Data Reviewed  Decide to obtain previous medical records or to obtain history from someone other than the patient: yes                      --------------------------------------------- PAST HISTORY ---------------------------------------------  Past Medical History:  has a past medical history of Anxiety, Arthritis, Asthma, Bipolar 2 disorder ([address] Bruising tendency ([address] Chronic back pain, Fibromyalgia, Glucose intolerance (impaired glucose tolerance), Insomnia, Lupus ([address] Memory difficulty, Mild cognitive impairment with memory loss, and Thumb pain.     Past Surgical History:  has a past surgical history that includes Tubal ligation (10/1994); Carpal tunnel release; tumor removal; Finger surgery; Hip Arthrotomy (1973); Colonoscopy; hernia repair (1970); Total hip arthroplasty (Right, 2/25/14); Abdomen surgery; Nerve Block; arthroplasty (Left, 4/16/2019); and Total hip arthroplasty (Left, 3/2/2021). Social History:  reports that she quit smoking about 7 years ago. She has a 30.00 pack-year smoking history. She has never used smokeless tobacco. She reports current alcohol use of about 3.0 standard drinks of alcohol per week. She reports that she does not use drugs. Family History: family history includes Cancer in her father and maternal grandmother. The patients home medications have been reviewed. Allergies: Patient has no known allergies.     -------------------------------------------------- RESULTS -------------------------------------------------    LABS:  Results for orders placed or performed during the hospital encounter of 09/01/21   CBC Auto Differential   Result Value Ref Range    WBC 4.4 (L) 4.5 - 11.5 E9/L    RBC 4.29 3.50 - 5.50 E12/L    Hemoglobin 12.8 11.5 - 15.5 g/dL    Hematocrit 39.8 34.0 - 48.0 %    MCV 92.8 80.0 - 99.9 fL    MCH 29.8 26.0 - 35.0 pg    MCHC 32.2 32.0 - 34.5 %    RDW 13.5 11.5 - 15.0 fL    Platelets 633 449 - 474 E9/L    MPV 11.0 7.0 - 12.0 fL    Neutrophils % 54.6 43.0 - 80.0 %    Immature Granulocytes % 0.2 0.0 - 5.0 %    Lymphocytes % 28.7 20.0 - 42.0 %    Monocytes % 8.6 2.0 - 12.0 %    Eosinophils % 7.0 (H) 0.0 - 6.0 %    Basophils % 0.9 0.0 - 2.0 %    Neutrophils Absolute 2.41 1.80 - 7.30 E9/L    Immature Granulocytes # 0.01 E9/L    Lymphocytes Absolute 1.27 (L) 1.50 - 4.00 E9/L    Monocytes Absolute 0.38 0.10 - 0.95 E9/L    Eosinophils Absolute 0.31 0.05 - 0.50 E9/L    Basophils Absolute 0.04 0.00 - 0.20 E9/L   Lipase   Result Value Ref Range    Lipase 69 (H) 13 - 60 U/L   LACTIC ACID, PLASMA   Result Value Ref Range    Lactic Acid 0.8 0.5 - 2.2 mmol/L   Urinalysis with Microscopic Result Value Ref Range    Color, UA Yellow Straw/Yellow    Clarity, UA Clear Clear    Glucose, Ur Negative Negative mg/dL    Bilirubin Urine Negative Negative    Ketones, Urine Negative Negative mg/dL    Specific Gravity, UA 1.010 1.005 - 1.030    Blood, Urine Negative Negative    pH, UA 6.0 5.0 - 9.0    Protein, UA Negative Negative mg/dL    Urobilinogen, Urine 0.2 <2.0 E.U./dL    Nitrite, Urine Negative Negative    Leukocyte Esterase, Urine SMALL (A) Negative    WBC, UA 1-3 0 - 5 /HPF    RBC, UA NONE 0 - 2 /HPF    Epithelial Cells, UA MODERATE /HPF    Bacteria, UA MODERATE (A) None Seen /HPF   Troponin   Result Value Ref Range    Troponin, High Sensitivity 31 (H) 0 - 9 ng/L   Ethanol   Result Value Ref Range    Ethanol Lvl <10 mg/dL   URINE DRUG SCREEN   Result Value Ref Range    Amphetamine Screen, Urine NOT DETECTED Negative <1000 ng/mL    Barbiturate Screen, Ur NOT DETECTED Negative < 200 ng/mL    Benzodiazepine Screen, Urine POSITIVE (A) Negative < 200 ng/mL    Cannabinoid Scrn, Ur POSITIVE (A) Negative < 50ng/mL    Cocaine Metabolite Screen, Urine NOT DETECTED Negative < 300 ng/mL    Opiate Scrn, Ur NOT DETECTED Negative < 300ng/mL    PCP Screen, Urine NOT DETECTED Negative < 25 ng/mL    Methadone Screen, Urine NOT DETECTED Negative <300 ng/mL    Oxycodone Urine NOT DETECTED Negative <100 ng/mL    FENTANYL SCREEN, URINE POSITIVE (A) Negative <1 ng/mL    Drug Screen Comment: see below    Basic Metabolic Panel   Result Value Ref Range    Sodium 142 132 - 146 mmol/L    Potassium 4.4 3.5 - 5.0 mmol/L    Chloride 107 98 - 107 mmol/L    CO2 26 22 - 29 mmol/L    Anion Gap 9 7 - 16 mmol/L    Glucose 101 (H) 74 - 99 mg/dL    BUN 8 6 - 20 mg/dL    CREATININE 0.8 0.5 - 1.0 mg/dL    GFR Non-African American >60 >=60 mL/min/1.73    GFR African American >60     Calcium 9.3 8.6 - 10.2 mg/dL   Hepatic Function Panel   Result Value Ref Range    Total Protein 6.9 6.4 - 8.3 g/dL    Albumin 4.2 3.5 - 5.2 g/dL    Alkaline Phosphatase 135 (H) 35 - 104 U/L    ALT 20 0 - 32 U/L    AST 23 0 - 31 U/L    Total Bilirubin 0.3 0.0 - 1.2 mg/dL    Bilirubin, Direct <0.2 0.0 - 0.3 mg/dL    Bilirubin, Indirect see below 0.0 - 1.0 mg/dL   Magnesium   Result Value Ref Range    Magnesium 2.1 1.6 - 2.6 mg/dL   Troponin   Result Value Ref Range    Troponin, High Sensitivity 37 (H) 0 - 9 ng/L   Troponin   Result Value Ref Range    Troponin, High Sensitivity 36 (H) 0 - 9 ng/L   EKG 12 Lead   Result Value Ref Range    Ventricular Rate 86 BPM    Atrial Rate 86 BPM    P-R Interval 138 ms    QRS Duration 82 ms    Q-T Interval 430 ms    QTc Calculation (Bazett) 514 ms    P Axis 61 degrees    R Axis 90 degrees    T Axis -110 degrees       RADIOLOGY:  CT ABDOMEN PELVIS W IV CONTRAST Additional Contrast? None   Final Result   Interval development of a slightly hypodense lesion in the lateral cortex of   the left kidney. The possibility of focal acute pyelonephritis in the   appropriate clinical setting should be considered. Interval resolution of pancreatic duct dilatation. Views CXR lung 08/05/2020   5 3 see the: No in spine. Multilevel spondylosis. Erosion. Moderate      No other significant change. XR CHEST (2 VW)   Final Result   No acute process. EKG:  This EKG is signed and interpreted by me. Rate: 86  Rhythm: Sinus  Interpretation: T wave inversions anterolateral, inferior leads; Prolonged QTc 514; normal QRS, DE intervals  Comparison: changes compared to previous EKG      ------------------------- NURSING NOTES AND VITALS REVIEWED ---------------------------  Date / Time Roomed:  9/1/2021 11:10 AM  ED Bed Assignment:  [id number]    The nursing notes within the ED encounter and vital signs as below have been reviewed.      Patient Vitals for the past 24 hrs:   BP Temp Temp src Pulse Resp SpO2 Height Weight   09/01/21 2100 116/62 98.1 °F (36.7 °C) Oral 71 18 96 % -- --   09/01/21 1758 113/69 98 °F (36.7 °C) Oral 84 16 98 % -- --   09/01/21 1630 106/63 -- -- 92 18 96 % -- --   09/01/21 1445 -- -- -- 85 16 95 % -- --   09/01/21 1042 112/67 97.6 °F (36.4 °C) Temporal 99 14 96 % -- --   09/01/21 1037 -- -- -- -- -- -- 5' (1.524 m) 112 lb (50.8 kg)   09/01/21 1013 -- -- -- 120 16 97 % -- --       Oxygen Saturation Interpretation: Normal    ------------------------------------------ PROGRESS NOTES ------------------------------------------    Counseling:  I have spoken with the patient and discussed todays results, in addition to providing specific details for the plan of care and counseling regarding the diagnosis and prognosis. Their questions are answered at this time and they are agreeable with the plan of admission.    --------------------------------- ADDITIONAL PROVIDER NOTES ---------------------------------    This patient's ED course included: a personal history and physicial examination, re-evaluation prior to disposition, multiple bedside re-evaluations, IV medications, cardiac monitoring and continuous pulse oximetry    This patient has remained hemodynamically stable during their ED course. Diagnosis:  1. Epigastric pain    2. Abnormal ECG    3. Prolonged QT interval        Disposition:  Patient's disposition: Admit to telemetry  Patient's condition is stable.          Salomón Mcgee MD  Resident  09/01/21 [id number]       Salomón Mcgee MD  Resident  09/01/21 [id number]

## 2021-09-01 NOTE — PROGRESS NOTES
EKG, labs reviewed. Abnormal EKG noted on 8/20/21  Troponin trending down from 8/20/21  d/w ED staff. ASA, Nitro paste, BB for now    Full consult to follow tomorrow.      Neri Sterling MD  9/1/2021  5:02 PM

## 2021-09-02 ENCOUNTER — APPOINTMENT (OUTPATIENT)
Dept: ULTRASOUND IMAGING | Age: 51
DRG: 438 | End: 2021-09-02
Payer: COMMERCIAL

## 2021-09-02 PROBLEM — I25.10 CAD (CORONARY ARTERY DISEASE): Status: ACTIVE | Noted: 2021-09-02

## 2021-09-02 LAB
ALBUMIN SERPL-MCNC: 3.7 G/DL (ref 3.5–5.2)
ALP BLD-CCNC: 115 U/L (ref 35–104)
ALT SERPL-CCNC: 13 U/L (ref 0–32)
ANION GAP SERPL CALCULATED.3IONS-SCNC: 8 MMOL/L (ref 7–16)
AST SERPL-CCNC: 19 U/L (ref 0–31)
BASOPHILS ABSOLUTE: 0.03 E9/L (ref 0–0.2)
BASOPHILS RELATIVE PERCENT: 0.8 % (ref 0–2)
BILIRUB SERPL-MCNC: 0.3 MG/DL (ref 0–1.2)
BUN BLDV-MCNC: 8 MG/DL (ref 6–20)
CALCIUM SERPL-MCNC: 9 MG/DL (ref 8.6–10.2)
CEA: 2.6 NG/ML (ref 0–5.2)
CHLORIDE BLD-SCNC: 105 MMOL/L (ref 98–107)
CO2: 27 MMOL/L (ref 22–29)
CREAT SERPL-MCNC: 0.8 MG/DL (ref 0.5–1)
EOSINOPHILS ABSOLUTE: 0.45 E9/L (ref 0.05–0.5)
EOSINOPHILS RELATIVE PERCENT: 11.3 % (ref 0–6)
FERRITIN: 32 NG/ML
FERRITIN: 32 NG/ML
GFR AFRICAN AMERICAN: >60
GFR NON-AFRICAN AMERICAN: >60 ML/MIN/1.73
GLUCOSE BLD-MCNC: 88 MG/DL (ref 74–99)
HCT VFR BLD CALC: 34.5 % (ref 34–48)
HEMOGLOBIN: 11 G/DL (ref 11.5–15.5)
HEMOGLOBIN: 12.3 G/DL (ref 11.5–15.5)
IMMATURE GRANULOCYTES #: 0.01 E9/L
IMMATURE GRANULOCYTES %: 0.3 % (ref 0–5)
IRON SATURATION: 39 % (ref 15–50)
IRON SATURATION: 43 % (ref 15–50)
IRON: 104 MCG/DL (ref 37–145)
IRON: 114 MCG/DL (ref 37–145)
LIPASE: 49 U/L (ref 13–60)
LYMPHOCYTES ABSOLUTE: 1.63 E9/L (ref 1.5–4)
LYMPHOCYTES RELATIVE PERCENT: 41 % (ref 20–42)
MCH RBC QN AUTO: 29.6 PG (ref 26–35)
MCHC RBC AUTO-ENTMCNC: 31.9 % (ref 32–34.5)
MCV RBC AUTO: 92.7 FL (ref 80–99.9)
MONOCYTES ABSOLUTE: 0.38 E9/L (ref 0.1–0.95)
MONOCYTES RELATIVE PERCENT: 9.5 % (ref 2–12)
NEUTROPHILS ABSOLUTE: 1.48 E9/L (ref 1.8–7.3)
NEUTROPHILS RELATIVE PERCENT: 37.1 % (ref 43–80)
PDW BLD-RTO: 13.5 FL (ref 11.5–15)
PLATELET # BLD: 194 E9/L (ref 130–450)
PMV BLD AUTO: 11.2 FL (ref 7–12)
POTASSIUM SERPL-SCNC: 4.5 MMOL/L (ref 3.5–5)
RBC # BLD: 3.72 E12/L (ref 3.5–5.5)
SODIUM BLD-SCNC: 140 MMOL/L (ref 132–146)
TOTAL IRON BINDING CAPACITY: 265 MCG/DL (ref 250–450)
TOTAL IRON BINDING CAPACITY: 268 MCG/DL (ref 250–450)
TOTAL PROTEIN: 5.7 G/DL (ref 6.4–8.3)
WBC # BLD: 4 E9/L (ref 4.5–11.5)

## 2021-09-02 PROCEDURE — 99254 IP/OBS CNSLTJ NEW/EST MOD 60: CPT | Performed by: INTERNAL MEDICINE

## 2021-09-02 PROCEDURE — 36415 COLL VENOUS BLD VENIPUNCTURE: CPT

## 2021-09-02 PROCEDURE — G0378 HOSPITAL OBSERVATION PER HR: HCPCS

## 2021-09-02 PROCEDURE — APPSS45 APP SPLIT SHARED TIME 31-45 MINUTES: Performed by: NURSE PRACTITIONER

## 2021-09-02 PROCEDURE — 80053 COMPREHEN METABOLIC PANEL: CPT

## 2021-09-02 PROCEDURE — 6360000002 HC RX W HCPCS: Performed by: FAMILY MEDICINE

## 2021-09-02 PROCEDURE — 83540 ASSAY OF IRON: CPT

## 2021-09-02 PROCEDURE — 83550 IRON BINDING TEST: CPT

## 2021-09-02 PROCEDURE — 82105 ALPHA-FETOPROTEIN SERUM: CPT

## 2021-09-02 PROCEDURE — 2580000003 HC RX 258: Performed by: FAMILY MEDICINE

## 2021-09-02 PROCEDURE — C9113 INJ PANTOPRAZOLE SODIUM, VIA: HCPCS | Performed by: FAMILY MEDICINE

## 2021-09-02 PROCEDURE — 2060000000 HC ICU INTERMEDIATE R&B

## 2021-09-02 PROCEDURE — 96372 THER/PROPH/DIAG INJ SC/IM: CPT

## 2021-09-02 PROCEDURE — 85025 COMPLETE CBC W/AUTO DIFF WBC: CPT

## 2021-09-02 PROCEDURE — 93306 TTE W/DOPPLER COMPLETE: CPT

## 2021-09-02 PROCEDURE — 96375 TX/PRO/DX INJ NEW DRUG ADDON: CPT

## 2021-09-02 PROCEDURE — 76775 US EXAM ABDO BACK WALL LIM: CPT

## 2021-09-02 PROCEDURE — 83690 ASSAY OF LIPASE: CPT

## 2021-09-02 PROCEDURE — 86301 IMMUNOASSAY TUMOR CA 19-9: CPT

## 2021-09-02 PROCEDURE — 85018 HEMOGLOBIN: CPT

## 2021-09-02 PROCEDURE — 82378 CARCINOEMBRYONIC ANTIGEN: CPT

## 2021-09-02 PROCEDURE — 82728 ASSAY OF FERRITIN: CPT

## 2021-09-02 PROCEDURE — 6370000000 HC RX 637 (ALT 250 FOR IP): Performed by: FAMILY MEDICINE

## 2021-09-02 PROCEDURE — 86316 IMMUNOASSAY TUMOR OTHER: CPT

## 2021-09-02 PROCEDURE — 6370000000 HC RX 637 (ALT 250 FOR IP): Performed by: INTERNAL MEDICINE

## 2021-09-02 RX ORDER — MAGNESIUM HYDROXIDE/ALUMINUM HYDROXICE/SIMETHICONE 120; 1200; 1200 MG/30ML; MG/30ML; MG/30ML
30 SUSPENSION ORAL EVERY 6 HOURS PRN
Refills: 0 | COMMUNITY
Start: 2021-09-02

## 2021-09-02 RX ORDER — SODIUM CHLORIDE 0.9 % (FLUSH) 0.9 %
5-40 SYRINGE (ML) INJECTION PRN
Status: DISCONTINUED | OUTPATIENT
Start: 2021-09-02 | End: 2021-09-03 | Stop reason: HOSPADM

## 2021-09-02 RX ORDER — ATORVASTATIN CALCIUM 20 MG/1
20 TABLET, FILM COATED ORAL NIGHTLY
Status: DISCONTINUED | OUTPATIENT
Start: 2021-09-02 | End: 2021-09-03 | Stop reason: HOSPADM

## 2021-09-02 RX ORDER — SODIUM CHLORIDE 0.9 % (FLUSH) 0.9 %
5-40 SYRINGE (ML) INJECTION EVERY 12 HOURS SCHEDULED
Status: DISCONTINUED | OUTPATIENT
Start: 2021-09-02 | End: 2021-09-03 | Stop reason: HOSPADM

## 2021-09-02 RX ORDER — ONDANSETRON 2 MG/ML
4 INJECTION INTRAMUSCULAR; INTRAVENOUS EVERY 6 HOURS PRN
Status: DISCONTINUED | OUTPATIENT
Start: 2021-09-02 | End: 2021-09-02 | Stop reason: ALTCHOICE

## 2021-09-02 RX ORDER — POLYETHYLENE GLYCOL 3350 17 G/17G
17 POWDER, FOR SOLUTION ORAL DAILY PRN
Status: DISCONTINUED | OUTPATIENT
Start: 2021-09-02 | End: 2021-09-03 | Stop reason: HOSPADM

## 2021-09-02 RX ORDER — SODIUM CHLORIDE 9 MG/ML
25 INJECTION, SOLUTION INTRAVENOUS PRN
Status: DISCONTINUED | OUTPATIENT
Start: 2021-09-02 | End: 2021-09-03 | Stop reason: HOSPADM

## 2021-09-02 RX ORDER — METOPROLOL SUCCINATE 25 MG/1
12.5 TABLET, EXTENDED RELEASE ORAL DAILY
Status: DISCONTINUED | OUTPATIENT
Start: 2021-09-02 | End: 2021-09-03 | Stop reason: HOSPADM

## 2021-09-02 RX ADMIN — HYDROXYCHLOROQUINE SULFATE 200 MG: 200 TABLET ORAL at 20:59

## 2021-09-02 RX ADMIN — ALPRAZOLAM 2 MG: 1 TABLET ORAL at 14:44

## 2021-09-02 RX ADMIN — TRAZODONE HYDROCHLORIDE 300 MG: 150 TABLET ORAL at 20:57

## 2021-09-02 RX ADMIN — SUCRALFATE 1 G: 1 TABLET ORAL at 00:13

## 2021-09-02 RX ADMIN — ALPRAZOLAM 2 MG: 1 TABLET ORAL at 10:51

## 2021-09-02 RX ADMIN — NITROGLYCERIN 0.5 INCH: 20 OINTMENT TOPICAL at 05:18

## 2021-09-02 RX ADMIN — METOPROLOL SUCCINATE 12.5 MG: 25 TABLET, EXTENDED RELEASE ORAL at 10:57

## 2021-09-02 RX ADMIN — ALPRAZOLAM 2 MG: 1 TABLET ORAL at 20:54

## 2021-09-02 RX ADMIN — ATORVASTATIN CALCIUM 20 MG: 20 TABLET, FILM COATED ORAL at 20:57

## 2021-09-02 RX ADMIN — SUCRALFATE 1 G: 1 TABLET ORAL at 17:53

## 2021-09-02 RX ADMIN — HYDROXYCHLOROQUINE SULFATE 200 MG: 200 TABLET ORAL at 10:51

## 2021-09-02 RX ADMIN — SUCRALFATE 1 G: 1 TABLET ORAL at 12:11

## 2021-09-02 RX ADMIN — NITROGLYCERIN 0.5 INCH: 20 OINTMENT TOPICAL at 12:11

## 2021-09-02 RX ADMIN — Medication 1000 UNITS: at 10:51

## 2021-09-02 RX ADMIN — NITROGLYCERIN 0.5 INCH: 20 OINTMENT TOPICAL at 00:13

## 2021-09-02 RX ADMIN — PANTOPRAZOLE SODIUM 40 MG: 40 INJECTION, POWDER, FOR SOLUTION INTRAVENOUS at 12:12

## 2021-09-02 RX ADMIN — SODIUM CHLORIDE, PRESERVATIVE FREE 10 ML: 5 INJECTION INTRAVENOUS at 20:59

## 2021-09-02 RX ADMIN — ENOXAPARIN SODIUM 40 MG: 40 INJECTION SUBCUTANEOUS at 10:52

## 2021-09-02 RX ADMIN — TRIMETHOBENZAMIDE HYDROCHLORIDE 200 MG: 100 INJECTION INTRAMUSCULAR at 11:00

## 2021-09-02 RX ADMIN — SUCRALFATE 1 G: 1 TABLET ORAL at 05:18

## 2021-09-02 RX ADMIN — ASPIRIN 81 MG: 81 TABLET, COATED ORAL at 10:51

## 2021-09-02 RX ADMIN — VENLAFAXINE HYDROCHLORIDE 75 MG: 75 CAPSULE, EXTENDED RELEASE ORAL at 10:51

## 2021-09-02 RX ADMIN — SODIUM CHLORIDE, PRESERVATIVE FREE 10 ML: 5 INJECTION INTRAVENOUS at 10:51

## 2021-09-02 RX ADMIN — LAMOTRIGINE 200 MG: 100 TABLET ORAL at 20:55

## 2021-09-02 ASSESSMENT — PAIN DESCRIPTION - DESCRIPTORS: DESCRIPTORS: TIGHTNESS

## 2021-09-02 ASSESSMENT — PAIN DESCRIPTION - LOCATION: LOCATION: CHEST

## 2021-09-02 ASSESSMENT — PAIN DESCRIPTION - FREQUENCY: FREQUENCY: CONTINUOUS

## 2021-09-02 ASSESSMENT — PAIN SCALES - GENERAL
PAINLEVEL_OUTOF10: 0
PAINLEVEL_OUTOF10: 0
PAINLEVEL_OUTOF10: 6

## 2021-09-02 ASSESSMENT — PAIN DESCRIPTION - PAIN TYPE: TYPE: CHRONIC PAIN

## 2021-09-02 NOTE — PROGRESS NOTES
Some upper abdominal discomfort- overall some better  vss  Lungs clear  Reg rhythm  Abd- soft- some mid/ epigastric tenderness  Ext- no edema  Stable- abdominal pain-etiology to be determined- recent NSTEMI- lab noted  Per orders- continue present care- cardiology-surgery-urology to see

## 2021-09-02 NOTE — CONSULTS
HISTORY:    1. Anxiety. 2. Fibromyalgia. 3. Asthma. 4. Systemic lupus erythematosus. 5. Bipolar disorder. 6. Manic depression/panic disorder. 7. Alcohol abuse. 8. Former tobacco abuse. 30-pack-year history  9. S/p left hip total arthoplasty March 2021. 10. S/p right hip arthroplasty in 2014. 11. Vitamin D deficiency. 12. Hernia repair  13. Carpal tunnel release  14. Hospital admission August 21, 2021 with elevated troponin and abnormal EKG, prolonged QTC, when she was admitted with abdominal pain, non-ST elevation MI, left AMA, possible takatsubo cardiomyopathy but CAD was to be excluded  15. 2D echocardiogram August 21, 2021     Summary   Left ventricle is mildly dilated. LVEDD 5.3 cm. LV systolic function is moderately reduced. Ejection fraction is visually estimated at 30-35%. Grade I diastolic dysfunction. The apex is akinetic; severe hypokinesis of the apical segments; mild   hypokinesis of the mid segments. Basal function is normal.   Normal left ventricular wall thickness. Normal right ventricular size and function. The left atrium is mildly dilated. Findings are suggestive of Takotsubo cardiomyopathy; however obstructive   CAD may have a similar appearance and should be excluded. 16. Limited 2D echo September 1, 2021    Summary   Limited Echo for LV function. Normal left ventricular chamber size. Normal left ventricular systolic function, LVEF 16-43%. Normal right ventricle size and function. No evidence of pericardial effusion. No intra cardiac mass or thrombus. Compared to prior echo from 8/21/21 - LV EF is improved from 30-35% to   60-65%. 16. Lexiscan stress test September 3, 2021    The myocardial perfusion is normal.   2. No evidence of stress-induced ischemia or prior myocardial   infarction. 3. Normal LV systolic function, EF 30% with mild apical hypokinesis. 4. There is no transient ischemic dilation. 5.  Low risk myocardial perfusion study.             18. ALLERGIES:  Patient has no known allergies.     SOCIAL HISTORY:    Former tobacco smoker, quit approximately seven years ago. Smoked two packs/day for 15+ years. States she drinks on average a couple of beers per day, however sometimes this is an every other day occurrence. Denies former or current illicit drug use.     FAMILY HISTORY:   Denies any pertinent cardiac family medical history to me at this time.             Medications Prior to admit:  Prior to Admission medications    Medication Sig Start Date End Date Taking? Authorizing Provider   omeprazole (PRILOSEC) 20 MG delayed release capsule Take 20 mg by mouth daily   Yes Historical Provider, MD   aspirin 81 MG EC tablet Take 81 mg by mouth daily   Yes Historical Provider, MD   vitamin D (CHOLECALCIFEROL) 25 MCG (1000 UT) TABS tablet Take by mouth daily    Yes Historical Provider, MD   hydroxychloroquine (PLAQUENIL) 200 MG tablet Take by mouth 2 times daily    Yes Historical Provider, MD   ALPRAZolam (XANAX) 1 MG tablet Take 2 mg by mouth 3 times daily. 4/20/19  Yes Historical Provider, MD   PROVENTIL  (90 Base) MCG/ACT inhaler INHALE 2 PUFFS EVERY 4 HOURS AS NEEDED 12/29/17  Yes Historical Provider, MD   lamoTRIgine (LAMICTAL) 200 MG tablet Take 200 mg by mouth 2 tabs at night 12/11/17  Yes Historical Provider, MD   venlafaxine (EFFEXOR XR) 75 MG extended release capsule Take 75 mg by mouth daily  10/25/17  Yes Historical Provider, MD   zolpidem (AMBIEN) 10 MG tablet Take  by mouth nightly as needed for Sleep.    Yes Historical Provider, MD   traZODone (DESYREL) 150 MG tablet Take 300 mg by mouth nightly    Yes Historical Provider, MD   brexpiprazole (REXULTI) 1 MG TABS tablet Take 1 mg by mouth daily   Patient not taking: Reported on 9/1/2021    Historical Provider, MD       Current Medications:    Current Facility-Administered Medications: sodium chloride flush 0.9 % injection 5-40 mL, 5-40 mL, IntraVENous, 2 times per day  sodium chloride flush 0.9 % injection 5-40 mL, 5-40 mL, IntraVENous, PRN  0.9 % sodium chloride infusion, 25 mL, IntraVENous, PRN  polyethylene glycol (GLYCOLAX) packet 17 g, 17 g, Oral, Daily PRN  perflutren lipid microspheres (DEFINITY) injection 1.65 mg, 1.5 mL, IntraVENous, ONCE PRN  metoprolol succinate (TOPROL XL) extended release tablet 12.5 mg, 12.5 mg, Oral, Daily  atorvastatin (LIPITOR) tablet 20 mg, 20 mg, Oral, Nightly  trimethobenzamide (TIGAN) injection 200 mg, 200 mg, IntraMUSCular, Q6H PRN  enoxaparin (LOVENOX) injection 40 mg, 40 mg, SubCUTAneous, Daily  acetaminophen (TYLENOL) tablet 650 mg, 650 mg, Oral, Q4H PRN  ALPRAZolam (XANAX) tablet 2 mg, 2 mg, Oral, TID  aspirin EC tablet 81 mg, 81 mg, Oral, Daily  lamoTRIgine (LAMICTAL) tablet 200 mg, 200 mg, Oral, Nightly  hydroxychloroquine (PLAQUENIL) tablet 200 mg, 200 mg, Oral, BID  traZODone (DESYREL) tablet 300 mg, 300 mg, Oral, Nightly  venlafaxine (EFFEXOR XR) extended release capsule 75 mg, 75 mg, Oral, Daily  vitamin D (CHOLECALCIFEROL) tablet 1,000 Units, 1,000 Units, Oral, Daily  zolpidem (AMBIEN) tablet 5 mg, 5 mg, Oral, Nightly PRN  sucralfate (CARAFATE) tablet 1 g, 1 g, Oral, 4 times per day  aluminum & magnesium hydroxide-simethicone (MAALOX) 200-200-20 MG/5ML suspension 30 mL, 30 mL, Oral, Q6H PRN  nitroglycerin (NITRO-BID) 2 % ointment 0.5 inch, 0.5 inch, Topical, 4 times per day  nitroGLYCERIN (NITROSTAT) SL tablet 0.4 mg, 0.4 mg, SubLINGual, Q5 Min PRN  albuterol (PROVENTIL) nebulizer solution 2.5 mg, 2.5 mg, Nebulization, Q4H PRN  pantoprazole (PROTONIX) injection 40 mg, 40 mg, IntraVENous, Daily **AND** sodium chloride (PF) 0.9 % injection 10 mL, 10 mL, IntraVENous, Daily      REVIEW OF SYSTEMS:     · Constitutional: Denies fatigue, fevers, chills or night sweats  · Eyes: Denies visual changes or drainage  · ENT: Denies headaches or hearing loss. No mouth sores or sore throat.  No epistaxis   · Cardiovascular: Denies chest pain, pressure or palpitations. No lower extremity swelling. · Respiratory: Denies RITCHIE, cough, orthopnea or PND. No hemoptysis   · Gastrointestinal: Denies hematemesis or anorexia. No hematochezia or melena    · Genitourinary: Denies urgency, dysuria or hematuria. · Musculoskeletal: Denies gait disturbance, weakness or joint complaints  · Integumentary: Denies rash, hives or pruritis   · Neurological: Denies dizziness, headaches or seizures. No numbness or tingling  · Psychiatric: Denies anxiety or depression. · Endocrine: Denies temperature intolerance. No recent weight change. .  · Hematologic/Lymphatic: Denies abnormal bruising or bleeding. No swollen lymph nodes    PHYSICAL EXAM:   /60   Pulse 84   Temp 98.1 °F (36.7 °C) (Oral)   Resp 16   Ht 5' (1.524 m)   Wt 112 lb 4 oz (50.9 kg)   LMP 12/04/2015 (LMP Unknown)   SpO2 94%   BMI 21.92 kg/m²   CONST:  Well developed, well nourished who appears of stated age. Awake, alert and cooperative. No apparent distress. HEENT:   Head- Normocephalic, atraumatic   Eyes- Conjunctivae pink, anicteric  Throat- Oral mucosa pink and moist  Neck-  No stridor, trachea midline, no jugular venous distention. No carotid bruit. CHEST: Chest symmetrical and non-tender to palpation. No accessory muscle use or intercostal retractions  RESPIRATORY: Lung sounds - clear throughout fields   CARDIOVASCULAR:     Heart Inspection- shows no noted pulsations  Heart Palpation- no heaves or thrills; PMI is non-displaced   Heart Ausculation- Regular rate and rhythm, no murmur. No s3, s4 or rub   PV: No lower extremity edema. No varicosities. Pedal pulses palpable, no clubbing or cyanosis   ABDOMEN: Soft, non-tender to light palpation. Bowel sounds present. No palpable masses no organomegaly; no abdominal bruit  MS: Good muscle strength and tone. No atrophy or abnormal movements.    : Deferred  SKIN: Warm and dry no statis dermatitis or ulcers   NEURO / PSYCH: Oriented to person, place and time. Speech clear and appropriate. Follows all commands. Pleasant affect     DATA:    ECG / Tele strips: please see HPI   Diagnostic:    No intake or output data in the 24 hours ending 09/02/21 1640    Labs:   CBC:   Recent Labs     09/01/21  1211 09/02/21  0613   WBC 4.4* 4.0*   HGB 12.8 11.0*   HCT 39.8 34.5    194     BMP:   Recent Labs     09/01/21  1211 09/02/21  0522    140   K 4.4 4.5   CO2 26 27   BUN 8 8   CREATININE 0.8 0.8   LABGLOM >60 >60   CALCIUM 9.3 9.0     Mag:   Recent Labs     09/01/21  1211   MG 2.1     Phos: No results for input(s): PHOS in the last 72 hours. TFT:   Lab Results   Component Value Date    TSH 1.890 02/27/2014      HgA1c: No results found for: LABA1C  No results found for: EAG  proBNP: No results for input(s): PROBNP in the last 72 hours. PT/INR: No results for input(s): PROTIME, INR in the last 72 hours. APTT:No results for input(s): APTT in the last 72 hours. CARDIAC ENZYMES:  Recent Labs     09/01/21  1211 09/01/21  1427 09/01/21  1612   TROPHS 31* 37* 36*     FASTING LIPID PANEL:No results found for: CHOL, HDL, LDLDIRECT, LDLCALC, TRIG  LIVER PROFILE:  Recent Labs     09/01/21  1211 09/02/21  0522   AST 23 19   ALT 20 13   LABALBU 4.2 3.7       Electronically signed by AJCOBY Carrasco CNP on 9/2/2021 at 4:40 PM       Signed        Expand AllCollapse All      Addendum:  Bud Saldana MD     Reason for consult:  Abnormal EKG     Patient previously known to: Dr. Laurence Rios from prior admission and consult     History of Present illness: 46 yr old with history of recent CMP, Anxiety, Alcohol use, chronic back pain presented to ED for epigastric pain, nausea. She was admitted 8/20/21 for Abdominal pain . . I had 'extreme anxiety 1-2 days prior my admission\". Seen by Dr Laurence Rios -  Dx with NSTEMI, Echo showed low EF with WMA likely Takotsubo CMP vs CAD. She left AMA prior to ischemia w/u.   She denied any CP; had occ mild SOB, No orthopnea; Fairly active at home with out chest pain on activity.      Review of systems:  Review of 10 systems negative except as mentioned in the HPI     Medical History: Reviewed     Surgical history: Reviewed     FamilyHistory: Reviewed     Allergies:  Reviewed     Social Hx: Reviewed.     Scheduled Meds:  Scheduled Medications    sodium chloride flush  5-40 mL IntraVENous 2 times per day    enoxaparin  40 mg SubCUTAneous Daily    ALPRAZolam  2 mg Oral TID    aspirin  81 mg Oral Daily    lamoTRIgine  200 mg Oral Nightly    hydroxychloroquine  200 mg Oral BID    traZODone  300 mg Oral Nightly    venlafaxine  75 mg Oral Daily    Vitamin D  1,000 Units Oral Daily    sucralfate  1 g Oral 4 times per day    nitroglycerin  0.5 inch Topical 4 times per day    pantoprazole  40 mg IntraVENous Daily     And    sodium chloride (PF)  10 mL IntraVENous Daily         Continuous Infusions:  Infusions Meds    sodium chloride           PRN Meds:. PRN Medications   sodium chloride flush, sodium chloride, polyethylene glycol, perflutren lipid microspheres, acetaminophen, zolpidem, aluminum & magnesium hydroxide-simethicone, nitroGLYCERIN, albuterol           Labs/imaging studies: Reviewed.     EKG - Reviewed, NSR, inferior, anterolateral ST/T chagnes     2D Echo 8/20/21   Summary   Left ventricle is mildly dilated.  LVEDD 5.3 cm.   LV systolic function is moderately reduced.   Ejection fraction is visually estimated at 30-35%.   Grade I diastolic dysfunction.   The apex is akinetic; severe hypokinesis of the apical segments; mild   hypokinesis of the mid segments.   Basal function is normal.   Normal left ventricular wall thickness.   Normal right ventricular size and function.   The left atrium is mildly dilated.    Findings are suggestive of Takotsubo cardiomyopathy; however obstructive   CAD may have a similar appearance and should be excluded.   - Electronically signed by Charline Caruso MD.           Above MERCEDES exam, assessment reviewed and reflect my work. I personally saw, examined, and evaluated the patient today.     I personally reviewed the medications, rhythm strips, pertinent labs and test reports. I directly participated in the medical-decision making, ordering pertinent tests and medication adjustment.     Physical exam:          Vitals:     09/02/21 0515   BP: 102/62   Pulse: 72   Resp:     Temp:     SpO2:           In general, this is a well developed, well nourished who appears stated age. awake, alert, cooperative, no apparent distress     HEENT: eyes -conjunctivae pink,   Neck-  no stridor, no carotid bruit. no jugular venous distention   RESPIRATORY: Chest symmetrical and non-tender to palpation. No accessory muscles use. Lung auscultation - few rhonchi  CARDIOVASCULAR:     Heart Palpation - no palpable thrills  Heart Ausculation - Regular rate and rhythm, 1/6 systolic murmur, No s3 or rub. No lower extremity edema, Distal pulses palpable, no cyanosis   ABDOMEN: Soft, nontender,  Bowel sounds present. MS: n/a. : Deferred  Rectal Exam: Deferred  SKIN: warm and dry  NEURO / PSYCH: oriented to person, place           Impression/Recommendations:     Abnormal EKG - Also noted on 8/20/20201 - Continue ASA, BB. Denies any chest pain now or prior to admission.     Recent Troponin Elevation on 8/20/2021 - NSTEMI vs. Takotsubo syndrome per Echo findings - She left AMA prior to ischemia w/u. Her troponin trending down - No CP. Start ASA, BB, Statin. Check Lipids; Based on her Echo findings Stress test prior to discharge if EF normal or Cardiac cath if EF still low, when stable and cleared by surgical consultants.     Abdominal pain, elevated lipase, recent FOBT+, and recent 40lb weight loss - Surgery on case     Cardiomyopathy - New, EF 30-35% by recent Echo 8/20/21- Repeat limited Echo for EF, r/o thrombus - Start Low dose Metoprolol succinate, Monitor BP/HR;  Based on Echo findings add Entresto/ACE/ARB as her BP/Renal Fn  tolerates     Alcohol use - Counseled to quit drinking     Chronic back pain                    Recent Echo findings and above recommendations discussed with her.        Thank you for the consult.           Yakov Herrera MD  9/2/2021  8:16 AM  [facility]) Cardiology

## 2021-09-02 NOTE — CONSULTS
9/2/2021 3:42 PM  Service: Urology  Group: [facility] ([place]    Nisha Gaffney  [id number]     Chief Complaint:    Left renal lesion     History of Present Illness: The patient is a 46 y.o. female patient who presented to the hospital yesterday for abdominal pain, weight loss, and elevated lipase levels. She was recenlty admitted for N-STEMI as well. She had a CT abdomen pelvis performed that showed a possible left renal lesion and we were asked to evaluate. The patient had an abdominal CAT scan because of abdominal pain. She shows a cyst which was not seen on previous studies and explained to her this is most likely benign we will get a baseline renal ultrasound here in the contrast enhanced ultrasound at a later time  No history of urinary tract calculus disease urinary tract infections no gross hematuria no burning no urgency no frequency no stress or urgency incontinence.  She has been having more frequency recently referable to her hydration      Past Medical History:   Diagnosis Date    Anxiety     dr Rhina Sen 63 [address] [facility] Asthma     Bipolar 2 disorder ([address]     Bruising tendency ([address]     Chronic back pain     Fibromyalgia     Glucose intolerance (impaired glucose tolerance) 6/15/2020    hyperglycemia    Insomnia     Lupus ([address]     Memory difficulty 6/15/2020    Mild cognitive impairment with memory loss 6/15/2020    Thumb pain     left - for OR 4-16-19          Past Surgical History:   Procedure Laterality Date    ABDOMEN SURGERY      fatty tumor removed     ARTHROPLASTY Left 4/16/2019    LEFT THUMB TRAPEZIECTOMY WITH LIGAMENT RECONSTRUCTION LEFT PARTIAL TRAPEZOIDECTOMY LEFT DEQUERVAINS RELEASE LEFT CARPAL TUNNEL RELEASE performed by Jane Rubin MD at [place] [address]      right    COLONOSCOPY      FINGER SURGERY      right [address]    HIP ARTHROTOMY  1973    right    NERVE BLOCK x4     TOTAL HIP ARTHROPLASTY Right 2/25/14    TOTAL HIP ARTHROPLASTY Left 3/2/2021    LEFT HIP TOTAL ARTHROPLASTY performed by Rosario Corcoran MD at [facility]  10/1994    TUMOR REMOVAL      right shoulder fatty tumor       Medications Prior to Admission:    Medications Prior to Admission: omeprazole (PRILOSEC) 20 MG delayed release capsule, Take 20 mg by mouth daily  aspirin 81 MG EC tablet, Take 81 mg by mouth daily  vitamin D (CHOLECALCIFEROL) 25 MCG (1000 UT) TABS tablet, Take by mouth daily   hydroxychloroquine (PLAQUENIL) 200 MG tablet, Take by mouth 2 times daily   ALPRAZolam (XANAX) 1 MG tablet, Take 2 mg by mouth 3 times daily. PROVENTIL  (90 Base) MCG/ACT inhaler, INHALE 2 PUFFS EVERY 4 HOURS AS NEEDED  lamoTRIgine (LAMICTAL) 200 MG tablet, Take 200 mg by mouth 2 tabs at night  venlafaxine (EFFEXOR XR) 75 MG extended release capsule, Take 75 mg by mouth daily   zolpidem (AMBIEN) 10 MG tablet, Take  by mouth nightly as needed for Sleep.  traZODone (DESYREL) 150 MG tablet, Take 300 mg by mouth nightly   brexpiprazole (REXULTI) 1 MG TABS tablet, Take 1 mg by mouth daily  (Patient not taking: Reported on 9/1/2021)    Allergies:    Patient has no known allergies. Social History:    reports that she quit smoking about 7 years ago. She has a 30.00 pack-year smoking history. She has never used smokeless tobacco. She reports current alcohol use of about 3.0 standard drinks of alcohol per week. She reports that she does not use drugs. Family History:   Non-contributory to this Urological problem  family history includes Cancer in her father and maternal grandmother.     Review of Systems:  Constitutional: No fever or chills   Respiratory: negative for cough and hemoptysis  Cardiovascular: negative for chest pain and dyspnea  Gastrointestinal: negative for abdominal pain, diarrhea, nausea and vomiting   Derm: negative for rash and skin lesion(s)  Neurological: negative for seizures and tremors  Musculoskeletal: Negative    Psychiatric: Negative   : As above in the HPI, otherwise negative  All other reviews are negative    Physical Exam:     Vitals:  /60   Pulse 84   Temp 98.1 °F (36.7 °C) (Oral)   Resp 16   Ht 5' (1.524 m)   Wt 112 lb 4 oz (50.9 kg)   LMP 12/04/2015 (LMP Unknown)   SpO2 94%   BMI 21.92 kg/m²     General:  Awake, alert, oriented X 3. No apparent distress. HEENT:  Normocephalic, atraumatic. Lungs:  Respirations symmetric and non-labored. Abdomen:  soft, nontender, no masses  Extremities:  No clubbing, cyanosis, or edema  Skin:  Warm and dry, no open lesions or rashes  Neuro: There are no motor or sensory deficits in the 4 quadrant extremities   Rectal: deferred  Genitourinary: Deferred    Labs:     Recent Labs     09/01/21  1211 09/02/21  0613   WBC 4.4* 4.0*   RBC 4.29 3.72   HGB 12.8 11.0*   HCT 39.8 34.5   MCV 92.8 92.7   MCH 29.8 29.6   MCHC 32.2 31.9*   RDW 13.5 13.5    194   MPV 11.0 11.2         Recent Labs     09/01/21  1211 09/02/21  0522   CREATININE 0.8 0.8       No results found for: PSA    Imaging:   Narrative   EXAMINATION:   CT OF THE ABDOMEN AND PELVIS WITH CONTRAST 9/1/2021 1:35 pm       TECHNIQUE:   CT of the abdomen and pelvis was performed with the administration of   intravenous contrast. Multiplanar reformatted images are provided for review.    Dose modulation, iterative reconstruction, and/or weight based adjustment of   the mA/kV was utilized to reduce the radiation dose to as low as reasonably   achievable.       COMPARISON:   08/20/2021       HISTORY:   ORDERING SYSTEM PROVIDED HISTORY: epigastric pain   TECHNOLOGIST PROVIDED HISTORY:   Additional Contrast?->None   Reason for exam:->epigastric pain   Decision Support Exception - unselect if not a suspected or confirmed   emergency medical condition->Emergency Medical Condition (MA)       FINDINGS:   Lower Chest: Minimal bibasilar dependent atelectasis.       Organs: A 0.3 cm hypodensity in the periphery of the right lobe of the liver,   probably a cyst or hemangioma.  Unremarkable spleen, pancreas, and adrenals. A few tiny hypodensities in the bilateral kidneys noted, probably simple cyst   but too small to fully characterize.  Interval development of an ill-defined   approximately 1 cm slightly hypodense lesion in the lateral cortex of the   left kidney, image number 37 focal acute pyelonephritis in the appropriate   clinical setting should be considered.  Pancreatic duct is of normal size on   the current study.  No definite cholelithiasis.       GI/Bowel: Normal appendix.  Bowel loops nonobstructed.       Pelvis: Status post hysterectomy.  No adnexal mass.  Enlarged left gonadal   veins, unchanged.  Incompletely distended urinary bladder.       Peritoneum/Retroperitoneum: No free air or free fluid.  No adenopathy. Intact abdominal aorta and its major branches.       Bones/Soft Tissues: Status post L3-L4 posterior pedicle screw interbody   fusion.  Multilevel lumbar spondylosis.  Bilateral total hip arthroplasty.           Impression   Interval development of a slightly hypodense lesion in the lateral cortex of   the left kidney.  The possibility of focal acute pyelonephritis in the   appropriate clinical setting should be considered.       Interval resolution of pancreatic duct dilatation.  Views CXR lung 08/05/2020   5 3 see the: No in spine.       Multilevel spondylosis.  Erosion.  Moderate       No other significant change.                       Assessment/plan:  Left renal lesion      Creatinine stable   No leukocytosis   UA unremarkable, no microscopic hematuria   Urine culture unremarkable   CT abdomen pelvis reviewed, questionable left renal lesion, this was not present on imaging done on 8/20/21. Do not suspect any pyelonephritis or infection as she is asymptomatic at this time.  No flank pain or urinary symptoms    Electronically signed by JACOBY Dominguez CNP on 9/2/2021 at 3:42 PM   This cyst is most likely benign and not infected on the left lateral aspect of the kidney.  We will get we will get a baseline ultrasound but when she is discharged we will do a contrast-enhanced renal ultrasound

## 2021-09-02 NOTE — CONSULTS
MD Ck at [facility]  10/1994    TUMOR REMOVAL      right shoulder fatty tumor       Medications Prior to Admission:    Prior to Admission medications    Medication Sig Start Date End Date Taking? Authorizing Provider   omeprazole (PRILOSEC) 20 MG delayed release capsule Take 20 mg by mouth daily   Yes Historical Provider, MD   aspirin 81 MG EC tablet Take 81 mg by mouth daily   Yes Historical Provider, MD   vitamin D (CHOLECALCIFEROL) 25 MCG (1000 UT) TABS tablet Take by mouth daily    Yes Historical Provider, MD   hydroxychloroquine (PLAQUENIL) 200 MG tablet Take by mouth 2 times daily    Yes Historical Provider, MD   ALPRAZolam (XANAX) 1 MG tablet Take 2 mg by mouth 3 times daily. 19  Yes Historical Provider, MD   PROVENTIL  (90 Base) MCG/ACT inhaler INHALE 2 PUFFS EVERY 4 HOURS AS NEEDED 17  Yes Historical Provider, MD   lamoTRIgine (LAMICTAL) 200 MG tablet Take 200 mg by mouth 2 tabs at night 17  Yes Historical Provider, MD   venlafaxine (EFFEXOR XR) 75 MG extended release capsule Take 75 mg by mouth daily  10/25/17  Yes Historical Provider, MD   zolpidem (AMBIEN) 10 MG tablet Take  by mouth nightly as needed for Sleep.    Yes Historical Provider, MD   traZODone (DESYREL) 150 MG tablet Take 300 mg by mouth nightly    Yes Historical Provider, MD   brexpiprazole (REXULTI) 1 MG TABS tablet Take 1 mg by mouth daily   Patient not taking: Reported on 2021    Historical Provider, MD       No Known Allergies    Family History   Problem Relation Age of Onset    Cancer Father         LEUKEMIA    Cancer Maternal Grandmother         lung and brain ca       Social History     Tobacco Use    Smoking status: Former Smoker     Packs/day: 1.00     Years: 30.00     Pack years: 30.00     Quit date: 2014     Years since quittin.6    Smokeless tobacco: Never Used    Tobacco comment: stop    Vaping Use    Vaping Use: Never used   Substance Use Topics    Alcohol use: Yes     Alcohol/week: 3.0 standard drinks     Types: 3 Cans of beer per week     Comment: SOCIAL    Drug use: No         Review of Systems   Negative except as listed in the HPI       PHYSICAL EXAM:    Vitals:    09/02/21 0015   BP: (!) 96/50   Pulse: 64   Resp: 16   Temp: 98 °F (36.7 °C)   SpO2: 94%       General Appearance:  awake, alert, oriented  Skin:  Skin color, texture, turgor normal. No rashes or lesions. Head/face:  NCAT  Eyes:  No gross abnormalities. , PERRL, EOMI and Sclera nonicteric  Lungs/Chest:  Normal expansion. No chest wall tenderness  Heart:  Heart regular rate. No chest pain  Abdomen:  Soft, mildly tender diffusely, minimally distended. No palpable organomegaly or masses. Extremities: Extremities warm to touch, pink, with no edema. LABS:    CBC  Recent Labs     09/01/21  1211   WBC 4.4*   HGB 12.8   HCT 39.8        BMP  Recent Labs     09/01/21  1211      K 4.4      CO2 26   BUN 8   CREATININE 0.8   CALCIUM 9.3     Liver Function  Recent Labs     09/01/21  1211   LIPASE 69*   BILITOT 0.3   BILIDIR <0.2   AST 23   ALT 20   ALKPHOS 135*   PROT 6.9   LABALBU 4.2     No results for input(s): LACTATE in the last 72 hours. No results for input(s): INR, PTT in the last 72 hours. Invalid input(s): PT    RADIOLOGY    XR CHEST (2 VW)    Result Date: 9/1/2021  EXAMINATION: TWO XRAY VIEWS OF THE CHEST 9/1/2021 11:51 am COMPARISON: 08/20/2021 HISTORY: ORDERING SYSTEM PROVIDED HISTORY: cardiac work-up TECHNOLOGIST PROVIDED HISTORY: Reason for exam:->cardiac work-up FINDINGS: The lungs are without acute focal process. There is no effusion or pneumothorax. The cardiomediastinal silhouette is without acute process. The osseous structures are without acute process. No acute process.      CT ABDOMEN PELVIS W IV CONTRAST Additional Contrast? None    Result Date: 9/1/2021  EXAMINATION: CT OF THE ABDOMEN AND PELVIS WITH CONTRAST 9/1/2021 1:35 pm TECHNIQUE: CT of the abdomen and pelvis was performed with the administration of intravenous contrast. Multiplanar reformatted images are provided for review. Dose modulation, iterative reconstruction, and/or weight based adjustment of the mA/kV was utilized to reduce the radiation dose to as low as reasonably achievable. COMPARISON: 08/20/2021 HISTORY: ORDERING SYSTEM PROVIDED HISTORY: epigastric pain TECHNOLOGIST PROVIDED HISTORY: Additional Contrast?->None Reason for exam:->epigastric pain Decision Support Exception - unselect if not a suspected or confirmed emergency medical condition->Emergency Medical Condition (MA) FINDINGS: Lower Chest: Minimal bibasilar dependent atelectasis. Organs: A 0.3 cm hypodensity in the periphery of the right lobe of the liver, probably a cyst or hemangioma. Unremarkable spleen, pancreas, and adrenals. A few tiny hypodensities in the bilateral kidneys noted, probably simple cyst but too small to fully characterize. Interval development of an ill-defined approximately 1 cm slightly hypodense lesion in the lateral cortex of the left kidney, image number 43 focal acute pyelonephritis in the appropriate clinical setting should be considered. Pancreatic duct is of normal size on the current study. No definite cholelithiasis. GI/Bowel: Normal appendix. Bowel loops nonobstructed. Pelvis: Status post hysterectomy. No adnexal mass. Enlarged left gonadal veins, unchanged. Incompletely distended urinary bladder. Peritoneum/Retroperitoneum: No free air or free fluid. No adenopathy. Intact abdominal aorta and its major branches. Bones/Soft Tissues: Status post L3-L4 posterior pedicle screw interbody fusion. Multilevel lumbar spondylosis. Bilateral total hip arthroplasty. Interval development of a slightly hypodense lesion in the lateral cortex of the left kidney. The possibility of focal acute pyelonephritis in the appropriate clinical setting should be considered.  Interval resolution of pancreatic duct dilatation. Views CXR lung 08/05/2020 5 3 see the: No in spine. Multilevel spondylosis. Erosion. Moderate No other significant change. ASSESSMENT:  46 y.o. female with abdominal pain and weight loss. Etiology currently unknown.     PLAN:  - follow up AFP, CEA, Chromogranin A, CA 19-9  - pain/nausea control  - monitor abdominal exam  - will follow along with you    Plan discussed with Dr. Satya Puente    Electronically signed by Corrinne Boring, DO on 9/2/21 at 5:05 AM EDT

## 2021-09-02 NOTE — H&P
[address] [address]                              HISTORY AND PHYSICAL    PATIENT NAME: Cordelia Anderson                     :        1970  MED REC NO:   [id number]                            ROOM:       0405  ACCOUNT NO:   [de-identified]                           ADMIT DATE: 2021  PROVIDER:     Bhaskar Feng MD    The patient is admitted to my service on 2021. CHIEF COMPLAINT:  Abdominal pain and elevated lipase. HISTORY OF PRESENT ILLNESS:  The patient is a 57-year-old white female  who states she has had a few months of significant weight loss. Per the  chart, she has lost 35 to 40 pounds in the last 3+ months, perhaps three  to six months. She has admitted to having increased stress. She was  hospitalized 1+ week ago with significant abdominal pain, some weakness,  and occult blood positive stool. At that time, she was also noted to  have a non-STEMI. She left AMA from the hospital, was seen in the  office, had persistent abdominal pain. Her lipase was checked. It was  initially 200+. Subsequently, several days later, it was 600+. Due to  the increased pain and elevated lipase, she was sent to the emergency  room where she was found to have a lipase of only 69 and, per the ER  doctor, increased EKG abnormal changes. She was seen by Cardiology in  the emergency room. They recommended admission. It was also noted on  her CT scan that the previous CT scan, which showed a dilated pancreatic  duct that was now read as normal, now did show some type of lesion on  her left kidney. She was subsequently admitted for further evaluation  and treatment. PAST MEDICAL HISTORY:  SURGERIES:  Bilateral hip replacements in the past.  Also, cervical and  lumbar fusions.   As far as any other types of surgeries, she has not  mentioned any, although in the chart, it does state she has a history of  tubal ligation, carpal tunnel release, and some type of finger surgery. RECENT HOSPITALIZATIONS:  Approximately 1+ week ago for abdominal pain  and non-STEMI. ALLERGIES:  No known allergies. MEDICAL PROBLEMS:  Include recently diagnosed CAD, presumed as having a  recent non-STEMI. Other diagnoses include fibromyalgia, systemic lupus  erythematosus, bipolar disorder with anxiety and depression, asthma,  recent significant weight loss, cognitive impairment with mild decrease  in memory. MEDICATIONS:  Include recently Prilosec and aspirin. Prior to that,  Lamictal, Ambien, vitamin D, Plaquenil, Xanax, Proventil inhaler as  needed, Effexor, _____. SOCIAL HISTORY:  Lives with her . She is a nonsmoker, although  she did have a 25 to 30 pack-year history of smoking in the past.   States drinks less than two drinks per day. No drug use, but states  recently several weeks ago tried marijuana to help her with her pain. FAMILY HISTORY:  Positive for lung and brain cancers and diabetes  mellitus. REVIEW OF SYSTEMS:  Currently has anxiety and depression for which she  sees a psychiatrist.  Has had recent abdominal pain, etiology to be  determined, with occult blood positive stool. Also, recent significant  weight loss of 35 to 40 pounds, states in the past three to six months  or so. PHYSICAL EXAMINATION:  GENERAL:  An alert, cooperative female in no acute distress. HEENT:  Atraumatic and normocephalic. Ears unremarkable. Nose  unremarkable. No drainage. Pupils are equal, round, and reactive to  light bilaterally. Throat without erythema or exudate. NECK:  Supple. No JVD or carotid bruit. Thyroid without enlargement or  nodule. CHEST:  Clear to auscultation. HEART:  Regular rate and rhythm without murmur. ABDOMEN:  Soft. Mild-to-moderate tenderness in the epigastric and mid  area. No guarding or rebound. No masses or organomegaly. RECTAL:  Declined. BREASTS:  Declined.   PELVIC: Declined. EXTREMITIES:  Able to move all well. No dependent edema. NEUROLOGIC:  No gross or focal deficits. PRINCIPAL DIAGNOSIS:  Abdominal pain with etiology to be determined. SECONDARY DIAGNOSES:  Include recent non-STEMI with increased EKG  changes per ER doctor; history of recent occult blood positive stool,  etiology to be determined; fibromyalgia; SLE; bipolar disorder with  anxiety and depression; asthma; significant weight loss; lesion, left  kidney, etiology to be determined; cognitive impairment with memory loss  -- mild. PLAN:  Admit per orders.         Marycarmen Vann MD    D: 09/02/2021 7:42:06       T: 09/02/2021 7:45:07     [email or web address]  Job#: [id number]     Doc#: [id number]    CC:

## 2021-09-02 NOTE — PROGRESS NOTES
Addendum:  Allison Franco MD    Reason for consult:  Abnormal EKG    Patient previously known to: Dr. Flo Moritz from prior admission and consult    History of Present illness: 46 yr old with history of recent CMP, Anxiety, Alcohol use, chronic back pain presented to ED for epigastric pain, nausea. She was admitted 8/20/21 for Abdominal pain . . I had 'extreme anxiety 1-2 days prior my admission\". Seen by Dr Flo Moritz -  Dx with NSTEMI, Echo showed low EF with WMA likely Takotsubo CMP vs CAD. She left AMA prior to ischemia w/u. She denied any CP; had occ mild SOB, No orthopnea; Fairly active at home with out chest pain on activity. Review of systems:  Review of 10 systems negative except as mentioned in the HPI    Medical History: Reviewed    Surgical history: Reviewed    FamilyHistory: Reviewed    Allergies:  Reviewed    Social Hx: Reviewed. Scheduled Meds:   sodium chloride flush  5-40 mL IntraVENous 2 times per day    enoxaparin  40 mg SubCUTAneous Daily    ALPRAZolam  2 mg Oral TID    aspirin  81 mg Oral Daily    lamoTRIgine  200 mg Oral Nightly    hydroxychloroquine  200 mg Oral BID    traZODone  300 mg Oral Nightly    venlafaxine  75 mg Oral Daily    Vitamin D  1,000 Units Oral Daily    sucralfate  1 g Oral 4 times per day    nitroglycerin  0.5 inch Topical 4 times per day    pantoprazole  40 mg IntraVENous Daily    And    sodium chloride (PF)  10 mL IntraVENous Daily     Continuous Infusions:   sodium chloride       PRN Meds:.sodium chloride flush, sodium chloride, polyethylene glycol, perflutren lipid microspheres, acetaminophen, zolpidem, aluminum & magnesium hydroxide-simethicone, nitroGLYCERIN, albuterol      Labs/imaging studies: Reviewed. EKG - Reviewed, NSR, inferior, anterolateral ST/T chagnes    2D Echo 8/20/21   Summary   Left ventricle is mildly dilated. LVEDD 5.3 cm. LV systolic function is moderately reduced. Ejection fraction is visually estimated at 30-35%.    Grade I Check Lipids; Based on her Echo findings Stress test prior to discharge if EF normal or Cardiac cath if EF still low, when stable and cleared by surgical consultants. Abdominal pain, elevated lipase, recent FOBT+, and recent 40lb weight loss - Surgery on case    Cardiomyopathy - New, EF 30-35% by recent Echo 8/20/21- Repeat limited Echo for EF, r/o thrombus - Start Low dose Metoprolol succinate, Monitor BP/HR; Based on Echo findings add Entresto/ACE/ARB as her BP/Renal Fn  tolerates    Alcohol use - Counseled to quit drinking    Chronic back pain              Recent Echo findings and above recommendations discussed with her. Thank you for the consult.         Marcelo Cox MD  9/2/2021  8:16 AM  [facility]) Cardiology

## 2021-09-02 NOTE — CARE COORDINATION
9/2/2021  Social Work Discharge Planning: This worker met with Pt to discuss  role and transition of care/discharge planning. Pt and her spouse reside in a 2 story home with 4 steps. Pt is independent without DME Has a ww and cane but doesnt use. Pharmacy is [facility] in [facility] and PCP is .  Electronically signed by MATIAS Card on 9/2/2021 at 12:55 PM

## 2021-09-03 ENCOUNTER — APPOINTMENT (OUTPATIENT)
Dept: NON INVASIVE DIAGNOSTICS | Age: 51
DRG: 438 | End: 2021-09-03
Payer: COMMERCIAL

## 2021-09-03 ENCOUNTER — APPOINTMENT (OUTPATIENT)
Dept: NUCLEAR MEDICINE | Age: 51
DRG: 438 | End: 2021-09-03
Payer: COMMERCIAL

## 2021-09-03 VITALS
TEMPERATURE: 97.6 F | HEIGHT: 60 IN | WEIGHT: 114.3 LBS | RESPIRATION RATE: 16 BRPM | SYSTOLIC BLOOD PRESSURE: 99 MMHG | OXYGEN SATURATION: 97 % | DIASTOLIC BLOOD PRESSURE: 59 MMHG | BODY MASS INDEX: 22.44 KG/M2 | HEART RATE: 75 BPM

## 2021-09-03 PROBLEM — D72.10 EOSINOPHILIA: Status: ACTIVE | Noted: 2021-09-03

## 2021-09-03 PROBLEM — D72.819 LEUKOPENIA: Status: ACTIVE | Noted: 2021-09-03

## 2021-09-03 LAB
BASOPHILS ABSOLUTE: 0.03 E9/L (ref 0–0.2)
BASOPHILS RELATIVE PERCENT: 0.7 % (ref 0–2)
CHOLESTEROL, TOTAL: 117 MG/DL (ref 0–199)
EOSINOPHILS ABSOLUTE: 0.48 E9/L (ref 0.05–0.5)
EOSINOPHILS RELATIVE PERCENT: 11.2 % (ref 0–6)
HCT VFR BLD CALC: 36.5 % (ref 34–48)
HDLC SERPL-MCNC: 49 MG/DL
HEMOGLOBIN: 11.6 G/DL (ref 11.5–15.5)
IMMATURE GRANULOCYTES #: 0.01 E9/L
IMMATURE GRANULOCYTES %: 0.2 % (ref 0–5)
LDL CHOLESTEROL CALCULATED: 51 MG/DL (ref 0–99)
LV EF: 65 %
LVEF MODALITY: NORMAL
LYMPHOCYTES ABSOLUTE: 1.65 E9/L (ref 1.5–4)
LYMPHOCYTES RELATIVE PERCENT: 38.4 % (ref 20–42)
MCH RBC QN AUTO: 29.4 PG (ref 26–35)
MCHC RBC AUTO-ENTMCNC: 31.8 % (ref 32–34.5)
MCV RBC AUTO: 92.6 FL (ref 80–99.9)
MONOCYTES ABSOLUTE: 0.36 E9/L (ref 0.1–0.95)
MONOCYTES RELATIVE PERCENT: 8.4 % (ref 2–12)
NEUTROPHILS ABSOLUTE: 1.77 E9/L (ref 1.8–7.3)
NEUTROPHILS RELATIVE PERCENT: 41.1 % (ref 43–80)
PDW BLD-RTO: 13.1 FL (ref 11.5–15)
PLATELET # BLD: 198 E9/L (ref 130–450)
PMV BLD AUTO: 11.1 FL (ref 7–12)
RBC # BLD: 3.94 E12/L (ref 3.5–5.5)
TRIGL SERPL-MCNC: 84 MG/DL (ref 0–149)
URINE CULTURE, ROUTINE: NORMAL
VLDLC SERPL CALC-MCNC: 17 MG/DL
WBC # BLD: 4.3 E9/L (ref 4.5–11.5)

## 2021-09-03 PROCEDURE — 93017 CV STRESS TEST TRACING ONLY: CPT

## 2021-09-03 PROCEDURE — G0378 HOSPITAL OBSERVATION PER HR: HCPCS

## 2021-09-03 PROCEDURE — A9500 TC99M SESTAMIBI: HCPCS | Performed by: RADIOLOGY

## 2021-09-03 PROCEDURE — 36415 COLL VENOUS BLD VENIPUNCTURE: CPT

## 2021-09-03 PROCEDURE — 6360000002 HC RX W HCPCS: Performed by: FAMILY MEDICINE

## 2021-09-03 PROCEDURE — 93018 CV STRESS TEST I&R ONLY: CPT | Performed by: INTERNAL MEDICINE

## 2021-09-03 PROCEDURE — 96376 TX/PRO/DX INJ SAME DRUG ADON: CPT

## 2021-09-03 PROCEDURE — 99232 SBSQ HOSP IP/OBS MODERATE 35: CPT | Performed by: INTERNAL MEDICINE

## 2021-09-03 PROCEDURE — 78452 HT MUSCLE IMAGE SPECT MULT: CPT | Performed by: INTERNAL MEDICINE

## 2021-09-03 PROCEDURE — 78452 HT MUSCLE IMAGE SPECT MULT: CPT

## 2021-09-03 PROCEDURE — 6370000000 HC RX 637 (ALT 250 FOR IP): Performed by: FAMILY MEDICINE

## 2021-09-03 PROCEDURE — C9113 INJ PANTOPRAZOLE SODIUM, VIA: HCPCS | Performed by: FAMILY MEDICINE

## 2021-09-03 PROCEDURE — 6370000000 HC RX 637 (ALT 250 FOR IP): Performed by: INTERNAL MEDICINE

## 2021-09-03 PROCEDURE — 80061 LIPID PANEL: CPT

## 2021-09-03 PROCEDURE — 96372 THER/PROPH/DIAG INJ SC/IM: CPT

## 2021-09-03 PROCEDURE — 85025 COMPLETE CBC W/AUTO DIFF WBC: CPT

## 2021-09-03 PROCEDURE — 3430000000 HC RX DIAGNOSTIC RADIOPHARMACEUTICAL: Performed by: RADIOLOGY

## 2021-09-03 PROCEDURE — 6360000002 HC RX W HCPCS: Performed by: INTERNAL MEDICINE

## 2021-09-03 PROCEDURE — 93016 CV STRESS TEST SUPVJ ONLY: CPT | Performed by: INTERNAL MEDICINE

## 2021-09-03 RX ORDER — SUCRALFATE 1 G/1
1 TABLET ORAL 4 TIMES DAILY
Qty: 120 TABLET | Refills: 0 | Status: SHIPPED | OUTPATIENT
Start: 2021-09-03

## 2021-09-03 RX ORDER — ATORVASTATIN CALCIUM 20 MG/1
20 TABLET, FILM COATED ORAL NIGHTLY
Qty: 30 TABLET | Refills: 0 | Status: SHIPPED | OUTPATIENT
Start: 2021-09-03 | End: 2021-09-27

## 2021-09-03 RX ORDER — METOPROLOL SUCCINATE 25 MG/1
12.5 TABLET, EXTENDED RELEASE ORAL DAILY
Qty: 30 TABLET | Refills: 0 | Status: SHIPPED | OUTPATIENT
Start: 2021-09-04 | End: 2021-09-27

## 2021-09-03 RX ADMIN — ASPIRIN 81 MG: 81 TABLET, COATED ORAL at 13:25

## 2021-09-03 RX ADMIN — METOPROLOL SUCCINATE 12.5 MG: 25 TABLET, EXTENDED RELEASE ORAL at 13:24

## 2021-09-03 RX ADMIN — Medication 1000 UNITS: at 13:25

## 2021-09-03 RX ADMIN — HYDROXYCHLOROQUINE SULFATE 200 MG: 200 TABLET ORAL at 13:31

## 2021-09-03 RX ADMIN — REGADENOSON 0.4 MG: 0.08 INJECTION, SOLUTION INTRAVENOUS at 11:06

## 2021-09-03 RX ADMIN — Medication 30 MILLICURIE: at 09:34

## 2021-09-03 RX ADMIN — ENOXAPARIN SODIUM 40 MG: 40 INJECTION SUBCUTANEOUS at 13:26

## 2021-09-03 RX ADMIN — ALPRAZOLAM 2 MG: 1 TABLET ORAL at 13:25

## 2021-09-03 RX ADMIN — SUCRALFATE 1 G: 1 TABLET ORAL at 13:24

## 2021-09-03 RX ADMIN — SUCRALFATE 1 G: 1 TABLET ORAL at 00:04

## 2021-09-03 RX ADMIN — PANTOPRAZOLE SODIUM 40 MG: 40 INJECTION, POWDER, FOR SOLUTION INTRAVENOUS at 13:26

## 2021-09-03 RX ADMIN — VENLAFAXINE HYDROCHLORIDE 75 MG: 75 CAPSULE, EXTENDED RELEASE ORAL at 13:31

## 2021-09-03 RX ADMIN — Medication 10 MILLICURIE: at 09:33

## 2021-09-03 ASSESSMENT — PAIN SCALES - GENERAL
PAINLEVEL_OUTOF10: 0

## 2021-09-03 NOTE — PROGRESS NOTES
Stress test normal    Cardiology sign off    F/u by Dr Natacha Corbett 3-4 weeks    Masood Garza MD  9/3/2021  1:57 PM

## 2021-09-03 NOTE — PROGRESS NOTES
GENERAL SURGERY  DAILY PROGRESS NOTE  9/3/2021    Subjective:  Patient reports that her pain is improving. She is still endorsing some LLQ pain. Objective:  BP (!) 104/58   Pulse 75   Temp 99.1 °F (37.3 °C) (Oral)   Resp 14   Ht 5' (1.524 m)   Wt 114 lb 4.8 oz (51.8 kg)   LMP 12/04/2015 (LMP Unknown)   SpO2 94%   BMI 22.32 kg/m²     General appearance: alert, cooperative and in no acute distress. Eyes: grossly normal  Lungs: nonlabored breathing on room ait  Heart: regular rate  Abdomen: soft, mildly tender in RLQ, non distended  Skin: No skin abnormalities  Neurologic: Alert and oriented x 3. Grossly normal  Musculoskeletal: No clubbing cyanosis or edema    Assessment/Plan:  46 y.o. female with abdominal pain and weight loss of unknown etiology. - ok for diet from general surgery standpoint.  - follow-up on tumor markers  - nausea/pain control  - monitor abdominal exam.    Patient findings and plan discussed with Dr. Rudy Mccauley.     Electronically signed by Braxton Magaña MD on 9/3/2021 at 6:52 AM

## 2021-09-03 NOTE — PROGRESS NOTES
9/3/2021 3:19 PM  Service: Urology  Group: [facility] ([facility])    Ana Alonso  [id number]    Subjective:  ? Of renal mass on ct  US done which was negative      Review of Systems  Constitutional: no fever or chills  Respiratory: negative for cough and hemoptysis  Cardiovascular: negative for chest pressure/discomfort  Gastrointestinal: negative for nausea and vomiting  Dermatologic: negative  Hematologic/lymphatic: negative  Musculoskeletal:negative for bone pain  Neurological: negative for memory problems  Endocrine: negative  Psychiatric: negative    Scheduled Meds:   sodium chloride flush  5-40 mL IntraVENous 2 times per day    metoprolol succinate  12.5 mg Oral Daily    atorvastatin  20 mg Oral Nightly    enoxaparin  40 mg SubCUTAneous Daily    ALPRAZolam  2 mg Oral TID    aspirin  81 mg Oral Daily    lamoTRIgine  200 mg Oral Nightly    hydroxychloroquine  200 mg Oral BID    traZODone  300 mg Oral Nightly    venlafaxine  75 mg Oral Daily    Vitamin D  1,000 Units Oral Daily    sucralfate  1 g Oral 4 times per day    pantoprazole  40 mg IntraVENous Daily    And    sodium chloride (PF)  10 mL IntraVENous Daily       Objective:  Vitals:    09/03/21 0915   BP: (!) 110/51   Pulse: 70   Resp: 16   Temp: 98 °F (36.7 °C)   SpO2: 94%         Allergies: Patient has no known allergies.     General Appearance: alert and oriented to person, place and time and in no acute distress  Skin: no rash or erythema  Head: normocephalic and atraumatic  Pulmonary/Chest: normal air movement, no respiratory distress and no chest wall tenderness  Abdomen: soft, non-tender, non-distended,  Genitalia: deferred         Labs:     Recent Labs     09/02/21  0522      K 4.5      CO2 27   BUN 8   CREATININE 0.8   GLUCOSE 88   CALCIUM 9.0       Lab Results   Component Value Date    HGB 11.6 09/03/2021    HCT 36.5 09/03/2021     Imaging:   Narrative   EXAMINATION:   CT OF THE ABDOMEN AND PELVIS WITH CONTRAST 9/1/2021 1:35 pm       TECHNIQUE:   CT of the abdomen and pelvis was performed with the administration of   intravenous contrast. Multiplanar reformatted images are provided for review. Dose modulation, iterative reconstruction, and/or weight based adjustment of   the mA/kV was utilized to reduce the radiation dose to as low as reasonably   achievable.       COMPARISON:   08/20/2021       HISTORY:   ORDERING SYSTEM PROVIDED HISTORY: epigastric pain   TECHNOLOGIST PROVIDED HISTORY:   Additional Contrast?->None   Reason for exam:->epigastric pain   Decision Support Exception - unselect if not a suspected or confirmed   emergency medical condition->Emergency Medical Condition (MA)       FINDINGS:   Lower Chest: Minimal bibasilar dependent atelectasis.       Organs: A 0.3 cm hypodensity in the periphery of the right lobe of the liver,   probably a cyst or hemangioma.  Unremarkable spleen, pancreas, and adrenals. A few tiny hypodensities in the bilateral kidneys noted, probably simple cyst   but too small to fully characterize.  Interval development of an ill-defined   approximately 1 cm slightly hypodense lesion in the lateral cortex of the   left kidney, image number 37 focal acute pyelonephritis in the appropriate   clinical setting should be considered.  Pancreatic duct is of normal size on   the current study.  No definite cholelithiasis.       GI/Bowel: Normal appendix.  Bowel loops nonobstructed.       Pelvis: Status post hysterectomy.  No adnexal mass.  Enlarged left gonadal   veins, unchanged.  Incompletely distended urinary bladder.       Peritoneum/Retroperitoneum: No free air or free fluid.  No adenopathy.    Intact abdominal aorta and its major branches.       Bones/Soft Tissues: Status post L3-L4 posterior pedicle screw interbody   fusion.  Multilevel lumbar spondylosis.  Bilateral total hip arthroplasty.           Impression   Interval development of a slightly hypodense lesion in the lateral Assessment:/Plan:  Question of left renal lesion (not present on imaging of 8/20/21)  Us reviewed. No  intervention planned  Will sign off   Call if needed.          Anthony Ackerman, APRN - CNP   [facility]

## 2021-09-03 NOTE — PROGRESS NOTES
Lexiscan Stress Test:    Reason for study: Abnormal EKG    Resting EKG showed Sinus rhyhtm, Anterolateral inversion  Exam:  Heart - regular, Lungs- clear    Patient received 0.4mg Lexiscan per protocol    Symptoms: No chest pain, Mild short of breath    EKG:  No new ischemia (compared to baseline EKG) or arrhythmia during Lexiscan infusion. Maximal heart rate 111        Peak /74 mmHg       Post test complications: None      SPECT image report pending.       Electronically signed by Raad Kurtz MD on 9/3/2021 at 11:11 AM

## 2021-09-03 NOTE — PROGRESS NOTES
Feels some better- less abdominal discomfort- no chest pain  vss  Lungs clear  Reg rhythm  Abd- soft- some tenderness mid/epigastric area- no guarding-rebound  Ext- no edema  Stable- lab noted that's back  Per orders- for stress test today- home when ok with cardiology and surgery

## 2021-09-03 NOTE — CARE COORDINATION
9/3/2021  Social Work Discharge Planning:Stress test today. Pt is independent at home with her spouse. No current needs. Room air.  Electronically signed by MATIAS Mae on 9/3/2021 at 9:40 AM

## 2021-09-03 NOTE — PROGRESS NOTES
[facility]) [facility]        CARDIOLOGY                 INPATIENT PROGRESS NOTE          PATIENT SEEN IN FOLLOW UP FOR: Abnormal EKG    Hospital Day: 2001 Juan Koch is a 46year old patient known to Dr. Garrick Borja from initial consult 8/20/21      SUBJECTIVE: Denies any CP or SOB, Abdominal pain and nausea is better - Seen in stress lab area    ROS: Review of rest of 10 systems negative except as mentioned above    OBJECTIVE: No acute distress. See Assessment     Diagnostics:       Telemetry: Reviewed    2D Echo: 9/2/21   Limited Echo for LV function. Normal left ventricular chamber size. Normal left ventricular systolic function, LVEF 43-32%. Normal right ventricle size and function. No evidence of pericardial effusion. No intra cardiac mass or thrombus. Compared to prior echo from 8/21/21 - LV EF is improved from 30-35% to 60-65%. Intake/Output Summary (Last 24 hours) at 9/3/2021 0915  Last data filed at 9/2/2021 2018  Gross per 24 hour   Intake 250 ml   Output --   Net 250 ml       Labs:   CBC:   Recent Labs     09/02/21  0613 09/02/21  0613 09/02/21  1640 09/03/21  0512   WBC 4.0*  --   --  4.3*   HGB 11.0*   < > 12.3 11.6   HCT 34.5  --   --  36.5     --   --  198    < > = values in this interval not displayed. BMP:   Recent Labs     09/01/21  1211 09/02/21  0522    140   K 4.4 4.5   CO2 26 27   BUN 8 8   CREATININE 0.8 0.8   LABGLOM >60 >60   CALCIUM 9.3 9.0     Mag:   Recent Labs     09/01/21  1211   MG 2.1     Phos: No results for input(s): PHOS in the last 72 hours. TSH: No results for input(s): TSH in the last 72 hours. HgA1c:     BNP: No results for input(s): BNP in the last 72 hours. PT/INR: No results for input(s): PROTIME, INR in the last 72 hours. APTT:No results for input(s): APTT in the last 72 hours. CARDIAC ENZYMES:No results for input(s): CKTOTAL, CKMB, CKMBINDEX, TROPONINI in the last 72 hours.   FASTING LIPID PANEL:  Lab Results Component Value Date    CHOL 117 2021    HDL 49 2021    LDLCALC 51 2021    TRIG 84 2021     LIVER PROFILE:  Recent Labs     21  1211 21  0522   AST 23 19   ALT 20 13   LABALBU 4.2 3.7       Current Inpatient Medications:   sodium chloride flush  5-40 mL IntraVENous 2 times per day    metoprolol succinate  12.5 mg Oral Daily    atorvastatin  20 mg Oral Nightly    enoxaparin  40 mg SubCUTAneous Daily    ALPRAZolam  2 mg Oral TID    aspirin  81 mg Oral Daily    lamoTRIgine  200 mg Oral Nightly    hydroxychloroquine  200 mg Oral BID    traZODone  300 mg Oral Nightly    venlafaxine  75 mg Oral Daily    Vitamin D  1,000 Units Oral Daily    sucralfate  1 g Oral 4 times per day    pantoprazole  40 mg IntraVENous Daily    And    sodium chloride (PF)  10 mL IntraVENous Daily       IV Infusions (if any):   sodium chloride           PHYSICAL EXAM:     CONSTITUTIONAL:   BP (!) 104/58   Pulse 75   Temp 99.1 °F (37.3 °C) (Oral)   Resp 14   Ht 5' (1.524 m)   Wt 114 lb 4.8 oz (51.8 kg)   LMP 2015 (LMP Unknown)   SpO2 94%   BMI 22.32 kg/m²   Pulse  Av.7  Min: 75  Max: 84  Systolic (66CPL), KFX:600 , Min:102 , USP:646    Diastolic (99IWC), QXQ:30, Min:58, Max:60      In general, this is a well developed, well nourished who appears stated age. awake, alert, cooperative, no apparent distress     HEENT: eyes -conjunctivae pink,   Neck-  no stridor, no carotid bruit. no jugular venous distention   RESPIRATORY: Chest symmetrical and non-tender to palpation. No accessory muscles use. Lung auscultation - few rhonchi  CARDIOVASCULAR:     Heart Palpation - no palpable thrills  Heart Ausculation - Regular rate and rhythm, 1/6 systolic murmur, No s3 or rub. No lower extremity edema, Distal pulses palpable, no cyanosis   ABDOMEN: Soft, nontender,  Bowel sounds present. MS: n/a.   : Deferred  Rectal Exam: Deferred  SKIN: warm and dry  NEURO / PSYCH: oriented to person, place           Impression/Recommendations:     Abnormal EKG - Also noted on 8/20/20201 - Continue ASA, BB. Denies any chest pain now or prior to admission. Lwxiscan stress today     Recent Troponin Elevation on 8/20/2021 - likely Takotsubo syndrome per Echo findings - She left AMA prior to ischemia w/u. Her troponin trending down - No CP. Continue ASA, BB, Statin. Echo showed EF normal; Lexiscan stress today.     Abdominal pain, elevated lipase, recent FOBT+, and recent 40lb weight loss - Surgery on case     Cardiomyopathy - New, Likely Takotsubo CMP since her LV EF 30-35% on 8/21/21 normalized now. Continue low dose Metoprolol succinate, Monitor BP/HR.      Alcohol use - Counseled to quit drinking     Chronic back pain    Bipolar affective disorder (HCC)      Hx of Fibromyalgia          Echo findings and above recommendations discussed with her. If stress nonischemic, Cardiology will sign off. F/u by Dr Mayte Tan after discharge.       Electronically signed by Marcelo Cox MD on 9/3/2021 at [address] [place] [address] Cardiology

## 2021-09-04 LAB — AFP-TUMOR MARKER: 2 NG/ML (ref 0–9)

## 2021-09-05 LAB — CA 19-9: 11 U/ML (ref 0–37)

## 2021-09-06 LAB — CHROMOGRANIN A: 99 NG/ML (ref 0–103)

## 2021-09-06 NOTE — DISCHARGE SUMMARY
[address] [address]                               DISCHARGE SUMMARY    PATIENT NAME: oTdd Yeboah                     :        1970  MED REC NO:   [id number]                            ROOM:       0405  ACCOUNT NO:   [de-identified]                           ADMIT DATE: 2021  PROVIDER:     Za Mejia MD                  [address] [place] [address] DATE: 2021    CHIEF COMPLAINT:  Abdominal pain. HISTORY OF PRESENT ILLNESS:  The patient is a white female, recently  discharged from the hospital for a non-STEMI, who also had abdominal  pain with occult blood positive stool who was supposed to get an  endoscopic workup, but because of her recent non-STEMI, this was  temporarily on hold. She has also had a 35- to 45-pound weight loss in  the past four to six months. She had persistent symptoms, did not feel  well, presented to the emergency room, was seen in the emergency room by  the Cardiology because of her recent non-STEMI and the fact that she  left AMA without a workup for ischemia. She was admitted for further  observation and treatment as well as to assess her abdominal pain. PHYSICAL EXAMINATION:  Significant for significant abdominal pain in the  mid and epigastric areas of the abdomen. The remainder of the exam is  basically unremarkable. SIGNIFICANT X-RAYS AND LABS:  Please refer to the chart. HOSPITAL COURSE:  The patient was admitted, appropriate blood work was  obtained. Medicines continued were appropriate. Cardiology saw the  patient and placed her on an aspirin, a beta-blocker, and statin. She  has also had surgical consultation done. In the hospital, she did have  some abdominal pain. She was treated with IV Protonix and Carafate was  added. Her repeat CAT scan in the emergency room showed resolution of  her previously dilated pancreatic duct.   It should be noted that one to  two days prior to admission, her lipase had elevated to the 669 range  approximately and that had been a marked increase from approximately 200  plus several days before that. In the hospital emergency room, her  lipase dropped down to 69 and subsequently began to fall while in the  hospital.  Surgery did see the patient and felt that GI workup still  needed to be done, but because her hemoglobin was stable and her recent  non-STEMI, she would be pursuing endoscopic workup as an outpatient. While in the hospital, it was also noted on CAT scan she had some type  of lesion on her left kidney. Urologic consultation was obtained. They  saw the patient, felt that this was probably a benign issue. They would  follow up this imaging study several weeks after discharge. Dietary  supplements were utilized for her recent weight loss. She seemed doing  well in the hospital after her diet was resumed. As a result, stress  test was done and was read as normal.  She was okay for discharge per  Cardiology. She would have followup with them, I believe, as an  outpatient. She was also okay for discharge per Surgery. She had _____  as well as some Carafate. She will follow up with me in the office in  one week. She was stable at the time of discharge and she was  discharged home.         Cyndy Hamilton MD    D: 09/05/2021 7:16:21       T: 09/05/2021 10:50:17     [email or web address]  Job#: [id number]     Doc#: [id number]    CC:

## 2021-09-06 NOTE — DISCHARGE SUMMARY
[address] [address]                               DISCHARGE SUMMARY    PATIENT NAME: Todd Yeboah                     :        1970  MED REC NO:   [id number]                            ROOM:       0405  ACCOUNT NO:   [de-identified]                           ADMIT DATE: 2021  PROVIDER:     Za Mejia MD                  [address] [place] [address] DATE: 2021    DATE OF ADMISSION[de-identified]  2021    DATE OF DISCHARGE:  2021    ADDENDUM    PRINCIPAL DIAGNOSES:  Abdominal pain with occult blood positive stool  with recently elevated lipase, presumed to be possibly a resolving  pancreatitis. SECONDARY DIAGNOSES:  Include coronary artery disease with recent  non-STEMI, fibromyalgia, systemic lupus erythematosus, bipolar disorder,  asthma, significant weight loss, cognitive impairment with memory loss  that is mild, anxiety and depression, lesion in the left kidney, felt to  be benign, and eosinophilic leukopenia.         Vasiliy Desir MD    D: 2021 7:18:24       T: 2021 11:26:54     [email or web address]  Job#: [id number]     Doc#: [id number]    CC:

## 2021-09-14 ENCOUNTER — OFFICE VISIT (OUTPATIENT)
Dept: CARDIOLOGY CLINIC | Age: 51
End: 2021-09-14
Payer: COMMERCIAL

## 2021-09-14 VITALS
RESPIRATION RATE: 14 BRPM | HEART RATE: 77 BPM | WEIGHT: 117.4 LBS | BODY MASS INDEX: 23.05 KG/M2 | DIASTOLIC BLOOD PRESSURE: 64 MMHG | SYSTOLIC BLOOD PRESSURE: 110 MMHG | HEIGHT: 60 IN

## 2021-09-14 DIAGNOSIS — I25.10 CORONARY ARTERY DISEASE INVOLVING NATIVE HEART, UNSPECIFIED VESSEL OR LESION TYPE, UNSPECIFIED WHETHER ANGINA PRESENT: Primary | ICD-10-CM

## 2021-09-14 PROCEDURE — 99214 OFFICE O/P EST MOD 30 MIN: CPT | Performed by: INTERNAL MEDICINE

## 2021-09-14 PROCEDURE — 93000 ELECTROCARDIOGRAM COMPLETE: CPT | Performed by: INTERNAL MEDICINE

## 2021-09-14 RX ORDER — PHENOL 1.4 %
1 AEROSOL, SPRAY (ML) MUCOUS MEMBRANE DAILY
COMMUNITY

## 2021-09-14 RX ORDER — NITROGLYCERIN 0.4 MG/1
TABLET SUBLINGUAL
COMMUNITY
Start: 2021-08-23

## 2021-09-14 NOTE — PROGRESS NOTES
OUTPATIENT CARDIOLOGY FOLLOW-UP    Name: Bronwyn Diaz    Age: 46 y.o. Primary Care Physician: Benny Bansal MD    Date of Service: 9/14/2021    Chief Complaint:   Chief Complaint   Patient presents with    Coronary Artery Disease        Interim History:   Here for hospital follow-up. Initially seen 8/21/2021 for elevated troponin and abnormal EKG, with echo findings suggestive of a Takotsubo cardiomyopathy. She had a had a severe panic attack 2 days prior to that admission. She also had heme positive stool and concern for GI bleed. She left AMA before institution of therapy for ischemic evaluation. She presented again a week later where echo showed normal EF. Stress test showed no ischemia. Today she feels well. She denies chest pain, shortness of breath or palpitations. She is due for outpatient endoscopies to work-up further GI bleed and 40 pound weight loss she has had.     Review of Systems:   Negative except as described above    Past Medical History:  Past Medical History:   Diagnosis Date    Anxiety     dr Jessica Graves 738 in [facility]    Arthritis     Asthma     Bipolar 2 disorder ([address]     Bruising tendency ([address]     Chronic back pain     Fibromyalgia     Glucose intolerance (impaired glucose tolerance) 6/15/2020    hyperglycemia    Insomnia     Lupus ([facility]     Memory difficulty 6/15/2020    Mild cognitive impairment with memory loss 6/15/2020    Thumb pain     left - for OR 4-16-19        Past Surgical History:  Past Surgical History:   Procedure Laterality Date    ABDOMEN SURGERY      fatty tumor removed     ARTHROPLASTY Left 4/16/2019    LEFT THUMB TRAPEZIECTOMY WITH LIGAMENT RECONSTRUCTION LEFT PARTIAL TRAPEZOIDECTOMY LEFT DEQUERVAINS RELEASE LEFT CARPAL TUNNEL RELEASE performed by Yasmin Villagomez [facility] at [facility]      right    COLONOSCOPY      FINGER SURGERY      [facility]    HIP ARTHROTOMY 1973    right    NERVE BLOCK      x4     TOTAL HIP ARTHROPLASTY Right 14    TOTAL HIP ARTHROPLASTY Left 3/2/2021    LEFT HIP TOTAL ARTHROPLASTY performed by Rachael Witt MD at [address] [place] [address]  10/1994    TUMOR REMOVAL      right shoulder fatty tumor       Family History:  Family History   Problem Relation Age of Onset    Cancer Father         LEUKEMIA    Cancer Maternal Grandmother         lung and brain ca       Social History:  Social History     Tobacco Use    Smoking status: Former Smoker     Packs/day: 1.00     Years: 30.00     Pack years: 30.00     Quit date: 2014     Years since quittin.6    Smokeless tobacco: Never Used    Tobacco comment: stop    Vaping Use    Vaping Use: Never used   Substance Use Topics    Alcohol use:  Yes     Alcohol/week: 3.0 standard drinks     Types: 3 Cans of beer per week     Comment: SOCIAL    Drug use: No        Allergies:  No Known Allergies    Current Medications:    Current Outpatient Medications:     nitroGLYCERIN (NITROSTAT) 0.4 MG SL tablet, PLACE 1 TABLET UNDER TONGUE EVERY 5 MINS, UP TO 3 DOSES AS NEEDED FOR CHEST PAIN, Disp: , Rfl:     calcium carbonate 600 MG TABS tablet, Take 1 tablet by mouth daily, Disp: , Rfl:     sucralfate (CARAFATE) 1 GM tablet, Take 1 tablet by mouth 4 times daily, Disp: 120 tablet, Rfl: 0    atorvastatin (LIPITOR) 20 MG tablet, Take 1 tablet by mouth nightly, Disp: 30 tablet, Rfl: 0    metoprolol succinate (TOPROL XL) 25 MG extended release tablet, Take 0.5 tablets by mouth daily, Disp: 30 tablet, Rfl: 0    aluminum & magnesium hydroxide-simethicone (MAALOX) 200-200-20 MG/5ML SUSP suspension, Take 30 mLs by mouth every 6 hours as needed for Indigestion, Disp: , Rfl: 0    omeprazole (PRILOSEC) 20 MG delayed release capsule, Take 20 mg by mouth daily, Disp: , Rfl:     aspirin 81 MG EC tablet, Take 81 mg by mouth daily, Disp: , Rfl:     brexpiprazole (REXULTI) 1 MG TABS tablet, Take 1 mg by mouth daily , Disp: , Rfl:     vitamin D (CHOLECALCIFEROL) 25 MCG (1000 UT) TABS tablet, Take by mouth daily , Disp: , Rfl:     hydroxychloroquine (PLAQUENIL) 200 MG tablet, Take by mouth 2 times daily , Disp: , Rfl:     ALPRAZolam (XANAX) 1 MG tablet, Take 2 mg by mouth 3 times daily. , Disp: , Rfl: 1    PROVENTIL  (90 Base) MCG/ACT inhaler, INHALE 2 PUFFS EVERY 4 HOURS AS NEEDED, Disp: , Rfl: 2    lamoTRIgine (LAMICTAL) 200 MG tablet, Take 200 mg by mouth 2 tabs at night, Disp: , Rfl:     venlafaxine (EFFEXOR XR) 75 MG extended release capsule, Take 75 mg by mouth daily , Disp: , Rfl: 0    zolpidem (AMBIEN) 10 MG tablet, Take  by mouth nightly as needed for Sleep., Disp: , Rfl:     traZODone (DESYREL) 150 MG tablet, Take 300 mg by mouth nightly , Disp: , Rfl:     Physical Exam:  /64   Pulse 77   Resp 14   Ht 5' (1.524 m)   Wt 117 lb 6.4 oz (53.3 kg)   LMP 12/04/2015 (LMP Unknown)   BMI 22.93 kg/m²   Wt Readings from Last 3 Encounters:   09/14/21 117 lb 6.4 oz (53.3 kg)   09/03/21 114 lb 4.8 oz (51.8 kg)   08/20/21 112 lb (50.8 kg)     Appearance: Thin female, awake, alert and oriented x 3, no acute respiratory distress  Skin: Intact, no rash  Head: Normocephalic, atraumatic  Eyes: EOMI, no conjunctival erythema  ENMT: No pharyngeal erythema, MMM, no rhinorrhea  Neck: Supple, no elevated JVP, no carotid bruits  Lungs: Clear to auscultation bilaterally. No wheezes, rales, or rhonchi.   Cardiac: Regular rate and rhythm, +S1S2, no murmurs apparent  Abdomen: Soft, nontender, +bowel sounds  Extremities: Moves all extremities x 4, no lower extremity edema  Neurologic: No focal motor deficits apparent, normal mood and affect, alert and oriented x 3  Peripheral Pulses: Intact posterior tibial pulses bilaterally    Laboratory Tests:  Lab Results   Component Value Date    CREATININE 0.8 09/02/2021    BUN 8 09/02/2021     09/02/2021    K 4.5 09/02/2021     09/02/2021    CO2 27 right ventricular size and function. The left atrium is mildly dilated. Findings are suggestive of Takotsubo cardiomyopathy; however obstructive   CAD may have a similar appearance and should be excluded. Stress test:    Pharmacologic stress 9/3/2021  Gated images demonstrate mild apical hypokinesis, with a calculated   left ventricular ejection fraction of 65%. End-diastolic volume 86 mL. TID ratio 1. 18.               Impression       1. The myocardial perfusion is normal.   2. No evidence of stress-induced ischemia or prior myocardial   infarction. 3. Normal LV systolic function, EF 83% with mild apical hypokinesis. 4. There is no transient ischemic dilation. 5. Low risk myocardial perfusion study. Cardiac catheterization:     Orders Placed This Encounter   Procedures    EKG 12 lead        Requested Prescriptions      No prescriptions requested or ordered in this encounter        ASSESSMENT / PLAN:  1. Takotsubo cardiomyopathy, resolved. EF 30-35% -> 60-65%  2. Abnormal EKG due to above. Still with T wave inversions that are improving; still had slight apical hypokinesis on SPECT imaging though EF had normalized  3. Abdominal pain/heme positive stool/40 pound weight loss  4. Systemic lupus erythematosus, on hydroxychloroquine therapy  5. Alcohol use, quit  6. Former tobacco abuse  7. Chronic back pain  8. Fibromyalgia/bipolar disorder/anxiety/severe panic disorder    Recommendations:  Stable from cardiac standpoint with no evidence of decompensated heart failure. Recent follow-up echo demonstrating complete recovery of EF consistent with Takotsubo.     · Continue beta-blocker therapy  · No indication for aspirin or statin from my standpoint  · Follow-up with surgery for endoscopic evaluation, may proceed without further cardiac evaluation and she is considered low cardiac risk  · Did explain she may have increased risk for recurrent episodes of Takotsubo cardiomyopathy down the road that could be precipitated by panic attacks or acute illness etc.  · Aggressive risk factor modification  · Follow-up in 6 months or sooner if need arises    The patient's current medication list, allergies, problem list and results of all previously ordered testing were reviewed at today's visit.     Negar Preciado MD, [facility] Cardiology

## 2021-09-27 RX ORDER — METOPROLOL SUCCINATE 25 MG/1
TABLET, EXTENDED RELEASE ORAL
Qty: 45 TABLET | Refills: 3 | Status: SHIPPED
Start: 2021-09-27 | End: 2021-12-14 | Stop reason: SDUPTHER

## 2021-09-27 RX ORDER — ATORVASTATIN CALCIUM 20 MG/1
TABLET, FILM COATED ORAL
Qty: 90 TABLET | Refills: 3 | Status: SHIPPED
Start: 2021-09-27 | End: 2021-12-14 | Stop reason: ALTCHOICE

## 2021-09-28 ENCOUNTER — HOSPITAL ENCOUNTER (OUTPATIENT)
Age: 51
Discharge: HOME | End: 2021-09-30

## 2021-09-28 PROCEDURE — 88342 IMHCHEM/IMCYTCHM 1ST ANTB: CPT

## 2021-09-28 PROCEDURE — 88305 TISSUE EXAM BY PATHOLOGIST: CPT

## 2021-12-14 ENCOUNTER — TELEPHONE (OUTPATIENT)
Dept: CARDIOLOGY CLINIC | Age: 51
End: 2021-12-14

## 2021-12-14 RX ORDER — METOPROLOL SUCCINATE 25 MG/1
TABLET, EXTENDED RELEASE ORAL
Qty: 45 TABLET | Refills: 3 | Status: SHIPPED | OUTPATIENT
Start: 2021-12-14

## 2021-12-14 NOTE — TELEPHONE ENCOUNTER
Contacted patient regarding discontinuing atorvastatin. She verbalized understanding.   Medication removed for her medication list.

## 2021-12-14 NOTE — TELEPHONE ENCOUNTER
Patient called regarding her atorvastatin script. According to her office visit of 9/14/2021, you indicated she did not need to be a statin or her ASA. Yet on 9/27, you filled a script for the atorvastatin. Please advise if she is to be on her statin.

## 2022-08-31 ENCOUNTER — OFFICE VISIT (OUTPATIENT)
Dept: CARDIOLOGY CLINIC | Age: 52
End: 2022-08-31
Payer: COMMERCIAL

## 2022-08-31 VITALS
RESPIRATION RATE: 14 BRPM | DIASTOLIC BLOOD PRESSURE: 70 MMHG | HEART RATE: 74 BPM | SYSTOLIC BLOOD PRESSURE: 108 MMHG | HEIGHT: 61 IN | BODY MASS INDEX: 21.6 KG/M2 | OXYGEN SATURATION: 91 % | WEIGHT: 114.4 LBS

## 2022-08-31 DIAGNOSIS — I51.81 TAKOTSUBO CARDIOMYOPATHY: ICD-10-CM

## 2022-08-31 DIAGNOSIS — R06.02 SOB (SHORTNESS OF BREATH): Primary | ICD-10-CM

## 2022-08-31 DIAGNOSIS — R94.31 ABNORMAL EKG: ICD-10-CM

## 2022-08-31 PROCEDURE — 93000 ELECTROCARDIOGRAM COMPLETE: CPT | Performed by: INTERNAL MEDICINE

## 2022-08-31 PROCEDURE — 99214 OFFICE O/P EST MOD 30 MIN: CPT | Performed by: INTERNAL MEDICINE

## 2022-08-31 NOTE — PROGRESS NOTES
OUTPATIENT CARDIOLOGY FOLLOW-UP    Name: Tessy Garza    Age: 45 y.o. Primary Care Physician: Grace Taylor MD    Date of Service: 8/31/2022    Chief Complaint:   Chief Complaint   Patient presents with    Coronary Artery Disease    Shortness of Breath        Interim History:   Here for follow-up. Initially seen 8/21/2021 for elevated troponin and abnormal EKG, with echo findings suggestive of a Takotsubo cardiomyopathy. She had a had a severe panic attack 2 days prior to that admission. She also had heme positive stool and concern for GI bleed. She left AMA before institution of therapy for ischemic evaluation. She presented again a week later where echo showed normal EF. Stress test showed no ischemia. Presents today for reevaluation after abnormal stress test at PCP office. EKG was sent over, it is very hard to read it it is a faxed copy, but the read suggests something about inferior infarct. Patient denies any chest pain. She does report some shortness of breath with exertion. Has ongoing anxiety issues but seems to be well managed. She gets occasional palpitations.     Review of Systems:   Negative except as described above    Past Medical History:  Past Medical History:   Diagnosis Date    Anxiety     dr Kelsey Marinelli 096 in [facility] counseling    Arthritis     Asthma     Bipolar 2 disorder ([address]     Bruising tendency ([address]     Chronic back pain     Fibromyalgia     Glucose intolerance (impaired glucose tolerance) 6/15/2020    hyperglycemia    Insomnia     Lupus ([address]     Memory difficulty 6/15/2020    Mild cognitive impairment with memory loss 6/15/2020    Thumb pain     left - for OR 4-16-19        Past Surgical History:  Past Surgical History:   Procedure Laterality Date    ABDOMEN SURGERY      fatty tumor removed     ARTHROPLASTY Left 4/16/2019    LEFT THUMB TRAPEZIECTOMY WITH LIGAMENT RECONSTRUCTION LEFT PARTIAL TRAPEZOIDECTOMY LEFT DEQUERVAINS RELEASE LEFT CARPAL TUNNEL RELEASE performed by Dorys West MD at [address]      right    COLONOSCOPY      FINGER SURGERY      right pinkie    HERNIA REPAIR  1970    HIP ARTHROTOMY  1973    right    NERVE BLOCK      x4     TOTAL HIP ARTHROPLASTY Right 14    TOTAL HIP ARTHROPLASTY Left 3/2/2021    LEFT HIP TOTAL ARTHROPLASTY performed by Ceferino Muse MD at [address] [facility]  10/1994    TUMOR REMOVAL      right shoulder fatty tumor       Family History:  Family History   Problem Relation Age of Onset    Cancer Father         LEUKEMIA    Cancer Maternal Grandmother         lung and brain ca       Social History:  Social History     Tobacco Use    Smoking status: Former     Packs/day: 1.00     Years: 30.00     Pack years: 30.00     Types: Cigarettes     Quit date: 2014     Years since quittin.5    Smokeless tobacco: Never    Tobacco comments:     stop    Vaping Use    Vaping Use: Never used   Substance Use Topics    Alcohol use:  Yes     Alcohol/week: 3.0 standard drinks     Types: 3 Cans of beer per week     Comment: SOCIAL    Drug use: No        Allergies:  No Known Allergies    Current Medications:    Current Outpatient Medications:     metoprolol succinate (TOPROL XL) 25 MG extended release tablet, TAKE 1/2 TABLET BY MOUTH EVERY DAY, Disp: 45 tablet, Rfl: 3    nitroGLYCERIN (NITROSTAT) 0.4 MG SL tablet, PLACE 1 TABLET UNDER TONGUE EVERY 5 MINS, UP TO 3 DOSES AS NEEDED FOR CHEST PAIN, Disp: , Rfl:     calcium carbonate 600 MG TABS tablet, Take 1 tablet by mouth daily, Disp: , Rfl:     sucralfate (CARAFATE) 1 GM tablet, Take 1 tablet by mouth 4 times daily, Disp: 120 tablet, Rfl: 0    omeprazole (PRILOSEC) 20 MG delayed release capsule, Take 20 mg by mouth daily, Disp: , Rfl:     brexpiprazole (REXULTI) 1 MG TABS tablet, Take 1 mg by mouth daily , Disp: , Rfl:     vitamin D (CHOLECALCIFEROL) 25 MCG (1000 UT) TABS tablet, Take by mouth daily , Disp: , Rfl:     hydroxychloroquine 140 2021    K 4.5 2021     2021    CO2 27 2021     Lab Results   Component Value Date/Time    MG 2.1 2021 12:11 PM     Lab Results   Component Value Date    WBC 4.3 (L) 2021    HGB 11.6 2021    HCT 36.5 2021    MCV 92.6 2021     2021     Lab Results   Component Value Date    ALT 13 2021    AST 19 2021    ALKPHOS 115 (H) 2021    BILITOT 0.3 2021     No results found for: CKTOTAL, CKMB, CKMBINDEX, TROPONINI  Lab Results   Component Value Date    INR 1.1 2021    PROTIME 12.2 2021     Lab Results   Component Value Date    TSH 1.890 2014     No results found for: LABA1C  No results found for: EAG  Lab Results   Component Value Date    CHOL 117 2021     Lab Results   Component Value Date    TRIG 84 2021     Lab Results   Component Value Date    HDL 49 2021     Lab Results   Component Value Date    LDLCALC 51 2021     Lab Results   Component Value Date    LABVLDL 17 2021     No results found for: CHOLHDLRATIO  No results for input(s): PROBNP in the last 72 hours. Cardiac Tests:  EC2022: Sinus rhythm 74 beats minute. Normal axis and intervals. Possible prior septal infarct. Previously noted anterior T wave inversions have resolved. 2022 at Dr. Avendaño [facility] office sinus rhythm 84 bpm.  Normal axis and intervals. Very difficult to see as it is a scanned and faxed copy. I do not appreciate any significant abnormalities but it is nearly impossible to read. The computer read suggests acute inferior ST elevation MI. It notes the patient was asymptomatic. Echocardiogram:   Limited echo 2021   Summary   Limited Echo for LV function. Normal left ventricular chamber size. Normal left ventricular systolic function, LVEF 61-13%. Normal right ventricle size and function.    Compared to prior echo from 21 - LV EF is improved from 30-35% to echocardiogram to ensure no change in LV function  Consider coronary CTA if any anginal symptoms, but currently has none  Continue beta-blocker therapy  No indication for aspirin or statin from my standpoint  Did explain she may have increased risk for recurrent episodes of Takotsubo cardiomyopathy down the road that could be precipitated by panic attacks or acute illness etc.  Aggressive risk factor modification  Follow-up in 12 months or sooner if need arises    The patient's current medication list, allergies, problem list and results of all previously ordered testing were reviewed at today's visit.     Heather Aguillon MD, [facility]

## 2022-09-12 ENCOUNTER — HOSPITAL ENCOUNTER (EMERGENCY)
Age: 52
Discharge: HOME | End: 2022-09-12
Attending: EMERGENCY MEDICINE
Payer: COMMERCIAL

## 2022-09-12 ENCOUNTER — APPOINTMENT (OUTPATIENT)
Dept: GENERAL RADIOLOGY | Age: 52
End: 2022-09-12
Payer: COMMERCIAL

## 2022-09-12 ENCOUNTER — APPOINTMENT (OUTPATIENT)
Dept: CT IMAGING | Age: 52
End: 2022-09-12
Payer: COMMERCIAL

## 2022-09-12 ENCOUNTER — APPOINTMENT (OUTPATIENT)
Dept: MRI IMAGING | Age: 52
End: 2022-09-12
Payer: COMMERCIAL

## 2022-09-12 VITALS
DIASTOLIC BLOOD PRESSURE: 79 MMHG | BODY MASS INDEX: 20.96 KG/M2 | OXYGEN SATURATION: 94 % | HEIGHT: 61 IN | HEART RATE: 87 BPM | TEMPERATURE: 98.2 F | RESPIRATION RATE: 16 BRPM | SYSTOLIC BLOOD PRESSURE: 118 MMHG | WEIGHT: 111 LBS
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
## 2022-09-12 LAB
ABO/RH: NORMAL
ACETAMINOPHEN LEVEL: <5 MCG/ML (ref 10–30)
ALBUMIN SERPL-MCNC: 4.6 G/DL (ref 3.5–5.2)
ALP BLD-CCNC: 231 U/L (ref 35–104)
ALT SERPL-CCNC: 15 U/L (ref 0–32)
AMPHETAMINE SCREEN, URINE: NOT DETECTED
ANION GAP SERPL CALCULATED.3IONS-SCNC: 17 MMOL/L (ref 7–16)
ANTIBODY SCREEN: NORMAL
APTT: 31.7 SEC (ref 24.5–35.1)
AST SERPL-CCNC: 22 U/L (ref 0–31)
B.E.: -1.2 MMOL/L (ref -3–3)
BARBITURATE SCREEN URINE: NOT DETECTED
BENZODIAZEPINE SCREEN, URINE: NOT DETECTED
BILIRUB SERPL-MCNC: 0.2 MG/DL (ref 0–1.2)
BUN BLDV-MCNC: 6 MG/DL (ref 6–20)
CALCIUM SERPL-MCNC: 10.3 MG/DL (ref 8.6–10.2)
CANNABINOID SCREEN URINE: POSITIVE
CHLORIDE BLD-SCNC: 102 MMOL/L (ref 98–107)
CO2: 22 MMOL/L (ref 22–29)
COCAINE METABOLITE SCREEN URINE: NOT DETECTED
COHB: 1.8 % (ref 0–1.5)
CREAT SERPL-MCNC: 0.9 MG/DL (ref 0.5–1)
CRITICAL: ABNORMAL
DATE ANALYZED: ABNORMAL
DATE OF COLLECTION: ABNORMAL
ETHANOL: <10 MG/DL (ref 0–0.08)
FENTANYL SCREEN, URINE: NOT DETECTED
GFR AFRICAN AMERICAN: >60
GFR NON-AFRICAN AMERICAN: >60 ML/MIN/1.73
GLUCOSE BLD-MCNC: 120 MG/DL (ref 74–99)
HCG QUALITATIVE: NEGATIVE
HCO3: 19 MMOL/L (ref 22–26)
HCT VFR BLD CALC: 44.4 % (ref 34–48)
HEMOGLOBIN: 15.1 G/DL (ref 11.5–15.5)
HHB: 0.3 % (ref 0–5)
INR BLD: 1.1
LAB: ABNORMAL
LACTIC ACID: 2.3 MMOL/L (ref 0.5–2.2)
Lab: ABNORMAL
Lab: ABNORMAL
MCH RBC QN AUTO: 30.6 PG (ref 26–35)
MCHC RBC AUTO-ENTMCNC: 34 % (ref 32–34.5)
MCV RBC AUTO: 89.9 FL (ref 80–99.9)
METHADONE SCREEN, URINE: NOT DETECTED
METHB: 0.3 % (ref 0–1.5)
MODE: ABNORMAL
O2 CONTENT: 21.9 ML/DL
O2 SATURATION: 99.7 % (ref 92–98.5)
O2HB: 97.6 % (ref 94–97)
OPERATOR ID: 7490
OPIATE SCREEN URINE: NOT DETECTED
OXYCODONE URINE: NOT DETECTED
PATIENT TEMP: 37 C
PCO2: 22.6 MMHG (ref 35–45)
PDW BLD-RTO: 12.4 FL (ref 11.5–15)
PH BLOOD GAS: 7.54 (ref 7.35–7.45)
PHENCYCLIDINE SCREEN URINE: NOT DETECTED
PLATELET # BLD: 251 E9/L (ref 130–450)
PMV BLD AUTO: 10.9 FL (ref 7–12)
PO2: 319.9 MMHG (ref 75–100)
POTASSIUM SERPL-SCNC: 3.74 MMOL/L (ref 3.5–5)
POTASSIUM SERPL-SCNC: 4 MMOL/L (ref 3.5–5)
PROTHROMBIN TIME: 12.4 SEC (ref 9.3–12.4)
RBC # BLD: 4.94 E12/L (ref 3.5–5.5)
SALICYLATE, SERUM: <0.3 MG/DL (ref 0–30)
SODIUM BLD-SCNC: 141 MMOL/L (ref 132–146)
SOURCE, BLOOD GAS: ABNORMAL
THB: 15.4 G/DL (ref 11.5–16.5)
TIME ANALYZED: 1425
TOTAL PROTEIN: 8 G/DL (ref 6.4–8.3)
TRICYCLIC ANTIDEPRESSANTS SCREEN SERUM: NEGATIVE NG/ML
WBC # BLD: 10.5 E9/L (ref 4.5–11.5)

## 2022-09-12 PROCEDURE — 84703 CHORIONIC GONADOTROPIN ASSAY: CPT

## 2022-09-12 PROCEDURE — 6370000000 HC RX 637 (ALT 250 FOR IP)

## 2022-09-12 PROCEDURE — 83605 ASSAY OF LACTIC ACID: CPT

## 2022-09-12 PROCEDURE — 80143 DRUG ASSAY ACETAMINOPHEN: CPT

## 2022-09-12 PROCEDURE — 85027 COMPLETE CBC AUTOMATED: CPT

## 2022-09-12 PROCEDURE — 72125 CT NECK SPINE W/O DYE: CPT

## 2022-09-12 PROCEDURE — 86900 BLOOD TYPING SEROLOGIC ABO: CPT

## 2022-09-12 PROCEDURE — 96374 THER/PROPH/DIAG INJ IV PUSH: CPT

## 2022-09-12 PROCEDURE — 94150 VITAL CAPACITY TEST: CPT

## 2022-09-12 PROCEDURE — 86901 BLOOD TYPING SEROLOGIC RH(D): CPT

## 2022-09-12 PROCEDURE — 36600 WITHDRAWAL OF ARTERIAL BLOOD: CPT | Performed by: SURGERY

## 2022-09-12 PROCEDURE — 6810039000 HC L1 TRAUMA ALERT

## 2022-09-12 PROCEDURE — 80053 COMPREHEN METABOLIC PANEL: CPT

## 2022-09-12 PROCEDURE — 99283 EMERGENCY DEPT VISIT LOW MDM: CPT | Performed by: SURGERY

## 2022-09-12 PROCEDURE — 84132 ASSAY OF SERUM POTASSIUM: CPT

## 2022-09-12 PROCEDURE — 74177 CT ABD & PELVIS W/CONTRAST: CPT

## 2022-09-12 PROCEDURE — 36410 VNPNXR 3YR/> PHY/QHP DX/THER: CPT | Performed by: SURGERY

## 2022-09-12 PROCEDURE — 72141 MRI NECK SPINE W/O DYE: CPT

## 2022-09-12 PROCEDURE — 86850 RBC ANTIBODY SCREEN: CPT

## 2022-09-12 PROCEDURE — 70450 CT HEAD/BRAIN W/O DYE: CPT

## 2022-09-12 PROCEDURE — 82805 BLOOD GASES W/O2 SATURATION: CPT

## 2022-09-12 PROCEDURE — 72170 X-RAY EXAM OF PELVIS: CPT

## 2022-09-12 PROCEDURE — 80307 DRUG TEST PRSMV CHEM ANLYZR: CPT

## 2022-09-12 PROCEDURE — 2580000003 HC RX 258: Performed by: RADIOLOGY

## 2022-09-12 PROCEDURE — 72128 CT CHEST SPINE W/O DYE: CPT

## 2022-09-12 PROCEDURE — 80179 DRUG ASSAY SALICYLATE: CPT

## 2022-09-12 PROCEDURE — 99285 EMERGENCY DEPT VISIT HI MDM: CPT

## 2022-09-12 PROCEDURE — 85730 THROMBOPLASTIN TIME PARTIAL: CPT

## 2022-09-12 PROCEDURE — 82077 ASSAY SPEC XCP UR&BREATH IA: CPT

## 2022-09-12 PROCEDURE — 6360000004 HC RX CONTRAST MEDICATION: Performed by: RADIOLOGY

## 2022-09-12 PROCEDURE — 85610 PROTHROMBIN TIME: CPT

## 2022-09-12 PROCEDURE — 72131 CT LUMBAR SPINE W/O DYE: CPT

## 2022-09-12 PROCEDURE — 71045 X-RAY EXAM CHEST 1 VIEW: CPT

## 2022-09-12 PROCEDURE — 6360000002 HC RX W HCPCS

## 2022-09-12 PROCEDURE — 71260 CT THORAX DX C+: CPT

## 2022-09-12 PROCEDURE — 36415 COLL VENOUS BLD VENIPUNCTURE: CPT

## 2022-09-12 RX ORDER — ACETAMINOPHEN 325 MG/1
650 TABLET ORAL 4 TIMES DAILY
Status: DISCONTINUED | OUTPATIENT
Start: 2022-09-12 | End: 2022-09-13 | Stop reason: HOSPADM

## 2022-09-12 RX ORDER — OXYCODONE HYDROCHLORIDE 10 MG/1
10 TABLET ORAL EVERY 4 HOURS PRN
Status: DISCONTINUED | OUTPATIENT
Start: 2022-09-12 | End: 2022-09-13 | Stop reason: HOSPADM

## 2022-09-12 RX ORDER — LORAZEPAM 2 MG/ML
0.5 INJECTION INTRAMUSCULAR ONCE
Status: COMPLETED | OUTPATIENT
Start: 2022-09-12 | End: 2022-09-12

## 2022-09-12 RX ORDER — SODIUM CHLORIDE 0.9 % (FLUSH) 0.9 %
10 SYRINGE (ML) INJECTION ONCE
Status: COMPLETED | OUTPATIENT
Start: 2022-09-12 | End: 2022-09-12

## 2022-09-12 RX ADMIN — ACETAMINOPHEN 650 MG: 325 TABLET, FILM COATED ORAL at 17:42

## 2022-09-12 RX ADMIN — OXYCODONE HYDROCHLORIDE 10 MG: 10 TABLET ORAL at 21:27

## 2022-09-12 RX ADMIN — Medication 10 ML: at 17:17

## 2022-09-12 RX ADMIN — LORAZEPAM 0.5 MG: 2 INJECTION INTRAMUSCULAR; INTRAVENOUS at 19:22

## 2022-09-12 RX ADMIN — OXYCODONE HYDROCHLORIDE 10 MG: 10 TABLET ORAL at 17:41

## 2022-09-12 RX ADMIN — IOPAMIDOL 90 ML: 755 INJECTION, SOLUTION INTRAVENOUS at 14:33

## 2022-09-12 ASSESSMENT — PAIN DESCRIPTION - ORIENTATION: ORIENTATION: LEFT

## 2022-09-12 ASSESSMENT — PAIN DESCRIPTION - DESCRIPTORS: DESCRIPTORS: ACHING

## 2022-09-12 ASSESSMENT — PAIN SCALES - GENERAL
PAINLEVEL_OUTOF10: 7
PAINLEVEL_OUTOF10: 8

## 2022-09-12 ASSESSMENT — PAIN DESCRIPTION - LOCATION: LOCATION: BACK;RIB CAGE
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
## 2022-10-13 ENCOUNTER — TELEPHONE (OUTPATIENT)
Dept: CARDIOLOGY | Age: 52
End: 2022-10-13

## 2023-07-14 ENCOUNTER — HOSPITAL ENCOUNTER (OUTPATIENT)
Age: 53
Discharge: HOME | End: 2023-07-16

## 2023-07-14 PROCEDURE — 88305 TISSUE EXAM BY PATHOLOGIST: CPT

## 2024-05-08 ENCOUNTER — HOSPITAL ENCOUNTER (OUTPATIENT)
Dept: NUCLEAR MEDICINE | Age: 54
Discharge: HOME | End: 2024-05-08
Attending: FAMILY MEDICINE
Payer: MEDICARE

## 2024-05-08 DIAGNOSIS — E05.90 PRETIBIAL MYXEDEMA: ICD-10-CM

## 2024-05-08 PROCEDURE — 78014 THYROID IMAGING W/BLOOD FLOW: CPT

## 2024-05-08 PROCEDURE — A9517 I131 IODIDE CAP, RX: HCPCS | Performed by: RADIOLOGY

## 2024-05-08 PROCEDURE — 3430000000 HC RX DIAGNOSTIC RADIOPHARMACEUTICAL: Performed by: RADIOLOGY

## 2024-05-08 RX ADMIN — Medication 15 MICRO CURIE: at 11:54

## 2024-05-09 ENCOUNTER — HOSPITAL ENCOUNTER (OUTPATIENT)
Dept: NUCLEAR MEDICINE | Age: 54
Discharge: HOME | End: 2024-05-09
Attending: FAMILY MEDICINE
Payer: MEDICARE

## 2024-05-09 PROCEDURE — 3430000000 HC RX DIAGNOSTIC RADIOPHARMACEUTICAL: Performed by: RADIOLOGY

## 2024-05-09 PROCEDURE — A9512 TC99M PERTECHNETATE: HCPCS | Performed by: RADIOLOGY

## 2024-05-09 RX ADMIN — Medication 10 MILLICURIE: at 11:52

## 2024-07-03 ENCOUNTER — HOSPITAL ENCOUNTER (INPATIENT)
Age: 54
LOS: 2 days | Discharge: ANOTHER HOSPITAL | DRG: 917 | End: 2024-07-05
Attending: EMERGENCY MEDICINE | Admitting: FAMILY MEDICINE
Payer: MEDICARE

## 2024-07-03 ENCOUNTER — APPOINTMENT (OUTPATIENT)
Dept: GENERAL RADIOLOGY | Age: 54
DRG: 917 | End: 2024-07-03
Payer: MEDICARE

## 2024-07-03 ENCOUNTER — APPOINTMENT (OUTPATIENT)
Dept: CT IMAGING | Age: 54
DRG: 917 | End: 2024-07-03
Payer: MEDICARE

## 2024-07-03 DIAGNOSIS — I50.9 OTHER CONGESTIVE HEART FAILURE (HCC): ICD-10-CM

## 2024-07-03 DIAGNOSIS — R41.82 ALTERED MENTAL STATUS, UNSPECIFIED ALTERED MENTAL STATUS TYPE: Primary | ICD-10-CM

## 2024-07-03 DIAGNOSIS — I42.9 CARDIOMYOPATHY, UNSPECIFIED TYPE (HCC): ICD-10-CM

## 2024-07-03 DIAGNOSIS — I24.9 ACUTE CORONARY SYNDROME (HCC): ICD-10-CM

## 2024-07-03 PROBLEM — J96.00 ACUTE RESPIRATORY FAILURE (HCC): Status: ACTIVE | Noted: 2024-07-03

## 2024-07-03 LAB
ALBUMIN SERPL-MCNC: 4.3 G/DL (ref 3.5–5.2)
ALP SERPL-CCNC: 138 U/L (ref 35–104)
ALT SERPL-CCNC: 10 U/L (ref 0–32)
AMPHET UR QL SCN: NEGATIVE
ANION GAP SERPL CALCULATED.3IONS-SCNC: 10 MMOL/L (ref 7–16)
ANION GAP SERPL CALCULATED.3IONS-SCNC: 21 MMOL/L (ref 7–16)
APAP SERPL-MCNC: <5 UG/ML (ref 10–30)
AST SERPL-CCNC: 21 U/L (ref 0–31)
B.E.: -4.4 MMOL/L (ref -3–3)
B.E.: -7.2 MMOL/L (ref -3–3)
BARBITURATES UR QL SCN: NEGATIVE
BASOPHILS # BLD: 0.04 K/UL (ref 0–0.2)
BASOPHILS # BLD: 0.07 K/UL (ref 0–0.2)
BASOPHILS NFR BLD: 0 % (ref 0–2)
BASOPHILS NFR BLD: 1 % (ref 0–2)
BENZODIAZ UR QL: POSITIVE
BILIRUB SERPL-MCNC: 0.3 MG/DL (ref 0–1.2)
BILIRUB UR QL STRIP: NEGATIVE
BUN SERPL-MCNC: 6 MG/DL (ref 6–20)
BUN SERPL-MCNC: 8 MG/DL (ref 6–20)
BUPRENORPHINE UR QL: NEGATIVE
CALCIUM SERPL-MCNC: 7 MG/DL (ref 8.6–10.2)
CALCIUM SERPL-MCNC: 9.5 MG/DL (ref 8.6–10.2)
CANNABINOIDS UR QL SCN: POSITIVE
CHLORIDE SERPL-SCNC: 101 MMOL/L (ref 98–107)
CHLORIDE SERPL-SCNC: 94 MMOL/L (ref 98–107)
CK SERPL-CCNC: 179 U/L (ref 20–180)
CK SERPL-CCNC: 288 U/L (ref 20–180)
CLARITY UR: CLEAR
CO2 SERPL-SCNC: 18 MMOL/L (ref 22–29)
CO2 SERPL-SCNC: 24 MMOL/L (ref 22–29)
COCAINE UR QL SCN: NEGATIVE
COHB: 0.3 % (ref 0–1.5)
COHB: 1.5 % (ref 0–1.5)
COLOR UR: YELLOW
CREAT SERPL-MCNC: 0.9 MG/DL (ref 0.5–1)
CREAT SERPL-MCNC: 0.9 MG/DL (ref 0.5–1)
CRITICAL: ABNORMAL
CRITICAL: ABNORMAL
DATE ANALYZED: ABNORMAL
DATE ANALYZED: ABNORMAL
DATE OF COLLECTION: ABNORMAL
DATE OF COLLECTION: ABNORMAL
EOSINOPHIL # BLD: 0.06 K/UL (ref 0.05–0.5)
EOSINOPHIL # BLD: 0.5 K/UL (ref 0.05–0.5)
EOSINOPHILS RELATIVE PERCENT: 0 % (ref 0–6)
EOSINOPHILS RELATIVE PERCENT: 6 % (ref 0–6)
ERYTHROCYTE [DISTWIDTH] IN BLOOD BY AUTOMATED COUNT: 13.3 % (ref 11.5–15)
ERYTHROCYTE [DISTWIDTH] IN BLOOD BY AUTOMATED COUNT: 13.6 % (ref 11.5–15)
ETHANOLAMINE SERPL-MCNC: <10 MG/DL (ref 0–0.08)
FENTANYL UR QL: POSITIVE
FIO2: 100 %
FIO2: 45 %
GFR, ESTIMATED: 75 ML/MIN/1.73M2
GFR, ESTIMATED: 79 ML/MIN/1.73M2
GLUCOSE BLD-MCNC: 196 MG/DL (ref 74–99)
GLUCOSE SERPL-MCNC: 196 MG/DL (ref 74–99)
GLUCOSE SERPL-MCNC: 236 MG/DL (ref 74–99)
GLUCOSE UR STRIP-MCNC: NEGATIVE MG/DL
HCO3: 16.7 MMOL/L (ref 22–26)
HCO3: 20.4 MMOL/L (ref 22–26)
HCT VFR BLD AUTO: 38.7 % (ref 34–48)
HCT VFR BLD AUTO: 41.7 % (ref 34–48)
HGB BLD-MCNC: 12.8 G/DL (ref 11.5–15.5)
HGB BLD-MCNC: 13.7 G/DL (ref 11.5–15.5)
HGB UR QL STRIP.AUTO: NEGATIVE
HHB: 0 % (ref 0–5)
HHB: 1.5 % (ref 0–5)
IMM GRANULOCYTES # BLD AUTO: 0.03 K/UL (ref 0–0.58)
IMM GRANULOCYTES # BLD AUTO: 0.1 K/UL (ref 0–0.58)
IMM GRANULOCYTES NFR BLD: 0 % (ref 0–5)
IMM GRANULOCYTES NFR BLD: 1 % (ref 0–5)
KETONES UR STRIP-MCNC: NEGATIVE MG/DL
LAB: ABNORMAL
LAB: ABNORMAL
LACTATE BLDV-SCNC: 4.1 MMOL/L (ref 0.5–2.2)
LACTATE BLDV-SCNC: 4.8 MMOL/L (ref 0.5–2.2)
LACTATE BLDV-SCNC: 8.8 MMOL/L (ref 0.5–2.2)
LEUKOCYTE ESTERASE UR QL STRIP: NEGATIVE
LYMPHOCYTES NFR BLD: 2.63 K/UL (ref 1.5–4)
LYMPHOCYTES NFR BLD: 2.95 K/UL (ref 1.5–4)
LYMPHOCYTES RELATIVE PERCENT: 17 % (ref 20–42)
LYMPHOCYTES RELATIVE PERCENT: 34 % (ref 20–42)
Lab: 1354
Lab: 2209
MAGNESIUM SERPL-MCNC: 1.9 MG/DL (ref 1.6–2.6)
MAGNESIUM SERPL-MCNC: 2.2 MG/DL (ref 1.6–2.6)
MCH RBC QN AUTO: 30.4 PG (ref 26–35)
MCH RBC QN AUTO: 30.6 PG (ref 26–35)
MCHC RBC AUTO-ENTMCNC: 32.9 G/DL (ref 32–34.5)
MCHC RBC AUTO-ENTMCNC: 33.1 G/DL (ref 32–34.5)
MCV RBC AUTO: 92.6 FL (ref 80–99.9)
MCV RBC AUTO: 92.7 FL (ref 80–99.9)
METHADONE UR QL: NEGATIVE
METHB: 0.3 % (ref 0–1.5)
METHB: 0.3 % (ref 0–1.5)
MODE: ABNORMAL
MODE: AC
MONOCYTES NFR BLD: 0.66 K/UL (ref 0.1–0.95)
MONOCYTES NFR BLD: 1.25 K/UL (ref 0.1–0.95)
MONOCYTES NFR BLD: 8 % (ref 2–12)
MONOCYTES NFR BLD: 8 % (ref 2–12)
NEUTROPHILS NFR BLD: 52 % (ref 43–80)
NEUTROPHILS NFR BLD: 74 % (ref 43–80)
NEUTS SEG NFR BLD: 11.7 K/UL (ref 1.8–7.3)
NEUTS SEG NFR BLD: 4.47 K/UL (ref 1.8–7.3)
NITRITE UR QL STRIP: NEGATIVE
O2 CONTENT: 18.4 ML/DL
O2 CONTENT: 19.8 ML/DL
O2 SATURATION: 100 % (ref 92–98.5)
O2 SATURATION: 98.5 % (ref 92–98.5)
O2HB: 97.9 % (ref 94–97)
O2HB: 98.2 % (ref 94–97)
OPERATOR ID: 5132
OPERATOR ID: 5133
OPIATES UR QL SCN: NEGATIVE
OXYCODONE UR QL SCN: NEGATIVE
PATIENT TEMP: 37 C
PATIENT TEMP: 37 C
PCO2: 29.2 MMHG (ref 35–45)
PCO2: 37 MMHG (ref 35–45)
PCP UR QL SCN: POSITIVE
PEEP/CPAP: 5 CMH2O
PFO2: 3.01 MMHG/%
PFO2: 4.77 MMHG/%
PH BLOOD GAS: 7.36 (ref 7.35–7.45)
PH BLOOD GAS: 7.37 (ref 7.35–7.45)
PH UR STRIP: 6 [PH] (ref 5–9)
PHOSPHATE SERPL-MCNC: 3 MG/DL (ref 2.5–4.5)
PLATELET # BLD AUTO: 251 K/UL (ref 130–450)
PLATELET # BLD AUTO: 279 K/UL (ref 130–450)
PMV BLD AUTO: 10.5 FL (ref 7–12)
PMV BLD AUTO: 10.5 FL (ref 7–12)
PO2: 135.5 MMHG (ref 75–100)
PO2: 477.2 MMHG (ref 75–100)
POTASSIUM SERPL-SCNC: 3.6 MMOL/L (ref 3.5–5)
POTASSIUM SERPL-SCNC: 4.2 MMOL/L (ref 3.5–5)
PROT SERPL-MCNC: 7.4 G/DL (ref 6.4–8.3)
PROT UR STRIP-MCNC: 100 MG/DL
RBC # BLD AUTO: 4.18 M/UL (ref 3.5–5.5)
RBC # BLD AUTO: 4.5 M/UL (ref 3.5–5.5)
RBC #/AREA URNS HPF: NORMAL /HPF
RI(T): 0.43
RI(T): 1.06
RR MECHANICAL: 14 B/MIN
SALICYLATES SERPL-MCNC: <0.3 MG/DL (ref 0–30)
SODIUM SERPL-SCNC: 133 MMOL/L (ref 132–146)
SODIUM SERPL-SCNC: 135 MMOL/L (ref 132–146)
SOURCE, BLOOD GAS: ABNORMAL
SOURCE, BLOOD GAS: ABNORMAL
SP GR UR STRIP: >1.03 (ref 1–1.03)
TEST INFORMATION: ABNORMAL
THB: 13.2 G/DL (ref 11.5–16.5)
THB: 13.4 G/DL (ref 11.5–16.5)
TIME ANALYZED: 1356
TIME ANALYZED: 2221
TOXIC TRICYCLIC SC,BLOOD: NEGATIVE
TROPONIN I SERPL HS-MCNC: 10 NG/L (ref 0–9)
TROPONIN I SERPL HS-MCNC: 595 NG/L (ref 0–9)
UROBILINOGEN UR STRIP-ACNC: 0.2 EU/DL (ref 0–1)
VT MECHANICAL: 400 ML
WBC #/AREA URNS HPF: NORMAL /HPF
WBC OTHER # BLD: 15.8 K/UL (ref 4.5–11.5)
WBC OTHER # BLD: 8.7 K/UL (ref 4.5–11.5)

## 2024-07-03 PROCEDURE — 6360000002 HC RX W HCPCS: Performed by: EMERGENCY MEDICINE

## 2024-07-03 PROCEDURE — 85025 COMPLETE CBC W/AUTO DIFF WBC: CPT

## 2024-07-03 PROCEDURE — 6360000002 HC RX W HCPCS

## 2024-07-03 PROCEDURE — 2580000003 HC RX 258: Performed by: EMERGENCY MEDICINE

## 2024-07-03 PROCEDURE — 70450 CT HEAD/BRAIN W/O DYE: CPT

## 2024-07-03 PROCEDURE — 03HB33Z INSERTION OF INFUSION DEVICE INTO RIGHT RADIAL ARTERY, PERCUTANEOUS APPROACH: ICD-10-PCS | Performed by: FAMILY MEDICINE

## 2024-07-03 PROCEDURE — 99285 EMERGENCY DEPT VISIT HI MDM: CPT

## 2024-07-03 PROCEDURE — 84100 ASSAY OF PHOSPHORUS: CPT

## 2024-07-03 PROCEDURE — 83735 ASSAY OF MAGNESIUM: CPT

## 2024-07-03 PROCEDURE — 96376 TX/PRO/DX INJ SAME DRUG ADON: CPT

## 2024-07-03 PROCEDURE — 84484 ASSAY OF TROPONIN QUANT: CPT

## 2024-07-03 PROCEDURE — 6370000000 HC RX 637 (ALT 250 FOR IP)

## 2024-07-03 PROCEDURE — 0202U NFCT DS 22 TRGT SARS-COV-2: CPT

## 2024-07-03 PROCEDURE — 81001 URINALYSIS AUTO W/SCOPE: CPT

## 2024-07-03 PROCEDURE — 36620 INSERTION CATHETER ARTERY: CPT

## 2024-07-03 PROCEDURE — 80048 BASIC METABOLIC PNL TOTAL CA: CPT

## 2024-07-03 PROCEDURE — 71045 X-RAY EXAM CHEST 1 VIEW: CPT

## 2024-07-03 PROCEDURE — 80307 DRUG TEST PRSMV CHEM ANLYZR: CPT

## 2024-07-03 PROCEDURE — C1751 CATH, INF, PER/CENT/MIDLINE: HCPCS

## 2024-07-03 PROCEDURE — 96374 THER/PROPH/DIAG INJ IV PUSH: CPT

## 2024-07-03 PROCEDURE — G0480 DRUG TEST DEF 1-7 CLASSES: HCPCS

## 2024-07-03 PROCEDURE — 85652 RBC SED RATE AUTOMATED: CPT

## 2024-07-03 PROCEDURE — 87086 URINE CULTURE/COLONY COUNT: CPT

## 2024-07-03 PROCEDURE — 74018 RADEX ABDOMEN 1 VIEW: CPT

## 2024-07-03 PROCEDURE — 80053 COMPREHEN METABOLIC PANEL: CPT

## 2024-07-03 PROCEDURE — 80175 DRUG SCREEN QUAN LAMOTRIGINE: CPT

## 2024-07-03 PROCEDURE — 86140 C-REACTIVE PROTEIN: CPT

## 2024-07-03 PROCEDURE — 96375 TX/PRO/DX INJ NEW DRUG ADDON: CPT

## 2024-07-03 PROCEDURE — 2000000000 HC ICU R&B

## 2024-07-03 PROCEDURE — 82962 GLUCOSE BLOOD TEST: CPT

## 2024-07-03 PROCEDURE — 2580000003 HC RX 258

## 2024-07-03 PROCEDURE — 36556 INSERT NON-TUNNEL CV CATH: CPT

## 2024-07-03 PROCEDURE — 83605 ASSAY OF LACTIC ACID: CPT

## 2024-07-03 PROCEDURE — 51702 INSERT TEMP BLADDER CATH: CPT

## 2024-07-03 PROCEDURE — 82805 BLOOD GASES W/O2 SATURATION: CPT

## 2024-07-03 PROCEDURE — 96372 THER/PROPH/DIAG INJ SC/IM: CPT

## 2024-07-03 PROCEDURE — 82550 ASSAY OF CK (CPK): CPT

## 2024-07-03 PROCEDURE — 80179 DRUG ASSAY SALICYLATE: CPT

## 2024-07-03 PROCEDURE — 37799 UNLISTED PX VASCULAR SURGERY: CPT

## 2024-07-03 PROCEDURE — 31500 INSERT EMERGENCY AIRWAY: CPT

## 2024-07-03 PROCEDURE — 2500000003 HC RX 250 WO HCPCS

## 2024-07-03 PROCEDURE — 94002 VENT MGMT INPAT INIT DAY: CPT

## 2024-07-03 PROCEDURE — 2500000003 HC RX 250 WO HCPCS: Performed by: EMERGENCY MEDICINE

## 2024-07-03 PROCEDURE — 93005 ELECTROCARDIOGRAM TRACING: CPT | Performed by: EMERGENCY MEDICINE

## 2024-07-03 PROCEDURE — 87081 CULTURE SCREEN ONLY: CPT

## 2024-07-03 PROCEDURE — 99291 CRITICAL CARE FIRST HOUR: CPT

## 2024-07-03 PROCEDURE — 87040 BLOOD CULTURE FOR BACTERIA: CPT

## 2024-07-03 PROCEDURE — 84145 PROCALCITONIN (PCT): CPT

## 2024-07-03 PROCEDURE — 5A1955Z RESPIRATORY VENTILATION, GREATER THAN 96 CONSECUTIVE HOURS: ICD-10-PCS | Performed by: INTERNAL MEDICINE

## 2024-07-03 PROCEDURE — 80143 DRUG ASSAY ACETAMINOPHEN: CPT

## 2024-07-03 RX ORDER — 0.9 % SODIUM CHLORIDE 0.9 %
2000 INTRAVENOUS SOLUTION INTRAVENOUS ONCE
Status: COMPLETED | OUTPATIENT
Start: 2024-07-03 | End: 2024-07-03

## 2024-07-03 RX ORDER — PROPOFOL 10 MG/ML
INJECTION, EMULSION INTRAVENOUS
Status: COMPLETED
Start: 2024-07-03 | End: 2024-07-03

## 2024-07-03 RX ORDER — PROPOFOL 10 MG/ML
5-50 INJECTION, EMULSION INTRAVENOUS CONTINUOUS
Status: DISCONTINUED | OUTPATIENT
Start: 2024-07-03 | End: 2024-07-03

## 2024-07-03 RX ORDER — LORAZEPAM 2 MG/ML
2 INJECTION INTRAMUSCULAR ONCE
Status: COMPLETED | OUTPATIENT
Start: 2024-07-03 | End: 2024-07-03

## 2024-07-03 RX ORDER — SODIUM CHLORIDE, SODIUM LACTATE, POTASSIUM CHLORIDE, AND CALCIUM CHLORIDE .6; .31; .03; .02 G/100ML; G/100ML; G/100ML; G/100ML
1000 INJECTION, SOLUTION INTRAVENOUS ONCE
Status: COMPLETED | OUTPATIENT
Start: 2024-07-03 | End: 2024-07-03

## 2024-07-03 RX ORDER — FENTANYL CITRATE-0.9 % NACL/PF 10 MCG/ML
25-200 PLASTIC BAG, INJECTION (ML) INTRAVENOUS CONTINUOUS
Status: DISCONTINUED | OUTPATIENT
Start: 2024-07-03 | End: 2024-07-04 | Stop reason: SDUPTHER

## 2024-07-03 RX ORDER — MIDAZOLAM HYDROCHLORIDE 1 MG/ML
1-10 INJECTION, SOLUTION INTRAVENOUS CONTINUOUS
Status: DISCONTINUED | OUTPATIENT
Start: 2024-07-03 | End: 2024-07-04 | Stop reason: SDUPTHER

## 2024-07-03 RX ORDER — DROPERIDOL 2.5 MG/ML
5 INJECTION, SOLUTION INTRAMUSCULAR; INTRAVENOUS ONCE
Status: COMPLETED | OUTPATIENT
Start: 2024-07-03 | End: 2024-07-03

## 2024-07-03 RX ORDER — ETOMIDATE 2 MG/ML
20 INJECTION INTRAVENOUS ONCE
Status: COMPLETED | OUTPATIENT
Start: 2024-07-03 | End: 2024-07-03

## 2024-07-03 RX ORDER — HYDROXYCHLOROQUINE SULFATE 200 MG/1
200 TABLET, FILM COATED ORAL 2 TIMES DAILY
Status: DISCONTINUED | OUTPATIENT
Start: 2024-07-04 | End: 2024-07-05 | Stop reason: HOSPADM

## 2024-07-03 RX ORDER — ETOMIDATE 2 MG/ML
INJECTION INTRAVENOUS
Status: DISCONTINUED
Start: 2024-07-03 | End: 2024-07-03 | Stop reason: WASHOUT

## 2024-07-03 RX ORDER — THIAMINE HYDROCHLORIDE 100 MG/ML
100 INJECTION, SOLUTION INTRAMUSCULAR; INTRAVENOUS ONCE
Status: COMPLETED | OUTPATIENT
Start: 2024-07-03 | End: 2024-07-03

## 2024-07-03 RX ORDER — LEVETIRACETAM 500 MG/5ML
1000 INJECTION, SOLUTION, CONCENTRATE INTRAVENOUS ONCE
Status: COMPLETED | OUTPATIENT
Start: 2024-07-03 | End: 2024-07-03

## 2024-07-03 RX ORDER — ROCURONIUM BROMIDE 10 MG/ML
INJECTION, SOLUTION INTRAVENOUS
Status: COMPLETED
Start: 2024-07-03 | End: 2024-07-03

## 2024-07-03 RX ORDER — ACETAMINOPHEN 325 MG/1
650 TABLET ORAL EVERY 6 HOURS PRN
Status: DISCONTINUED | OUTPATIENT
Start: 2024-07-03 | End: 2024-07-05 | Stop reason: HOSPADM

## 2024-07-03 RX ORDER — MIDAZOLAM HYDROCHLORIDE 2 MG/2ML
5 INJECTION, SOLUTION INTRAMUSCULAR; INTRAVENOUS ONCE
Status: COMPLETED | OUTPATIENT
Start: 2024-07-03 | End: 2024-07-03

## 2024-07-03 RX ORDER — ROCURONIUM BROMIDE 10 MG/ML
80 INJECTION, SOLUTION INTRAVENOUS ONCE
Status: COMPLETED | OUTPATIENT
Start: 2024-07-03 | End: 2024-07-03

## 2024-07-03 RX ORDER — LEVALBUTEROL INHALATION SOLUTION 0.63 MG/3ML
0.63 SOLUTION RESPIRATORY (INHALATION) EVERY 8 HOURS PRN
Status: DISCONTINUED | OUTPATIENT
Start: 2024-07-03 | End: 2024-07-05

## 2024-07-03 RX ORDER — MIDAZOLAM HYDROCHLORIDE 2 MG/2ML
2 INJECTION, SOLUTION INTRAMUSCULAR; INTRAVENOUS ONCE
Status: COMPLETED | OUTPATIENT
Start: 2024-07-03 | End: 2024-07-03

## 2024-07-03 RX ORDER — SODIUM CHLORIDE 9 MG/ML
INJECTION, SOLUTION INTRAVENOUS CONTINUOUS
Status: DISCONTINUED | OUTPATIENT
Start: 2024-07-04 | End: 2024-07-05 | Stop reason: HOSPADM

## 2024-07-03 RX ORDER — FENTANYL CITRATE-0.9 % NACL/PF 100MCG/2ML
50 SYRINGE (ML) INTRAVENOUS ONCE
Status: DISCONTINUED | OUTPATIENT
Start: 2024-07-03 | End: 2024-07-03 | Stop reason: CLARIF

## 2024-07-03 RX ORDER — LEVETIRACETAM 500 MG/5ML
1000 INJECTION, SOLUTION, CONCENTRATE INTRAVENOUS EVERY 12 HOURS
Status: DISCONTINUED | OUTPATIENT
Start: 2024-07-04 | End: 2024-07-05

## 2024-07-03 RX ADMIN — ETOMIDATE 20 MG: 2 INJECTION, SOLUTION INTRAVENOUS at 13:55

## 2024-07-03 RX ADMIN — SODIUM CHLORIDE 2000 ML: 9 INJECTION, SOLUTION INTRAVENOUS at 15:28

## 2024-07-03 RX ADMIN — LEVETIRACETAM 1000 MG: 100 INJECTION INTRAVENOUS at 17:52

## 2024-07-03 RX ADMIN — PROPOFOL 20 MCG/KG/MIN: 10 INJECTION, EMULSION INTRAVENOUS at 13:41

## 2024-07-03 RX ADMIN — ROCURONIUM BROMIDE 80 MG: 10 INJECTION, SOLUTION INTRAVENOUS at 13:39

## 2024-07-03 RX ADMIN — CEFEPIME 2000 MG: 2 INJECTION, POWDER, FOR SOLUTION INTRAVENOUS at 19:08

## 2024-07-03 RX ADMIN — SODIUM CHLORIDE, POTASSIUM CHLORIDE, SODIUM LACTATE AND CALCIUM CHLORIDE 1000 ML: 600; 310; 30; 20 INJECTION, SOLUTION INTRAVENOUS at 13:57

## 2024-07-03 RX ADMIN — SODIUM CHLORIDE 10 MCG/MIN: 9 INJECTION, SOLUTION INTRAVENOUS at 16:56

## 2024-07-03 RX ADMIN — MIDAZOLAM HYDROCHLORIDE 5 MG: 1 INJECTION, SOLUTION INTRAMUSCULAR; INTRAVENOUS at 17:26

## 2024-07-03 RX ADMIN — VASOPRESSIN 0.03 UNITS/MIN: 20 INJECTION INTRAVENOUS at 23:00

## 2024-07-03 RX ADMIN — ETOMIDATE 20 MG: 2 INJECTION INTRAVENOUS at 13:55

## 2024-07-03 RX ADMIN — SODIUM BICARBONATE: 84 INJECTION, SOLUTION INTRAVENOUS at 16:57

## 2024-07-03 RX ADMIN — THIAMINE HYDROCHLORIDE 100 MG: 100 INJECTION, SOLUTION INTRAMUSCULAR; INTRAVENOUS at 15:47

## 2024-07-03 RX ADMIN — SODIUM CHLORIDE: 9 INJECTION, SOLUTION INTRAVENOUS at 23:49

## 2024-07-03 RX ADMIN — FENTANYL CITRATE 50 MCG: 50 INJECTION, SOLUTION INTRAMUSCULAR; INTRAVENOUS at 16:51

## 2024-07-03 RX ADMIN — ACETAMINOPHEN 650 MG: 325 TABLET ORAL at 22:31

## 2024-07-03 RX ADMIN — LORAZEPAM 2 MG: 2 INJECTION INTRAMUSCULAR; INTRAVENOUS at 13:30

## 2024-07-03 RX ADMIN — SODIUM BICARBONATE 50 MEQ: 84 INJECTION, SOLUTION INTRAVENOUS at 16:51

## 2024-07-03 RX ADMIN — DROPERIDOL 5 MG: 2.5 INJECTION, SOLUTION INTRAMUSCULAR; INTRAVENOUS at 13:30

## 2024-07-03 RX ADMIN — PROPOFOL 30 MCG/KG/MIN: 10 INJECTION, EMULSION INTRAVENOUS at 20:38

## 2024-07-03 RX ADMIN — MIDAZOLAM HYDROCHLORIDE 2 MG: 1 INJECTION, SOLUTION INTRAMUSCULAR; INTRAVENOUS at 15:27

## 2024-07-03 RX ADMIN — Medication 50 MCG/HR: at 16:55

## 2024-07-03 RX ADMIN — LEVETIRACETAM 1000 MG: 100 INJECTION INTRAVENOUS at 13:58

## 2024-07-03 RX ADMIN — Medication 2 MG/HR: at 17:47

## 2024-07-03 RX ADMIN — VANCOMYCIN HYDROCHLORIDE 2000 MG: 10 INJECTION, POWDER, LYOPHILIZED, FOR SOLUTION INTRAVENOUS at 19:45

## 2024-07-03 RX ADMIN — HYDROCORTISONE SODIUM SUCCINATE 100 MG: 100 INJECTION, POWDER, FOR SOLUTION INTRAMUSCULAR; INTRAVENOUS at 22:31

## 2024-07-03 ASSESSMENT — PULMONARY FUNCTION TESTS
PIF_VALUE: 15
PIF_VALUE: 16
PIF_VALUE: 0
PIF_VALUE: 0
PIF_VALUE: 19
PIF_VALUE: 16
PIF_VALUE: 0
PIF_VALUE: 0
PIF_VALUE: 19
PIF_VALUE: 16
PIF_VALUE: 0
PIF_VALUE: 0

## 2024-07-03 NOTE — ED NOTES
Per providers orders to pull emergent 2 mg ativan from [facility]. Pt not registered at the time medicine was pulled under susy escobar and 2mg Ativan given IM order was scanned and linked in patients chart

## 2024-07-03 NOTE — CONSULTS
if any further seizure activity noted    # Bipolar/depression/anxiety/insomnia  - On multiple psychotropic medication at home  - Alprazolam, Lamotrigine, Brexpiprazole, venlafaxine, Trazodone, and Ambien  - Hold all home meds for now will have pharmacy verify active prescription    Respiratory   # Acute hypoxic respiratory failure  - 2/2 overdose/polysubstance abuse/seizure  - Was intubated in ED for hypoxia and airway protection  - Continue full MVS, Daily CXR and ABG  - Wean oxygen as tolerated. Keep O2 sat 90-92%    Cardiovascular   # Shock   - Unclear etiology possible septic versus hypovolemic, rule out cardiogenic  - History of Takotsubo cardiomyopathy August 2021 felt to be brought on by panic attack, with follow-up stress test with no ischemia and echo revealing resolution: ECHO (9/2/2021) EF 60-65%  - Received 2 L of fluid in ED  - Repeat troponin, check NICOM, check cortisol level and start stress dose steroids, consider echocardiogram  - Continue norepinephrine for MAP > 65    # Tachycardia  - Persistent tachycardia despite fluid administration  - Initial troponin 10 will get repeat, EKG revealed sinus tachycardia with nonspecific T wave abnormality  - Continue maintenance fluid, repeat troponin, repeat EKG, consult cardiology    Gastrointestinal   # No active problems     Renal   # Lactic acidosis   - Possibly from agitation/seizure/sepsis   - Continue fluid resuscitation and trend lactic     Infectious Disease   # Sepsis of unknown origin  - Sepsis criteria met, febrile Tmax 100.9, tachycardic, hypotensive, and tachypneic  - Unclear source at this time, CXR unremarkable, initial lactic 8.8, no leukocytosis, UA unremarkable, follow blood and urine cultures, check baseline CRP, PCT and ESR  - If continued fever may need to consider LP  - Received vancomycin and cefepime in ED will continue for now     Hematology/Oncology   # No active problem  - Check folate and B12    Endocrine   # SLE  - Continue

## 2024-07-03 NOTE — ED PROVIDER NOTES
HPI:  7/3/24, Time: 1:44 PM EDT         Greta Moran is a 54 y.o. female presenting to the ED for presents with syncope possible seizure witnessed by boyfriend.  Found unresponsive.  EMS was called she had difficulty breathing and also difficulty arousing.  Patient make did become combative with EMS.  Patient received IM Haldol 10 mg, 5 mg of Versed IM as well as 200 mg of ketamine IM.  Patient continued to be combative upon arrival.  Her blood glucose was 200.  Patient was not cooperating with EMS or staff.  It was decided for safety reasons the patient the rapid sequence intubated to allow for evaluation., beginning several minutes ago.  The complaint has been persistent, severe in severity, and worsened by nothing.      Further history was obtained from the .  States that she is on medications for her bipolar disorder.  He also states she has a history of alcohol abuse in the past he did admit that they had several drinks a few days ago.  States that she does not take any illicit drugs.  No trauma was reported.  No fevers chills nausea vomiting or constitutional symptoms are reported prior to the incident today.    Review of Systems:   A complete review of systems was performed and pertinent positives and negatives are stated within HPI, all other systems reviewed and are negative.    --------------------------------------------- PAST HISTORY ---------------------------------------------  Past Medical History:  has a past medical history of Anxiety, Arthritis, Asthma, Bipolar 2 disorder (HCC), Bruising tendency, Chronic back pain, Fibromyalgia, Glucose intolerance (impaired glucose tolerance), Insomnia, Lupus (HCC), Memory difficulty, Mild cognitive impairment with memory loss, Osteoporosis, and Thumb pain.    Past Surgical History:  has a past surgical history that includes Tubal ligation (10/1994); Carpal tunnel release; tumor removal; Finger surgery; Hip Arthrotomy (1973); Colonoscopy; hernia  repair (1970); Total hip arthroplasty (Right, 2/25/14); Abdomen surgery; Nerve Block; arthroplasty (Left, 4/16/2019); and Total hip arthroplasty (Left, 3/2/2021).    Social History:  reports that she quit smoking about 10 years ago. Her smoking use included cigarettes. She started smoking about 40 years ago. She has a 30.0 pack-year smoking history. She has never used smokeless tobacco. She reports that she does not currently use alcohol after a past usage of about 3.0 standard drinks of alcohol per week. She reports that she does not use drugs.    Family History: family history includes Cancer in her father and maternal grandmother.     The patient’s home medications have been reviewed.    Allergies: Patient has no known allergies.    -------------------------------------------------- RESULTS -------------------------------------------------  All laboratory and radiology results have been personally reviewed by myself   LABS:  Results for orders placed or performed during the hospital encounter of 07/03/24   Culture, Blood 1    Specimen: Blood   Result Value Ref Range    Specimen Description .BLOOD     Special Requests          Culture NO GROWTH <24 HRS    Culture, Blood 2    Specimen: Blood   Result Value Ref Range    Specimen Description .BLOOD     Special Requests          Culture NO GROWTH <24 HRS    Respiratory Panel, Molecular, with COVID-19 (Restricted: peds pts or suitable admitted adults)    Specimen: Nasopharyngeal Swab   Result Value Ref Range    Specimen Description .NASOPHARYNGEAL SWAB     Adenovirus PCR Not Detected Not Detected    Coronavirus 229E PCR Not Detected Not Detected    Coronavirus HKU1 PCR Not Detected Not Detected    Coronavirus NL63 PCR Not Detected Not Detected    Coronavirus OC43 PCR Not Detected Not Detected    SARS-CoV-2, PCR Not Detected Not Detected    Human Metapneumovirus PCR Not Detected Not Detected    Rhino/Enterovirus PCR Not Detected Not Detected    Influenza A by PCR Not

## 2024-07-04 ENCOUNTER — APPOINTMENT (OUTPATIENT)
Dept: GENERAL RADIOLOGY | Age: 54
DRG: 917 | End: 2024-07-04
Payer: MEDICARE

## 2024-07-04 ENCOUNTER — APPOINTMENT (OUTPATIENT)
Dept: CT IMAGING | Age: 54
DRG: 917 | End: 2024-07-04
Payer: MEDICARE

## 2024-07-04 PROBLEM — R65.21 SEPSIS WITH ENCEPHALOPATHY AND SEPTIC SHOCK (HCC): Status: ACTIVE | Noted: 2024-07-04

## 2024-07-04 PROBLEM — G93.41 ACUTE METABOLIC ENCEPHALOPATHY: Status: ACTIVE | Noted: 2024-07-04

## 2024-07-04 PROBLEM — A41.9 SEPSIS WITH ENCEPHALOPATHY AND SEPTIC SHOCK (HCC): Status: ACTIVE | Noted: 2024-07-04

## 2024-07-04 PROBLEM — R56.9 SEIZURE (HCC): Status: ACTIVE | Noted: 2024-07-04

## 2024-07-04 PROBLEM — R41.82 ALTERED MENTAL STATUS: Status: ACTIVE | Noted: 2024-07-04

## 2024-07-04 PROBLEM — E46 HYPOALBUMINEMIA DUE TO PROTEIN-CALORIE MALNUTRITION (HCC): Status: ACTIVE | Noted: 2024-07-04

## 2024-07-04 PROBLEM — G93.41 SEPSIS WITH ENCEPHALOPATHY AND SEPTIC SHOCK (HCC): Status: ACTIVE | Noted: 2024-07-04

## 2024-07-04 PROBLEM — E88.09 HYPOALBUMINEMIA DUE TO PROTEIN-CALORIE MALNUTRITION (HCC): Status: ACTIVE | Noted: 2024-07-04

## 2024-07-04 PROBLEM — R79.89 ELEVATED TROPONIN: Status: ACTIVE | Noted: 2024-07-04

## 2024-07-04 PROBLEM — R79.89 ELEVATED LACTIC ACID LEVEL: Status: ACTIVE | Noted: 2024-07-04

## 2024-07-04 LAB
ALBUMIN SERPL-MCNC: 3.1 G/DL (ref 3.5–5.2)
ALP SERPL-CCNC: 98 U/L (ref 35–104)
ALT SERPL-CCNC: 108 U/L (ref 0–32)
ANION GAP SERPL CALCULATED.3IONS-SCNC: 12 MMOL/L (ref 7–16)
AST SERPL-CCNC: 162 U/L (ref 0–31)
B PARAP IS1001 DNA NPH QL NAA+NON-PROBE: NOT DETECTED
B PERT DNA SPEC QL NAA+PROBE: NOT DETECTED
B.E.: -6.6 MMOL/L (ref -3–3)
BASOPHILS # BLD: 0.03 K/UL (ref 0–0.2)
BASOPHILS NFR BLD: 0 % (ref 0–2)
BILIRUB SERPL-MCNC: 0.3 MG/DL (ref 0–1.2)
BNP SERPL-MCNC: 8613 PG/ML (ref 0–125)
BUN SERPL-MCNC: 11 MG/DL (ref 6–20)
C PNEUM DNA NPH QL NAA+NON-PROBE: NOT DETECTED
CALCIUM SERPL-MCNC: 6.9 MG/DL (ref 8.6–10.2)
CHLORIDE SERPL-SCNC: 105 MMOL/L (ref 98–107)
CHOLEST SERPL-MCNC: 89 MG/DL
CK SERPL-CCNC: 321 U/L (ref 20–180)
CO2 SERPL-SCNC: 19 MMOL/L (ref 22–29)
COHB: 0.5 % (ref 0–1.5)
CORTIS SERPL-MCNC: 95.2 UG/DL (ref 2.7–18.4)
CORTISOL COLLECTION INFO: ABNORMAL
CREAT SERPL-MCNC: 0.9 MG/DL (ref 0.5–1)
CRITICAL: ABNORMAL
CRP SERPL HS-MCNC: 5 MG/L (ref 0–5)
DATE ANALYZED: ABNORMAL
DATE OF COLLECTION: ABNORMAL
EKG ATRIAL RATE: 128 BPM
EKG P AXIS: 87 DEGREES
EKG P-R INTERVAL: 144 MS
EKG Q-T INTERVAL: 320 MS
EKG QRS DURATION: 92 MS
EKG QTC CALCULATION (BAZETT): 467 MS
EKG R AXIS: 42 DEGREES
EKG T AXIS: 90 DEGREES
EKG VENTRICULAR RATE: 128 BPM
EOSINOPHIL # BLD: 0.02 K/UL (ref 0.05–0.5)
EOSINOPHILS RELATIVE PERCENT: 0 % (ref 0–6)
ERYTHROCYTE [DISTWIDTH] IN BLOOD BY AUTOMATED COUNT: 13.8 % (ref 11.5–15)
ERYTHROCYTE [SEDIMENTATION RATE] IN BLOOD BY WESTERGREN METHOD: 0 MM/HR (ref 0–20)
FIO2: 35 %
FLUAV RNA NPH QL NAA+NON-PROBE: NOT DETECTED
FLUBV RNA NPH QL NAA+NON-PROBE: NOT DETECTED
FOLATE SERPL-MCNC: 6.4 NG/ML (ref 4.8–24.2)
GFR, ESTIMATED: 72 ML/MIN/1.73M2
GLUCOSE SERPL-MCNC: 183 MG/DL (ref 74–99)
HADV DNA NPH QL NAA+NON-PROBE: NOT DETECTED
HCO3: 18.7 MMOL/L (ref 22–26)
HCOV 229E RNA NPH QL NAA+NON-PROBE: NOT DETECTED
HCOV HKU1 RNA NPH QL NAA+NON-PROBE: NOT DETECTED
HCOV NL63 RNA NPH QL NAA+NON-PROBE: NOT DETECTED
HCOV OC43 RNA NPH QL NAA+NON-PROBE: NOT DETECTED
HCT VFR BLD AUTO: 41.6 % (ref 34–48)
HDLC SERPL-MCNC: 36 MG/DL
HGB BLD-MCNC: 13.9 G/DL (ref 11.5–15.5)
HHB: 1.4 % (ref 0–5)
HMPV RNA NPH QL NAA+NON-PROBE: NOT DETECTED
HPIV1 RNA NPH QL NAA+NON-PROBE: NOT DETECTED
HPIV2 RNA NPH QL NAA+NON-PROBE: NOT DETECTED
HPIV3 RNA NPH QL NAA+NON-PROBE: NOT DETECTED
HPIV4 RNA NPH QL NAA+NON-PROBE: NOT DETECTED
IMM GRANULOCYTES # BLD AUTO: 0.1 K/UL (ref 0–0.58)
IMM GRANULOCYTES NFR BLD: 1 % (ref 0–5)
LAB: ABNORMAL
LACTATE BLDV-SCNC: 1.6 MMOL/L (ref 0.5–2.2)
LACTATE BLDV-SCNC: 2.2 MMOL/L (ref 0.5–2.2)
LACTATE BLDV-SCNC: 2.4 MMOL/L (ref 0.5–2.2)
LACTATE BLDV-SCNC: 2.9 MMOL/L (ref 0.5–2.2)
LDLC SERPL CALC-MCNC: 36 MG/DL
LYMPHOCYTES NFR BLD: 1.55 K/UL (ref 1.5–4)
LYMPHOCYTES RELATIVE PERCENT: 10 % (ref 20–42)
Lab: 510
M PNEUMO DNA NPH QL NAA+NON-PROBE: NOT DETECTED
MAGNESIUM SERPL-MCNC: 1.9 MG/DL (ref 1.6–2.6)
MCH RBC QN AUTO: 30.5 PG (ref 26–35)
MCHC RBC AUTO-ENTMCNC: 33.4 G/DL (ref 32–34.5)
MCV RBC AUTO: 91.2 FL (ref 80–99.9)
METHB: 0.3 % (ref 0–1.5)
MODE: AC
MONOCYTES NFR BLD: 1.08 K/UL (ref 0.1–0.95)
MONOCYTES NFR BLD: 7 % (ref 2–12)
NEUTROPHILS NFR BLD: 82 % (ref 43–80)
NEUTS SEG NFR BLD: 12.41 K/UL (ref 1.8–7.3)
O2 CONTENT: 20.5 ML/DL
O2 SATURATION: 98.6 % (ref 92–98.5)
O2HB: 97.8 % (ref 94–97)
OPERATOR ID: 5133
PARTIAL THROMBOPLASTIN TIME: 27.9 SEC (ref 24.5–35.1)
PARTIAL THROMBOPLASTIN TIME: 89.8 SEC (ref 24.5–35.1)
PATIENT TEMP: 37 C
PCO2: 36.8 MMHG (ref 35–45)
PEEP/CPAP: 5 CMH2O
PFO2: 3.86 MMHG/%
PH BLOOD GAS: 7.32 (ref 7.35–7.45)
PHOSPHATE SERPL-MCNC: 4.2 MG/DL (ref 2.5–4.5)
PLATELET # BLD AUTO: 224 K/UL (ref 130–450)
PMV BLD AUTO: 10.8 FL (ref 7–12)
PO2: 135.2 MMHG (ref 75–100)
POTASSIUM SERPL-SCNC: 4.2 MMOL/L (ref 3.5–5)
PROCALCITONIN SERPL-MCNC: 0.63 NG/ML (ref 0–0.08)
PROT SERPL-MCNC: 5.2 G/DL (ref 6.4–8.3)
RBC # BLD AUTO: 4.56 M/UL (ref 3.5–5.5)
RI(T): 0.53
RR MECHANICAL: 14 B/MIN
RSV RNA NPH QL NAA+NON-PROBE: NOT DETECTED
RV+EV RNA NPH QL NAA+NON-PROBE: NOT DETECTED
SARS-COV-2 RNA NPH QL NAA+NON-PROBE: NOT DETECTED
SODIUM SERPL-SCNC: 136 MMOL/L (ref 132–146)
SOURCE, BLOOD GAS: ABNORMAL
SPECIMEN DESCRIPTION: NORMAL
THB: 14.8 G/DL (ref 11.5–16.5)
TIME ANALYZED: 520
TRIGL SERPL-MCNC: 87 MG/DL
TROPONIN I SERPL HS-MCNC: 550 NG/L (ref 0–9)
TROPONIN I SERPL HS-MCNC: 626 NG/L (ref 0–9)
VIT B12 SERPL-MCNC: 705 PG/ML (ref 211–946)
VLDLC SERPL CALC-MCNC: 17 MG/DL
VT MECHANICAL: 400 ML
WBC OTHER # BLD: 15.2 K/UL (ref 4.5–11.5)

## 2024-07-04 PROCEDURE — 6370000000 HC RX 637 (ALT 250 FOR IP)

## 2024-07-04 PROCEDURE — 80053 COMPREHEN METABOLIC PANEL: CPT

## 2024-07-04 PROCEDURE — 82607 VITAMIN B-12: CPT

## 2024-07-04 PROCEDURE — 82550 ASSAY OF CK (CPK): CPT

## 2024-07-04 PROCEDURE — 93005 ELECTROCARDIOGRAM TRACING: CPT

## 2024-07-04 PROCEDURE — 82746 ASSAY OF FOLIC ACID SERUM: CPT

## 2024-07-04 PROCEDURE — 85025 COMPLETE CBC W/AUTO DIFF WBC: CPT

## 2024-07-04 PROCEDURE — 6360000002 HC RX W HCPCS: Performed by: INTERNAL MEDICINE

## 2024-07-04 PROCEDURE — 80061 LIPID PANEL: CPT

## 2024-07-04 PROCEDURE — 84100 ASSAY OF PHOSPHORUS: CPT

## 2024-07-04 PROCEDURE — 6360000002 HC RX W HCPCS: Performed by: EMERGENCY MEDICINE

## 2024-07-04 PROCEDURE — 6360000002 HC RX W HCPCS

## 2024-07-04 PROCEDURE — 82805 BLOOD GASES W/O2 SATURATION: CPT

## 2024-07-04 PROCEDURE — 74176 CT ABD & PELVIS W/O CONTRAST: CPT

## 2024-07-04 PROCEDURE — 2580000003 HC RX 258

## 2024-07-04 PROCEDURE — 2580000003 HC RX 258: Performed by: INTERNAL MEDICINE

## 2024-07-04 PROCEDURE — 2000000000 HC ICU R&B

## 2024-07-04 PROCEDURE — 6370000000 HC RX 637 (ALT 250 FOR IP): Performed by: INTERNAL MEDICINE

## 2024-07-04 PROCEDURE — 71045 X-RAY EXAM CHEST 1 VIEW: CPT

## 2024-07-04 PROCEDURE — 37799 UNLISTED PX VASCULAR SURGERY: CPT

## 2024-07-04 PROCEDURE — 93010 ELECTROCARDIOGRAM REPORT: CPT | Performed by: INTERNAL MEDICINE

## 2024-07-04 PROCEDURE — 82533 TOTAL CORTISOL: CPT

## 2024-07-04 PROCEDURE — 2500000003 HC RX 250 WO HCPCS

## 2024-07-04 PROCEDURE — 71250 CT THORAX DX C-: CPT

## 2024-07-04 PROCEDURE — 83605 ASSAY OF LACTIC ACID: CPT

## 2024-07-04 PROCEDURE — 83735 ASSAY OF MAGNESIUM: CPT

## 2024-07-04 PROCEDURE — 2500000003 HC RX 250 WO HCPCS: Performed by: EMERGENCY MEDICINE

## 2024-07-04 PROCEDURE — 2500000003 HC RX 250 WO HCPCS: Performed by: INTERNAL MEDICINE

## 2024-07-04 PROCEDURE — 99291 CRITICAL CARE FIRST HOUR: CPT | Performed by: INTERNAL MEDICINE

## 2024-07-04 PROCEDURE — 99291 CRITICAL CARE FIRST HOUR: CPT

## 2024-07-04 PROCEDURE — 84484 ASSAY OF TROPONIN QUANT: CPT

## 2024-07-04 PROCEDURE — 94003 VENT MGMT INPAT SUBQ DAY: CPT

## 2024-07-04 PROCEDURE — 85730 THROMBOPLASTIN TIME PARTIAL: CPT

## 2024-07-04 PROCEDURE — 83880 ASSAY OF NATRIURETIC PEPTIDE: CPT

## 2024-07-04 RX ORDER — HEPARIN SODIUM 1000 [USP'U]/ML
30 INJECTION, SOLUTION INTRAVENOUS; SUBCUTANEOUS PRN
Status: DISCONTINUED | OUTPATIENT
Start: 2024-07-04 | End: 2024-07-05 | Stop reason: HOSPADM

## 2024-07-04 RX ORDER — MIDAZOLAM HYDROCHLORIDE 2 MG/2ML
1 INJECTION, SOLUTION INTRAMUSCULAR; INTRAVENOUS EVERY 30 MIN PRN
Status: DISCONTINUED | OUTPATIENT
Start: 2024-07-04 | End: 2024-07-05 | Stop reason: HOSPADM

## 2024-07-04 RX ORDER — HEPARIN SODIUM 1000 [USP'U]/ML
60 INJECTION, SOLUTION INTRAVENOUS; SUBCUTANEOUS ONCE
Status: COMPLETED | OUTPATIENT
Start: 2024-07-04 | End: 2024-07-04

## 2024-07-04 RX ORDER — FENTANYL CITRATE-0.9 % NACL/PF 10 MCG/ML
25-200 PLASTIC BAG, INJECTION (ML) INTRAVENOUS CONTINUOUS
Status: DISCONTINUED | OUTPATIENT
Start: 2024-07-04 | End: 2024-07-05 | Stop reason: HOSPADM

## 2024-07-04 RX ORDER — HEPARIN SODIUM 1000 [USP'U]/ML
60 INJECTION, SOLUTION INTRAVENOUS; SUBCUTANEOUS PRN
Status: DISCONTINUED | OUTPATIENT
Start: 2024-07-04 | End: 2024-07-05 | Stop reason: HOSPADM

## 2024-07-04 RX ORDER — HEPARIN SODIUM 1000 [USP'U]/ML
60 INJECTION, SOLUTION INTRAVENOUS; SUBCUTANEOUS PRN
Status: DISCONTINUED | OUTPATIENT
Start: 2024-07-04 | End: 2024-07-04

## 2024-07-04 RX ORDER — FENTANYL CITRATE-0.9 % NACL/PF 20 MCG/2ML
50 SYRINGE (ML) INTRAVENOUS EVERY 30 MIN PRN
Status: DISCONTINUED | OUTPATIENT
Start: 2024-07-04 | End: 2024-07-05 | Stop reason: HOSPADM

## 2024-07-04 RX ORDER — HEPARIN SODIUM 1000 [USP'U]/ML
30 INJECTION, SOLUTION INTRAVENOUS; SUBCUTANEOUS PRN
Status: DISCONTINUED | OUTPATIENT
Start: 2024-07-04 | End: 2024-07-04

## 2024-07-04 RX ORDER — HEPARIN SODIUM 10000 [USP'U]/100ML
5-30 INJECTION, SOLUTION INTRAVENOUS CONTINUOUS
Status: DISCONTINUED | OUTPATIENT
Start: 2024-07-04 | End: 2024-07-05 | Stop reason: HOSPADM

## 2024-07-04 RX ORDER — HEPARIN SODIUM 1000 [USP'U]/ML
60 INJECTION, SOLUTION INTRAVENOUS; SUBCUTANEOUS ONCE
Status: DISCONTINUED | OUTPATIENT
Start: 2024-07-04 | End: 2024-07-04

## 2024-07-04 RX ORDER — ASPIRIN 81 MG/1
324 TABLET, CHEWABLE ORAL ONCE
Status: COMPLETED | OUTPATIENT
Start: 2024-07-04 | End: 2024-07-04

## 2024-07-04 RX ORDER — MIDAZOLAM HYDROCHLORIDE 1 MG/ML
1-10 INJECTION, SOLUTION INTRAVENOUS CONTINUOUS
Status: DISCONTINUED | OUTPATIENT
Start: 2024-07-04 | End: 2024-07-05 | Stop reason: HOSPADM

## 2024-07-04 RX ORDER — CHLORHEXIDINE GLUCONATE ORAL RINSE 1.2 MG/ML
15 SOLUTION DENTAL 2 TIMES DAILY
Status: DISCONTINUED | OUTPATIENT
Start: 2024-07-04 | End: 2024-07-05 | Stop reason: HOSPADM

## 2024-07-04 RX ORDER — CALCIUM GLUCONATE 20 MG/ML
2000 INJECTION, SOLUTION INTRAVENOUS ONCE
Status: COMPLETED | OUTPATIENT
Start: 2024-07-04 | End: 2024-07-04

## 2024-07-04 RX ADMIN — ACYCLOVIR SODIUM 600 MG: 50 INJECTION, SOLUTION INTRAVENOUS at 23:00

## 2024-07-04 RX ADMIN — HYDROXYCHLOROQUINE SULFATE 200 MG: 200 TABLET ORAL at 20:30

## 2024-07-04 RX ADMIN — HEPARIN SODIUM 3700 UNITS: 1000 INJECTION INTRAVENOUS; SUBCUTANEOUS at 12:09

## 2024-07-04 RX ADMIN — PANTOPRAZOLE SODIUM 40 MG: 40 INJECTION, POWDER, FOR SOLUTION INTRAVENOUS at 09:00

## 2024-07-04 RX ADMIN — LEVETIRACETAM 1000 MG: 100 INJECTION INTRAVENOUS at 18:20

## 2024-07-04 RX ADMIN — SODIUM CHLORIDE: 9 INJECTION, SOLUTION INTRAVENOUS at 20:28

## 2024-07-04 RX ADMIN — SODIUM CHLORIDE 14 MCG/MIN: 9 INJECTION, SOLUTION INTRAVENOUS at 19:04

## 2024-07-04 RX ADMIN — CEFEPIME 2000 MG: 2 INJECTION, POWDER, FOR SOLUTION INTRAVENOUS at 09:05

## 2024-07-04 RX ADMIN — VANCOMYCIN HYDROCHLORIDE 1250 MG: 10 INJECTION, POWDER, LYOPHILIZED, FOR SOLUTION INTRAVENOUS at 12:28

## 2024-07-04 RX ADMIN — Medication 6 MG/HR: at 13:53

## 2024-07-04 RX ADMIN — Medication 100 MCG/HR: at 01:16

## 2024-07-04 RX ADMIN — SODIUM CHLORIDE: 9 INJECTION, SOLUTION INTRAVENOUS at 10:20

## 2024-07-04 RX ADMIN — ACETAMINOPHEN 650 MG: 325 TABLET ORAL at 19:03

## 2024-07-04 RX ADMIN — Medication 175 MCG/HR: at 22:54

## 2024-07-04 RX ADMIN — LEVETIRACETAM 1000 MG: 100 INJECTION INTRAVENOUS at 05:38

## 2024-07-04 RX ADMIN — ACYCLOVIR SODIUM 300 MG: 50 INJECTION, SOLUTION INTRAVENOUS at 16:47

## 2024-07-04 RX ADMIN — SODIUM CHLORIDE 20 MCG/MIN: 9 INJECTION, SOLUTION INTRAVENOUS at 03:25

## 2024-07-04 RX ADMIN — Medication 100 MCG/HR: at 09:00

## 2024-07-04 RX ADMIN — VASOPRESSIN 0.03 UNITS/MIN: 20 INJECTION INTRAVENOUS at 08:59

## 2024-07-04 RX ADMIN — ACYCLOVIR SODIUM 305 MG: 50 INJECTION, SOLUTION INTRAVENOUS at 12:44

## 2024-07-04 RX ADMIN — HYDROCORTISONE SODIUM SUCCINATE 100 MG: 100 INJECTION, POWDER, FOR SOLUTION INTRAMUSCULAR; INTRAVENOUS at 20:29

## 2024-07-04 RX ADMIN — HYDROCORTISONE SODIUM SUCCINATE 100 MG: 100 INJECTION, POWDER, FOR SOLUTION INTRAMUSCULAR; INTRAVENOUS at 12:58

## 2024-07-04 RX ADMIN — ACETAMINOPHEN 650 MG: 325 TABLET ORAL at 12:51

## 2024-07-04 RX ADMIN — CHLORHEXIDINE GLUCONATE 15 ML: 1.2 RINSE ORAL at 09:02

## 2024-07-04 RX ADMIN — ASPIRIN 81 MG CHEWABLE TABLET 324 MG: 81 TABLET CHEWABLE at 01:19

## 2024-07-04 RX ADMIN — HYDROCORTISONE SODIUM SUCCINATE 100 MG: 100 INJECTION, POWDER, FOR SOLUTION INTRAMUSCULAR; INTRAVENOUS at 05:39

## 2024-07-04 RX ADMIN — VASOPRESSIN 0.03 UNITS/MIN: 20 INJECTION INTRAVENOUS at 21:45

## 2024-07-04 RX ADMIN — CALCIUM GLUCONATE 2000 MG: 20 INJECTION, SOLUTION INTRAVENOUS at 06:13

## 2024-07-04 RX ADMIN — HEPARIN SODIUM 12 UNITS/KG/HR: 10000 INJECTION, SOLUTION INTRAVENOUS at 12:12

## 2024-07-04 RX ADMIN — Medication 175 MCG/HR: at 16:46

## 2024-07-04 RX ADMIN — CHLORHEXIDINE GLUCONATE 15 ML: 1.2 RINSE ORAL at 20:30

## 2024-07-04 RX ADMIN — CEFEPIME 2000 MG: 2 INJECTION, POWDER, FOR SOLUTION INTRAVENOUS at 20:36

## 2024-07-04 RX ADMIN — Medication 7 MG/HR: at 16:48

## 2024-07-04 ASSESSMENT — PULMONARY FUNCTION TESTS
PIF_VALUE: 19
PIF_VALUE: 15
PIF_VALUE: 15
PIF_VALUE: 14
PIF_VALUE: 16
PIF_VALUE: 15
PIF_VALUE: 15
PIF_VALUE: 18
PIF_VALUE: 17
PIF_VALUE: 29
PIF_VALUE: 5
PIF_VALUE: 16
PIF_VALUE: 40
PIF_VALUE: 14
PIF_VALUE: 15
PIF_VALUE: 16
PIF_VALUE: 19
PIF_VALUE: 16
PIF_VALUE: 40
PIF_VALUE: 16
PIF_VALUE: 15
PIF_VALUE: 17
PIF_VALUE: 18
PIF_VALUE: 21
PIF_VALUE: 17
PIF_VALUE: 17
PIF_VALUE: 15

## 2024-07-04 ASSESSMENT — PAIN SCALES - GENERAL
PAINLEVEL_OUTOF10: 0

## 2024-07-04 NOTE — PROGRESS NOTES
Sedated on vent- per nurse seems overall some improved  Vss  lungs clear  reg rhythm  abd- soft and non-tender  ext- no edema  Stable- lab noted  Continue present care- discussed with cardiology and nurse- will cardiac workup etc.

## 2024-07-04 NOTE — PROGRESS NOTES
Critical Care Team - Daily Progress Note      Date and time: 7/4/2024 11:06 AM  Patient's name:  Greta Moran  Medical Record Number: [id number]  Patient's account/billing number: [id number]  Patient's YOB: 1970  Age: 54 y.o.  Date of Admission: 7/3/2024  1:12 PM  Length of stay during current admission: 1      Primary Care Physician: Brian Houston MD  ICU Attending Physician: Dr. Millan    Code Status: Prior    Reason for ICU admission: Critical care management      SUBJECTIVE:     OVERNIGHT EVENTS:           Patient continued to be on norepinephrine and vasopressin was added for support of her blood pressure.  NICOM was negative.  Lactic acidosis resolving.  Patient continues to be on vancomycin and cefepime.  Patient did have episodes of fever overnight and was tachycardic.  Troponin was elevated however is on a decreasing trend.  Patient does have history of Takotsubo in the past, cardiology was consulted.  Patient will be started on heparin drip for the NSTEMI. Keppra was continued.  Initially patient was on fentanyl Versed and propofol in the emergency room and patient did wake up and become agitated hence the sedations.  Continue with the fentanyl and Versed for sedation.  Lumbar puncture was considered to rule out any meningitis however was deferred to IR because patient had hardware from the lower back with a history of back surgery.  Empirically, acyclovir was started.  Will obtain a CT chest and CT abdomen for evaluation-infected source.      Intake/Output:   No intake/output data recorded.  I/O last 3 completed shifts:  In: 2150.3 [I.V.:1422.2; IV Piggyback:728.2]  Out: 275 [Urine:275]    Awake and following commands: No  Current Ventilation: - Ventilator Settings:    Vt (Set, mL): 400 mL  Resp Rate (Set): 14 bpm  FiO2 : 40 %    PEEP/CPAP (cmH2O): 5     Secretions: None  Sedation: fentanyl and versed  Paralyzed: No  Vasopressors: norepinephrine, vasopressin    ROS:  Unless stated  prior to the UDS being drawn  - Ketamine, lamotrigine, and venlafaxine all can cause false positive phencyclidine on UDS  - Every 4 hours neuroexams, continue to monitor for seizure activity, check lamotrigine level    #?Meningitis  - FU with IR Lumbar puncture, bedside LP deferred due to h/o back surgery with hardware in place  - Acyclovir added to antiinfective regimen      # Seizure  - No known history of seizures  - Currently on propofol and Versed  - Received 1 g of Keppra x 2 doses 4 hours apart  - Will continue Keppra IV  - May need EEG if any further seizure activity noted     # Bipolar/depression/anxiety/insomnia  - On multiple psychotropic medication at home  - Alprazolam, Lamotrigine, Brexpiprazole, venlafaxine, Trazodone, and Ambien  - Hold all home meds for now will have pharmacy verify active prescription  -Follow-up lamotrigine levels     Respiratory   # Acute hypoxic respiratory failure  - 2/2 overdose/polysubstance abuse/seizure  - Was intubated in ED for hypoxia and airway protection  - Continue full MVS, Daily CXR and ABG  - Wean oxygen as tolerated. Keep O2 sat 90-92%     Cardiovascular   # Shock   - Unclear etiology possible septic versus hypovolemic, rule out cardiogenic  - History of Takotsubo cardiomyopathy August 2021 felt to be brought on by panic attack, with follow-up stress test with no ischemia and echo revealing resolution: ECHO (9/2/2021) EF 60-65%  - Received 2 L of fluid in ED  - Stress dose steroids, consider echocardiogram  - Continue norepinephrine and vasopressin for MAP > 65  - Wean pressors as able    # Elevated troponin  -Troponin trended -286-550  - No STEMI in EKG  -Consult cardiology  - Aspirin given  - Heparin drip for ACS    # Elevated proBNP  - 8613  - ECHO (9/2/2021) EF 60-65%  - Follow-up with cardiology, repeat echo, EKG  - History of Takotsubo cardiomyopathy     # Tachycardia  - Persistent tachycardia despite fluid administration  - Initial troponin 10 will

## 2024-07-04 NOTE — CONSULTS
Inpatient Cardiology Consultation      Reason for Consult: Elevated troponin and tachycardia    Consulting Physician: Dr. Cross    Requesting Physician:  Gabino Sweet, JACOBY - CNP     Date of Consultation: 7/4/2024    Previous known to Dr. Saez's  service at [facility] cardiology    HISTORY OF PRESENT ILLNESS:   Ms. Moran is a 54-year-old  female with history of anxiety disorder, arthritis, asthma, bipolar disorder chronic back pain, fibromyalgia, impaired glucose tolerance, insomnia, lupus, cognitive dysfunction and multiple orthopedic surgeries as described below.  She E is a former smoker quit in 2014 with 30-pack-year history of smoking    Patient was admitted through emergency room on 7/3/2024 with complaints of altered mental status.  Currently patient is intubated and sedated and was placed on the ventilator.  Most of the history was gathered from medical records and also from the patient's  who was at the bedside.  Apparently, patient was doing well without any major issues yesterday morning when her  went out for a walk.  When he came back from the walker his granddaughter came running to the driveway and told him his wife who was in distress.  When he went and checked, her patient was found confused in the bathroom and her knees with blood in her mouth without any incontinence and confused.  He did not notice any tonic-clonic movements.  He did not speak and could not recognize him.  He helped her to get to the chair but was confused.  He called EMS service when the EMS arrived patient was confused and combative.  Later she was transferred to the emergency room and sedated and intubated.  Also spoke to , patient takes prescription pain medications.  And no history of any substance abuse.  Per ER records her blood glucose level was 200 but was combative.  Patient was intubated in the ER using rapid sequence intubation for safety reasons.    ER evaluation: Blood  ulcers   NEURO / PSYCH: Oriented to person, place and time. Speech clear and appropriate. Follows all commands. Pleasant affect     DATA:    ECG / Tele strips: please see HPI   Diagnostic:      Intake/Output Summary (Last 24 hours) at 7/4/2024 0759  Last data filed at 7/4/2024 0700  Gross per 24 hour   Intake 2150.34 ml   Output 275 ml   Net 1875.34 ml       Labs:   CBC:   Recent Labs     07/03/24 2140 07/04/24  0510   WBC 15.8* 15.2*   HGB 12.8 13.9   HCT 38.7 41.6    224     BMP:   Recent Labs     07/03/24 2140 07/04/24  0510    136   K 4.2 4.2   CO2 24 19*   BUN 8 11   CREATININE 0.9 0.9   LABGLOM 75 72   CALCIUM 7.0* 6.9*     Mag:   Recent Labs     07/03/24 2140 07/04/24  0510   MG 1.9 1.9     Phos:   Recent Labs     07/03/24 2140 07/04/24  0510   PHOS 3.0 4.2     TSH: No results for input(s): \"TSH\" in the last 72 hours.  HgA1c: No results found for: \"LABA1C\"  No results found for: \"EAG\"  proBNP:   Recent Labs     07/04/24  0510   PROBNP 8,613*     PT/INR: No results for input(s): \"PROTIME\", \"INR\" in the last 72 hours.  APTT:No results for input(s): \"APTT\" in the last 72 hours.  CARDIAC ENZYMES:  Recent Labs     07/03/24  1343 07/03/24 2140 07/04/24  0510   CKTOTAL 179 288* 321*     FASTING LIPID PANEL:  Lab Results   Component Value Date/Time    CHOL 117 09/03/2021 05:12 AM    HDL 36 07/04/2024 05:10 AM    TRIG 84 09/03/2021 05:12 AM     LIVER PROFILE:  Recent Labs     07/03/24  1343 07/04/24  0510   AST 21 162*   ALT 10 108*   Data review:  Telemetry-    EKG: Sinus tachycardia, nonspecific ST changes, abnormal EKG.    Vitals and labs were reviewed: As above blood pressure 104/71, heart rate 119, currently on Levophed at 18 mics per minute vasopressin 0.03 units/min    Labs-BMP normal proBNP 8613, troponin 10>> 595>> 550, urine drug screen positive for benzos buprenorphine fentanyl and phencyclidine, AST//108, WBC 15.2 rest of CBC normal.,  Respiratory panel

## 2024-07-04 NOTE — PROGRESS NOTES
4 Eyes Skin Assessment     NAME:  Greta Moran  YOB: 1970  MEDICAL RECORD NUMBER:  [id number]    The patient is being assessed for  Admission    I agree that at least one RN has performed a thorough Head to Toe Skin Assessment on the patient. ALL assessment sites listed below have been assessed.      Areas assessed by both nurses:    Head, Face, Ears, Shoulders, Back, Chest, Arms, Elbows, Hands, Sacrum. Buttock, Coccyx, Ischium, and Legs. Feet and Heels        Does the Patient have a Wound? No noted wound(s)       Kory Prevention initiated by RN: Yes  Wound Care Orders initiated by RN: No    Pressure Injury (Stage 3,4, Unstageable, DTI, NWPT, and Complex wounds) if present, place Wound referral order by RN under : No    New Ostomies, if present place, Ostomy referral order under : No     Nurse 1 eSignature: Electronically signed by Keara Salazar RN on 7/4/24 at 2:55 AM EDT    **SHARE this note so that the co-signing nurse can place an eSignature**    Nurse 2 eSignature: Electronically signed by MAGDY FERREIRA RN on 7/4/24 at 2:57 AM EDT

## 2024-07-04 NOTE — PROGRESS NOTES
Pharmacy Consultation Note  (Antibiotic Dosing and Monitoring)    Initial consult date: 7/4  Consulting physician/provider: Kee  Drug: Vancomycin  Indication: Sepsis x 7 days    Age/  Gender Height Weight IBW  Allergy Information   54 y.o./female 152.4 cm (5') 61.2 kg (135 lb)     Ideal body weight: 45.5 kg (100 lb 4.9 oz)  Adjusted ideal body weight: 51.8 kg (114 lb 3 oz)   Patient has no known allergies.      Renal Function:  Recent Labs     07/03/24  1343 07/03/24  2140 07/04/24  0510   BUN 6 8 11   CREATININE 0.9 0.9 0.9       Intake/Output Summary (Last 24 hours) at 7/4/2024 0911  Last data filed at 7/4/2024 0700  Gross per 24 hour   Intake 2150.34 ml   Output 275 ml   Net 1875.34 ml       Vancomycin Monitoring:  Trough:  No results for input(s): \"VANCOTROUGH\" in the last 72 hours.  Random:  No results for input(s): \"VANCORANDOM\" in the last 72 hours.        Assessment:  Patient is a 54 y.o. female who has been initiated on vancomycin  Estimated Creatinine Clearance: 57 mL/min (based on SCr of 0.9 mg/dL).  No history of vancomycin levels in epic  Patient received vancomycin 2000 mg IV x 1 on 7/3 (1945).    Plan:  Will place standing order for vancomycin 1250 mg IV every 24 hours to begin now for projected auc/reid 400-600.  Will check vancomycin levels when appropriate  Will continue to monitor renal function   Pharmacy to follow      Mando Snowden RPH 7/4/2024 9:11 AM    SEB: [phone]  SEY: [phone]  SJW: [phone]

## 2024-07-04 NOTE — PROGRESS NOTES
Intensive Care Daily Quality Rounding Checklist      ICU Team Members:     ICU Day #: 2    SOFA Score: 10    Intubation Date: 7/3/24    Ventilator Day #: 2    Central Line Insertion Date: 7/3/24        Day #: 2        Indication: CVCIndication: Administration of vasopressors or vesicant medications     Arterial Line Insertion Date: 7/3/24      Day #: 2    Temporary Hemodialysis Catheter Insertion Date: n/a      Day #     DVT Prophylaxis: start a heparin gtt    GI Prophylaxis: Protonix    [place] Catheter Insertion Date: 7/3/24       Day #: 2      Indications: Need for fluid management of the critically ill patient in a critical care setting      Continued need (if yes, reason documented and discussed with physician): yes    Skin Issues/ Wounds and ordered treatment discussed on rounds: SOS precautions    Goals/ Plans for the Day: Wean vent; wean pressors; Cardiology consult and EKG, wean pressors as tolerated, continue to trend trops and lactics, daily restraint order for safety,   Check LP today, heparin gtt for ACS protocol p/cardiology, non contrast chest, abd and pelvis CT's,   Reviewed plan and goals for day with patient and/or representative:

## 2024-07-04 NOTE — H&P
[place] [address]                           HISTORY & PHYSICAL      PATIENT NAME: JENI KURTZ               : 1970  MED REC NO: [id number]                        ROOM: 0205  ACCOUNT NO: [id number]                       ADMIT DATE: 2024  PROVIDER: Brian Houston MD      CHIEF COMPLAINT:  Altered mental status.    HISTORY OF PRESENT ILLNESS:  The patient is a 54-year-old white female, who per her  was perfectly fine on the day of admission.  He saw abruptly.  She did state that she did not feel well.  She was going to apparently throw up over the toilet when suddenly he states she went unresponsive.  She seemed to be unconscious for approximately 15 to 20 seconds.  Upon awakening, she was confused and combative.  She did not recognize him.  She was brought to the emergency room, where after evaluation there, she was thought to perhaps had a drug overdose of some type as there are drug screens and multiple controlled substances in them.  She also was combative and had to be sedated and subsequently placed on a ventilator for acute respiratory failure from the sedation.  She had an elevated lactic acid as well as other abnormal labs and she was given fluids and then admitted to intensive care.  It should be noted at this time that there is no reliable historian I can go over this with, so the information will be somewhat incomplete.    PAST MEDICAL HISTORY:  Surgeries:  History not available.    Hospitalizations:  To my knowledge, nothing recent.    Medical problems:  She has a history of asthma, bipolar disorder, anxiety, depression, systemic lupus erythematosus, and fibromyalgia.    ALLERGIES:  NONE KNOWN.      MEDICATIONS:  Effexor, Fosamax, Toprol-XL, Carafate, calcium, Nitrostat as needed, Prilosec, Rexulti, vitamin D, Plaquenil, Xanax, albuterol inhaler, Lamictal, Ambien, and Desyrel.  It should

## 2024-07-04 NOTE — PLAN OF CARE
Problem: Safety - Adult  Goal: Free from fall injury  Outcome: Progressing     Problem: Discharge Planning  Goal: Discharge to home or other facility with appropriate resources  Outcome: Progressing  Flowsheets (Taken 7/3/2024 2100)  Discharge to home or other facility with appropriate resources: Identify barriers to discharge with patient and caregiver     Problem: Safety - Medical Restraint  Goal: Remains free of injury from restraints (Restraint for Interference with Medical Device)  Description: INTERVENTIONS:  1. Determine that other, less restrictive measures have been tried or would not be effective before applying the restraint  2. Evaluate the patient's condition at the time of restraint application  3. Inform patient/family regarding the reason for restraint  4. Q2H: Monitor safety, psychosocial status, comfort, nutrition and hydration  Outcome: Progressing  Flowsheets  Taken 7/4/2024 0200 by Keara Salazar RN  Remains free of injury from restraints (restraint for interference with medical device):   Determine that other, less restrictive measures have been tried or would not be effective before applying the restraint   Evaluate the patient's condition at the time of restraint application   Inform patient/family regarding the reason for restraint   Every 2 hours: Monitor safety, psychosocial status, comfort, nutrition and hydration  Taken 7/4/2024 0000 by Keara Salazar RN  Remains free of injury from restraints (restraint for interference with medical device):   Determine that other, less restrictive measures have been tried or would not be effective before applying the restraint   Evaluate the patient's condition at the time of restraint application   Inform patient/family regarding the reason for restraint   Every 2 hours: Monitor safety, psychosocial status, comfort, nutrition and hydration  Taken 7/3/2024 2200 by Lisa Tran RN  Remains free of injury from restraints (restraint for  interference with medical device): Every 2 hours: Monitor safety, psychosocial status, comfort, nutrition and hydration  Taken 7/3/2024 2119 by Lisa Tran RN  Remains free of injury from restraints (restraint for interference with medical device): Every 2 hours: Monitor safety, psychosocial status, comfort, nutrition and hydration     Problem: Pain  Goal: Verbalizes/displays adequate comfort level or baseline comfort level  Outcome: Progressing  Flowsheets  Taken 7/4/2024 0200  Verbalizes/displays adequate comfort level or baseline comfort level: Assess pain using appropriate pain scale  Taken 7/4/2024 0000  Verbalizes/displays adequate comfort level or baseline comfort level: Assess pain using appropriate pain scale  Taken 7/3/2024 2200  Verbalizes/displays adequate comfort level or baseline comfort level: Assess pain using appropriate pain scale

## 2024-07-04 NOTE — PROCEDURES
PROCEDURE NOTE  Date: 7/3/2024   Name: Greta Moran  YOB: 1970    Arterial Line Placement Procedure Note                     Indication: severe hypotension    Consent: Unable to be obtained due to the emergent nature of this procedure.      Procedure: The skin over the right radial artery was prepped with Chloraprep and draped in a sterile fashion.  Local anesthesia was not performed due to the emergent nature of this procedure.  A 20 gauge arterial line catheter was then inserted, under ultrasound guidance, using a modified Seldinger technique, into the vessel.  The transducer set was then attached and securely fastened to the skin with 1 suture.  Waveforms on the monitor were observed and found to be adequate.  The patient had good distal perfusion after the procedure. The site was then dressed in a sterile fashion.    The patient tolerated the procedure well.     Complications: None    Electronically signed by JACOBY Quinones CNP on 7/3/2024 at 11:54 PM

## 2024-07-05 ENCOUNTER — APPOINTMENT (OUTPATIENT)
Dept: GENERAL RADIOLOGY | Age: 54
DRG: 917 | End: 2024-07-05
Payer: MEDICARE

## 2024-07-05 ENCOUNTER — HOSPITAL ENCOUNTER (INPATIENT)
Age: 54
LOS: 7 days | Discharge: ANOTHER HOSPITAL | DRG: 917 | End: 2024-07-12
Attending: FAMILY MEDICINE | Admitting: FAMILY MEDICINE
Payer: MEDICARE

## 2024-07-05 ENCOUNTER — HOSPITAL ENCOUNTER (OUTPATIENT)
Age: 54
Setting detail: OUTPATIENT SURGERY
Discharge: ANOTHER HOSPITAL | End: 2024-07-05
Attending: INTERNAL MEDICINE
Payer: MEDICARE

## 2024-07-05 ENCOUNTER — APPOINTMENT (OUTPATIENT)
Age: 54
DRG: 917 | End: 2024-07-05
Attending: INTERNAL MEDICINE
Payer: MEDICARE

## 2024-07-05 VITALS
OXYGEN SATURATION: 96 % | SYSTOLIC BLOOD PRESSURE: 117 MMHG | HEART RATE: 106 BPM | DIASTOLIC BLOOD PRESSURE: 72 MMHG | RESPIRATION RATE: 14 BRPM

## 2024-07-05 VITALS
RESPIRATION RATE: 15 BRPM | BODY MASS INDEX: 26.5 KG/M2 | DIASTOLIC BLOOD PRESSURE: 73 MMHG | WEIGHT: 135 LBS | OXYGEN SATURATION: 96 % | HEART RATE: 114 BPM | HEIGHT: 60 IN | TEMPERATURE: 100 F | SYSTOLIC BLOOD PRESSURE: 113 MMHG

## 2024-07-05 DIAGNOSIS — I21.4 NSTEMI (NON-ST ELEVATED MYOCARDIAL INFARCTION) (HCC): ICD-10-CM

## 2024-07-05 LAB
ALBUMIN SERPL-MCNC: 3.1 G/DL (ref 3.5–5.2)
ALP SERPL-CCNC: 83 U/L (ref 35–104)
ALT SERPL-CCNC: 170 U/L (ref 0–32)
ANION GAP SERPL CALCULATED.3IONS-SCNC: 9 MMOL/L (ref 7–16)
AST SERPL-CCNC: 163 U/L (ref 0–31)
B.E.: -5.1 MMOL/L (ref -3–3)
B.E.: -6.7 MMOL/L
BASOPHILS # BLD: 0.03 K/UL (ref 0–0.2)
BASOPHILS NFR BLD: 0 % (ref 0–2)
BILIRUB SERPL-MCNC: 0.3 MG/DL (ref 0–1.2)
BUN SERPL-MCNC: 13 MG/DL (ref 6–20)
CALCIUM SERPL-MCNC: 7.1 MG/DL (ref 8.6–10.2)
CHLORIDE SERPL-SCNC: 109 MMOL/L (ref 98–107)
CO2 SERPL-SCNC: 21 MMOL/L (ref 22–29)
COHB: 0.3 % (ref 0–1.5)
COHB: 0.5 % (ref 0–1.5)
CREAT SERPL-MCNC: 0.7 MG/DL (ref 0.5–1)
CRITICAL: ABNORMAL
CRITICAL: ABNORMAL
DATE ANALYZED: ABNORMAL
DATE ANALYZED: ABNORMAL
DATE OF COLLECTION: ABNORMAL
DATE OF COLLECTION: ABNORMAL
ECHO AO ASC DIAM: 2.7 CM
ECHO AO ASCENDING AORTA INDEX: 1.71 CM/M2
ECHO AO SINUS VALSALVA DIAM: 3.2 CM
ECHO AO SINUS VALSALVA INDEX: 2.03 CM/M2
ECHO AV AREA PEAK VELOCITY: 2.7 CM2
ECHO AV AREA VTI: 2.9 CM2
ECHO AV AREA/BSA PEAK VELOCITY: 1.7 CM2/M2
ECHO AV AREA/BSA VTI: 1.8 CM2/M2
ECHO AV CUSP MM: 1.9 CM
ECHO AV MEAN GRADIENT: 2 MMHG
ECHO AV MEAN VELOCITY: 0.6 M/S
ECHO AV PEAK GRADIENT: 4 MMHG
ECHO AV PEAK VELOCITY: 0.9 M/S
ECHO AV VELOCITY RATIO: 0.89
ECHO AV VTI: 12.5 CM
ECHO BSA: 1.61 M2
ECHO EST RA PRESSURE: 8 MMHG
ECHO LA DIAMETER INDEX: 2.03 CM/M2
ECHO LA DIAMETER: 3.2 CM
ECHO LA VOL A-L A2C: 71 ML (ref 22–52)
ECHO LA VOL A-L A4C: 65 ML (ref 22–52)
ECHO LA VOL MOD A2C: 67 ML (ref 22–52)
ECHO LA VOL MOD A4C: 59 ML (ref 22–52)
ECHO LA VOLUME AREA LENGTH: 68 ML
ECHO LA VOLUME INDEX A-L A2C: 45 ML/M2 (ref 16–34)
ECHO LA VOLUME INDEX A-L A4C: 41 ML/M2 (ref 16–34)
ECHO LA VOLUME INDEX AREA LENGTH: 43 ML/M2 (ref 16–34)
ECHO LA VOLUME INDEX MOD A2C: 42 ML/M2 (ref 16–34)
ECHO LA VOLUME INDEX MOD A4C: 37 ML/M2 (ref 16–34)
ECHO LV E' LATERAL VELOCITY: 11 CM/S
ECHO LV E' SEPTAL VELOCITY: 10 CM/S
ECHO LV EDV A2C: 149 ML
ECHO LV EDV A4C: 149 ML
ECHO LV EDV BP: 154 ML (ref 56–104)
ECHO LV EDV INDEX A4C: 94 ML/M2
ECHO LV EDV INDEX BP: 97 ML/M2
ECHO LV EDV NDEX A2C: 94 ML/M2
ECHO LV EJECTION FRACTION A2C: 22 %
ECHO LV EJECTION FRACTION A4C: 22 %
ECHO LV EJECTION FRACTION BIPLANE: 24 % (ref 55–100)
ECHO LV ESV A2C: 116 ML
ECHO LV ESV A4C: 116 ML
ECHO LV ESV BP: 117 ML (ref 19–49)
ECHO LV ESV INDEX A2C: 73 ML/M2
ECHO LV ESV INDEX A4C: 73 ML/M2
ECHO LV ESV INDEX BP: 74 ML/M2
ECHO LV FRACTIONAL SHORTENING: 18 % (ref 28–44)
ECHO LV INTERNAL DIMENSION DIASTOLE INDEX: 3.16 CM/M2
ECHO LV INTERNAL DIMENSION DIASTOLIC: 5 CM (ref 3.9–5.3)
ECHO LV INTERNAL DIMENSION SYSTOLIC INDEX: 2.59 CM/M2
ECHO LV INTERNAL DIMENSION SYSTOLIC: 4.1 CM
ECHO LV ISOVOLUMETRIC RELAXATION TIME (IVRT): 78.4 MS
ECHO LV IVSD: 0.9 CM (ref 0.6–0.9)
ECHO LV IVSS: 1 CM
ECHO LV MASS 2D: 146.8 G (ref 67–162)
ECHO LV MASS INDEX 2D: 92.9 G/M2 (ref 43–95)
ECHO LV POSTERIOR WALL DIASTOLIC: 0.8 CM (ref 0.6–0.9)
ECHO LV POSTERIOR WALL SYSTOLIC: 1 CM
ECHO LV RELATIVE WALL THICKNESS RATIO: 0.32
ECHO LVOT AREA: 3.1 CM2
ECHO LVOT AV VTI INDEX: 0.94
ECHO LVOT DIAM: 2 CM
ECHO LVOT MEAN GRADIENT: 1 MMHG
ECHO LVOT PEAK GRADIENT: 3 MMHG
ECHO LVOT PEAK VELOCITY: 0.8 M/S
ECHO LVOT STROKE VOLUME INDEX: 23.3 ML/M2
ECHO LVOT SV: 36.7 ML
ECHO LVOT VTI: 11.7 CM
ECHO MV "A" WAVE DURATION: 101.5 MSEC
ECHO MV A VELOCITY: 0.33 M/S
ECHO MV AREA PHT: 4.7 CM2
ECHO MV AREA VTI: 3.5 CM2
ECHO MV E DECELERATION TIME (DT): 109.9 MS
ECHO MV E VELOCITY: 0.59 M/S
ECHO MV E/A RATIO: 1.79
ECHO MV E/E' LATERAL: 5.36
ECHO MV E/E' RATIO (AVERAGED): 5.63
ECHO MV E/E' SEPTAL: 5.9
ECHO MV LVOT VTI INDEX: 0.9
ECHO MV MAX VELOCITY: 0.6 M/S
ECHO MV MEAN GRADIENT: 1 MMHG
ECHO MV MEAN VELOCITY: 0.4 M/S
ECHO MV PEAK GRADIENT: 1 MMHG
ECHO MV PRESSURE HALF TIME (PHT): 46.4 MS
ECHO MV VTI: 10.5 CM
ECHO PULMONARY ARTERY END DIASTOLIC PRESSURE: 10 MMHG
ECHO PV MAX VELOCITY: 0.8 M/S
ECHO PV MEAN GRADIENT: 1 MMHG
ECHO PV MEAN VELOCITY: 0.5 M/S
ECHO PV PEAK GRADIENT: 3 MMHG
ECHO PV REGURGITANT MAX VELOCITY: 1.6 M/S
ECHO PV VTI: 10.6 CM
ECHO PVEIN PEAK D VELOCITY: 0.6 M/S
ECHO PVEIN PEAK S VELOCITY: 0.5 M/S
ECHO PVEIN S/D RATIO: 0.8
ECHO RIGHT VENTRICULAR SYSTOLIC PRESSURE (RVSP): 39 MMHG
ECHO RV INTERNAL DIMENSION: 2.6 CM
ECHO RV TAPSE: 1.9 CM (ref 1.7–?)
ECHO TV REGURGITANT MAX VELOCITY: 2.78 M/S
ECHO TV REGURGITANT PEAK GRADIENT: 31 MMHG
EKG ATRIAL RATE: 106 BPM
EKG ATRIAL RATE: 123 BPM
EKG P AXIS: 37 DEGREES
EKG P AXIS: 39 DEGREES
EKG P-R INTERVAL: 132 MS
EKG P-R INTERVAL: 146 MS
EKG Q-T INTERVAL: 308 MS
EKG Q-T INTERVAL: 366 MS
EKG QRS DURATION: 58 MS
EKG QRS DURATION: 78 MS
EKG QTC CALCULATION (BAZETT): 440 MS
EKG QTC CALCULATION (BAZETT): 486 MS
EKG R AXIS: 44 DEGREES
EKG R AXIS: 47 DEGREES
EKG T AXIS: 134 DEGREES
EKG T AXIS: 45 DEGREES
EKG VENTRICULAR RATE: 106 BPM
EKG VENTRICULAR RATE: 123 BPM
EOSINOPHIL # BLD: 0 K/UL (ref 0.05–0.5)
EOSINOPHILS RELATIVE PERCENT: 0 % (ref 0–6)
ERYTHROCYTE [DISTWIDTH] IN BLOOD BY AUTOMATED COUNT: 14.1 % (ref 11.5–15)
FIO2: 35 %
FIO2: 50 %
GFR, ESTIMATED: >90 ML/MIN/1.73M2
GLUCOSE SERPL-MCNC: 161 MG/DL (ref 74–99)
HCO3: 19 MMOL/L (ref 22–26)
HCO3: 19.5 MMOL/L
HCT VFR BLD AUTO: 35.6 % (ref 34–48)
HGB BLD-MCNC: 11.8 G/DL (ref 11.5–15.5)
HHB: 2.2 % (ref 0–5)
HHB: 26.1 %
IMM GRANULOCYTES # BLD AUTO: 0.12 K/UL (ref 0–0.58)
IMM GRANULOCYTES NFR BLD: 1 % (ref 0–5)
INR PPP: 1.6
L PNEUMO1 AG UR QL IA.RAPID: NEGATIVE
LAB: ABNORMAL
LAB: ABNORMAL
LACTATE BLDV-SCNC: 1.4 MMOL/L (ref 0.5–2.2)
LACTATE BLDV-SCNC: 1.6 MMOL/L (ref 0.5–2.2)
LYMPHOCYTES NFR BLD: 1.78 K/UL (ref 1.5–4)
LYMPHOCYTES RELATIVE PERCENT: 9 % (ref 20–42)
Lab: 1207
Lab: 425
MAGNESIUM SERPL-MCNC: 1.9 MG/DL (ref 1.6–2.6)
MCH RBC QN AUTO: 30.2 PG (ref 26–35)
MCHC RBC AUTO-ENTMCNC: 33.1 G/DL (ref 32–34.5)
MCV RBC AUTO: 91 FL (ref 80–99.9)
METHB: 0.3 % (ref 0–1.5)
METHB: 0.3 % (ref 0–1.5)
MICROORGANISM SPEC CULT: NO GROWTH
MICROORGANISM SPEC CULT: NORMAL
MODE: AC
MODE: AC
MONOCYTES NFR BLD: 1.33 K/UL (ref 0.1–0.95)
MONOCYTES NFR BLD: 7 % (ref 2–12)
NEUTROPHILS NFR BLD: 84 % (ref 43–80)
NEUTS SEG NFR BLD: 17.09 K/UL (ref 1.8–7.3)
O2 CONTENT: 17.6 ML/DL
O2 SATURATION: 73.7 %
O2 SATURATION: 97.8 % (ref 92–98.5)
O2HB: 73.1 %
O2HB: 97.2 % (ref 94–97)
OPERATOR ID: 882
OPERATOR ID: ABNORMAL
PARTIAL THROMBOPLASTIN TIME: 28.4 SEC (ref 24.5–35.1)
PARTIAL THROMBOPLASTIN TIME: 50.4 SEC (ref 24.5–35.1)
PATIENT TEMP: 37 C
PATIENT TEMP: 37 C
PCO2: 32.3 MMHG (ref 35–45)
PCO2: 41.5 MMHG (ref 40–52)
PEEP/CPAP: 5 CMH2O
PEEP/CPAP: 5 CMH2O
PFO2: 0.91 MMHG/%
PFO2: 3.17 MMHG/%
PH BLOOD GAS: 7.29 (ref 7.3–7.42)
PH BLOOD GAS: 7.39 (ref 7.35–7.45)
PHOSPHATE SERPL-MCNC: 2.3 MG/DL (ref 2.5–4.5)
PLATELET # BLD AUTO: 182 K/UL (ref 130–450)
PMV BLD AUTO: 11.9 FL (ref 7–12)
PO2: 110.9 MMHG (ref 75–100)
PO2: 45.6 MMHG (ref 30–50)
POTASSIUM SERPL-SCNC: 4.7 MMOL/L (ref 3.5–5)
PROT SERPL-MCNC: 5.1 G/DL (ref 6.4–8.3)
PROTHROMBIN TIME: 17.2 SEC (ref 9.3–12.4)
RBC # BLD AUTO: 3.91 M/UL (ref 3.5–5.5)
RI(T): 0.91
RR MECHANICAL: 14 B/MIN
RR MECHANICAL: 14 B/MIN
S PNEUM AG SPEC QL: NEGATIVE
SERVICE CMNT-IMP: NORMAL
SERVICE CMNT-IMP: NORMAL
SODIUM SERPL-SCNC: 139 MMOL/L (ref 132–146)
SOURCE, BLOOD GAS: ABNORMAL
SOURCE, BLOOD GAS: ABNORMAL
SPECIMEN DESCRIPTION: NORMAL
SPECIMEN DESCRIPTION: NORMAL
SPECIMEN SOURCE: NORMAL
THB: 12.4 G/DL (ref 11.5–16.5)
THB: 12.8 G/DL (ref 11.5–16.5)
TIME ANALYZED: 1212
TIME ANALYZED: 435
VT MECHANICAL: 400 ML
VT MECHANICAL: 400 ML
WBC OTHER # BLD: 20.4 K/UL (ref 4.5–11.5)

## 2024-07-05 PROCEDURE — C8929 TTE W OR WO FOL WCON,DOPPLER: HCPCS

## 2024-07-05 PROCEDURE — 2580000003 HC RX 258: Performed by: INTERNAL MEDICINE

## 2024-07-05 PROCEDURE — 36556 INSERT NON-TUNNEL CV CATH: CPT

## 2024-07-05 PROCEDURE — 87899 AGENT NOS ASSAY W/OPTIC: CPT

## 2024-07-05 PROCEDURE — 93010 ELECTROCARDIOGRAM REPORT: CPT | Performed by: INTERNAL MEDICINE

## 2024-07-05 PROCEDURE — 2500000003 HC RX 250 WO HCPCS: Performed by: INTERNAL MEDICINE

## 2024-07-05 PROCEDURE — 85730 THROMBOPLASTIN TIME PARTIAL: CPT

## 2024-07-05 PROCEDURE — 6360000004 HC RX CONTRAST MEDICATION: Performed by: INTERNAL MEDICINE

## 2024-07-05 PROCEDURE — 99233 SBSQ HOSP IP/OBS HIGH 50: CPT | Performed by: INTERNAL MEDICINE

## 2024-07-05 PROCEDURE — C1894 INTRO/SHEATH, NON-LASER: HCPCS | Performed by: INTERNAL MEDICINE

## 2024-07-05 PROCEDURE — 80053 COMPREHEN METABOLIC PANEL: CPT

## 2024-07-05 PROCEDURE — C1769 GUIDE WIRE: HCPCS | Performed by: INTERNAL MEDICINE

## 2024-07-05 PROCEDURE — 2500000003 HC RX 250 WO HCPCS

## 2024-07-05 PROCEDURE — 6360000002 HC RX W HCPCS: Performed by: INTERNAL MEDICINE

## 2024-07-05 PROCEDURE — 83605 ASSAY OF LACTIC ACID: CPT

## 2024-07-05 PROCEDURE — 6360000002 HC RX W HCPCS

## 2024-07-05 PROCEDURE — 87449 NOS EACH ORGANISM AG IA: CPT

## 2024-07-05 PROCEDURE — 3E043XZ INTRODUCTION OF VASOPRESSOR INTO CENTRAL VEIN, PERCUTANEOUS APPROACH: ICD-10-PCS | Performed by: INTERNAL MEDICINE

## 2024-07-05 PROCEDURE — 02HV33Z INSERTION OF INFUSION DEVICE INTO SUPERIOR VENA CAVA, PERCUTANEOUS APPROACH: ICD-10-PCS | Performed by: INTERNAL MEDICINE

## 2024-07-05 PROCEDURE — 37799 UNLISTED PX VASCULAR SURGERY: CPT

## 2024-07-05 PROCEDURE — 2000000000 HC ICU R&B

## 2024-07-05 PROCEDURE — 82805 BLOOD GASES W/O2 SATURATION: CPT

## 2024-07-05 PROCEDURE — 6370000000 HC RX 637 (ALT 250 FOR IP): Performed by: INTERNAL MEDICINE

## 2024-07-05 PROCEDURE — 94640 AIRWAY INHALATION TREATMENT: CPT

## 2024-07-05 PROCEDURE — 2709999900 HC NON-CHARGEABLE SUPPLY: Performed by: INTERNAL MEDICINE

## 2024-07-05 PROCEDURE — 6370000000 HC RX 637 (ALT 250 FOR IP)

## 2024-07-05 PROCEDURE — 71045 X-RAY EXAM CHEST 1 VIEW: CPT

## 2024-07-05 PROCEDURE — 93306 TTE W/DOPPLER COMPLETE: CPT | Performed by: INTERNAL MEDICINE

## 2024-07-05 PROCEDURE — 93458 L HRT ARTERY/VENTRICLE ANGIO: CPT | Performed by: INTERNAL MEDICINE

## 2024-07-05 PROCEDURE — 94003 VENT MGMT INPAT SUBQ DAY: CPT

## 2024-07-05 PROCEDURE — 7100000010 HC PHASE II RECOVERY - FIRST 15 MIN: Performed by: INTERNAL MEDICINE

## 2024-07-05 PROCEDURE — 94002 VENT MGMT INPAT INIT DAY: CPT

## 2024-07-05 PROCEDURE — 83735 ASSAY OF MAGNESIUM: CPT

## 2024-07-05 PROCEDURE — 84100 ASSAY OF PHOSPHORUS: CPT

## 2024-07-05 PROCEDURE — 7100000011 HC PHASE II RECOVERY - ADDTL 15 MIN: Performed by: INTERNAL MEDICINE

## 2024-07-05 PROCEDURE — 2580000003 HC RX 258

## 2024-07-05 PROCEDURE — 85610 PROTHROMBIN TIME: CPT

## 2024-07-05 PROCEDURE — 85025 COMPLETE CBC W/AUTO DIFF WBC: CPT

## 2024-07-05 RX ORDER — HEPARIN SODIUM 1000 [USP'U]/ML
60 INJECTION, SOLUTION INTRAVENOUS; SUBCUTANEOUS PRN
Status: DISCONTINUED | OUTPATIENT
Start: 2024-07-05 | End: 2024-07-06

## 2024-07-05 RX ORDER — MIDAZOLAM HYDROCHLORIDE 1 MG/ML
1-10 INJECTION, SOLUTION INTRAVENOUS CONTINUOUS
Status: DISCONTINUED | OUTPATIENT
Start: 2024-07-05 | End: 2024-07-05 | Stop reason: HOSPADM

## 2024-07-05 RX ORDER — LEVALBUTEROL INHALATION SOLUTION 0.63 MG/3ML
0.63 SOLUTION RESPIRATORY (INHALATION) EVERY 8 HOURS
Status: DISCONTINUED | OUTPATIENT
Start: 2024-07-05 | End: 2024-07-05 | Stop reason: HOSPADM

## 2024-07-05 RX ORDER — FENTANYL CITRATE-0.9 % NACL/PF 10 MCG/ML
25-200 PLASTIC BAG, INJECTION (ML) INTRAVENOUS CONTINUOUS
Status: DISCONTINUED | OUTPATIENT
Start: 2024-07-05 | End: 2024-07-05 | Stop reason: HOSPADM

## 2024-07-05 RX ORDER — FENTANYL CITRATE-0.9 % NACL/PF 10 MCG/ML
25-200 PLASTIC BAG, INJECTION (ML) INTRAVENOUS CONTINUOUS
Status: DISCONTINUED | OUTPATIENT
Start: 2024-07-05 | End: 2024-07-05 | Stop reason: SDUPTHER

## 2024-07-05 RX ORDER — LAMOTRIGINE 100 MG/1
100 TABLET ORAL NIGHTLY
Status: CANCELLED | OUTPATIENT
Start: 2024-07-05

## 2024-07-05 RX ORDER — HEPARIN SODIUM 10000 [USP'U]/100ML
5-30 INJECTION, SOLUTION INTRAVENOUS CONTINUOUS
Status: DISCONTINUED | OUTPATIENT
Start: 2024-07-05 | End: 2024-07-06

## 2024-07-05 RX ORDER — NITROGLYCERIN 20 MG/100ML
INJECTION INTRAVENOUS CONTINUOUS PRN
Status: COMPLETED | OUTPATIENT
Start: 2024-07-05 | End: 2024-07-05

## 2024-07-05 RX ORDER — HEPARIN SODIUM 1000 [USP'U]/ML
30 INJECTION, SOLUTION INTRAVENOUS; SUBCUTANEOUS PRN
Status: CANCELLED | OUTPATIENT
Start: 2024-07-05

## 2024-07-05 RX ORDER — SODIUM CHLORIDE 9 MG/ML
INJECTION, SOLUTION INTRAVENOUS CONTINUOUS
Status: DISCONTINUED | OUTPATIENT
Start: 2024-07-05 | End: 2024-07-06

## 2024-07-05 RX ORDER — MIDAZOLAM HYDROCHLORIDE 1 MG/ML
1-10 INJECTION, SOLUTION INTRAVENOUS CONTINUOUS
Status: DISCONTINUED | OUTPATIENT
Start: 2024-07-05 | End: 2024-07-09

## 2024-07-05 RX ORDER — VENLAFAXINE 75 MG/1
75 TABLET ORAL 2 TIMES DAILY WITH MEALS
Status: CANCELLED | OUTPATIENT
Start: 2024-07-05

## 2024-07-05 RX ORDER — ACETAMINOPHEN 325 MG/1
650 TABLET ORAL EVERY 4 HOURS PRN
Status: DISCONTINUED | OUTPATIENT
Start: 2024-07-05 | End: 2024-07-05 | Stop reason: HOSPADM

## 2024-07-05 RX ORDER — HYDROXYCHLOROQUINE SULFATE 200 MG/1
200 TABLET, FILM COATED ORAL 2 TIMES DAILY
Status: CANCELLED | OUTPATIENT
Start: 2024-07-05

## 2024-07-05 RX ORDER — HEPARIN SODIUM 1000 [USP'U]/ML
60 INJECTION, SOLUTION INTRAVENOUS; SUBCUTANEOUS PRN
Status: CANCELLED | OUTPATIENT
Start: 2024-07-05

## 2024-07-05 RX ORDER — HEPARIN SODIUM 1000 [USP'U]/ML
30 INJECTION, SOLUTION INTRAVENOUS; SUBCUTANEOUS PRN
Status: DISCONTINUED | OUTPATIENT
Start: 2024-07-05 | End: 2024-07-06

## 2024-07-05 RX ORDER — ALPRAZOLAM 1 MG/1
2 TABLET ORAL 3 TIMES DAILY
Status: DISCONTINUED | OUTPATIENT
Start: 2024-07-05 | End: 2024-07-09

## 2024-07-05 RX ORDER — MIDAZOLAM HYDROCHLORIDE 2 MG/2ML
1 INJECTION, SOLUTION INTRAMUSCULAR; INTRAVENOUS EVERY 30 MIN PRN
Status: DISCONTINUED | OUTPATIENT
Start: 2024-07-05 | End: 2024-07-09

## 2024-07-05 RX ORDER — FENTANYL CITRATE-0.9 % NACL/PF 20 MCG/2ML
50 SYRINGE (ML) INTRAVENOUS EVERY 30 MIN PRN
Status: DISCONTINUED | OUTPATIENT
Start: 2024-07-05 | End: 2024-07-09

## 2024-07-05 RX ORDER — LEVALBUTEROL INHALATION SOLUTION 0.63 MG/3ML
0.63 SOLUTION RESPIRATORY (INHALATION) EVERY 8 HOURS
Status: CANCELLED | OUTPATIENT
Start: 2024-07-05

## 2024-07-05 RX ORDER — CHLORHEXIDINE GLUCONATE ORAL RINSE 1.2 MG/ML
15 SOLUTION DENTAL 2 TIMES DAILY
Status: CANCELLED | OUTPATIENT
Start: 2024-07-05

## 2024-07-05 RX ORDER — ACETAMINOPHEN 325 MG/1
650 TABLET ORAL EVERY 6 HOURS PRN
Status: CANCELLED | OUTPATIENT
Start: 2024-07-05

## 2024-07-05 RX ORDER — VERAPAMIL HYDROCHLORIDE 2.5 MG/ML
INJECTION, SOLUTION INTRAVENOUS PRN
Status: DISCONTINUED | OUTPATIENT
Start: 2024-07-05 | End: 2024-07-05 | Stop reason: HOSPADM

## 2024-07-05 RX ORDER — ALPRAZOLAM 1 MG/1
2 TABLET ORAL 3 TIMES DAILY
Status: DISCONTINUED | OUTPATIENT
Start: 2024-07-05 | End: 2024-07-05 | Stop reason: HOSPADM

## 2024-07-05 RX ORDER — LEVALBUTEROL INHALATION SOLUTION 0.63 MG/3ML
0.63 SOLUTION RESPIRATORY (INHALATION) EVERY 8 HOURS
Status: DISCONTINUED | OUTPATIENT
Start: 2024-07-05 | End: 2024-07-12 | Stop reason: HOSPADM

## 2024-07-05 RX ORDER — VENLAFAXINE 75 MG/1
75 TABLET ORAL 2 TIMES DAILY WITH MEALS
Status: DISCONTINUED | OUTPATIENT
Start: 2024-07-06 | End: 2024-07-12 | Stop reason: HOSPADM

## 2024-07-05 RX ORDER — VENLAFAXINE 75 MG/1
75 TABLET ORAL 2 TIMES DAILY WITH MEALS
Status: DISCONTINUED | OUTPATIENT
Start: 2024-07-05 | End: 2024-07-05 | Stop reason: HOSPADM

## 2024-07-05 RX ORDER — CHLORHEXIDINE GLUCONATE ORAL RINSE 1.2 MG/ML
15 SOLUTION DENTAL 2 TIMES DAILY
Status: DISCONTINUED | OUTPATIENT
Start: 2024-07-05 | End: 2024-07-12 | Stop reason: HOSPADM

## 2024-07-05 RX ORDER — FENTANYL CITRATE-0.9 % NACL/PF 10 MCG/ML
25-200 PLASTIC BAG, INJECTION (ML) INTRAVENOUS CONTINUOUS
Status: DISCONTINUED | OUTPATIENT
Start: 2024-07-05 | End: 2024-07-09

## 2024-07-05 RX ORDER — FENTANYL CITRATE-0.9 % NACL/PF 10 MCG/ML
25-200 PLASTIC BAG, INJECTION (ML) INTRAVENOUS CONTINUOUS
Status: CANCELLED | OUTPATIENT
Start: 2024-07-05

## 2024-07-05 RX ORDER — MIDAZOLAM HYDROCHLORIDE 1 MG/ML
1-10 INJECTION, SOLUTION INTRAVENOUS CONTINUOUS
Status: DISCONTINUED | OUTPATIENT
Start: 2024-07-05 | End: 2024-07-05 | Stop reason: SDUPTHER

## 2024-07-05 RX ORDER — SODIUM CHLORIDE 9 MG/ML
INJECTION, SOLUTION INTRAVENOUS CONTINUOUS
Status: DISCONTINUED | OUTPATIENT
Start: 2024-07-05 | End: 2024-07-05 | Stop reason: SDUPTHER

## 2024-07-05 RX ORDER — HYDROXYCHLOROQUINE SULFATE 200 MG/1
200 TABLET, FILM COATED ORAL 2 TIMES DAILY
Status: DISCONTINUED | OUTPATIENT
Start: 2024-07-05 | End: 2024-07-12 | Stop reason: HOSPADM

## 2024-07-05 RX ORDER — FENTANYL CITRATE-0.9 % NACL/PF 20 MCG/2ML
50 SYRINGE (ML) INTRAVENOUS EVERY 30 MIN PRN
Status: CANCELLED | OUTPATIENT
Start: 2024-07-05

## 2024-07-05 RX ORDER — LAMOTRIGINE 100 MG/1
100 TABLET ORAL NIGHTLY
Status: DISCONTINUED | OUTPATIENT
Start: 2024-07-05 | End: 2024-07-05 | Stop reason: HOSPADM

## 2024-07-05 RX ORDER — HEPARIN SODIUM 10000 [USP'U]/100ML
5-30 INJECTION, SOLUTION INTRAVENOUS CONTINUOUS
Status: CANCELLED | OUTPATIENT
Start: 2024-07-05

## 2024-07-05 RX ORDER — LAMOTRIGINE 100 MG/1
100 TABLET ORAL NIGHTLY
Status: DISCONTINUED | OUTPATIENT
Start: 2024-07-05 | End: 2024-07-12 | Stop reason: HOSPADM

## 2024-07-05 RX ORDER — MIDAZOLAM HYDROCHLORIDE 2 MG/2ML
1 INJECTION, SOLUTION INTRAMUSCULAR; INTRAVENOUS EVERY 30 MIN PRN
Status: CANCELLED | OUTPATIENT
Start: 2024-07-05

## 2024-07-05 RX ORDER — ACETAMINOPHEN 325 MG/1
650 TABLET ORAL EVERY 6 HOURS PRN
Status: DISCONTINUED | OUTPATIENT
Start: 2024-07-05 | End: 2024-07-12 | Stop reason: HOSPADM

## 2024-07-05 RX ORDER — HEPARIN SODIUM 1000 [USP'U]/ML
INJECTION, SOLUTION INTRAVENOUS; SUBCUTANEOUS PRN
Status: DISCONTINUED | OUTPATIENT
Start: 2024-07-05 | End: 2024-07-05 | Stop reason: HOSPADM

## 2024-07-05 RX ORDER — SODIUM CHLORIDE 9 MG/ML
INJECTION, SOLUTION INTRAVENOUS CONTINUOUS
Status: CANCELLED | OUTPATIENT
Start: 2024-07-05

## 2024-07-05 RX ORDER — MIDAZOLAM HYDROCHLORIDE 1 MG/ML
1-10 INJECTION, SOLUTION INTRAVENOUS CONTINUOUS
Status: CANCELLED | OUTPATIENT
Start: 2024-07-05

## 2024-07-05 RX ORDER — ALPRAZOLAM 1 MG/1
2 TABLET ORAL 3 TIMES DAILY
Status: CANCELLED | OUTPATIENT
Start: 2024-07-05

## 2024-07-05 RX ADMIN — VASOPRESSIN 0.03 UNITS/MIN: 20 INJECTION INTRAVENOUS at 11:30

## 2024-07-05 RX ADMIN — ALPRAZOLAM 2 MG: 1 TABLET ORAL at 14:26

## 2024-07-05 RX ADMIN — HEPARIN SODIUM 3700 UNITS: 1000 INJECTION INTRAVENOUS; SUBCUTANEOUS at 10:32

## 2024-07-05 RX ADMIN — LAMOTRIGINE 100 MG: 100 TABLET ORAL at 22:09

## 2024-07-05 RX ADMIN — Medication 200 MCG/HR: at 13:41

## 2024-07-05 RX ADMIN — PERFLUTREN 1.5 ML: 6.52 INJECTION, SUSPENSION INTRAVENOUS at 09:10

## 2024-07-05 RX ADMIN — Medication 8 MG/HR: at 02:07

## 2024-07-05 RX ADMIN — SODIUM CHLORIDE: 9 INJECTION, SOLUTION INTRAVENOUS at 13:46

## 2024-07-05 RX ADMIN — HYDROCORTISONE SODIUM SUCCINATE 100 MG: 100 INJECTION, POWDER, FOR SOLUTION INTRAMUSCULAR; INTRAVENOUS at 22:09

## 2024-07-05 RX ADMIN — HYDROXYCHLOROQUINE SULFATE 200 MG: 200 TABLET ORAL at 22:18

## 2024-07-05 RX ADMIN — HYDROXYCHLOROQUINE SULFATE 200 MG: 200 TABLET ORAL at 09:01

## 2024-07-05 RX ADMIN — CEFEPIME 2000 MG: 2 INJECTION, POWDER, FOR SOLUTION INTRAVENOUS at 09:15

## 2024-07-05 RX ADMIN — CHLORHEXIDINE GLUCONATE 0.12% ORAL RINSE 15 ML: 1.2 LIQUID ORAL at 21:55

## 2024-07-05 RX ADMIN — CHLORHEXIDINE GLUCONATE 15 ML: 1.2 RINSE ORAL at 09:01

## 2024-07-05 RX ADMIN — HYDROCORTISONE SODIUM SUCCINATE 100 MG: 100 INJECTION, POWDER, FOR SOLUTION INTRAMUSCULAR; INTRAVENOUS at 14:26

## 2024-07-05 RX ADMIN — SODIUM CHLORIDE 20 MCG/MIN: 9 INJECTION, SOLUTION INTRAVENOUS at 10:38

## 2024-07-05 RX ADMIN — LEVETIRACETAM 1000 MG: 100 INJECTION INTRAVENOUS at 06:03

## 2024-07-05 RX ADMIN — LEVALBUTEROL 0.63 MG: 0.63 SOLUTION RESPIRATORY (INHALATION) at 14:02

## 2024-07-05 RX ADMIN — Medication 10 MG/HR: at 20:00

## 2024-07-05 RX ADMIN — CEFEPIME 2000 MG: 2 INJECTION, POWDER, FOR SOLUTION INTRAVENOUS at 22:08

## 2024-07-05 RX ADMIN — Medication 10 MG/HR: at 11:27

## 2024-07-05 RX ADMIN — HYDROCORTISONE SODIUM SUCCINATE 100 MG: 100 INJECTION, POWDER, FOR SOLUTION INTRAMUSCULAR; INTRAVENOUS at 06:03

## 2024-07-05 RX ADMIN — ALPRAZOLAM 2 MG: 1 TABLET ORAL at 10:32

## 2024-07-05 RX ADMIN — ALPRAZOLAM 2 MG: 1 TABLET ORAL at 22:09

## 2024-07-05 RX ADMIN — Medication 200 MCG/HR: at 03:11

## 2024-07-05 RX ADMIN — ACETAMINOPHEN 650 MG: 325 TABLET ORAL at 00:47

## 2024-07-05 RX ADMIN — LEVALBUTEROL 0.63 MG: 0.63 SOLUTION RESPIRATORY (INHALATION) at 23:43

## 2024-07-05 RX ADMIN — ACYCLOVIR SODIUM 600 MG: 50 INJECTION, SOLUTION INTRAVENOUS at 06:45

## 2024-07-05 RX ADMIN — VENLAFAXINE HYDROCHLORIDE 75 MG: 75 TABLET ORAL at 11:36

## 2024-07-05 RX ADMIN — PANTOPRAZOLE SODIUM 40 MG: 40 INJECTION, POWDER, FOR SOLUTION INTRAVENOUS at 09:01

## 2024-07-05 RX ADMIN — HEPARIN SODIUM AND DEXTROSE 12 UNITS/KG/HR: 10000; 5 INJECTION INTRAVENOUS at 22:17

## 2024-07-05 RX ADMIN — SODIUM CHLORIDE: 9 INJECTION, SOLUTION INTRAVENOUS at 21:49

## 2024-07-05 RX ADMIN — Medication 200 MCG/HR: at 08:22

## 2024-07-05 RX ADMIN — Medication 200 MCG/HR: at 23:20

## 2024-07-05 ASSESSMENT — PULMONARY FUNCTION TESTS
PIF_VALUE: 15
PIF_VALUE: 16
PIF_VALUE: 16
PIF_VALUE: 17
PIF_VALUE: 16
PIF_VALUE: 16
PIF_VALUE: 14
PIF_VALUE: 18
PIF_VALUE: 16
PIF_VALUE: 15
PIF_VALUE: 17
PIF_VALUE: 17
PIF_VALUE: 18
PIF_VALUE: 16
PIF_VALUE: 16
PIF_VALUE: 18
PIF_VALUE: 18
PIF_VALUE: 16
PIF_VALUE: 14
PIF_VALUE: 16
PIF_VALUE: 17
PIF_VALUE: 19
PIF_VALUE: 20

## 2024-07-05 ASSESSMENT — PAIN SCALES - GENERAL
PAINLEVEL_OUTOF10: 0
PAINLEVEL_OUTOF10: 0

## 2024-07-05 NOTE — PLAN OF CARE
Problem: Safety - Adult  Goal: Free from fall injury  Outcome: Progressing     Problem: Safety - Medical Restraint  Goal: Remains free of injury from restraints (Restraint for Interference with Medical Device)  Description: INTERVENTIONS:  1. Determine that other, less restrictive measures have been tried or would not be effective before applying the restraint  2. Evaluate the patient's condition at the time of restraint application  3. Inform patient/family regarding the reason for restraint  4. Q2H: Monitor safety, psychosocial status, comfort, nutrition and hydration  Outcome: Progressing  Flowsheets  Taken 7/4/2024 1800 by Mari Harris RN  Remains free of injury from restraints (restraint for interference with medical device): Every 2 hours: Monitor safety, psychosocial status, comfort, nutrition and hydration  Taken 7/4/2024 1600 by Mari Harris RN  Remains free of injury from restraints (restraint for interference with medical device): Every 2 hours: Monitor safety, psychosocial status, comfort, nutrition and hydration  Taken 7/4/2024 1400 by Mari Harris RN  Remains free of injury from restraints (restraint for interference with medical device): Every 2 hours: Monitor safety, psychosocial status, comfort, nutrition and hydration  Taken 7/4/2024 1200 by Mari Harris RN  Remains free of injury from restraints (restraint for interference with medical device): Every 2 hours: Monitor safety, psychosocial status, comfort, nutrition and hydration  Taken 7/4/2024 1000 by Mari Harris RN  Remains free of injury from restraints (restraint for interference with medical device): Every 2 hours: Monitor safety, psychosocial status, comfort, nutrition and hydration  Taken 7/4/2024 0800 by Mari Harris RN  Remains free of injury from restraints (restraint for interference with medical device): Every 2 hours: Monitor safety, psychosocial status, comfort, nutrition and hydration  Taken

## 2024-07-05 NOTE — CONSULTS
7/5/2024 9:19 AM  Greta Moran  [id number]     Chief Complaint:    Abnormal findings on CT scan    History of Present Illness:      The patient is a 54 y.o. female patient who presented to the hospital with complaints of syncope possible seizure.  History is obtained per extensive review of prior notes due to patient being intubated.  She was brought to the ED after being found unresponsive.  She was very combative in the EMS and ED.  She was later intubated to allow proper evaluation.    Urology is asked to consult today for a dilated right renal collecting system found on CT imaging.  It is noted that this images distorted by artifact.  Obstructive uropathy is not ruled out.  She does appear stable at this present time for urology standpoint.  Her boyfriend Mekhi is present and states that she has never had urinary issues in the past.  He states she is never complained about dysuria, gross hematuria.  He said she did report some right lower quadrant pain but denies flank pain.    Past Medical History:   Diagnosis Date    Anxiety     dr matamoros 224 in [facility] counseling    Arthritis     Asthma     Bipolar 2 disorder (HCC)     Bruising tendency     Chronic back pain     Fibromyalgia     Glucose intolerance (impaired glucose tolerance) 06/15/2020    hyperglycemia    Insomnia     Lupus (HCC)     Memory difficulty 06/15/2020    Mild cognitive impairment with memory loss 06/15/2020    Osteoporosis     Thumb pain     left - for OR 4-16-19          Past Surgical History:   Procedure Laterality Date    ABDOMEN SURGERY      fatty tumor removed     ARTHROPLASTY Left 4/16/2019    LEFT THUMB TRAPEZIECTOMY WITH LIGAMENT RECONSTRUCTION LEFT PARTIAL TRAPEZOIDECTOMY LEFT DEQUERVAINS RELEASE LEFT CARPAL TUNNEL RELEASE performed by Jacky Morrell MD at [facility] OR    CARPAL TUNNEL RELEASE      right    COLONOSCOPY      FINGER SURGERY      right pinkie    HERNIA REPAIR  1970    HIP ARTHROTOMY  1973    right    NERVE

## 2024-07-05 NOTE — H&P
Patient arrived from [place] intubated, sedated, and on a ventilator.  Unable to communicate.  On multiple pressors with a systolic in the 1 teens.  Decreased breath sounds but clear.  Heart is distant to auscultation.  Trivial lower extremity edema.

## 2024-07-05 NOTE — PROGRESS NOTES
Critical Care Team - Daily Progress Note      Date and time: 7/5/2024 12:29 PM  Patient's name:  Greta Moran  Medical Record Number: [id number]  Patient's account/billing number: [id number]  Patient's YOB: 1970  Age: 54 y.o.  Date of Admission: 7/3/2024  1:12 PM  Length of stay during current admission: 2      Primary Care Physician: Brian Houston MD  ICU Attending Physician: Dr. Millan    Code Status: Prior    Reason for ICU admission: Critical care management      SUBJECTIVE:     OVERNIGHT EVENTS:           Patient still on Levophed and Micky support.  Patient sedated on fentanyl and Versed.  Patient on full vent support.  Patient continues to be on the heparin drip.  Leukocytosis 20.4 from 15.  Stress dose steroids started could be attributed to that.  No focal findings on the chest x-ray.  Pending cultures.  Will resume her home psych medications and since echo showed decreased ejection fraction, she will be transferred for any heart catheterization for possible ischemic event.  Stop acyclovir and vancomycin since low concern for meningitis at this time.  Continue cefepime.    Intake/Output:   No intake/output data recorded.  I/O last 3 completed shifts:  In: 7245.8 [I.V.:5781.2; NG/GT:120; IV Piggyback:1344.6]  Out: 855 [Urine:855]    Awake and following commands: No  Current Ventilation: - Ventilator Settings:    Vt (Set, mL): 400 mL  Resp Rate (Set): 14 bpm  FiO2 : 35 %    PEEP/CPAP (cmH2O): 5     Secretions: None  Sedation: fentanyl and versed  Paralyzed: No  Vasopressors: norepinephrine, vasopressin    ROS:  Unless stated above, ROS is otherwise negative.      Initial HPI + past overnight events: The patient is a 54 y.o. female with significant past medical history of anxiety, arthritis, asthma, bipolar, depression, fibromyalgia, insomnia, lupus, lupus, cognitive impairment, and osteoporosis.  Information limited due to patient's status and no family present during my examination.   14  SpO2: 94 %  Position: Semi-Mccarty's  Humidification Source: Heated wire  Humidification Temp: 37  Circuit Condensation: Drained    Arterial Blood Gas 7/5/2024  Recent Labs     07/05/24  1207   PH 7.290*   PCO2 41.5   PO2 45.6   HCO3 19.5   BE -6.7   O2SAT 73.7         Laboratory findings:    Complete Blood Count:   Recent Labs     07/03/24 2140 07/04/24  0510 07/05/24  0420   WBC 15.8* 15.2* 20.4*   HGB 12.8 13.9 11.8   HCT 38.7 41.6 35.6    224 182        Last 3 Blood Glucose:   Recent Labs     07/03/24 2140 07/04/24  0510 07/05/24  0420   GLUCOSE 236* 183* 161*        PT/INR:    Lab Results   Component Value Date/Time    PROTIME 17.2 07/05/2024 12:40 AM    INR 1.6 07/05/2024 12:40 AM     PTT:    Lab Results   Component Value Date/Time    APTT 28.4 07/05/2024 09:33 AM    APTT 31.7 09/12/2022 02:15 PM       Comprehensive Metabolic Profile:   Recent Labs     07/03/24 2140 07/04/24  0510 07/05/24  0420    136 139   K 4.2 4.2 4.7    105 109*   CO2 24 19* 21*   BUN 8 11 13   CREATININE 0.9 0.9 0.7   GLUCOSE 236* 183* 161*   CALCIUM 7.0* 6.9* 7.1*   BILITOT  --  0.3 0.3   ALKPHOS  --  98 83   AST  --  162* 163*   ALT  --  108* 170*      Magnesium:   Lab Results   Component Value Date/Time    MG 1.9 07/05/2024 04:20 AM     Phosphorus:   Lab Results   Component Value Date/Time    PHOS 2.3 07/05/2024 04:20 AM     Ionized Calcium: No results found for: \"CAION\"     Urinalysis:     Troponin: No results for input(s): \"TROPONINI\" in the last 72 hours.    Microbiology:  Cultures drawn: Blood pending and Urine pending    Radiology/Imaging:     Chest Xray (7/5/2024):  XR CHEST PORTABLE   Final Result   1. Endotracheal tube 1.6 cm above the adriana.   2. Orogastric tube courses into the stomach.   3. No acute cardiopulmonary process.         CT CHEST WO CONTRAST   Final Result   1. Small bilateral pleural effusions with some increased markings identified   at the lung bases bilaterally suggesting bibasilar

## 2024-07-05 NOTE — PROCEDURES
PROCEDURE  7/5/24       Time: 1200    CENTRAL LINE INSERTION  Risks, benefits and alternatives (for applicable procedures below) described.   Performed By: Xu Poe MD and ICU Attending Physician.    Indication: long term access and centrally administered medications.  Informed consent: Verbal consent obtained.  The spouse was counseled regarding the procedure in person, it's indications, risks, potential complications and alternatives and any questions were answered. Verbal consent was obtained.  Procedure: After routine sterile preparation, local anesthesia obtained by infiltration using 1% Lidocaine without epinephrine. A right 3-Lumen 7F Central Venous Catheter was placed by internal jugular vein approach and secured by standard fashion.   Ultrasound Guidance:   used.  Number of Attempts: 1  Post-procedure Findings: A post procedural chest x-ray  was ordered and is still pending at this time.  Patient tolerated the procedure well.     I was present to provide direct supervision for the above services.    Miladis Millan MD  7/5/2024 2:18 PM

## 2024-07-05 NOTE — PROGRESS NOTES
Comprehensive Nutrition Assessment    Type and Reason for Visit:  Initial (ICU/vent)    Nutrition Recommendations/Plan:   If pt returns to [facility] after Cardiac Cath, recommend Consult Dietitian for TF Ordering and Management when medically feasible and if pt remains intubated or otherwise unable to tolerate adequate PO.    Cointinue inpatient monitoring     Malnutrition Assessment:  Malnutrition Status:  At risk for malnutrition (Comment) (Intubated/NPO) (07/05/24 1222)    Context:  Acute Illness     Findings of the 6 clinical characteristics of malnutrition:  Energy Intake:  Mild decrease in energy intake (Comment) (NPO since admit)  Weight Loss:  Unable to assess     Body Fat Loss:  Unable to assess     Muscle Mass Loss:  Unable to assess    Fluid Accumulation:  Unable to assess Extremities (multiple factors contribute)   Strength:  Not Performed    Nutrition Assessment:    Pt admit 2/2 sepsis, poss seizure. Currently intubated/sedated and planned transfer to [facility] for Cardiac cath today. Currently NPO for procedure. Pertinent hx= SLE, bipolar disorder, impaired glucose tolerance. Will continue to monitor POC/GOC and recommend Consult Dietitian for TF Ordering and Management, if pt remains intubated and EN needed for nutrition support    Nutrition Related Findings:    intubated/sedated, pressors (MAP 77 calc), NGT, phos 2.3, soft rounded abd w/hypoactive BS, +1 edema, +I/O 6L Wound Type: None       Current Nutrition Intake & Therapies:    Average Meal Intake: NPO  Average Supplements Intake: NPO  No diet orders on file    Anthropometric Measures:  Height: 152.4 cm (5')  Ideal Body Weight (IBW): 100 lbs (45 kg)       Current Body Weight: 61.2 kg (134 lb 14.7 oz), 134.9 % IBW. Weight Source: Not Specified (7/3)  Current BMI (kg/m2): 26.4  Usual Body Weight: 48.5 kg (106 lb 15.8 oz) (7/6/2023 at [facility])  % Weight Change (Calculated): 26.1                    BMI Categories: Normal Weight (BMI  18.5-24.9)    Estimated Daily Nutrient Needs:  Energy Requirements Based On: Formula ([facility] 2003b)  Weight Used for Energy Requirements: Current  Energy (kcal/day):   Weight Used for Protein Requirements: Current  Protein (g/day): 70-80 (1.2-1.4 g/kg)  Method Used for Fluid Requirements: Other (Comment)  Fluid (ml/day): per Critical care management    Nutrition Diagnosis:   Inadequate oral intake related to impaired respiratory function as evidenced by NPO or clear liquid status due to medical condition, intubation    Nutrition Interventions:   Food and/or Nutrient Delivery: Continue NPO  Nutrition Education/Counseling: No recommendation at this time  Coordination of Nutrition Care: Continue to monitor while inpatient       Goals:     Goals: other (specify)  Specify Other Goals: Nutrition progression appropriate for [facility]    Nutrition Monitoring and Evaluation:   Behavioral-Environmental Outcomes: None Identified  Food/Nutrient Intake Outcomes: Diet Advancement/Tolerance  Physical Signs/Symptoms Outcomes: Biochemical Data, GI Status, Fluid Status or Edema, Nutrition Focused Physical Findings, Skin, Weight, Hemodynamic Status    Discharge Planning:    Too soon to determine     Tejal Maciel RD, CNSC, LD  Contact: x [phone]

## 2024-07-05 NOTE — PLAN OF CARE
Problem: Discharge Planning  Goal: Discharge to home or other facility with appropriate resources  7/5/2024 0015 by Randi Mari RN  Outcome: Not Progressing  7/4/2024 2029 by Mari Harris RN  Outcome: Not Progressing  Flowsheets  Taken 7/4/2024 2000 by Randi Mari RN  Discharge to home or other facility with appropriate resources:   Identify barriers to discharge with patient and caregiver   Arrange for needed discharge resources and transportation as appropriate   Identify discharge learning needs (meds, wound care, etc)  Taken 7/4/2024 0800 by Mari Harris RN  Discharge to home or other facility with appropriate resources: Identify barriers to discharge with patient and caregiver     Problem: Safety - Adult  Goal: Free from fall injury  7/5/2024 0015 by Randi Mari RN  Outcome: Progressing  7/4/2024 2029 by Mari Harris RN  Outcome: Progressing     Problem: Safety - Medical Restraint  Goal: Remains free of injury from restraints (Restraint for Interference with Medical Device)  Description: INTERVENTIONS:  1. Determine that other, less restrictive measures have been tried or would not be effective before applying the restraint  2. Evaluate the patient's condition at the time of restraint application  3. Inform patient/family regarding the reason for restraint  4. Q2H: Monitor safety, psychosocial status, comfort, nutrition and hydration  7/5/2024 0015 by Randi Mrai RN  Outcome: Progressing  Flowsheets  Taken 7/5/2024 0000  Remains free of injury from restraints (restraint for interference with medical device): Every 2 hours: Monitor safety, psychosocial status, comfort, nutrition and hydration  Taken 7/4/2024 2200  Remains free of injury from restraints (restraint for interference with medical device): Every 2 hours: Monitor safety, psychosocial status, comfort, nutrition and hydration  7/4/2024 2029 by Mari Harris RN  Outcome: Progressing  Flowsheets  Taken 7/4/2024 2000 by  Progressing  Flowsheets  Taken 7/4/2024 2000 by Randi Mari RN  Discharge to home or other facility with appropriate resources:   Identify barriers to discharge with patient and caregiver   Arrange for needed discharge resources and transportation as appropriate   Identify discharge learning needs (meds, wound care, etc)  Taken 7/4/2024 0800 by Mari Harris, RN  Discharge to home or other facility with appropriate resources: Identify barriers to discharge with patient and caregiver     Problem: Pain  Goal: Verbalizes/displays adequate comfort level or baseline comfort level  7/5/2024 0015 by Randi Mari RN  Outcome: Progressing  Flowsheets  Taken 7/5/2024 0000  Verbalizes/displays adequate comfort level or baseline comfort level: Assess pain using appropriate pain scale  Taken 7/4/2024 2300  Verbalizes/displays adequate comfort level or baseline comfort level: Assess pain using appropriate pain scale  Taken 7/4/2024 2200  Verbalizes/displays adequate comfort level or baseline comfort level: Assess pain using appropriate pain scale  Taken 7/4/2024 2100  Verbalizes/displays adequate comfort level or baseline comfort level: Assess pain using appropriate pain scale  7/4/2024 2029 by Mari Harris, RN  Outcome: Not Progressing  Flowsheets (Taken 7/4/2024 2000 by Randi Mari RN)  Verbalizes/displays adequate comfort level or baseline comfort level: Assess pain using appropriate pain scale  Note: Patient appears to be comfortable and without pain while unstimulated. Upon any auditory or tactile stimuli, patient becomes agitated.

## 2024-07-05 NOTE — PROGRESS NOTES
Patient arrived to CVL holding area bay 1. Patient remains intubated and on all drips, left radial cath site stable, VSS. See flowsheets for details.
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
## 2024-07-05 NOTE — CARE COORDINATION
Chart review completed as pt was home w/spouse prior to admission and independent. Pt has a PCP and insurance. Pt remains sedated and intubated w/possible plans for transfer to [facility] for cardiac cath. LP on hold. Peer recovery notified to follow. CM will follow and assist with discharge planning as necessary.    TYRELL Hunter, RN  [facility] Case Management  [phone]

## 2024-07-05 NOTE — PROGRESS NOTES
Telephone consent for cardiac catheterization obtained from patient's , Diego Moran, by 2 RN's. All questions answered for  at this time.

## 2024-07-05 NOTE — CONSULTS
[facility]  [facility]    Consultation Note     Admit Date: 7/3/2024  1:12 PM    Reason for Consult:   Septic shock    Attending Physician:  Brian Houston MD     Chief Complaint: AMS    HISTORY OF PRESENT ILLNESS:   The patient is a 54 y.o.  female not known to the Infectious Diseases service. The patient presented to the ER yesterday with altered mental status/difficulty breathing. She was combative on presentation. She got sedative enroute before AMARILIS 9 was done.     Since admission patient had a Tmax of 101.1 tachycardic hypotensive got intubated overnight.  Labs showed white count was 8.7 on presentation today's 20 hemoglobin is 11.8 platelet count is 182 AMARILIS 9 was positive for benzos cannabinoids and fentanyl and phencyclidine.  Blood cultures are in process MRSA nasal screen negative urine culture is no growth RVP negative.  CT head showed chronic sinusitis.  CT chest abdomen pelvis showed left lower lobe infiltrate and bilateral pleural effusions.  Patient is on IV cefepime and received 1 dose of acyclovir I got consult further recommendations.    Past Medical History:        Diagnosis Date    Anxiety     dr matamoros 224 in [facility] counseling    Arthritis     Asthma     Bipolar 2 disorder (HCC)     Bruising tendency     Chronic back pain     Fibromyalgia     Glucose intolerance (impaired glucose tolerance) 06/15/2020    hyperglycemia    Insomnia     Lupus (HCC)     Memory difficulty 06/15/2020    Mild cognitive impairment with memory loss 06/15/2020    Osteoporosis     Thumb pain     left - for OR 4-16-19        Past Surgical History:        Procedure Laterality Date    ABDOMEN SURGERY      fatty tumor removed     ARTHROPLASTY Left 4/16/2019    LEFT THUMB TRAPEZIECTOMY WITH LIGAMENT RECONSTRUCTION LEFT PARTIAL TRAPEZOIDECTOMY LEFT DEQUERVAINS RELEASE LEFT CARPAL TUNNEL RELEASE performed by Jacky Morrell MD at [facility] OR    CARPAL TUNNEL RELEASE      right

## 2024-07-05 NOTE — FLOWSHEET NOTE
Spoke with ICU, lumbar puncture is on hold for now, heparin gtt was restarted and patient is possibly having a heart cath today.

## 2024-07-05 NOTE — PROGRESS NOTES
Sedated on vent- no reported problems  Vss  lungs clear  reg rhythm  abd- soft and non-tender  ext- no edema  Stable- lab noted  Continue present care and workup- supposed to get LP

## 2024-07-05 NOTE — ACP (ADVANCE CARE PLANNING)
Advance Care Planning   Healthcare Decision Maker:    Primary Decision Maker: Diego Moran - Spouse - [phone]    Next of kin.

## 2024-07-06 ENCOUNTER — APPOINTMENT (OUTPATIENT)
Dept: GENERAL RADIOLOGY | Age: 54
DRG: 917 | End: 2024-07-06
Attending: FAMILY MEDICINE
Payer: MEDICARE

## 2024-07-06 PROBLEM — R57.0 CARDIOGENIC SHOCK (HCC): Status: ACTIVE | Noted: 2024-07-06

## 2024-07-06 PROBLEM — F19.10 POLYSUBSTANCE ABUSE (HCC): Status: ACTIVE | Noted: 2024-07-06

## 2024-07-06 PROBLEM — R57.9 SHOCK (HCC): Status: ACTIVE | Noted: 2024-07-06

## 2024-07-06 PROBLEM — J18.9 PNEUMONIA OF LEFT LOWER LOBE DUE TO INFECTIOUS ORGANISM: Status: ACTIVE | Noted: 2024-07-06

## 2024-07-06 PROBLEM — I50.43 ACUTE ON CHRONIC COMBINED SYSTOLIC AND DIASTOLIC CONGESTIVE HEART FAILURE (HCC): Status: ACTIVE | Noted: 2024-07-06

## 2024-07-06 PROBLEM — R41.82 AMS (ALTERED MENTAL STATUS): Status: ACTIVE | Noted: 2024-07-06

## 2024-07-06 LAB
ALBUMIN SERPL-MCNC: 3 G/DL (ref 3.5–5.2)
ALP SERPL-CCNC: 73 U/L (ref 35–104)
ALT SERPL-CCNC: 427 U/L (ref 0–32)
ANION GAP SERPL CALCULATED.3IONS-SCNC: 9 MMOL/L (ref 7–16)
AST SERPL-CCNC: 260 U/L (ref 0–31)
BASOPHILS # BLD: 0.03 K/UL (ref 0–0.2)
BASOPHILS NFR BLD: 0 % (ref 0–2)
BILIRUB SERPL-MCNC: 0.2 MG/DL (ref 0–1.2)
BNP SERPL-MCNC: ABNORMAL PG/ML (ref 0–125)
BUN SERPL-MCNC: 14 MG/DL (ref 6–20)
CALCIUM SERPL-MCNC: 7.1 MG/DL (ref 8.6–10.2)
CHLORIDE SERPL-SCNC: 110 MMOL/L (ref 98–107)
CK SERPL-CCNC: 331 U/L (ref 20–180)
CO2 SERPL-SCNC: 21 MMOL/L (ref 22–29)
CREAT SERPL-MCNC: 0.6 MG/DL (ref 0.5–1)
EOSINOPHIL # BLD: 0 K/UL (ref 0.05–0.5)
EOSINOPHILS RELATIVE PERCENT: 0 % (ref 0–6)
ERYTHROCYTE [DISTWIDTH] IN BLOOD BY AUTOMATED COUNT: 14.4 % (ref 11.5–15)
FIO2: 45
GFR, ESTIMATED: >90 ML/MIN/1.73M2
GLUCOSE SERPL-MCNC: 134 MG/DL (ref 74–99)
HCT VFR BLD AUTO: 33.2 % (ref 34–48)
HGB BLD-MCNC: 11.1 G/DL (ref 11.5–15.5)
IMM GRANULOCYTES # BLD AUTO: 0.11 K/UL (ref 0–0.58)
IMM GRANULOCYTES NFR BLD: 1 % (ref 0–5)
LACTATE BLDV-SCNC: 1.1 MMOL/L (ref 0.5–2.2)
LAMOTRIGINE SERPL-MCNC: 3.8 UG/ML (ref 3–15)
LAMOTRIGINE SERPL-MCNC: 5.8 UG/ML (ref 3–15)
LYMPHOCYTES NFR BLD: 0.97 K/UL (ref 1.5–4)
LYMPHOCYTES RELATIVE PERCENT: 6 % (ref 20–42)
MAGNESIUM SERPL-MCNC: 2.2 MG/DL (ref 1.6–2.6)
MCH RBC QN AUTO: 30.8 PG (ref 26–35)
MCHC RBC AUTO-ENTMCNC: 33.4 G/DL (ref 32–34.5)
MCV RBC AUTO: 92.2 FL (ref 80–99.9)
MODE: AC
MONOCYTES NFR BLD: 0.83 K/UL (ref 0.1–0.95)
MONOCYTES NFR BLD: 5 % (ref 2–12)
NEGATIVE BASE EXCESS, ART: 4.5 MMOL/L
NEUTROPHILS NFR BLD: 88 % (ref 43–80)
NEUTS SEG NFR BLD: 14.41 K/UL (ref 1.8–7.3)
O2 DELIVERY DEVICE: ABNORMAL
PARTIAL THROMBOPLASTIN TIME: 33.8 SEC (ref 24.5–35.1)
PARTIAL THROMBOPLASTIN TIME: 37.8 SEC (ref 24.5–35.1)
PATIENT TEMP: 37
PEEP: 5
PHOSPHATE SERPL-MCNC: 2 MG/DL (ref 2.5–4.5)
PHOSPHATE SERPL-MCNC: 2.4 MG/DL (ref 2.5–4.5)
PLATELET # BLD AUTO: 132 K/UL (ref 130–450)
PMV BLD AUTO: 12.4 FL (ref 7–12)
POC HCO3: 20.9 MMOL/L (ref 22–26)
POC O2 SATURATION: 99.2 % (ref 92–98.5)
POC PCO2 TEMP: 38.9 MM HG
POC PCO2: 38.9 MM HG (ref 35–45)
POC PH TEMP: 7.34
POC PH: 7.34 (ref 7.35–7.45)
POC PO2 TEMP: 152.9 MM HG
POC PO2: 152.9 MM HG (ref 80–100)
POTASSIUM SERPL-SCNC: 4.4 MMOL/L (ref 3.5–5)
PROT SERPL-MCNC: 4.9 G/DL (ref 6.4–8.3)
RBC # BLD AUTO: 3.6 M/UL (ref 3.5–5.5)
SET RATE, POC: 14
SODIUM SERPL-SCNC: 140 MMOL/L (ref 132–146)
TIDAL VOLUME: 400
TROPONIN I SERPL HS-MCNC: 265 NG/L (ref 0–9)
WBC OTHER # BLD: 16.4 K/UL (ref 4.5–11.5)

## 2024-07-06 PROCEDURE — 2000000000 HC ICU R&B

## 2024-07-06 PROCEDURE — 6360000002 HC RX W HCPCS: Performed by: INTERNAL MEDICINE

## 2024-07-06 PROCEDURE — 2580000003 HC RX 258: Performed by: INTERNAL MEDICINE

## 2024-07-06 PROCEDURE — 82550 ASSAY OF CK (CPK): CPT

## 2024-07-06 PROCEDURE — 80053 COMPREHEN METABOLIC PANEL: CPT

## 2024-07-06 PROCEDURE — 84100 ASSAY OF PHOSPHORUS: CPT

## 2024-07-06 PROCEDURE — 83605 ASSAY OF LACTIC ACID: CPT

## 2024-07-06 PROCEDURE — 99233 SBSQ HOSP IP/OBS HIGH 50: CPT | Performed by: INTERNAL MEDICINE

## 2024-07-06 PROCEDURE — 6370000000 HC RX 637 (ALT 250 FOR IP): Performed by: INTERNAL MEDICINE

## 2024-07-06 PROCEDURE — 83880 ASSAY OF NATRIURETIC PEPTIDE: CPT

## 2024-07-06 PROCEDURE — 2500000003 HC RX 250 WO HCPCS: Performed by: INTERNAL MEDICINE

## 2024-07-06 PROCEDURE — 94640 AIRWAY INHALATION TREATMENT: CPT

## 2024-07-06 PROCEDURE — 85025 COMPLETE CBC W/AUTO DIFF WBC: CPT

## 2024-07-06 PROCEDURE — 85730 THROMBOPLASTIN TIME PARTIAL: CPT

## 2024-07-06 PROCEDURE — 99233 SBSQ HOSP IP/OBS HIGH 50: CPT | Performed by: STUDENT IN AN ORGANIZED HEALTH CARE EDUCATION/TRAINING PROGRAM

## 2024-07-06 PROCEDURE — 94003 VENT MGMT INPAT SUBQ DAY: CPT

## 2024-07-06 PROCEDURE — 82803 BLOOD GASES ANY COMBINATION: CPT

## 2024-07-06 PROCEDURE — 83735 ASSAY OF MAGNESIUM: CPT

## 2024-07-06 PROCEDURE — 84484 ASSAY OF TROPONIN QUANT: CPT

## 2024-07-06 PROCEDURE — 71045 X-RAY EXAM CHEST 1 VIEW: CPT

## 2024-07-06 RX ORDER — ENOXAPARIN SODIUM 100 MG/ML
40 INJECTION SUBCUTANEOUS DAILY
Status: DISCONTINUED | OUTPATIENT
Start: 2024-07-06 | End: 2024-07-08

## 2024-07-06 RX ORDER — SODIUM CHLORIDE 0.9 % (FLUSH) 0.9 %
5-40 SYRINGE (ML) INJECTION PRN
Status: DISCONTINUED | OUTPATIENT
Start: 2024-07-06 | End: 2024-07-12

## 2024-07-06 RX ORDER — SODIUM CHLORIDE 9 MG/ML
INJECTION, SOLUTION INTRAVENOUS PRN
Status: DISCONTINUED | OUTPATIENT
Start: 2024-07-06 | End: 2024-07-12 | Stop reason: HOSPADM

## 2024-07-06 RX ORDER — MAGNESIUM SULFATE IN WATER 40 MG/ML
2000 INJECTION, SOLUTION INTRAVENOUS PRN
Status: DISCONTINUED | OUTPATIENT
Start: 2024-07-06 | End: 2024-07-12 | Stop reason: HOSPADM

## 2024-07-06 RX ORDER — POTASSIUM CHLORIDE 29.8 MG/ML
20 INJECTION INTRAVENOUS PRN
Status: DISCONTINUED | OUTPATIENT
Start: 2024-07-06 | End: 2024-07-12 | Stop reason: HOSPADM

## 2024-07-06 RX ORDER — SODIUM CHLORIDE 0.9 % (FLUSH) 0.9 %
5-40 SYRINGE (ML) INJECTION EVERY 12 HOURS SCHEDULED
Status: DISCONTINUED | OUTPATIENT
Start: 2024-07-06 | End: 2024-07-12

## 2024-07-06 RX ADMIN — SODIUM PHOSPHATE, MONOBASIC, MONOHYDRATE AND SODIUM PHOSPHATE, DIBASIC, ANHYDROUS 10.65 MMOL: 142; 276 INJECTION, SOLUTION INTRAVENOUS at 13:52

## 2024-07-06 RX ADMIN — Medication 175 MCG/HR: at 19:55

## 2024-07-06 RX ADMIN — HYDROCORTISONE SODIUM SUCCINATE 100 MG: 100 INJECTION, POWDER, FOR SOLUTION INTRAMUSCULAR; INTRAVENOUS at 06:03

## 2024-07-06 RX ADMIN — HEPARIN SODIUM 3700 UNITS: 1000 INJECTION INTRAVENOUS; SUBCUTANEOUS at 04:31

## 2024-07-06 RX ADMIN — Medication 200 MCG/HR: at 08:19

## 2024-07-06 RX ADMIN — SODIUM CHLORIDE, PRESERVATIVE FREE 10 ML: 5 INJECTION INTRAVENOUS at 22:57

## 2024-07-06 RX ADMIN — NOREPINEPHRINE BITARTRATE 8 MCG/MIN: 1 INJECTION, SOLUTION, CONCENTRATE INTRAVENOUS at 04:11

## 2024-07-06 RX ADMIN — VANCOMYCIN HYDROCHLORIDE 1250 MG: 10 INJECTION, POWDER, LYOPHILIZED, FOR SOLUTION INTRAVENOUS at 09:25

## 2024-07-06 RX ADMIN — ACETAMINOPHEN 650 MG: 325 TABLET ORAL at 06:02

## 2024-07-06 RX ADMIN — SODIUM CHLORIDE, PRESERVATIVE FREE 10 ML: 5 INJECTION INTRAVENOUS at 21:29

## 2024-07-06 RX ADMIN — HYDROXYCHLOROQUINE SULFATE 200 MG: 200 TABLET ORAL at 21:24

## 2024-07-06 RX ADMIN — HYDROXYCHLOROQUINE SULFATE 200 MG: 200 TABLET ORAL at 08:46

## 2024-07-06 RX ADMIN — CHLORHEXIDINE GLUCONATE 0.12% ORAL RINSE 15 ML: 1.2 LIQUID ORAL at 09:00

## 2024-07-06 RX ADMIN — ALPRAZOLAM 2 MG: 1 TABLET ORAL at 21:24

## 2024-07-06 RX ADMIN — ENOXAPARIN SODIUM 40 MG: 100 INJECTION SUBCUTANEOUS at 13:43

## 2024-07-06 RX ADMIN — ACETAMINOPHEN 650 MG: 325 TABLET ORAL at 13:44

## 2024-07-06 RX ADMIN — ALPRAZOLAM 2 MG: 1 TABLET ORAL at 08:46

## 2024-07-06 RX ADMIN — Medication 10 MG/HR: at 05:12

## 2024-07-06 RX ADMIN — LEVALBUTEROL 0.63 MG: 0.63 SOLUTION RESPIRATORY (INHALATION) at 06:11

## 2024-07-06 RX ADMIN — CEFEPIME 2000 MG: 2 INJECTION, POWDER, FOR SOLUTION INTRAVENOUS at 21:36

## 2024-07-06 RX ADMIN — HYDROCORTISONE SODIUM SUCCINATE 100 MG: 100 INJECTION, POWDER, FOR SOLUTION INTRAMUSCULAR; INTRAVENOUS at 22:57

## 2024-07-06 RX ADMIN — CHLORHEXIDINE GLUCONATE 0.12% ORAL RINSE 15 ML: 1.2 LIQUID ORAL at 20:20

## 2024-07-06 RX ADMIN — LEVALBUTEROL 0.63 MG: 0.63 SOLUTION RESPIRATORY (INHALATION) at 16:05

## 2024-07-06 RX ADMIN — SODIUM CHLORIDE, PRESERVATIVE FREE 40 MG: 5 INJECTION INTRAVENOUS at 08:46

## 2024-07-06 RX ADMIN — VENLAFAXINE HYDROCHLORIDE 75 MG: 75 TABLET ORAL at 17:42

## 2024-07-06 RX ADMIN — VENLAFAXINE HYDROCHLORIDE 75 MG: 75 TABLET ORAL at 08:19

## 2024-07-06 RX ADMIN — Medication 8 MG/HR: at 13:46

## 2024-07-06 RX ADMIN — Medication 200 MCG/HR: at 04:01

## 2024-07-06 RX ADMIN — SODIUM CHLORIDE: 9 INJECTION, SOLUTION INTRAVENOUS at 21:34

## 2024-07-06 RX ADMIN — LAMOTRIGINE 100 MG: 100 TABLET ORAL at 21:24

## 2024-07-06 RX ADMIN — ALPRAZOLAM 2 MG: 1 TABLET ORAL at 16:03

## 2024-07-06 RX ADMIN — HYDROCORTISONE SODIUM SUCCINATE 100 MG: 100 INJECTION, POWDER, FOR SOLUTION INTRAMUSCULAR; INTRAVENOUS at 13:43

## 2024-07-06 RX ADMIN — CEFEPIME 2000 MG: 2 INJECTION, POWDER, FOR SOLUTION INTRAVENOUS at 08:47

## 2024-07-06 RX ADMIN — Medication 175 MCG/HR: at 13:45

## 2024-07-06 ASSESSMENT — PULMONARY FUNCTION TESTS
PIF_VALUE: 17
PIF_VALUE: 25
PIF_VALUE: 24
PIF_VALUE: 26
PIF_VALUE: 17
PIF_VALUE: 21
PIF_VALUE: 26
PIF_VALUE: 24
PIF_VALUE: 29
PIF_VALUE: 17
PIF_VALUE: 22
PIF_VALUE: 24
PIF_VALUE: 17
PIF_VALUE: 21
PIF_VALUE: 23
PIF_VALUE: 24
PIF_VALUE: 40
PIF_VALUE: 23
PIF_VALUE: 21
PIF_VALUE: 21
PIF_VALUE: 25
PIF_VALUE: 24
PIF_VALUE: 25
PIF_VALUE: 17
PIF_VALUE: 26
PIF_VALUE: 21
PIF_VALUE: 24
PIF_VALUE: 21
PIF_VALUE: 22
PIF_VALUE: 20
PIF_VALUE: 17
PIF_VALUE: 26
PIF_VALUE: 29
PIF_VALUE: 23
PIF_VALUE: 22
PIF_VALUE: 24
PIF_VALUE: 22

## 2024-07-06 ASSESSMENT — PAIN SCALES - GENERAL
PAINLEVEL_OUTOF10: 0
PAINLEVEL_OUTOF10: 0

## 2024-07-06 NOTE — PROGRESS NOTES
7/6/2024 11:12 AM  Greta Moran  [id number]    Subjective:      Still in the ICU  Recently returned from [facility] Cath Lab  Still intubated    Review of Systems  Unable to obtain due to intubation    Scheduled Meds:   pantoprazole (PROTONIX) 40 mg in sodium chloride (PF) 0.9 % 10 mL injection  40 mg IntraVENous Daily    hydrocortisone sodium succinate PF  100 mg IntraVENous Q8H    hydroxychloroquine  200 mg Oral BID    cefepime  2,000 mg IntraVENous Q12H    chlorhexidine  15 mL Mouth/Throat BID    ALPRAZolam  2 mg Oral TID    venlafaxine  75 mg Oral BID WC    lamoTRIgine  100 mg Oral Nightly    levalbuterol  0.63 mg Nebulization q8h       Objective:  Vitals:    07/06/24 1000   BP: 108/66   Pulse: 99   Resp: 14   Temp:    SpO2: 98%         Allergies: Patient has no known allergies.    General Appearance: Intubated and sedated  Skin: no rash or erythema  Head: normocephalic and atraumatic  Pulmonary/Chest: normal air movement, no respiratory distress  Abdomen: soft, non-tender, non-distended  Genitourinary: Coto catheter in place  Extremities: no cyanosis, clubbing or edema         Labs:     Recent Labs     07/06/24  0345      K 4.4   *   CO2 21*   BUN 14   CREATININE 0.6   GLUCOSE 134*   CALCIUM 7.1*       Lab Results   Component Value Date/Time    HGB 11.1 07/06/2024 03:45 AM    HCT 33.2 07/06/2024 03:45 AM       No results found for: \"PSA\"      Assessment/Plan:  Distention of right renal collecting system and right ureter     Creatinine still stable at 0.6  WBC trending down 20.4>>16.4  Hold on  intervention at this time  We will continue to follow and consider renal Lasix scan when she is more stable  Continue to monitor creatinine  Continue ICU care  Will follow    ASHISH Ford  Urology

## 2024-07-06 NOTE — PROGRESS NOTES
Comprehensive Nutrition Assessment    Type and Reason for Visit:  Initial (ICU/vent)    Nutrition Recommendations/Plan:   Continue NPO  Recommend consult dietitian for TF O/M if pt remains intubated and EN is needed for nutrition support  Will continue to monitor while inpatient     Malnutrition Assessment:  Malnutrition Status:  Mild malnutrition (07/06/24 1111)    Context:  Acute Illness     Findings of the 6 clinical characteristics of malnutrition:  Energy Intake:  Mild decrease in energy intake (Comment) (NPO since adm)  Weight Loss:  Unable to assess     Body Fat Loss:  No significant body fat loss     Muscle Mass Loss:  Mild muscle mass loss Temples (temporalis)  Fluid Accumulation:  Unable to assess (d/t multifactorial)     Strength:  Not Performed    Nutrition Assessment:    Pt return from [facility] after Cardiac cath. Hx SLE, bipolar disorder, impaired glucose tolerance. Currently intubated, sedated, on vent w pressor support. Recommend consult dietitian for TF O/M if pt remains intubated and EN is needed for nutrition support. Will continue to monitor.    Nutrition Related Findings:    Intubated/sedated, abd soft/rounded, hypo BS, BUE +1 edema, I/O WDL, phos 2.0, gluc 134, elevated LFTs, pressor support, MAP 81 Wound Type: None       Current Nutrition Intake & Therapies:    Average Meal Intake: NPO  Average Supplements Intake: NPO  No diet orders on file    Anthropometric Measures:  Height: 152.4 cm (5')  Ideal Body Weight (IBW): 100 lbs (45 kg)    Admission Body Weight: 61.2 kg (134 lb 14.7 oz) (7/5)  Current Body Weight: 66.5 kg (146 lb 8.3 oz) (7/6), 146.5 % IBW. Weight Source: Bed Scale  Current BMI (kg/m2): 28.6  Usual Body Weight: 48.5 kg (106 lb 15.8 oz) (7/6/23)  % Weight Change (Calculated): 37  Weight Adjustment For: No Adjustment                 BMI Categories: Overweight (BMI 25.0-29.9)    Estimated Daily Nutrient Needs:  Energy Requirements Based On: Formula  Weight Used for Energy

## 2024-07-06 NOTE — CONSULTS
Nutrition Note    Consult for TF O/M- will order per consult. See RD note from 7/6 for full assessment.  Start TF:  Vital AF 1.2 @ 45 ml/hr w/ 30 ml flush Q4 hr  To provide:  1080 ml TV, 1296 kcal, 81 g pro, 1056 ml total free water  This regimen will meet 100% of protein/energy needs    Electronically signed by FERNANDO SHEN MPH, RD, LD on 7/6/24 at 12:41 PM EDT    Contact: x [phone]

## 2024-07-06 NOTE — PATIENT CARE CONFERENCE
Intensive Care Daily Quality Rounding Checklist      ICU Team Members:     ICU Day #: NUMBER: 2    SOFA Score: 6    Intubation Date: July 3    Ventilator Day #: NUMBER: 4    Central Line Insertion Date: July 3        Day #: NUMBER: 4        Indication: CVCIndication: Administration of vasopressors or vesicant medications     Arterial Line Insertion Date: July 3      Day #: NUMBER: 4    Temporary Hemodialysis Catheter Insertion Date: N/A       Day #     DVT Prophylaxis Lovenox    GI Prophylaxis: protonix    Coto Catheter Insertion Date: July 3       Day #: 4      Indications: Need for fluid management of the critically ill patient in a critical care setting      Continued need (if yes, reason documented and discussed with physician): yes    Skin Issues/ Wounds and ordered treatment discussed on rounds: SOS precautions     Goals/ Plans for the Day:  Monitor labs and vitals, treat/replace as needed, wean vent as tolerated, wean pressors as tolerated, wean sedation as tolerated, continue critical care management. Daily restraint order, dietary for tube feeds    Reviewed plan and goals for day with patient and/or representative:
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
## 2024-07-06 NOTE — DISCHARGE SUMMARY
[facility] Hospitalist Physician Discharge Summary       No follow-up provider specified.      Dispo: [facility] cath lab    Condition on discharge: Stable     Patient ID:  Greta Moran  [id number]  54 y.o.  1970    Admit date: 7/3/2024    Discharge date and time:  7/6/2024  9:22 AM    Admission Diagnoses: Principal Problem:    Acute respiratory failure (HCC)  Active Problems:    Elevated liver enzymes    Bipolar affective disorder (HCC)    Mild cognitive impairment with memory loss    Lupus (HCC)    Asthma    Fibromyalgia    Anxiety    Sepsis with encephalopathy and septic shock (HCC)    Acute metabolic encephalopathy    Seizure (HCC)    Elevated lactic acid level    Hypoalbuminemia due to protein-calorie malnutrition (HCC)    Elevated troponin    Altered mental status  Resolved Problems:    * No resolved hospital problems. *      Discharge Diagnoses: Principal Problem:    Acute respiratory failure (HCC)  Active Problems:    Elevated liver enzymes    Bipolar affective disorder (HCC)    Mild cognitive impairment with memory loss    Lupus (HCC)    Asthma    Fibromyalgia    Anxiety    Sepsis with encephalopathy and septic shock (HCC)    Acute metabolic encephalopathy    Seizure (HCC)    Elevated lactic acid level    Hypoalbuminemia due to protein-calorie malnutrition (HCC)    Elevated troponin    Altered mental status  Resolved Problems:    * No resolved hospital problems. *      Consults:  IP CONSULT TO CRITICAL CARE  IP CONSULT TO PRIMARY CARE PROVIDER  PHARMACY TO DOSE VANCOMYCIN  IP CONSULT TO CARDIOLOGY  IP CONSULT TO UROLOGY  IP CONSULT TO INFECTIOUS DISEASES    Hospital Course:   Patient Greta Moran is a 54 y.o. presented with Acute respiratory failure (HCC) [J96.00]  Altered mental status, unspecified altered mental status type [R41.82]    Patient is a 54-year-old female who was admitted after she went unresponsive at home for approximately 15 to 20 seconds.  Concern for drug overdose.  UDS

## 2024-07-06 NOTE — PROGRESS NOTES
[facility] Hospitalist Progress Note    Admitting Date and Time: 7/5/2024  9:08 PM  Admit Dx: Acute respiratory failure, unspecified whether with hypoxia or hypercapnia [J96.00]  Altered mental status, unspecified [R41.82]  AMS (altered mental status) [R41.82]    Subjective:  Patient is being followed for Acute respiratory failure, unspecified whether with hypoxia or hypercapnia [J96.00]  Altered mental status, unspecified [R41.82]  AMS (altered mental status) [R41.82]   Pt was seen and examined today. Intubated and sedated.    ROS: denies fever, chills, cp, sob, n/v, HA unless stated above.     vancomycin  1,250 mg IntraVENous Q24H    pantoprazole (PROTONIX) 40 mg in sodium chloride (PF) 0.9 % 10 mL injection  40 mg IntraVENous Daily    hydrocortisone sodium succinate PF  100 mg IntraVENous Q8H    hydroxychloroquine  200 mg Oral BID    cefepime  2,000 mg IntraVENous Q12H    chlorhexidine  15 mL Mouth/Throat BID    ALPRAZolam  2 mg Oral TID    venlafaxine  75 mg Oral BID WC    lamoTRIgine  100 mg Oral Nightly    levalbuterol  0.63 mg Nebulization q8h     acetaminophen, 650 mg, Q6H PRN  perflutren lipid microspheres, 1.5 mL, ONCE PRN  heparin (porcine), 60 Units/kg, PRN  heparin (porcine), 30 Units/kg, PRN  fentaNYL, 50 mcg, Q30 Min PRN  midazolam, 1 mg, Q30 Min PRN         Objective:    BP 98/60   Pulse 93   Temp 99.9 °F (37.7 °C) (Bladder)   Resp 14   Ht 1.524 m (5')   Wt 61.2 kg (135 lb)   LMP 12/04/2015 (LMP Unknown)   SpO2 99%   BMI 26.37 kg/m²     General Appearance: Intubated and sedated  Skin: warm and dry  Head: normocephalic and atraumatic  Pulmonary/Chest: clear to auscultation bilaterally- no wheezes, rales or rhonchi, normal air movement, no respiratory distress  Cardiovascular: normal rate, normal S1 and S2  Abdomen: soft, non-distended, normal bowel sounds  Extremities: no cyanosis, no clubbing and no edema      Recent Labs     07/04/24  0510 07/05/24  0420 07/06/24  0345    139

## 2024-07-06 NOTE — PROGRESS NOTES
Pharmacy Consultation Note  (Antibiotic Dosing and Monitoring)    Initial consult date: 7/6  Consulting physician/provider: Tristian  Drug: Vancomycin  Indication: Sepsis x 7 days    Age/  Gender Height Weight IBW  Allergy Information   54 y.o./female 152.4 cm (5') 61.2 kg (135 lb)     Ideal body weight: 45.5 kg (100 lb 4.9 oz)  Adjusted ideal body weight: 51.8 kg (114 lb 3 oz)   Patient has no known allergies.      Renal Function:  Recent Labs     07/04/24  0510 07/05/24  0420 07/06/24  0345   BUN 11 13 14   CREATININE 0.9 0.7 0.6         Intake/Output Summary (Last 24 hours) at 7/6/2024 0844  Last data filed at 7/6/2024 0800  Gross per 24 hour   Intake 359.15 ml   Output 1000 ml   Net -640.85 ml         Vancomycin Monitoring:  Trough:  No results for input(s): \"VANCOTROUGH\" in the last 72 hours.  Random:  No results for input(s): \"VANCORANDOM\" in the last 72 hours.        Assessment:  Patient is a 54 y.o. female who has been initiated on vancomycin  Estimated Creatinine Clearance: 86 mL/min (based on SCr of 0.6 mg/dL).  No history of vancomycin levels in epic  Patient received vancomycin 2000 mg IV x 1 on 7/3 (1945).  Patient received vancomycin 1250 mg IV x 1 on 7/4 (1228)  Consult was cancelled on 7/4      Plan:  Will resume dosing from 7/4, but without loading dose.  Will place standing order for vancomycin 1250 mg IV every 24 hours to begin now for projected auc/reid 400-600.  Will check vancomycin levels when appropriate  Will continue to monitor renal function   Pharmacy to follow      Mando Snowden RPH 7/6/2024 8:44 AM    BELKIS: [phone]  SEY: [phone]  SJW: [phone]

## 2024-07-06 NOTE — PLAN OF CARE
Problem: Discharge Planning  Goal: Discharge to home or other facility with appropriate resources  Outcome: Progressing     Problem: Safety - Medical Restraint  Goal: Remains free of injury from restraints (Restraint for Interference with Medical Device)  Description: INTERVENTIONS:  1. Determine that other, less restrictive measures have been tried or would not be effective before applying the restraint  2. Evaluate the patient's condition at the time of restraint application  3. Inform patient/family regarding the reason for restraint  4. Q2H: Monitor safety, psychosocial status, comfort, nutrition and hydration  Outcome: Progressing  Flowsheets  Taken 7/6/2024 0000  Remains free of injury from restraints (restraint for interference with medical device):   Every 2 hours: Monitor safety, psychosocial status, comfort, nutrition and hydration   Inform patient/family regarding the reason for restraint   Evaluate the patient's condition at the time of restraint application   Determine that other, less restrictive measures have been tried or would not be effective before applying the restraint  Taken 7/5/2024 2200  Remains free of injury from restraints (restraint for interference with medical device):   Every 2 hours: Monitor safety, psychosocial status, comfort, nutrition and hydration   Evaluate the patient's condition at the time of restraint application   Inform patient/family regarding the reason for restraint   Determine that other, less restrictive measures have been tried or would not be effective before applying the restraint  Taken 7/5/2024 2129  Remains free of injury from restraints (restraint for interference with medical device):   Every 2 hours: Monitor safety, psychosocial status, comfort, nutrition and hydration   Inform patient/family regarding the reason for restraint   Evaluate the patient's condition at the time of restraint application   Determine that other, less restrictive measures have been

## 2024-07-06 NOTE — PATIENT CARE CONFERENCE
Patient admitted from [facility] to room 205, placed on monitor, patient oriented to room and unit visiting hours.  Patient guide at bedside, reviewed patient rights and responsibilities. MRSA nasal swab obtained. Bed alarm on, call light within reach. No belongings at bedside.

## 2024-07-06 NOTE — PROGRESS NOTES
Pharmacy Consultation Note  (Antibiotic Dosing and Monitoring)    Initial consult date: 7/5/24  Consulting physician/provider: Tristian   Drug: Vancomycin  Indication: Sepsis    Age/  Gender Height Weight IBW  Allergy Information   54 y.o./female         Ideal body weight: 45.5 kg (100 lb 4.9 oz)  Adjusted ideal body weight: 51.8 kg (114 lb 3 oz)   Patient has no known allergies.      Renal Function:  Recent Labs     07/03/24  2140 07/04/24  0510 07/05/24  0420   BUN 8 11 13   CREATININE 0.9 0.9 0.7     No intake or output data in the 24 hours ending 07/05/24 2322    Vancomycin Monitoring:  Trough:  No results for input(s): \"VANCOTROUGH\" in the last 72 hours.  Random:  No results for input(s): \"VANCORANDOM\" in the last 72 hours.    Vancomycin Administration Times:  Recent vancomycin administrations                     vancomycin (VANCOCIN) 1,250 mg in sodium chloride 0.9 % 250 mL IVPB (mg) 1,250 mg New Bag 07/04/24 1228    vancomycin (VANCOCIN) 2000 mg in 0.9% sodium chloride 500 mL IVPB (mg) 2,000 mg New Bag 07/03/24 1945                    Assessment:  Patient is a 54 y.o. female who has been initiated on vancomycin  Estimated Creatinine Clearance: 73 mL/min (based on SCr of 0.7 mg/dL).    Plan:  Patient evaluated by ID on 7/5, recommended to continue cefepime. No vancomycin ordered at this time.    Pharmacy to follow      Nathanael Francis RPH 7/5/2024 11:22 PM    SEB: [phone]  SEY: [phone]  SJW: [phone]

## 2024-07-06 NOTE — PROGRESS NOTES
4 Eyes Skin Assessment     NAME:  Greta Moran  YOB: 1970  MEDICAL RECORD NUMBER:  [id number]    The patient is being assessed for  Admission    I agree that at least one RN has performed a thorough Head to Toe Skin Assessment on the patient. ALL assessment sites listed below have been assessed.      Areas assessed by both nurses:    Head, Face, Ears, Shoulders, Back, Chest, Arms, Elbows, Hands, Sacrum. Buttock, Coccyx, Ischium, Legs. Feet and Heels, and Under Medical Devices         Does the Patient have a Wound? No noted wound(s)       Kory Prevention initiated by RN: Yes  Wound Care Orders initiated by RN: No    Pressure Injury (Stage 3,4, Unstageable, DTI, NWPT, and Complex wounds) if present, place Wound referral order by RN under : No    New Ostomies, if present place, Ostomy referral order under : No     Nurse 1 eSignature: Electronically signed by MAGDY FERREIRA RN on 7/5/24 at 11:46 PM EDT    **SHARE this note so that the co-signing nurse can place an eSignature**    Nurse 2 eSignature: {Esignature:[id number]}

## 2024-07-06 NOTE — PROGRESS NOTES
Critical Care Team - Daily Progress Note      Date and time: 7/6/2024 12:17 PM  Patient's name:  Greta Moran  Medical Record Number: [id number]  Patient's account/billing number: [id number]  Patient's YOB: 1970  Age: 54 y.o.  Date of Admission: 7/5/2024  9:08 PM  Length of stay during current admission: 1      Primary Care Physician: Brian Houston MD  ICU Attending Physician: Dr. Millan    Code Status: Full Code    Reason for ICU admission: Critical care management      SUBJECTIVE:     OVERNIGHT EVENTS:           Remains critically ill intubated and sedated  Her vasopressor requirements are improved to 4mcg/min  She went to [facility] yesterday and [facility] did not show obstructive CAD - she was therefore brought back to [place].   present    Intake/Output:   I/O this shift:  In: 1286.2 [I.V.:1062.4; IV Piggyback:223.8]  Out: 100 [Urine:100]  I/O last 3 completed shifts:  In: 359.2 [I.V.:259.2; IV Piggyback:100]  Out: 900 [Urine:900]    Awake and following commands: No  Current Ventilation: - Ventilator Settings:    Vt (Set, mL): 400 mL  Resp Rate (Set): 14 bpm  FiO2 : 45 %    PEEP/CPAP (cmH2O): 5     Secretions: None  Sedation: fentanyl and versed  Paralyzed: No  Vasopressors: norepinephrine, vasopressin    ROS:  Unless stated above, ROS is otherwise negative.      Initial HPI + past overnight events: The patient is a 54 y.o. female with significant past medical history of anxiety, arthritis, asthma, bipolar, depression, fibromyalgia, insomnia, lupus, lupus, cognitive impairment, and osteoporosis.  Information limited due to patient's status and no family present during my examination.  Information solely from EMR and ED physician.  Patient presented to the emergency room with syncopal possible seizure witnessed by boyfriend.  When EMS arrived patient was found unresponsive initially had difficulty arousing her.  Reportedly she did arouse and she became extremely combative requiring

## 2024-07-06 NOTE — PLAN OF CARE
Problem: Discharge Planning  Goal: Discharge to home or other facility with appropriate resources  Outcome: Progressing     Problem: Safety - Medical Restraint  Goal: Remains free of injury from restraints (Restraint for Interference with Medical Device)  Description: INTERVENTIONS:  1. Determine that other, less restrictive measures have been tried or would not be effective before applying the restraint  2. Evaluate the patient's condition at the time of restraint application  3. Inform patient/family regarding the reason for restraint  4. Q2H: Monitor safety, psychosocial status, comfort, nutrition and hydration  Outcome: Progressing  Flowsheets  Taken 7/6/2024 1130 by Mirna Segura RN  Remains free of injury from restraints (restraint for interference with medical device):   Determine that other, less restrictive measures have been tried or would not be effective before applying the restraint   Evaluate the patient's condition at the time of restraint application   Inform patient/family regarding the reason for restraint   Every 2 hours: Monitor safety, psychosocial status, comfort, nutrition and hydration    Problem: Safety - Adult  Goal: Free from fall injury  Outcome: Progressing     Problem: Pain  Goal: Verbalizes/displays adequate comfort level or baseline comfort level  Outcome: Progressing     Problem: Skin/Tissue Integrity  Goal: Absence of new skin breakdown  Description: 1.  Monitor for areas of redness and/or skin breakdown  2.  Assess vascular access sites hourly  3.  Every 4-6 hours minimum:  Change oxygen saturation probe site  4.  Every 4-6 hours:  If on nasal continuous positive airway pressure, respiratory therapy assess nares and determine need for appliance change or resting period.  Outcome: Progressing

## 2024-07-06 NOTE — PROGRESS NOTES
Infectious Disease  Progress Note  [facility]    Chief Complaint: shock    Subjective:  patient is in the ICU. No fever. Had cath this morning at [facility] and got transferred back.     Scheduled Meds:   enoxaparin  40 mg SubCUTAneous Daily    pantoprazole (PROTONIX) 40 mg in sodium chloride (PF) 0.9 % 10 mL injection  40 mg IntraVENous Daily    hydrocortisone sodium succinate PF  100 mg IntraVENous Q8H    hydroxychloroquine  200 mg Oral BID    cefepime  2,000 mg IntraVENous Q12H    chlorhexidine  15 mL Mouth/Throat BID    ALPRAZolam  2 mg Oral TID    venlafaxine  75 mg Oral BID WC    lamoTRIgine  100 mg Oral Nightly    levalbuterol  0.63 mg Nebulization q8h     Continuous Infusions:   norepinephrine 4 mcg/min (07/06/24 1049)    midazolam 10 mg/hr (07/06/24 1049)    fentaNYL 175 mcg/hr (07/06/24 1049)     PRN Meds:potassium chloride, magnesium sulfate, sodium phosphate 10.65 mmol in sodium chloride 0.9 % 250 mL IVPB **OR** sodium phosphate 21.27 mmol in sodium chloride 0.9 % 250 mL IVPB, acetaminophen, perflutren lipid microspheres, heparin (porcine), heparin (porcine), fentaNYL **AND** fentaNYL, midazolam    Prior to Admission medications    Medication Sig Start Date End Date Taking? Authorizing Provider   venlafaxine (EFFEXOR XR) 150 MG extended release capsule  1/11/24   Brittni Camilo MD   epinastine (ELESTAT) 0.05 % SOLN  12/29/23   Brittni Camilo MD   alendronate (FOSAMAX) 70 MG tablet  1/20/24   Brittni Camilo MD   loratadine (CLARITIN) 10 MG capsule Take 1 capsule by mouth daily    Brittni Camilo MD   metoprolol succinate (TOPROL XL) 25 MG extended release tablet TAKE 1/2 TABLET BY MOUTH EVERY DAY 12/14/21   Getachew Saez MD   nitroGLYCERIN (NITROSTAT) 0.4 MG SL tablet PLACE 1 TABLET UNDER TONGUE EVERY 5 MINS, UP TO 3 DOSES AS NEEDED FOR CHEST PAIN 8/23/21   Brittni Camilo MD   calcium carbonate 600 MG TABS tablet Take 1 tablet by mouth daily    Brittni Camilo MD  appreciated.   Abdomen: Positive bowel sounds to auscultation. Benign to palpation. No masses felt. No hepatosplenomegaly.  Genitourinary: sweeney  Extremities: No clubbing, no cyanosis, no edema.  Musculoskeletal: no gross focal abnormalities  Neurological: sedated  Lines: peripheral, Right radial Dara, right fem Cvc    Labs, Cultures reviewed  Radiology and other consultants notes reviewed    Microbiology:  Blood cx: negative  MRSA NS negative  Urine cx : negative    Assessment:  Shock : likely cardiogenic  Likely Takotsubo cardiomyopathy:   Left lower lung pneumonia:   Fever/leucocytosis:     Plan:    Cont IV cefepime   IV vancomycin was added by hospitalist last night. I am unclear why. Stopped it this morning. Pt got one dose this morning.   Monitor labs  Will follow with you      Electronically signed by SUJATHA NERI MD on 7/6/2024 at 11:37 AM

## 2024-07-07 ENCOUNTER — APPOINTMENT (OUTPATIENT)
Dept: GENERAL RADIOLOGY | Age: 54
DRG: 917 | End: 2024-07-07
Attending: FAMILY MEDICINE
Payer: MEDICARE

## 2024-07-07 LAB
ALBUMIN SERPL-MCNC: 2.8 G/DL (ref 3.5–5.2)
ALP SERPL-CCNC: 67 U/L (ref 35–104)
ALT SERPL-CCNC: 387 U/L (ref 0–32)
ANION GAP SERPL CALCULATED.3IONS-SCNC: 13 MMOL/L (ref 7–16)
ANION GAP SERPL CALCULATED.3IONS-SCNC: 7 MMOL/L (ref 7–16)
AST SERPL-CCNC: 136 U/L (ref 0–31)
B.E.: -12.7 MMOL/L (ref -3–3)
B.E.: -5 MMOL/L (ref -3–3)
BASOPHILS # BLD: 0.01 K/UL (ref 0–0.2)
BASOPHILS NFR BLD: 0 % (ref 0–2)
BILIRUB SERPL-MCNC: 0.3 MG/DL (ref 0–1.2)
BUN SERPL-MCNC: 18 MG/DL (ref 6–20)
BUN SERPL-MCNC: 23 MG/DL (ref 6–20)
CALCIUM SERPL-MCNC: 7.4 MG/DL (ref 8.6–10.2)
CALCIUM SERPL-MCNC: 7.5 MG/DL (ref 8.6–10.2)
CHLORIDE SERPL-SCNC: 111 MMOL/L (ref 98–107)
CHLORIDE SERPL-SCNC: 114 MMOL/L (ref 98–107)
CO2 SERPL-SCNC: 18 MMOL/L (ref 22–29)
CO2 SERPL-SCNC: 24 MMOL/L (ref 22–29)
COHB: 0.1 % (ref 0–1.5)
COHB: 0.2 % (ref 0–1.5)
CREAT SERPL-MCNC: 0.6 MG/DL (ref 0.5–1)
CREAT SERPL-MCNC: 0.6 MG/DL (ref 0.5–1)
CRITICAL: ABNORMAL
CRITICAL: ABNORMAL
DATE ANALYZED: ABNORMAL
DATE ANALYZED: ABNORMAL
DATE OF COLLECTION: ABNORMAL
DATE OF COLLECTION: ABNORMAL
EOSINOPHIL # BLD: 0 K/UL (ref 0.05–0.5)
EOSINOPHILS RELATIVE PERCENT: 0 % (ref 0–6)
ERYTHROCYTE [DISTWIDTH] IN BLOOD BY AUTOMATED COUNT: 14.8 % (ref 11.5–15)
FIO2: 100 %
FIO2: 45 %
GFR, ESTIMATED: >90 ML/MIN/1.73M2
GFR, ESTIMATED: >90 ML/MIN/1.73M2
GLUCOSE SERPL-MCNC: 135 MG/DL (ref 74–99)
GLUCOSE SERPL-MCNC: 203 MG/DL (ref 74–99)
HCO3: 13.9 MMOL/L (ref 22–26)
HCO3: 19.4 MMOL/L (ref 22–26)
HCT VFR BLD AUTO: 31.4 % (ref 34–48)
HGB BLD-MCNC: 10 G/DL (ref 11.5–15.5)
HHB: 3.3 % (ref 0–5)
HHB: 6.7 % (ref 0–5)
IMM GRANULOCYTES # BLD AUTO: 0.03 K/UL (ref 0–0.58)
IMM GRANULOCYTES NFR BLD: 0 % (ref 0–5)
LAB: ABNORMAL
LAB: ABNORMAL
LACTATE BLDV-SCNC: 1.1 MMOL/L (ref 0.5–2.2)
LACTATE BLDV-SCNC: 2 MMOL/L (ref 0.5–2.2)
LACTATE BLDV-SCNC: 3.3 MMOL/L (ref 0.5–2.2)
LIPASE SERPL-CCNC: 22 U/L (ref 13–60)
LYMPHOCYTES NFR BLD: 0.74 K/UL (ref 1.5–4)
LYMPHOCYTES RELATIVE PERCENT: 7 % (ref 20–42)
Lab: 1320
Lab: 550
MAGNESIUM SERPL-MCNC: 2.5 MG/DL (ref 1.6–2.6)
MCH RBC QN AUTO: 29.9 PG (ref 26–35)
MCHC RBC AUTO-ENTMCNC: 31.8 G/DL (ref 32–34.5)
MCV RBC AUTO: 94 FL (ref 80–99.9)
METHB: 0.3 % (ref 0–1.5)
METHB: 0.3 % (ref 0–1.5)
MICROORGANISM SPEC CULT: NORMAL
MICROORGANISM SPEC CULT: NORMAL
MODE: AC
MODE: AC
MONOCYTES NFR BLD: 0.64 K/UL (ref 0.1–0.95)
MONOCYTES NFR BLD: 6 % (ref 2–12)
NEUTROPHILS NFR BLD: 87 % (ref 43–80)
NEUTS SEG NFR BLD: 9.47 K/UL (ref 1.8–7.3)
O2 CONTENT: 14.9 ML/DL
O2 CONTENT: 15.8 ML/DL
O2 SATURATION: 93.3 % (ref 92–98.5)
O2 SATURATION: 96.7 % (ref 92–98.5)
O2HB: 92.9 % (ref 94–97)
O2HB: 96.2 % (ref 94–97)
OPERATOR ID: 6032
OPERATOR ID: ABNORMAL
PATIENT TEMP: 37 C
PATIENT TEMP: 37 C
PCO2: 33.7 MMHG (ref 35–45)
PCO2: 34.4 MMHG (ref 35–45)
PEEP/CPAP: 5 CMH2O
PEEP/CPAP: 5 CMH2O
PFO2: 0.83 MMHG/%
PFO2: 2.1 MMHG/%
PH BLOOD GAS: 7.22 (ref 7.35–7.45)
PH BLOOD GAS: 7.38 (ref 7.35–7.45)
PHOSPHATE SERPL-MCNC: 1.9 MG/DL (ref 2.5–4.5)
PHOSPHATE SERPL-MCNC: 2 MG/DL (ref 2.5–4.5)
PLATELET # BLD AUTO: 117 K/UL (ref 130–450)
PMV BLD AUTO: 12.7 FL (ref 7–12)
PO2: 83.4 MMHG (ref 75–100)
PO2: 94.3 MMHG (ref 75–100)
POTASSIUM SERPL-SCNC: 4.2 MMOL/L (ref 3.5–5)
POTASSIUM SERPL-SCNC: 4.8 MMOL/L (ref 3.5–5)
PROT SERPL-MCNC: 5.3 G/DL (ref 6.4–8.3)
RBC # BLD AUTO: 3.34 M/UL (ref 3.5–5.5)
RI(T): 2
RI(T): 7.14
RR MECHANICAL: 14 B/MIN
RR MECHANICAL: 14 B/MIN
SERVICE CMNT-IMP: NORMAL
SERVICE CMNT-IMP: NORMAL
SODIUM SERPL-SCNC: 142 MMOL/L (ref 132–146)
SODIUM SERPL-SCNC: 145 MMOL/L (ref 132–146)
SOURCE, BLOOD GAS: ABNORMAL
SOURCE, BLOOD GAS: ABNORMAL
SPECIMEN DESCRIPTION: NORMAL
SPECIMEN DESCRIPTION: NORMAL
THB: 10.9 G/DL (ref 11.5–16.5)
THB: 12 G/DL (ref 11.5–16.5)
TIME ANALYZED: 1326
TIME ANALYZED: 554
VT MECHANICAL: 400 ML
VT MECHANICAL: 400 ML
WBC OTHER # BLD: 10.9 K/UL (ref 4.5–11.5)

## 2024-07-07 PROCEDURE — 80048 BASIC METABOLIC PNL TOTAL CA: CPT

## 2024-07-07 PROCEDURE — 99233 SBSQ HOSP IP/OBS HIGH 50: CPT | Performed by: INTERNAL MEDICINE

## 2024-07-07 PROCEDURE — 6360000002 HC RX W HCPCS: Performed by: INTERNAL MEDICINE

## 2024-07-07 PROCEDURE — 36600 WITHDRAWAL OF ARTERIAL BLOOD: CPT

## 2024-07-07 PROCEDURE — 2500000003 HC RX 250 WO HCPCS

## 2024-07-07 PROCEDURE — 94640 AIRWAY INHALATION TREATMENT: CPT

## 2024-07-07 PROCEDURE — 83690 ASSAY OF LIPASE: CPT

## 2024-07-07 PROCEDURE — 6370000000 HC RX 637 (ALT 250 FOR IP): Performed by: INTERNAL MEDICINE

## 2024-07-07 PROCEDURE — 2000000000 HC ICU R&B

## 2024-07-07 PROCEDURE — 36592 COLLECT BLOOD FROM PICC: CPT

## 2024-07-07 PROCEDURE — 71045 X-RAY EXAM CHEST 1 VIEW: CPT

## 2024-07-07 PROCEDURE — 2580000003 HC RX 258

## 2024-07-07 PROCEDURE — 83605 ASSAY OF LACTIC ACID: CPT

## 2024-07-07 PROCEDURE — 82805 BLOOD GASES W/O2 SATURATION: CPT

## 2024-07-07 PROCEDURE — 3E0G76Z INTRODUCTION OF NUTRITIONAL SUBSTANCE INTO UPPER GI, VIA NATURAL OR ARTIFICIAL OPENING: ICD-10-PCS | Performed by: FAMILY MEDICINE

## 2024-07-07 PROCEDURE — 83735 ASSAY OF MAGNESIUM: CPT

## 2024-07-07 PROCEDURE — 36415 COLL VENOUS BLD VENIPUNCTURE: CPT

## 2024-07-07 PROCEDURE — 87070 CULTURE OTHR SPECIMN AEROBIC: CPT

## 2024-07-07 PROCEDURE — 2500000003 HC RX 250 WO HCPCS: Performed by: INTERNAL MEDICINE

## 2024-07-07 PROCEDURE — 2580000003 HC RX 258: Performed by: INTERNAL MEDICINE

## 2024-07-07 PROCEDURE — 36591 DRAW BLOOD OFF VENOUS DEVICE: CPT

## 2024-07-07 PROCEDURE — 84100 ASSAY OF PHOSPHORUS: CPT

## 2024-07-07 PROCEDURE — 87205 SMEAR GRAM STAIN: CPT

## 2024-07-07 PROCEDURE — 99232 SBSQ HOSP IP/OBS MODERATE 35: CPT | Performed by: STUDENT IN AN ORGANIZED HEALTH CARE EDUCATION/TRAINING PROGRAM

## 2024-07-07 PROCEDURE — 85025 COMPLETE CBC W/AUTO DIFF WBC: CPT

## 2024-07-07 PROCEDURE — 87040 BLOOD CULTURE FOR BACTERIA: CPT

## 2024-07-07 PROCEDURE — 94003 VENT MGMT INPAT SUBQ DAY: CPT

## 2024-07-07 PROCEDURE — 80053 COMPREHEN METABOLIC PANEL: CPT

## 2024-07-07 RX ORDER — METHYLPREDNISOLONE SODIUM SUCCINATE 40 MG/ML
40 INJECTION, POWDER, LYOPHILIZED, FOR SOLUTION INTRAMUSCULAR; INTRAVENOUS EVERY 12 HOURS
Status: DISCONTINUED | OUTPATIENT
Start: 2024-07-07 | End: 2024-07-09

## 2024-07-07 RX ORDER — ARFORMOTEROL TARTRATE 15 UG/2ML
15 SOLUTION RESPIRATORY (INHALATION)
Status: DISCONTINUED | OUTPATIENT
Start: 2024-07-07 | End: 2024-07-12 | Stop reason: HOSPADM

## 2024-07-07 RX ORDER — BUDESONIDE 0.5 MG/2ML
0.5 INHALANT ORAL
Status: DISCONTINUED | OUTPATIENT
Start: 2024-07-07 | End: 2024-07-12 | Stop reason: HOSPADM

## 2024-07-07 RX ORDER — WATER 10 ML/10ML
INJECTION INTRAMUSCULAR; INTRAVENOUS; SUBCUTANEOUS
Status: COMPLETED
Start: 2024-07-07 | End: 2024-07-07

## 2024-07-07 RX ADMIN — CEFEPIME 2000 MG: 2 INJECTION, POWDER, FOR SOLUTION INTRAVENOUS at 21:32

## 2024-07-07 RX ADMIN — CHLORHEXIDINE GLUCONATE 0.12% ORAL RINSE 15 ML: 1.2 LIQUID ORAL at 07:28

## 2024-07-07 RX ADMIN — VENLAFAXINE HYDROCHLORIDE 75 MG: 75 TABLET ORAL at 16:50

## 2024-07-07 RX ADMIN — CEFEPIME 2000 MG: 2 INJECTION, POWDER, FOR SOLUTION INTRAVENOUS at 08:18

## 2024-07-07 RX ADMIN — SODIUM CHLORIDE, PRESERVATIVE FREE 40 MG: 5 INJECTION INTRAVENOUS at 07:23

## 2024-07-07 RX ADMIN — Medication 75 MCG/HR: at 12:57

## 2024-07-07 RX ADMIN — ALPRAZOLAM 2 MG: 1 TABLET ORAL at 21:25

## 2024-07-07 RX ADMIN — Medication 150 MCG/HR: at 02:21

## 2024-07-07 RX ADMIN — ALPRAZOLAM 2 MG: 1 TABLET ORAL at 07:27

## 2024-07-07 RX ADMIN — Medication 3 MG/HR: at 13:04

## 2024-07-07 RX ADMIN — HYDROXYCHLOROQUINE SULFATE 200 MG: 200 TABLET ORAL at 08:30

## 2024-07-07 RX ADMIN — DEXMEDETOMIDINE 0.2 MCG/KG/HR: 100 INJECTION, SOLUTION INTRAVENOUS at 10:09

## 2024-07-07 RX ADMIN — BUDESONIDE 500 MCG: 0.5 SUSPENSION RESPIRATORY (INHALATION) at 20:18

## 2024-07-07 RX ADMIN — LAMOTRIGINE 100 MG: 100 TABLET ORAL at 21:25

## 2024-07-07 RX ADMIN — LEVALBUTEROL 0.63 MG: 0.63 SOLUTION RESPIRATORY (INHALATION) at 15:53

## 2024-07-07 RX ADMIN — HYDROCORTISONE SODIUM SUCCINATE 100 MG: 100 INJECTION, POWDER, FOR SOLUTION INTRAMUSCULAR; INTRAVENOUS at 06:00

## 2024-07-07 RX ADMIN — Medication 4 MG/HR: at 06:41

## 2024-07-07 RX ADMIN — SODIUM PHOSPHATE, MONOBASIC, MONOHYDRATE AND SODIUM PHOSPHATE, DIBASIC, ANHYDROUS 21.27 MMOL: 142; 276 INJECTION, SOLUTION INTRAVENOUS at 20:37

## 2024-07-07 RX ADMIN — SODIUM PHOSPHATE, MONOBASIC, MONOHYDRATE AND SODIUM PHOSPHATE, DIBASIC, ANHYDROUS 21.27 MMOL: 142; 276 INJECTION, SOLUTION INTRAVENOUS at 10:59

## 2024-07-07 RX ADMIN — METHYLPREDNISOLONE SODIUM SUCCINATE 40 MG: 40 INJECTION INTRAMUSCULAR; INTRAVENOUS at 10:44

## 2024-07-07 RX ADMIN — ALPRAZOLAM 2 MG: 1 TABLET ORAL at 14:32

## 2024-07-07 RX ADMIN — ARFORMOTEROL TARTRATE 15 MCG: 15 SOLUTION RESPIRATORY (INHALATION) at 20:18

## 2024-07-07 RX ADMIN — METHYLPREDNISOLONE SODIUM SUCCINATE 40 MG: 40 INJECTION INTRAMUSCULAR; INTRAVENOUS at 21:25

## 2024-07-07 RX ADMIN — HYDROXYCHLOROQUINE SULFATE 200 MG: 200 TABLET ORAL at 21:25

## 2024-07-07 RX ADMIN — ENOXAPARIN SODIUM 40 MG: 100 INJECTION SUBCUTANEOUS at 08:30

## 2024-07-07 RX ADMIN — DEXMEDETOMIDINE 0.9 MCG/KG/HR: 100 INJECTION, SOLUTION INTRAVENOUS at 20:00

## 2024-07-07 RX ADMIN — ACETAMINOPHEN 650 MG: 325 TABLET ORAL at 07:24

## 2024-07-07 RX ADMIN — CHLORHEXIDINE GLUCONATE 0.12% ORAL RINSE 15 ML: 1.2 LIQUID ORAL at 21:26

## 2024-07-07 RX ADMIN — ARFORMOTEROL TARTRATE 15 MCG: 15 SOLUTION RESPIRATORY (INHALATION) at 10:49

## 2024-07-07 RX ADMIN — SODIUM CHLORIDE, PRESERVATIVE FREE 10 ML: 5 INJECTION INTRAVENOUS at 21:25

## 2024-07-07 RX ADMIN — LEVALBUTEROL 0.63 MG: 0.63 SOLUTION RESPIRATORY (INHALATION) at 08:03

## 2024-07-07 RX ADMIN — BUDESONIDE 500 MCG: 0.5 SUSPENSION RESPIRATORY (INHALATION) at 10:49

## 2024-07-07 RX ADMIN — WATER 10 ML: 1 INJECTION INTRAMUSCULAR; INTRAVENOUS; SUBCUTANEOUS at 06:00

## 2024-07-07 RX ADMIN — Medication 75 MCG/HR: at 21:22

## 2024-07-07 RX ADMIN — VENLAFAXINE HYDROCHLORIDE 75 MG: 75 TABLET ORAL at 07:26

## 2024-07-07 RX ADMIN — LEVALBUTEROL 0.63 MG: 0.63 SOLUTION RESPIRATORY (INHALATION) at 00:00

## 2024-07-07 RX ADMIN — SODIUM CHLORIDE, PRESERVATIVE FREE 10 ML: 5 INJECTION INTRAVENOUS at 06:00

## 2024-07-07 ASSESSMENT — PAIN SCALES - GENERAL
PAINLEVEL_OUTOF10: 0

## 2024-07-07 ASSESSMENT — PULMONARY FUNCTION TESTS
PIF_VALUE: 25
PIF_VALUE: 20
PIF_VALUE: 23
PIF_VALUE: 21
PIF_VALUE: 35
PIF_VALUE: 24
PIF_VALUE: 24
PIF_VALUE: 31
PIF_VALUE: 27
PIF_VALUE: 23
PIF_VALUE: 15
PIF_VALUE: 21
PIF_VALUE: 28
PIF_VALUE: 26
PIF_VALUE: 21
PIF_VALUE: 23
PIF_VALUE: 21
PIF_VALUE: 23
PIF_VALUE: 22

## 2024-07-07 NOTE — PLAN OF CARE
Problem: Discharge Planning  Goal: Discharge to home or other facility with appropriate resources  7/6/2024 2228 by Ashli Shankar  Outcome: Progressing  Flowsheets (Taken 7/6/2024 1945)  Discharge to home or other facility with appropriate resources: Identify barriers to discharge with patient and caregiver  7/6/2024 1616 by Mirna Segura RN  Outcome: Progressing     Problem: Safety - Medical Restraint  Goal: Remains free of injury from restraints (Restraint for Interference with Medical Device)  Description: INTERVENTIONS:  1. Determine that other, less restrictive measures have been tried or would not be effective before applying the restraint  2. Evaluate the patient's condition at the time of restraint application  3. Inform patient/family regarding the reason for restraint  4. Q2H: Monitor safety, psychosocial status, comfort, nutrition and hydration  7/6/2024 2228 by Ashli Shankar  Outcome: Progressing  Flowsheets (Taken 7/6/2024 2000 by Dino Poon RN)  Remains free of injury from restraints (restraint for interference with medical device):   Determine that other, less restrictive measures have been tried or would not be effective before applying the restraint   Evaluate the patient's condition at the time of restraint application   Inform patient/family regarding the reason for restraint   Every 2 hours: Monitor safety, psychosocial status, comfort, nutrition and hydration  7/6/2024 1616 by Mirna Segura, RN  Outcome: Progressing  Flowsheets  Taken 7/6/2024 1130 by Mirna Segura, RN  Remains free of injury from restraints (restraint for interference with medical device):   Determine that other, less restrictive measures have been tried or would not be effective before applying the restraint   Evaluate the patient's condition at the time of restraint application   Inform patient/family regarding the reason for restraint   Every 2 hours: Monitor safety,  psychosocial status, comfort, nutrition and hydration  Taken 7/6/2024 1000 by Jero Blackburn RN  Remains free of injury from restraints (restraint for interference with medical device):   Inform patient/family regarding the reason for restraint   Every 2 hours: Monitor safety, psychosocial status, comfort, nutrition and hydration   Evaluate the patient's condition at the time of restraint application   Determine that other, less restrictive measures have been tried or would not be effective before applying the restraint  Taken 7/6/2024 0800 by Jero Blackburn RN  Remains free of injury from restraints (restraint for interference with medical device):   Every 2 hours: Monitor safety, psychosocial status, comfort, nutrition and hydration   Inform patient/family regarding the reason for restraint   Evaluate the patient's condition at the time of restraint application   Determine that other, less restrictive measures have been tried or would not be effective before applying the restraint  Taken 7/6/2024 0600 by Jero Blackburn RN  Remains free of injury from restraints (restraint for interference with medical device):   Every 2 hours: Monitor safety, psychosocial status, comfort, nutrition and hydration   Inform patient/family regarding the reason for restraint   Evaluate the patient's condition at the time of restraint application   Determine that other, less restrictive measures have been tried or would not be effective before applying the restraint  Taken 7/6/2024 0400 by Jero Blackburn RN  Remains free of injury from restraints (restraint for interference with medical device):   Every 2 hours: Monitor safety, psychosocial status, comfort, nutrition and hydration   Inform patient/family regarding the reason for restraint   Evaluate the patient's condition at the time of restraint application   Determine that other, less restrictive measures have been tried or would not be effective before applying the restraint

## 2024-07-07 NOTE — PLAN OF CARE
Problem: Respiratory - Adult  Goal: Achieves optimal ventilation and oxygenation  Outcome: Progressing   Patient on vasopressors. No weaning at this time.   Dr. Millan here wants to trial pt on SBT   Patient failed SBT due to agitation. No more weaning today.

## 2024-07-07 NOTE — PROGRESS NOTES
Critical Care Team - Daily Progress Note      Date and time: 7/7/2024 11:48 AM  Patient's name:  Greta Moran  Medical Record Number: [id number]  Patient's account/billing number: [id number]  Patient's YOB: 1970  Age: 54 y.o.  Date of Admission: 7/5/2024  9:08 PM  Length of stay during current admission: 2      Primary Care Physician: Brian Houston MD  ICU Attending Physician: Dr. Millan    Code Status: Full Code    Reason for ICU admission: Critical care management      SUBJECTIVE:     OVERNIGHT EVENTS:           Patient had fever with maximum temperature of 100.8. Blood cultures sent.  Remains on Levophed support.  On sedation with fentanyl and Versed.  She is on full vent support.  She is having good urine output.  Hemoglobin dropped from 11-10.  X-ray with right lower zone haziness.  Patient started on tube feeds.  Electrolytes replaced.  Repeat labs yesterday showed elevated proBNP which was 30,000 and troponin trending down to 265.  We will switch her sedation from fentanyl and Versed to Precedex.  Patient was put on pressure support however patient got tachycardic and agitated even on sedation.  Will plan spontaneous breathing trials tomorrow.    Intake/Output:   I/O this shift:  In: 85 [I.V.:74.8; IV Piggyback:10.2]  Out: 100 [Urine:100]  I/O last 3 completed shifts:  In: 3160.3 [I.V.:1834.2; NG/GT:524; IV Piggyback:802.1]  Out: 2000 [Urine:2000]    Awake and following commands: No  Current Ventilation: - Ventilator Settings:    Vt (Set, mL): 400 mL  Resp Rate (Set): 14 bpm  FiO2 : 40 %    PEEP/CPAP (cmH2O): 5     Secretions: None  Sedation: fentanyl and versed  Paralyzed: No  Vasopressors: norepinephrine, vasopressin    ROS:  Unless stated above, ROS is otherwise negative.      Initial HPI + past overnight events: The patient is a 54 y.o. female with significant past medical history of anxiety, arthritis, asthma, bipolar, depression, fibromyalgia, insomnia, lupus, lupus, cognitive

## 2024-07-07 NOTE — PROGRESS NOTES
DAILY VENTILATOR WEANING ASSESSMENT PERFORMED    P/FIO2 Ratio =   210       (<100= do not Wean)                  Cs =   46                     (<32= Instability)  Plat. Pressure = 14  MV = 450  RSBI =    Instabilities:       Cardiovascular = 1       CNS =       Respiratory =       Metabolic =    Parameters    no    Wean per protocol  no    Ask Physician for a weaning plan yes    Additional Comments: Patient on vasopressors. No weaning at this time.    Performed by Gui Faust RCP RRT      Reference Table:    Cardiovascular     CNS      1. Mean BP less than or equal to 75   1. Neuromuscular blockade  2. Heart Rate greater than 130   2. RASS of -3, -4, -5  3. Myocardial Ischemia    3. RASS of +3, +4  4. Mechanical Assist Device    4. ICP greater than 15 or             Intracranial Hypertension         Respiratory      Metabolic  1. PEEP equal to or greater than 10cm/H20  1.Temp. (8hrs) less than 95 or > 103  2. Respiratory Rate greater than 35   2. WBC < 5000 or > 85943  3. Minute Volume greater than 15L  4. pH less than 7.30  5. Deteriorating chest X-ray         07/07/24 0806   Patient Observation   Pulse 95   Respirations 14   SpO2 94 %   Vent Information   Ventilator Day(s) 5   Ventilator ID [id number]   Vent Mode AC/VC   Ventilator Settings   Vt (Set, mL) 400 mL   Resp Rate (Set) 14 bpm   PEEP/CPAP (cmH2O) 5   FiO2  45 %   Peak Inspiratory Flow (Set) 60 L/sec   Vent Patient Data (Readings)   Vt (Measured) 450 mL   Peak Inspiratory Pressure (cmH2O) 25 cmH2O   Rate Measured 14 br/min   Minute Volume (L/min) 5.94 Liters   Peak Inspiratory Flow (lpm) 60 L/sec   Mean Airway Pressure (cmH2O) 8.5 cmH20   Plateau Pressure (cm H2O) 14 cm H2O   Driving Pressure 9   I:E Ratio 1:5.00   Flow Sensitivity 3 L/min   PEEP Intrinsic (cm H2O) 0 cm H2O   Static Compliance (L/cm H2O) 46   Backup Apnea On   Backup Rate 14 Breaths Per Minute   Backup Vt 400   Vent Alarm Settings   High Pressure (cmH2O) 40 cmH2O   Low Minute Volume  (lpm) 4.5 L/min   High Minute Volume (lpm) 16 L/min   Low Exhaled Vt (ml) 300 mL   High Exhaled Vt (ml) 880 mL   RR High (bpm) 35 br/min   Apnea (secs) 20 secs   Additional Respiratoray Assessments   Humidification Source Heated wire   Humidification Temp 37   Circuit Condensation Not drained   Ambu Bag With Mask At Bedside Yes   Non-Surgical Airway 07/03/24 Endotracheal tube   Placement Date/Time: 07/03/24 1346   Placed By: In ED  Placement Verified By: Colorimetric ETCO2 device;Auscultation  Insertion attempts: 1  Location: Oral  Airway Device: Endotracheal tube  Size: 7.5   Secured At 25 cm   Measured From Lips   Secured Location Right   Secured By Commercial tube dang   Site Assessment Dry   Cuff Pressure 29 cm H2O

## 2024-07-07 NOTE — PROGRESS NOTES
Infectious Disease  Progress Note  [facility]    Chief Complaint: shock    Subjective:  Patient is in the ICU. Had fever of 101 overnight.  Off levo this morning. On precedex started this morning     Scheduled Meds:   arformoterol tartrate  15 mcg Nebulization BID RT    budesonide  0.5 mg Nebulization BID RT    methylPREDNISolone  40 mg IntraVENous Q12H    enoxaparin  40 mg SubCUTAneous Daily    sodium chloride flush  5-40 mL IntraVENous 2 times per day    pantoprazole (PROTONIX) 40 mg in sodium chloride (PF) 0.9 % 10 mL injection  40 mg IntraVENous Daily    hydroxychloroquine  200 mg Oral BID    cefepime  2,000 mg IntraVENous Q12H    chlorhexidine  15 mL Mouth/Throat BID    ALPRAZolam  2 mg Oral TID    venlafaxine  75 mg Oral BID WC    lamoTRIgine  100 mg Oral Nightly    levalbuterol  0.63 mg Nebulization q8h     Continuous Infusions:   dexmedeTOMIDine (PRECEDEX) 400 mcg in sodium chloride 0.9 % 100 mL infusion 0.4 mcg/kg/hr (07/07/24 1012)    sodium chloride Stopped (07/07/24 0818)    norepinephrine Stopped (07/07/24 1015)    midazolam Stopped (07/07/24 0850)    fentaNYL Stopped (07/07/24 0850)     PRN Meds:potassium chloride, magnesium sulfate, sodium phosphate 10.65 mmol in sodium chloride 0.9 % 250 mL IVPB **OR** sodium phosphate 21.27 mmol in sodium chloride 0.9 % 250 mL IVPB, sodium chloride flush, sodium chloride, acetaminophen, fentaNYL **AND** fentaNYL, midazolam    Prior to Admission medications    Medication Sig Start Date End Date Taking? Authorizing Provider   venlafaxine (EFFEXOR XR) 150 MG extended release capsule  1/11/24   Brittni Camilo MD   epinastine (ELESTAT) 0.05 % SOLN  12/29/23   Brittni Camilo MD   alendronate (FOSAMAX) 70 MG tablet  1/20/24   Brittni Camilo MD   loratadine (CLARITIN) 10 MG capsule Take 1 capsule by mouth daily    Brittni Camilo MD   metoprolol succinate (TOPROL XL) 25 MG extended release tablet TAKE 1/2 TABLET BY MOUTH EVERY DAY 12/14/21   Se  Getachew Rey MD   nitroGLYCERIN (NITROSTAT) 0.4 MG SL tablet PLACE 1 TABLET UNDER TONGUE EVERY 5 MINS, UP TO 3 DOSES AS NEEDED FOR CHEST PAIN 8/23/21   Brittni Camilo MD   calcium carbonate 600 MG TABS tablet Take 1 tablet by mouth daily    Brittni Camilo MD   sucralfate (CARAFATE) 1 GM tablet Take 1 tablet by mouth 4 times daily 9/3/21   Hakeem Vera MD   aluminum & magnesium hydroxide-simethicone (MAALOX) 200-200-20 MG/5ML SUSP suspension Take 30 mLs by mouth every 6 hours as needed for Indigestion  Patient not taking: Reported on 8/31/2022 9/2/21   Brian Houston MD   omeprazole (PRILOSEC) 20 MG delayed release capsule Take 20 mg by mouth daily    Brittni Camilo MD   brexpiprazole (REXULTI) 1 MG TABS tablet Take 1 tablet by mouth daily    Brittni Camilo MD   vitamin D (CHOLECALCIFEROL) 25 MCG (1000 UT) TABS tablet Take by mouth daily     Brittni Camilo MD   hydroxychloroquine (PLAQUENIL) 200 MG tablet Take by mouth 2 times daily     Brittni Camilo MD   ALPRAZolam (XANAX) 1 MG tablet Take 2 mg by mouth 3 times daily.  4/20/19   Brittni Camilo MD   PROVENTIL  (90 Base) MCG/ACT inhaler INHALE 2 PUFFS EVERY 4 HOURS AS NEEDED 12/29/17   Brittni Camilo MD   lamoTRIgine (LAMICTAL) 200 MG tablet Take 1 tablet by mouth 2 tabs at night 12/11/17   Brittni Camilo MD   zolpidem (AMBIEN) 10 MG tablet Take  by mouth nightly as needed for Sleep.    Brittni Camilo MD   traZODone (DESYREL) 150 MG tablet Take 2 tablets by mouth nightly    Brittni Camilo MD        ROS:  Cannot be obtained pt is intubated.       BP (!) 85/66   Pulse 100   Temp 100.4 °F (38 °C) (Bladder)   Resp 15   Ht 1.524 m (5')   Wt 63.8 kg (140 lb 10.5 oz)   LMP 12/04/2015 (LMP Unknown)   SpO2 91%   BMI 27.47 kg/m²     Physical Exam  Constitutional: The patient is sedated .  Skin: Warm and dry. No rashes were noted. No jaundice.  HEENT: Eyes show round, and

## 2024-07-07 NOTE — PROGRESS NOTES
[facility] Hospitalist Progress Note    Admitting Date and Time: 7/5/2024  9:08 PM  Admit Dx: Acute respiratory failure, unspecified whether with hypoxia or hypercapnia [J96.00]  Altered mental status, unspecified [R41.82]  AMS (altered mental status) [R41.82]    Subjective:  Patient is being followed for Acute respiratory failure, unspecified whether with hypoxia or hypercapnia [J96.00]  Altered mental status, unspecified [R41.82]  AMS (altered mental status) [R41.82]   Pt was seen and examined today. Intubated and sedated.    ROS: denies fever, chills, cp, sob, n/v, HA unless stated above.     enoxaparin  40 mg SubCUTAneous Daily    sodium chloride flush  5-40 mL IntraVENous 2 times per day    pantoprazole (PROTONIX) 40 mg in sodium chloride (PF) 0.9 % 10 mL injection  40 mg IntraVENous Daily    hydrocortisone sodium succinate PF  100 mg IntraVENous Q8H    hydroxychloroquine  200 mg Oral BID    cefepime  2,000 mg IntraVENous Q12H    chlorhexidine  15 mL Mouth/Throat BID    ALPRAZolam  2 mg Oral TID    venlafaxine  75 mg Oral BID WC    lamoTRIgine  100 mg Oral Nightly    levalbuterol  0.63 mg Nebulization q8h     potassium chloride, 20 mEq, PRN  magnesium sulfate, 2,000 mg, PRN  sodium phosphate 10.65 mmol in sodium chloride 0.9 % 250 mL IVPB, 0.16 mmol/kg, PRN   Or  sodium phosphate 21.27 mmol in sodium chloride 0.9 % 250 mL IVPB, 0.32 mmol/kg, PRN  sodium chloride flush, 5-40 mL, PRN  sodium chloride, , PRN  acetaminophen, 650 mg, Q6H PRN  fentaNYL, 50 mcg, Q30 Min PRN  midazolam, 1 mg, Q30 Min PRN         Objective:    BP (!) 78/58   Pulse 95   Temp (!) 101.4 °F (38.6 °C) (Bladder)   Resp 14   Ht 1.524 m (5')   Wt 63.8 kg (140 lb 10.5 oz)   LMP 12/04/2015 (LMP Unknown)   SpO2 94%   BMI 27.47 kg/m²     General Appearance: Intubated and sedated  Skin: warm and dry  Head: normocephalic and atraumatic  Pulmonary/Chest: clear to auscultation bilaterally- no wheezes, rales or rhonchi, normal air

## 2024-07-07 NOTE — PATIENT CARE CONFERENCE
Intensive Care Daily Quality Rounding Checklist        ICU Team Members: Dr. Millan, Dr. Poe (resident), charge nurse, bedside nurse, respiratory therapist     ICU Day #: NUMBER: 5     SOFA Score: 7     Intubation Date: July 3     Ventilator Day #: NUMBER: 5     Central Line Insertion Date: July 5                                                    Day #: NUMBER: 3                                                    Indication: CVCIndication: Administration of vasopressors or vesicant medications      Arterial Line Insertion Date:                              Day #:     Temporary Hemodialysis Catheter Insertion Date: N/A                              Day # N/A     DVT Prophylaxis: Lovenox    GI Prophylaxis: Protonix     Coto Catheter Insertion Date: July 3                                        Day #: 5                             Indications: Need for fluid management of the critically ill patient in a critical care setting                             Continued need (if yes, reason documented and discussed with physician): yes     Skin Issues/ Wounds and ordered treatment discussed on rounds: SOS precautions        Goals/ Plans for the Day: Daily labs, replace electrolytes as needed, wean vent as able, wean sedation as tolerated, soft wrist restraints to prevent pulling tubes, continue critical care management, DC  the ir request for LP, add on a lipase, start precedex     Reviewed plan and goals for day with patient and/or representative: reviewed w/  Mekhi

## 2024-07-07 NOTE — PROGRESS NOTES
Consulted for collection of blood cultures x 2.  Only able to collect one set of BC. Right arm veins non-compressible and no blood can be obtained from them.  Only one vein on lower left forearm.  Will return for second set.  Notified ICU RN.
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
## 2024-07-07 NOTE — PROGRESS NOTES
07/07/24 1035   Vent Information   Ventilator Day(s) 5   Ventilator ID [id number]   Vent Mode (S)  AC/VC  (patient failed SBT due to agitation. she tired herself out and desated. Dr. Millan stated  no extubation today due to agitation issues.)

## 2024-07-08 ENCOUNTER — APPOINTMENT (OUTPATIENT)
Dept: GENERAL RADIOLOGY | Age: 54
DRG: 917 | End: 2024-07-08
Attending: FAMILY MEDICINE
Payer: MEDICARE

## 2024-07-08 PROBLEM — K76.89 LIVER DYSFUNCTION: Status: ACTIVE | Noted: 2024-07-08

## 2024-07-08 LAB
ALBUMIN SERPL-MCNC: 2.9 G/DL (ref 3.5–5.2)
ALP SERPL-CCNC: 73 U/L (ref 35–104)
ALT SERPL-CCNC: 278 U/L (ref 0–32)
ANION GAP SERPL CALCULATED.3IONS-SCNC: 12 MMOL/L (ref 7–16)
ANION GAP SERPL CALCULATED.3IONS-SCNC: 16 MMOL/L (ref 7–16)
AST SERPL-CCNC: 68 U/L (ref 0–31)
B PARAP IS1001 DNA NPH QL NAA+NON-PROBE: NOT DETECTED
B PERT DNA SPEC QL NAA+PROBE: NOT DETECTED
B.E.: -4.3 MMOL/L (ref -3–3)
BASOPHILS # BLD: 0.01 K/UL (ref 0–0.2)
BASOPHILS NFR BLD: 0 % (ref 0–2)
BILIRUB SERPL-MCNC: 0.3 MG/DL (ref 0–1.2)
BNP SERPL-MCNC: ABNORMAL PG/ML (ref 0–125)
BUN SERPL-MCNC: 25 MG/DL (ref 6–20)
BUN SERPL-MCNC: 29 MG/DL (ref 6–20)
C PNEUM DNA NPH QL NAA+NON-PROBE: NOT DETECTED
CALCIUM SERPL-MCNC: 7.8 MG/DL (ref 8.6–10.2)
CALCIUM SERPL-MCNC: 8.1 MG/DL (ref 8.6–10.2)
CHLORIDE SERPL-SCNC: 112 MMOL/L (ref 98–107)
CHLORIDE SERPL-SCNC: 112 MMOL/L (ref 98–107)
CO2 SERPL-SCNC: 19 MMOL/L (ref 22–29)
CO2 SERPL-SCNC: 23 MMOL/L (ref 22–29)
COHB: 0.1 % (ref 0–1.5)
CREAT SERPL-MCNC: 0.6 MG/DL (ref 0.5–1)
CREAT SERPL-MCNC: 0.7 MG/DL (ref 0.5–1)
CRITICAL: ABNORMAL
DATE ANALYZED: ABNORMAL
DATE OF COLLECTION: ABNORMAL
EOSINOPHIL # BLD: 0 K/UL (ref 0.05–0.5)
EOSINOPHILS RELATIVE PERCENT: 0 % (ref 0–6)
ERYTHROCYTE [DISTWIDTH] IN BLOOD BY AUTOMATED COUNT: 14.7 % (ref 11.5–15)
FIO2: 50 %
FLUAV RNA NPH QL NAA+NON-PROBE: NOT DETECTED
FLUBV RNA NPH QL NAA+NON-PROBE: NOT DETECTED
GFR, ESTIMATED: >90 ML/MIN/1.73M2
GFR, ESTIMATED: >90 ML/MIN/1.73M2
GLUCOSE SERPL-MCNC: 146 MG/DL (ref 74–99)
GLUCOSE SERPL-MCNC: 180 MG/DL (ref 74–99)
HADV DNA NPH QL NAA+NON-PROBE: NOT DETECTED
HCO3: 18.5 MMOL/L (ref 22–26)
HCOV 229E RNA NPH QL NAA+NON-PROBE: NOT DETECTED
HCOV HKU1 RNA NPH QL NAA+NON-PROBE: NOT DETECTED
HCOV NL63 RNA NPH QL NAA+NON-PROBE: NOT DETECTED
HCOV OC43 RNA NPH QL NAA+NON-PROBE: NOT DETECTED
HCT VFR BLD AUTO: 34.3 % (ref 34–48)
HGB BLD-MCNC: 10.7 G/DL (ref 11.5–15.5)
HHB: 13.1 % (ref 0–5)
HMPV RNA NPH QL NAA+NON-PROBE: NOT DETECTED
HPIV1 RNA NPH QL NAA+NON-PROBE: NOT DETECTED
HPIV2 RNA NPH QL NAA+NON-PROBE: NOT DETECTED
HPIV3 RNA NPH QL NAA+NON-PROBE: NOT DETECTED
HPIV4 RNA NPH QL NAA+NON-PROBE: NOT DETECTED
IMM GRANULOCYTES # BLD AUTO: 0.06 K/UL (ref 0–0.58)
IMM GRANULOCYTES NFR BLD: 1 % (ref 0–5)
LAB: ABNORMAL
LACTATE BLDV-SCNC: 2 MMOL/L (ref 0.5–2.2)
LACTATE BLDV-SCNC: 2.3 MMOL/L (ref 0.5–2.2)
LACTATE BLDV-SCNC: 2.5 MMOL/L (ref 0.5–2.2)
LACTATE BLDV-SCNC: 3.3 MMOL/L (ref 0.5–2.2)
LACTATE BLDV-SCNC: 8 MMOL/L (ref 0.5–2.2)
LYMPHOCYTES NFR BLD: 1.19 K/UL (ref 1.5–4)
LYMPHOCYTES RELATIVE PERCENT: 12 % (ref 20–42)
Lab: 448
M PNEUMO DNA NPH QL NAA+NON-PROBE: NOT DETECTED
MAGNESIUM SERPL-MCNC: 2 MG/DL (ref 1.6–2.6)
MAGNESIUM SERPL-MCNC: 2.4 MG/DL (ref 1.6–2.6)
MCH RBC QN AUTO: 30.1 PG (ref 26–35)
MCHC RBC AUTO-ENTMCNC: 31.2 G/DL (ref 32–34.5)
MCV RBC AUTO: 96.6 FL (ref 80–99.9)
METHB: 0.3 % (ref 0–1.5)
MICROORGANISM SPEC CULT: NORMAL
MICROORGANISM SPEC CULT: NORMAL
MODE: AC
MONOCYTES NFR BLD: 1.01 K/UL (ref 0.1–0.95)
MONOCYTES NFR BLD: 10 % (ref 2–12)
NEUTROPHILS NFR BLD: 77 % (ref 43–80)
NEUTS SEG NFR BLD: 7.74 K/UL (ref 1.8–7.3)
O2 CONTENT: 13.5 ML/DL
O2 SATURATION: 86.8 % (ref 92–98.5)
O2HB: 86.5 % (ref 94–97)
OPERATOR ID: 1687
PATIENT TEMP: 37 C
PCO2: 27.2 MMHG (ref 35–45)
PEEP/CPAP: 5 CMH2O
PFO2: 1.09 MMHG/%
PH BLOOD GAS: 7.45 (ref 7.35–7.45)
PHOSPHATE SERPL-MCNC: 6.3 MG/DL (ref 2.5–4.5)
PLATELET # BLD AUTO: 104 K/UL (ref 130–450)
PMV BLD AUTO: 12.6 FL (ref 7–12)
PO2: 54.3 MMHG (ref 75–100)
POTASSIUM SERPL-SCNC: 3.5 MMOL/L (ref 3.5–5)
POTASSIUM SERPL-SCNC: 4.8 MMOL/L (ref 3.5–5)
PROT SERPL-MCNC: 5.2 G/DL (ref 6.4–8.3)
RBC # BLD AUTO: 3.55 M/UL (ref 3.5–5.5)
RI(T): 5
RR MECHANICAL: 22 B/MIN
RSV RNA NPH QL NAA+NON-PROBE: NOT DETECTED
RV+EV RNA NPH QL NAA+NON-PROBE: NOT DETECTED
SARS-COV-2 RNA NPH QL NAA+NON-PROBE: NOT DETECTED
SERVICE CMNT-IMP: NORMAL
SERVICE CMNT-IMP: NORMAL
SODIUM SERPL-SCNC: 147 MMOL/L (ref 132–146)
SODIUM SERPL-SCNC: 147 MMOL/L (ref 132–146)
SOURCE, BLOOD GAS: ABNORMAL
SPECIMEN DESCRIPTION: NORMAL
THB: 11.1 G/DL (ref 11.5–16.5)
TIME ANALYZED: 452
VT MECHANICAL: 400 ML
WBC OTHER # BLD: 10 K/UL (ref 4.5–11.5)

## 2024-07-08 PROCEDURE — 6370000000 HC RX 637 (ALT 250 FOR IP): Performed by: NURSE PRACTITIONER

## 2024-07-08 PROCEDURE — 80048 BASIC METABOLIC PNL TOTAL CA: CPT

## 2024-07-08 PROCEDURE — 6360000002 HC RX W HCPCS: Performed by: INTERNAL MEDICINE

## 2024-07-08 PROCEDURE — 6370000000 HC RX 637 (ALT 250 FOR IP)

## 2024-07-08 PROCEDURE — P9047 ALBUMIN (HUMAN), 25%, 50ML: HCPCS

## 2024-07-08 PROCEDURE — 86140 C-REACTIVE PROTEIN: CPT

## 2024-07-08 PROCEDURE — 6370000000 HC RX 637 (ALT 250 FOR IP): Performed by: INTERNAL MEDICINE

## 2024-07-08 PROCEDURE — 82805 BLOOD GASES W/O2 SATURATION: CPT

## 2024-07-08 PROCEDURE — 83735 ASSAY OF MAGNESIUM: CPT

## 2024-07-08 PROCEDURE — 83605 ASSAY OF LACTIC ACID: CPT

## 2024-07-08 PROCEDURE — 2580000003 HC RX 258: Performed by: INTERNAL MEDICINE

## 2024-07-08 PROCEDURE — 2580000003 HC RX 258

## 2024-07-08 PROCEDURE — 80053 COMPREHEN METABOLIC PANEL: CPT

## 2024-07-08 PROCEDURE — 36600 WITHDRAWAL OF ARTERIAL BLOOD: CPT

## 2024-07-08 PROCEDURE — 94640 AIRWAY INHALATION TREATMENT: CPT

## 2024-07-08 PROCEDURE — 6360000002 HC RX W HCPCS: Performed by: NURSE PRACTITIONER

## 2024-07-08 PROCEDURE — 84100 ASSAY OF PHOSPHORUS: CPT

## 2024-07-08 PROCEDURE — 83880 ASSAY OF NATRIURETIC PEPTIDE: CPT

## 2024-07-08 PROCEDURE — 94003 VENT MGMT INPAT SUBQ DAY: CPT

## 2024-07-08 PROCEDURE — 71045 X-RAY EXAM CHEST 1 VIEW: CPT

## 2024-07-08 PROCEDURE — 2500000003 HC RX 250 WO HCPCS: Performed by: INTERNAL MEDICINE

## 2024-07-08 PROCEDURE — 99233 SBSQ HOSP IP/OBS HIGH 50: CPT | Performed by: INTERNAL MEDICINE

## 2024-07-08 PROCEDURE — 84145 PROCALCITONIN (PCT): CPT

## 2024-07-08 PROCEDURE — 2000000000 HC ICU R&B

## 2024-07-08 PROCEDURE — 36592 COLLECT BLOOD FROM PICC: CPT

## 2024-07-08 PROCEDURE — 93005 ELECTROCARDIOGRAM TRACING: CPT | Performed by: INTERNAL MEDICINE

## 2024-07-08 PROCEDURE — 2500000003 HC RX 250 WO HCPCS

## 2024-07-08 PROCEDURE — 6360000002 HC RX W HCPCS

## 2024-07-08 PROCEDURE — 0202U NFCT DS 22 TRGT SARS-COV-2: CPT

## 2024-07-08 PROCEDURE — 85025 COMPLETE CBC W/AUTO DIFF WBC: CPT

## 2024-07-08 RX ORDER — CARVEDILOL 6.25 MG/1
6.25 TABLET ORAL 2 TIMES DAILY WITH MEALS
Status: DISCONTINUED | OUTPATIENT
Start: 2024-07-08 | End: 2024-07-11

## 2024-07-08 RX ORDER — PROPOFOL 10 MG/ML
5-50 INJECTION, EMULSION INTRAVENOUS CONTINUOUS
Status: DISCONTINUED | OUTPATIENT
Start: 2024-07-08 | End: 2024-07-09

## 2024-07-08 RX ORDER — QUETIAPINE FUMARATE 25 MG/1
25 TABLET, FILM COATED ORAL NIGHTLY
Status: DISCONTINUED | OUTPATIENT
Start: 2024-07-08 | End: 2024-07-11

## 2024-07-08 RX ORDER — FUROSEMIDE 10 MG/ML
40 INJECTION INTRAMUSCULAR; INTRAVENOUS ONCE
Status: COMPLETED | OUTPATIENT
Start: 2024-07-08 | End: 2024-07-08

## 2024-07-08 RX ORDER — MIDAZOLAM HYDROCHLORIDE 2 MG/2ML
2 INJECTION, SOLUTION INTRAMUSCULAR; INTRAVENOUS EVERY 4 HOURS PRN
Status: DISCONTINUED | OUTPATIENT
Start: 2024-07-08 | End: 2024-07-09

## 2024-07-08 RX ORDER — ENOXAPARIN SODIUM 100 MG/ML
1 INJECTION SUBCUTANEOUS EVERY 12 HOURS
Status: DISCONTINUED | OUTPATIENT
Start: 2024-07-08 | End: 2024-07-12 | Stop reason: HOSPADM

## 2024-07-08 RX ORDER — ALBUMIN (HUMAN) 12.5 G/50ML
25 SOLUTION INTRAVENOUS ONCE
Status: COMPLETED | OUTPATIENT
Start: 2024-07-08 | End: 2024-07-08

## 2024-07-08 RX ORDER — ENOXAPARIN SODIUM 100 MG/ML
1 INJECTION SUBCUTANEOUS 2 TIMES DAILY
Status: DISCONTINUED | OUTPATIENT
Start: 2024-07-08 | End: 2024-07-08

## 2024-07-08 RX ADMIN — LEVALBUTEROL 0.63 MG: 0.63 SOLUTION RESPIRATORY (INHALATION) at 08:25

## 2024-07-08 RX ADMIN — ENOXAPARIN SODIUM 60 MG: 100 INJECTION SUBCUTANEOUS at 16:00

## 2024-07-08 RX ADMIN — MIDAZOLAM HYDROCHLORIDE 1 MG: 1 INJECTION, SOLUTION INTRAMUSCULAR; INTRAVENOUS at 12:53

## 2024-07-08 RX ADMIN — CARVEDILOL 6.25 MG: 6.25 TABLET, FILM COATED ORAL at 04:05

## 2024-07-08 RX ADMIN — DEXMEDETOMIDINE 1.4 MCG/KG/HR: 100 INJECTION, SOLUTION INTRAVENOUS at 01:11

## 2024-07-08 RX ADMIN — CARVEDILOL 6.25 MG: 6.25 TABLET, FILM COATED ORAL at 15:42

## 2024-07-08 RX ADMIN — LEVALBUTEROL 0.63 MG: 0.63 SOLUTION RESPIRATORY (INHALATION) at 00:02

## 2024-07-08 RX ADMIN — METHYLPREDNISOLONE SODIUM SUCCINATE 40 MG: 40 INJECTION INTRAMUSCULAR; INTRAVENOUS at 22:22

## 2024-07-08 RX ADMIN — SODIUM CHLORIDE, PRESERVATIVE FREE 40 MG: 5 INJECTION INTRAVENOUS at 07:40

## 2024-07-08 RX ADMIN — QUETIAPINE FUMARATE 25 MG: 25 TABLET ORAL at 20:08

## 2024-07-08 RX ADMIN — ACETAMINOPHEN 650 MG: 325 TABLET ORAL at 19:51

## 2024-07-08 RX ADMIN — ALPRAZOLAM 2 MG: 1 TABLET ORAL at 07:41

## 2024-07-08 RX ADMIN — DEXMEDETOMIDINE 1.5 MCG/KG/HR: 100 INJECTION, SOLUTION INTRAVENOUS at 16:25

## 2024-07-08 RX ADMIN — Medication 8 MG/HR: at 18:01

## 2024-07-08 RX ADMIN — HYDROXYCHLOROQUINE SULFATE 200 MG: 200 TABLET ORAL at 20:07

## 2024-07-08 RX ADMIN — SODIUM CHLORIDE, PRESERVATIVE FREE 10 ML: 5 INJECTION INTRAVENOUS at 20:08

## 2024-07-08 RX ADMIN — FUROSEMIDE 40 MG: 10 INJECTION, SOLUTION INTRAMUSCULAR; INTRAVENOUS at 13:00

## 2024-07-08 RX ADMIN — Medication 50 MCG/HR: at 11:00

## 2024-07-08 RX ADMIN — PROPOFOL 20 MCG/KG/MIN: 10 INJECTION, EMULSION INTRAVENOUS at 22:57

## 2024-07-08 RX ADMIN — DEXMEDETOMIDINE 1.5 MCG/KG/HR: 100 INJECTION, SOLUTION INTRAVENOUS at 19:50

## 2024-07-08 RX ADMIN — DEXMEDETOMIDINE 1.5 MCG/KG/HR: 100 INJECTION, SOLUTION INTRAVENOUS at 11:00

## 2024-07-08 RX ADMIN — ARFORMOTEROL TARTRATE 15 MCG: 15 SOLUTION RESPIRATORY (INHALATION) at 19:31

## 2024-07-08 RX ADMIN — LEVALBUTEROL 0.63 MG: 0.63 SOLUTION RESPIRATORY (INHALATION) at 15:20

## 2024-07-08 RX ADMIN — VENLAFAXINE HYDROCHLORIDE 75 MG: 75 TABLET ORAL at 15:42

## 2024-07-08 RX ADMIN — CHLORHEXIDINE GLUCONATE 0.12% ORAL RINSE 15 ML: 1.2 LIQUID ORAL at 20:08

## 2024-07-08 RX ADMIN — CHLORHEXIDINE GLUCONATE 0.12% ORAL RINSE 15 ML: 1.2 LIQUID ORAL at 07:42

## 2024-07-08 RX ADMIN — METHYLPREDNISOLONE SODIUM SUCCINATE 40 MG: 40 INJECTION INTRAMUSCULAR; INTRAVENOUS at 09:21

## 2024-07-08 RX ADMIN — Medication 50 MCG: at 12:54

## 2024-07-08 RX ADMIN — VENLAFAXINE HYDROCHLORIDE 75 MG: 75 TABLET ORAL at 07:42

## 2024-07-08 RX ADMIN — ALPRAZOLAM 2 MG: 1 TABLET ORAL at 20:08

## 2024-07-08 RX ADMIN — POTASSIUM CHLORIDE 20 MEQ: 29.8 INJECTION, SOLUTION INTRAVENOUS at 19:50

## 2024-07-08 RX ADMIN — DEXMEDETOMIDINE 1.4 MCG/KG/HR: 100 INJECTION, SOLUTION INTRAVENOUS at 06:20

## 2024-07-08 RX ADMIN — BUDESONIDE 500 MCG: 0.5 SUSPENSION RESPIRATORY (INHALATION) at 08:25

## 2024-07-08 RX ADMIN — CEFEPIME 2000 MG: 2 INJECTION, POWDER, FOR SOLUTION INTRAVENOUS at 09:21

## 2024-07-08 RX ADMIN — HYDROXYCHLOROQUINE SULFATE 200 MG: 200 TABLET ORAL at 09:21

## 2024-07-08 RX ADMIN — ACETAMINOPHEN 650 MG: 325 TABLET ORAL at 14:27

## 2024-07-08 RX ADMIN — Medication 5 MG/HR: at 04:10

## 2024-07-08 RX ADMIN — ALPRAZOLAM 2 MG: 1 TABLET ORAL at 14:28

## 2024-07-08 RX ADMIN — ENOXAPARIN SODIUM 60 MG: 100 INJECTION SUBCUTANEOUS at 04:33

## 2024-07-08 RX ADMIN — ARFORMOTEROL TARTRATE 15 MCG: 15 SOLUTION RESPIRATORY (INHALATION) at 08:25

## 2024-07-08 RX ADMIN — BUDESONIDE 500 MCG: 0.5 SUSPENSION RESPIRATORY (INHALATION) at 19:31

## 2024-07-08 RX ADMIN — CEFEPIME 2000 MG: 2 INJECTION, POWDER, FOR SOLUTION INTRAVENOUS at 21:10

## 2024-07-08 RX ADMIN — ALBUMIN (HUMAN) 25 G: 0.25 INJECTION, SOLUTION INTRAVENOUS at 11:08

## 2024-07-08 RX ADMIN — Medication 125 MCG/HR: at 19:34

## 2024-07-08 RX ADMIN — LAMOTRIGINE 100 MG: 100 TABLET ORAL at 20:08

## 2024-07-08 RX ADMIN — POTASSIUM BICARBONATE 40 MEQ: 782 TABLET, EFFERVESCENT ORAL at 20:07

## 2024-07-08 ASSESSMENT — PULMONARY FUNCTION TESTS
PIF_VALUE: 28
PIF_VALUE: 19
PIF_VALUE: 22
PIF_VALUE: 23
PIF_VALUE: 21
PIF_VALUE: 23
PIF_VALUE: 29
PIF_VALUE: 24
PIF_VALUE: 21
PIF_VALUE: 20
PIF_VALUE: 26
PIF_VALUE: 21
PIF_VALUE: 18
PIF_VALUE: 19
PIF_VALUE: 28
PIF_VALUE: 21
PIF_VALUE: 21
PIF_VALUE: 24
PIF_VALUE: 24
PIF_VALUE: 34
PIF_VALUE: 23
PIF_VALUE: 23
PIF_VALUE: 31
PIF_VALUE: 22
PIF_VALUE: 25
PIF_VALUE: 20
PIF_VALUE: 21

## 2024-07-08 ASSESSMENT — PAIN SCALES - GENERAL
PAINLEVEL_OUTOF10: 0

## 2024-07-08 NOTE — PROGRESS NOTES
Patient remains agitated despite sedation.  AC settings changed to PRVC in an attempt for vent synchrony.        07/08/24 1520   Patient Observation   Pulse (!) 127   Respirations 28   SpO2 92 %   Vent Information   Ventilator ID [id number]   Vent Mode AC/PRVC   Ventilator Settings   Vt (Set, mL) 400 mL   Resp Rate (Set) 22 bpm   PEEP/CPAP (cmH2O) 5   FiO2  50 %   Insp Time (sec) 0.7 sec   Vent Patient Data (Readings)   Vt (Measured) 790 mL   Peak Inspiratory Pressure (cmH2O) 25 cmH2O   Rate Measured 32 br/min   Minute Volume (L/min) 14.8 Liters   Mean Airway Pressure (cmH2O) 10 cmH20   Plateau Pressure (cm H2O) 16 cm H2O   Driving Pressure 11   Inspiratory Time 0.7 sec   I:E Ratio 1:2.90   Flow Sensitivity 3 L/min   I Time/ I Time % 0.7 s   Backup Apnea On   Backup Rate 22 Breaths Per Minute   Backup Vt 400   Vent Alarm Settings   Low Pressure (cmH2O) 8.5 cmH2O   High Pressure (cmH2O) 40 cmH2O   Low Minute Volume (lpm) 5 L/min   High Minute Volume (lpm) 18 L/min   Low Exhaled Vt (ml) 300 mL   High Exhaled Vt (ml) 900 mL   RR High (bpm) 40 br/min   Apnea (secs) 20 secs   Additional Respiratoray Assessments   Humidification Source Heated wire   Humidification Temp 36.5   Ambu Bag With Mask At Bedside Yes   Non-Surgical Airway 07/03/24 Endotracheal tube   Placement Date/Time: 07/03/24 1346   Placed By: In ED  Placement Verified By: Colorimetric ETCO2 device;Auscultation  Insertion attempts: 1  Location: Oral  Airway Device: Endotracheal tube  Size: 7.5   Secured At 25 cm   Measured From Lips   Secured Location Center   Secured By Commercial tube dang   Site Assessment Dry

## 2024-07-08 NOTE — PLAN OF CARE
Problem: Discharge Planning  Goal: Discharge to home or other facility with appropriate resources  Outcome: Progressing     Problem: Safety - Medical Restraint  Goal: Remains free of injury from restraints (Restraint for Interference with Medical Device)  Description: INTERVENTIONS:  1. Determine that other, less restrictive measures have been tried or would not be effective before applying the restraint  2. Evaluate the patient's condition at the time of restraint application  3. Inform patient/family regarding the reason for restraint  4. Q2H: Monitor safety, psychosocial status, comfort, nutrition and hydration  Outcome: Progressing  Flowsheets  Taken 7/8/2024 1400 by Whitney Mars RN  Remains free of injury from restraints (restraint for interference with medical device): Every 2 hours: Monitor safety, psychosocial status, comfort, nutrition and hydration  Taken 7/8/2024 1200 by Whitney Mars RN  Remains free of injury from restraints (restraint for interference with medical device): Every 2 hours: Monitor safety, psychosocial status, comfort, nutrition and hydration  Taken 7/8/2024 1000 by Whitney Mars RN  Remains free of injury from restraints (restraint for interference with medical device): Every 2 hours: Monitor safety, psychosocial status, comfort, nutrition and hydration  Taken 7/8/2024 0800 by Whitney Mars RN  Remains free of injury from restraints (restraint for interference with medical device): Every 2 hours: Monitor safety, psychosocial status, comfort, nutrition and hydration  Taken 7/8/2024 0755 by Whitney Mars RN  Remains free of injury from restraints (restraint for interference with medical device): Every 2 hours: Monitor safety, psychosocial status, comfort, nutrition and hydration  Taken 7/8/2024 0600 by Ashli Shankar  Remains free of injury from restraints (restraint for interference with medical device): Every 2 hours: Monitor safety, psychosocial status,  comfort, nutrition and hydration  Taken 7/8/2024 0400 by Ashli Shankar  Remains free of injury from restraints (restraint for interference with medical device): Every 2 hours: Monitor safety, psychosocial status, comfort, nutrition and hydration  Taken 7/8/2024 0200 by Ashli Shankar  Remains free of injury from restraints (restraint for interference with medical device): Every 2 hours: Monitor safety, psychosocial status, comfort, nutrition and hydration     Problem: Safety - Adult  Goal: Free from fall injury  Outcome: Progressing  Flowsheets (Taken 7/8/2024 1554)  Free From Fall Injury: Instruct family/caregiver on patient safety     Problem: Pain  Goal: Verbalizes/displays adequate comfort level or baseline comfort level  Outcome: Progressing     Problem: Skin/Tissue Integrity  Goal: Absence of new skin breakdown  Description: 1.  Monitor for areas of redness and/or skin breakdown  2.  Assess vascular access sites hourly  3.  Every 4-6 hours minimum:  Change oxygen saturation probe site  4.  Every 4-6 hours:  If on nasal continuous positive airway pressure, respiratory therapy assess nares and determine need for appliance change or resting period.  Outcome: Progressing     Problem: ABCDS Injury Assessment  Goal: Absence of physical injury  Outcome: Progressing  Flowsheets (Taken 7/8/2024 1554)  Absence of Physical Injury: Implement safety measures based on patient assessment     Problem: Nutrition Deficit:  Goal: Optimize nutritional status  Outcome: Progressing     Problem: Respiratory - Adult  Goal: Achieves optimal ventilation and oxygenation  7/8/2024 1556 by Whitney Mars, RN  Outcome: Progressing  7/8/2024 0829 by Laura Freire RCP  Outcome: Progressing     Problem: Chronic Conditions and Co-morbidities  Goal: Patient's chronic conditions and co-morbidity symptoms are monitored and maintained or improved  Outcome: Progressing

## 2024-07-08 NOTE — PROGRESS NOTES
07/08/24 1932   Patient Observation   Pulse 100   Respirations 23   Vent Information   Ventilator ID [id number]   Vent Mode AC/PRVC   Ventilator Settings   Vt (Set, mL) 400 mL   Resp Rate (Set) 22 bpm   PEEP/CPAP (cmH2O) 5   FiO2  50 %   Insp Time (sec) 0.7 sec   Vent Patient Data (Readings)   Vt (Measured) 395 mL   Peak Inspiratory Pressure (cmH2O) 29 cmH2O   Rate Measured 22 br/min   Minute Volume (L/min) 9.51 Liters   Mean Airway Pressure (cmH2O) 12 cmH20   Plateau Pressure (cm H2O) 19 cm H2O   Driving Pressure 14   Inspiratory Time 0.7 sec   I:E Ratio 1:2.90   Flow Sensitivity 3 L/min   PEEP Intrinsic (cm H2O) 2 cm H2O   Static Compliance (L/cm H2O) 20   I Time/ I Time % 0.7 s   Backup Apnea On   Backup Rate 22 Breaths Per Minute   Backup Vt 400   Vent Alarm Settings   Low Pressure (cmH2O) 8.5 cmH2O   High Pressure (cmH2O) 40 cmH2O   Low Minute Volume (lpm) 5 L/min   High Minute Volume (lpm) 18 L/min   Low Exhaled Vt (ml) 300 mL   High Exhaled Vt (ml) 900 mL   RR High (bpm) 40 br/min   Apnea (secs) 20 secs   Additional Respiratoray Assessments   Humidification Source Heated wire   Humidification Temp 37.3   Ambu Bag With Mask At Bedside Yes   Non-Surgical Airway 07/03/24 Endotracheal tube   Placement Date/Time: 07/03/24 1346   Placed By: In ED  Placement Verified By: Colorimetric ETCO2 device;Auscultation  Insertion attempts: 1  Location: Oral  Airway Device: Endotracheal tube  Size: 7.5   Secured At 25 cm   Measured From Lips   Secured Location Left   Secured By Commercial tube dang   Site Assessment Dry

## 2024-07-08 NOTE — PATIENT CARE CONFERENCE
Intensive Care Daily Quality Rounding Checklist        ICU Team Members: Dr. Owens, Dr. Poe (resident), charge nurse, bedside nurse, respiratory therapist     ICU Day #: NUMBER: 6     SOFA Score: 7     Intubation Date: July 3     Ventilator Day #: NUMBER: 6     Central Line Insertion Date: July 5                                                    Day #: NUMBER: 4                                                    Indication: CVC Indication: Administration of vasopressors or vesicant medications      Arterial Line Insertion Date: N/A                             Day #:  N/A     Temporary Hemodialysis Catheter Insertion Date: N/A                              Day # N/A     DVT Prophylaxis: Lovenox    GI Prophylaxis: Protonix     Coto Catheter Insertion Date: July 3                                        Day #: 6                             Indications: Need for fluid management of the critically ill patient in a critical care setting                             Continued need (if yes, reason documented and discussed with physician): yes     Skin Issues/ Wounds and ordered treatment discussed on rounds: SOS precautions        Goals/ Plans for the Day: Daily labs, replace electrolytes as needed, wean vent as able, wean sedation as tolerated, soft wrist restraints to prevent pulling tubes, continue critical care management, continue precedex, consider adding home Trazadone and/ or other antipsychotic meds. Add on pro-BNP. Order albumin and lasix.     Reviewed plan and goals for day with patient and/or representative: reviewed w/  Mekhi

## 2024-07-08 NOTE — FLOWSHEET NOTE
Patient alarming on vent, severely agitated, kicking legs pulling at restraints. Received Versed bolus, received Fentanyl bolus, increased Precedex as per orders, patient tachycardic and tachypneic during episode lasting greater than 30 minutes.

## 2024-07-08 NOTE — PLAN OF CARE
Problem: Respiratory - Adult  Goal: Achieves optimal ventilation and oxygenation  7/8/2024 0829 by Laura Freire RCP  Outcome: Progressing  7/7/2024 2218 by Ashli Shankar  Outcome: Progressing  7/7/2024 2218 by Ashli Shankar  Outcome: Progressing

## 2024-07-08 NOTE — FLOWSHEET NOTE
Severely agitated with any movement or any interaction despite continuous sedation. Takes a long time to settle down, unable to redirect.

## 2024-07-08 NOTE — PROGRESS NOTES
Patient placed back on AC settings due to increased agitation requiring resedation.         07/08/24 1148   Patient Observation   Pulse 99   Respirations 25   SpO2 91 %   Vent Information   Vent Mode (S)  AC/VC   Ventilator Settings   Vt (Set, mL) 400 mL   Resp Rate (Set) 22 bpm   PEEP/CPAP (cmH2O) 5   FiO2  50 %   Peak Inspiratory Flow (Set) 65 L/sec   Vent Patient Data (Readings)   Vt (Measured) 477 mL   Peak Inspiratory Pressure (cmH2O) 31 cmH2O   Rate Measured 25 br/min   Minute Volume (L/min) 10.4 Liters   Peak Inspiratory Flow (lpm) 65 L/sec   Mean Airway Pressure (cmH2O) 12 cmH20   Plateau Pressure (cm H2O) 16 cm H2O   Driving Pressure 11   I:E Ratio 1:3.00   Flow Sensitivity 3 L/min   Backup Apnea On   Backup Rate 22 Breaths Per Minute   Backup Vt 400   Vent Alarm Settings   High Pressure (cmH2O) 40 cmH2O   Low Minute Volume (lpm) 5 L/min   High Minute Volume (lpm) 18 L/min   Low Exhaled Vt (ml) 300 mL   High Exhaled Vt (ml) 900 mL   RR High (bpm) 40 br/min   Apnea (secs) 20 secs

## 2024-07-08 NOTE — PROGRESS NOTES
Critical Care Team - Daily Progress Note      Date and time: 7/8/2024 11:45 AM  Patient's name:  Greta Moran  Medical Record Number: [id number]  Patient's account/billing number: [id number]  Patient's YOB: 1970  Age: 54 y.o.  Date of Admission: 7/5/2024  9:08 PM  Length of stay during current admission: 3      Primary Care Physician: Brian Houston MD  ICU Attending Physician: Dr. Owens    Code Status: Full Code    Reason for ICU admission: Critical care management      SUBJECTIVE:     OVERNIGHT EVENTS:           Patient remains sedated on fentanyl and Versed and Precedex.  Patient did have a episode of A-fib RVR overnight.  Cardiology was consulted and patient started on Coreg.  Patient did not need any cardioversion, she cardioverted on self.  Lovenox was upped to therapeutic.  Patient does have respiratory alkalosis and ABG this morning, we will switch her to assist-control mode and wean down sedation.  Patient did have increased lactic acidosis with lactic drawn yesterday overnight, it is trending down and normalized this morning.    Intake/Output:   I/O this shift:  In: -   Out: 145 [Urine:145]  I/O last 3 completed shifts:  In: 3531.5 [I.V.:1053.2; NG/GT:1677; IV Piggyback:801.3]  Out: 1530 [Urine:1530]    Awake and following commands: No  Current Ventilation: - Ventilator Settings:    Vt (Set, mL): 400 mL  Resp Rate (Set): 8 bpm  FiO2 : 50 %    PEEP/CPAP (cmH2O): 5     Secretions: None  Sedation: fentanyl and versed  Paralyzed: No  Vasopressors: norepinephrine, vasopressin    ROS:  Unless stated above, ROS is otherwise negative.      Initial HPI + past overnight events: The patient is a 54 y.o. female with significant past medical history of anxiety, arthritis, asthma, bipolar, depression, fibromyalgia, insomnia, lupus, lupus, cognitive impairment, and osteoporosis.  Information limited due to patient's status and no family present during my examination.  Information solely from EMR  Eucrisa Pregnancy And Lactation Text: This medication has not been assigned a Pregnancy Risk Category but animal studies failed to show danger with the topical medication. It is unknown if the medication is excreted in breast milk.

## 2024-07-08 NOTE — PROGRESS NOTES
Patient on SIMV at this time per Dr. Owens.  Rate of 8, tidal volume 400, Pressure support 12, 50% FiO2 and a PEEP of 5.  Patient is agitated at this time.         07/08/24 1123   Patient Observation   Pulse (!) 105   Respirations 21   Vent Information   Ventilator ID [id number]   Vent Mode SIMV/VC   Ventilator Settings   Vt (Set, mL) 400 mL   Resp Rate (Set) 8 bpm   PEEP/CPAP (cmH2O) 5   FiO2  50 %   Peak Inspiratory Flow (Set) 65 L/sec   Pressure Support (cm H2O) 12 cm H2O   Vent Patient Data (Readings)   Vt (Measured) 678 mL   Peak Inspiratory Pressure (cmH2O) 19 cmH2O   Rate Measured 29 br/min   Minute Volume (L/min) 15.1 Liters   Peak Inspiratory Flow (lpm) 65 L/sec   Mean Airway Pressure (cmH2O) 9.1 cmH20   Plateau Pressure (cm H2O) 16 cm H2O   Driving Pressure 11   I:E Ratio 1:2.60   Flow Sensitivity 3 L/min   Backup Apnea On   Backup Rate 22 Breaths Per Minute   Backup Vt 400   Vent Alarm Settings   High Pressure (cmH2O) 40 cmH2O   Low Minute Volume (lpm) 5 L/min   High Minute Volume (lpm) 18 L/min   Low Exhaled Vt (ml) 300 mL   High Exhaled Vt (ml) 900 mL   RR High (bpm) 40 br/min   Apnea (secs) 20 secs   Additional Respiratoray Assessments   Humidification Source Heated wire   Humidification Temp 37.3   Ambu Bag With Mask At Bedside Yes   Non-Surgical Airway 07/03/24 Endotracheal tube   Placement Date/Time: 07/03/24 1346   Placed By: In ED  Placement Verified By: Colorimetric ETCO2 device;Auscultation  Insertion attempts: 1  Location: Oral  Airway Device: Endotracheal tube  Size: 7.5   Secured At 25 cm   Measured From Lips   Secured Location Center   Secured By Commercial tube dang   Site Assessment Dry

## 2024-07-08 NOTE — PROGRESS NOTES
07/08/24 0825   Patient Observation   Pulse 76   Respirations 22   SpO2 96 %   Vent Information   Ventilator ID [id number]   Vent Mode AC/VC   Ventilator Settings   Vt (Set, mL) 400 mL   Resp Rate (Set) 22 bpm   PEEP/CPAP (cmH2O) 5   FiO2  50 %   Peak Inspiratory Flow (Set) 65 L/sec   Vent Patient Data (Readings)   Vt (Measured) 411 mL   Peak Inspiratory Pressure (cmH2O) 21 cmH2O   Rate Measured 22 br/min   Minute Volume (L/min) 9.03 Liters   Peak Inspiratory Flow (lpm) 65 L/sec   Mean Airway Pressure (cmH2O) 9.7 cmH20   Plateau Pressure (cm H2O) 14 cm H2O   Driving Pressure 9   I:E Ratio 1:3.00   Flow Sensitivity 3 L/min   PEEP Intrinsic (cm H2O) 0.4 cm H2O   Static Compliance (L/cm H2O) 41   Backup Apnea On   Backup Rate 22 Breaths Per Minute   Backup Vt 400   Vent Alarm Settings   High Pressure (cmH2O) 40 cmH2O   Low Minute Volume (lpm) 5 L/min   High Minute Volume (lpm) 18 L/min   Low Exhaled Vt (ml) 300 mL   High Exhaled Vt (ml) 900 mL   RR High (bpm) 40 br/min   Apnea (secs) 20 secs   Additional Respiratoray Assessments   Humidification Source Heated wire   Humidification Temp 37.2   Ambu Bag With Mask At Bedside Yes   Non-Surgical Airway 07/03/24 Endotracheal tube   Placement Date/Time: 07/03/24 1346   Placed By: In ED  Placement Verified By: Colorimetric ETCO2 device;Auscultation  Insertion attempts: 1  Location: Oral  Airway Device: Endotracheal tube  Size: 7.5   Secured At 25 cm   Measured From Lips   Secured Location Right   Secured By Commercial tube dang   Site Assessment Dry

## 2024-07-08 NOTE — DISCHARGE INSTRUCTIONS

## 2024-07-08 NOTE — FLOWSHEET NOTE
Patient severely agitated requiring bolus of Fentanyl and Versed as well as two RN's assisting to keep patient in bed. Reaching for tubes, sitting up in bed. Noted to be in afib for approximately a total of one hour. Initially rate was controlled low 100's, so did not immediately notice the change, however when became agitated, HR was 130-180's. After reviewing strips realized patient was in a fib for total of about one hour. Dr. Derrek Rice Cardiology was notified and while receiving orders, patient was calming down and did spontaneously convert back into normal sinus rhythm.

## 2024-07-08 NOTE — H&P
[id number] [place] [address]                           HISTORY & PHYSICAL      PATIENT NAME: JENI KURTZ               : 1970  MED REC NO: [id number]                        ROOM: 0205  ACCOUNT NO: [id number]                       ADMIT DATE: 2024  PROVIDER: Brian Houston MD      CHIEF COMPLAINT:  Acute respiratory failure.    HISTORY OF PRESENT ILLNESS:  The patient is a 54-year-old white female who several days previously was admitted to the hospital with altered mental status, extreme agitation, and sedation with subsequent placement on the ventilator for acute respiratory failure while in the intensive care unit.  She had profound hypotension requiring multiple vasopressors.  She had an echocardiogram done, which showed severe dysfunction and a catheterization was recommended.  She was subsequently discharged to [facility] for cardiac catheterization and was found to have no significant obstruction and was subsequently re-admitted back to [facility] in [place] for further evaluation and treatment.  It should be noted that the patient is sedated on a ventilator.  Any information following is gleaned from the chart.    PAST MEDICAL HISTORY AND SURGERIES:  Unavailable.    HOSPITALIZATIONS:  Several days ago for acute altered mental status and subsequent acute respiratory failure.    ALLERGIES:  NONE KNOWN.      MEDICAL PROBLEMS:  Include asthma, bipolar disorder, anxiety, depression, systemic lupus erythematosus, and fibromyalgia.  Also, cardiomyopathy, severe hypotension, acute respiratory failure, acute liver dysfunction, elevated lactic acid and BNP, hypoalbuminemia, possible left lower lobe pneumonia, acute metabolic encephalopathy.    MEDICATIONS:  In the hospital, on multiple vasopressors as well as Lamictal, some sedatives, Protonix, and Lovenox.    SOCIAL HISTORY:  Lives with her .

## 2024-07-08 NOTE — PLAN OF CARE
Problem: Discharge Planning  Goal: Discharge to home or other facility with appropriate resources  7/7/2024 2218 by Ashli Shankar  Outcome: Progressing  7/7/2024 2218 by Ashli Shankar  Outcome: Progressing     Problem: Safety - Medical Restraint  Goal: Remains free of injury from restraints (Restraint for Interference with Medical Device)  Description: INTERVENTIONS:  1. Determine that other, less restrictive measures have been tried or would not be effective before applying the restraint  2. Evaluate the patient's condition at the time of restraint application  3. Inform patient/family regarding the reason for restraint  4. Q2H: Monitor safety, psychosocial status, comfort, nutrition and hydration  7/7/2024 2218 by Ashli Shankar  Outcome: Progressing  7/7/2024 2218 by Ashli Shankar  Outcome: Progressing  Flowsheets  Taken 7/7/2024 2200 by Ashli Shankar  Remains free of injury from restraints (restraint for interference with medical device): Every 2 hours: Monitor safety, psychosocial status, comfort, nutrition and hydration  Taken 7/7/2024 2000 by Ashli Shankar  Remains free of injury from restraints (restraint for interference with medical device): Every 2 hours: Monitor safety, psychosocial status, comfort, nutrition and hydration  Taken 7/7/2024 1800 by Whitney Mars RN  Remains free of injury from restraints (restraint for interference with medical device): Every 2 hours: Monitor safety, psychosocial status, comfort, nutrition and hydration  Taken 7/7/2024 1600 by Whitney Mars RN  Remains free of injury from restraints (restraint for interference with medical device): Every 2 hours: Monitor safety, psychosocial status, comfort, nutrition and hydration  Taken 7/7/2024 1400 by Whitney Mars RN  Remains free of injury from restraints (restraint for interference with medical device): Every 2 hours: Monitor safety, psychosocial  Ashli Shankar  Outcome: Progressing  7/7/2024 2218 by Ashli Shankar  Outcome: Progressing     Problem: Respiratory - Adult  Goal: Achieves optimal ventilation and oxygenation  7/7/2024 2218 by Ashli Shankar  Outcome: Progressing  7/7/2024 2218 by Ashli Shankar  Outcome: Progressing  7/7/2024 0837 by Gui Faust RCP  Outcome: Progressing

## 2024-07-08 NOTE — PROGRESS NOTES
Sedated on vent  Vss  lungs clear  reg rhythm  abd- soft and non-tender  ext- no edema  Stable- lab noted- episode a-fib with rvr last pm- NSR now  Per orders- continue present care
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
## 2024-07-08 NOTE — PROGRESS NOTES
nitroGLYCERIN (NITROSTAT) 0.4 MG SL tablet PLACE 1 TABLET UNDER TONGUE EVERY 5 MINS, UP TO 3 DOSES AS NEEDED FOR CHEST PAIN 8/23/21   Brittni Camilo MD   calcium carbonate 600 MG TABS tablet Take 1 tablet by mouth daily    Brittni Camilo MD   sucralfate (CARAFATE) 1 GM tablet Take 1 tablet by mouth 4 times daily 9/3/21   Hakeem Vera MD   aluminum & magnesium hydroxide-simethicone (MAALOX) 200-200-20 MG/5ML SUSP suspension Take 30 mLs by mouth every 6 hours as needed for Indigestion  Patient not taking: Reported on 8/31/2022 9/2/21   Brian Houston MD   omeprazole (PRILOSEC) 20 MG delayed release capsule Take 20 mg by mouth daily    Brittni Camilo MD   brexpiprazole (REXULTI) 1 MG TABS tablet Take 1 tablet by mouth daily    Brittni Camilo MD   vitamin D (CHOLECALCIFEROL) 25 MCG (1000 UT) TABS tablet Take by mouth daily     Brittni Camilo MD   hydroxychloroquine (PLAQUENIL) 200 MG tablet Take by mouth 2 times daily     Brittni Caimlo MD   ALPRAZolam (XANAX) 1 MG tablet Take 2 mg by mouth 3 times daily.  4/20/19   Brittni Camilo MD   PROVENTIL  (90 Base) MCG/ACT inhaler INHALE 2 PUFFS EVERY 4 HOURS AS NEEDED 12/29/17   Brittni Camilo MD   lamoTRIgine (LAMICTAL) 200 MG tablet Take 1 tablet by mouth 2 tabs at night 12/11/17   Brittni Camilo MD   zolpidem (AMBIEN) 10 MG tablet Take  by mouth nightly as needed for Sleep.    Brittni Camilo MD   traZODone (DESYREL) 150 MG tablet Take 2 tablets by mouth nightly    Brittni Camilo MD        ROS:  Cannot be obtained pt is intubated.       BP 95/72   Pulse 80   Temp 99.7 °F (37.6 °C) (Bladder)   Resp 22   Ht 1.524 m (5')   Wt 63.8 kg (140 lb 10.5 oz)   LMP 12/04/2015 (LMP Unknown)   SpO2 95%   BMI 27.47 kg/m²     Physical Exam  Constitutional: The patient is sedated .  Skin: Warm and dry. No rashes were noted. No jaundice.  HEENT: Eyes show round, and reactive pupils. Moist

## 2024-07-08 NOTE — H&P
[place] [address]                           HISTORY & PHYSICAL      PATIENT NAME: JENI KURTZ               : 1970  MED REC NO: [id number]                        ROOM: 0205  ACCOUNT NO: [id number]                       ADMIT DATE: 2024  PROVIDER: Brian Houston MD      CHIEF COMPLAINT:  Acute respiratory failure.    HISTORY OF PRESENT ILLNESS:  The patient is a 54-year-old white female, admitted to the hospital approximately 2024 with altered mental status with subsequent sedation and acute respiratory failure, was placed on a ventilator and admitted to the intensive care unit.  While in the intensive care unit, she had severe hypotension requiring use of various vasopressors as well as other medications.  It was decided by team to transfer downtown to [facility] for a cardiac catheterization to assess her cardiac status.  Upon completion of the catheterization, she was found to have no obstructive disease and thought to have takotsubo cardiomyopathy and subsequently was re-transferred back to the [facility] for readmission.  It should be noted that she is also on a ventilator and sedated.    PAST MEDICAL HISTORY:  It should be noted before we start this that this information is gleaned from the previous admission.    PAST SURGICAL HISTORY:  History not available.    RECENT HOSPITALIZATIONS:  Several days ago for altered mental status and acute respiratory failure.    ALLERGIES:  NONE KNOWN.      MEDICAL PROBLEMS:  Include a history of asthma, bipolar disorder, anxiety, depression, systemic lupus erythematosus, fibromyalgia, and most recently acute respiratory failure with altered mental status, severe hypotension, acute liver dysfunction, elevated lactic acid and BNP, hypoalbuminemia, possible left lower lobe pneumonia, acute metabolic encephalopathy.    MEDICATIONS:  Various  vasopressors as well as Lovenox anticoagulation, Protonix, Lamictal.    SOCIAL HISTORY:  Lives with her .  Does not use cigarettes, alcohol, or drugs to his knowledge.    FAMILY HISTORY:  Unobtainable.    REVIEW OF SYSTEMS:  Unobtainable as the patient is sedated on a ventilator.    ASSESSMENT:    1. Altered mental status with acute respiratory failure-ventilator placement needed.  2. Secondary diagnosis includes cardiogenic shock secondary to cardiomyopathy which is believed to be takotsubo syndrome (takotsubo).  3. Other secondary diagnoses include severe hypotension requiring vasopressors, acute liver dysfunction, elevated lactic acid, elevated BNP, hypoalbuminemia, asthma, elevated troponin, bipolar disorder, systemic lupus erythematosus, fibromyalgia, recent altered mental status, possible left lower lobe pneumonia, acute metabolic encephalopathy.    PLAN:  Admit per orders.          MARGARETH KIM MD      D:  07/08/2024 07:47:10     T:  07/08/2024 10:20:03     NUBIA/TORY  Job #:  [id number]     Doc#:  [id number]

## 2024-07-08 NOTE — PROGRESS NOTES
SPIRITUAL HEALTH SERVICES - [facility]  PROGRESS NOTE    Name: Greta Moran                Latter day: Gnosticism   Anointed (Last Rites): No    Referral: Routine Visit    Assessment:  Upon entering the room  observes Patient is asleep and on ventilator. Patient's , Patient's Mother, and Mother's SO were present for  visit.      Intervention:   provided active listening, empathy, prayer, and prayer card. Patient's Mother shared about Patient's health condition.    Outcome:  Family very appreciative of 's visit and prayer. Patient's  did not want their Scientologist contacted.    Plan:  Chaplains will remain available to offer spiritual and emotional support as needed.      Electronically signed by CHARLEE Jauregui, on 7/8/2024 at 2:29 PM.  Spiritual Care Department  [facility] [place] [facility]  [phone]

## 2024-07-08 NOTE — PROGRESS NOTES
7/8/2024 3:16 PM  Greta Moran  [id number]    Subjective:      Still in the ICU  Still intubated  No changes    Review of Systems  Unable to obtain due to intubation    Scheduled Meds:   carvedilol  6.25 mg Oral BID WC    enoxaparin  1 mg/kg SubCUTAneous Q12H    QUEtiapine  25 mg Oral Nightly    arformoterol tartrate  15 mcg Nebulization BID RT    budesonide  0.5 mg Nebulization BID RT    methylPREDNISolone  40 mg IntraVENous Q12H    sodium chloride flush  5-40 mL IntraVENous 2 times per day    pantoprazole (PROTONIX) 40 mg in sodium chloride (PF) 0.9 % 10 mL injection  40 mg IntraVENous Daily    hydroxychloroquine  200 mg Oral BID    cefepime  2,000 mg IntraVENous Q12H    chlorhexidine  15 mL Mouth/Throat BID    ALPRAZolam  2 mg Oral TID    venlafaxine  75 mg Oral BID WC    lamoTRIgine  100 mg Oral Nightly    levalbuterol  0.63 mg Nebulization q8h       Objective:  Vitals:    07/08/24 1148   BP:    Pulse: 99   Resp: 25   Temp:    SpO2: 91%         Allergies: Patient has no known allergies.    General Appearance: alert and oriented to person, place and time and in no acute distress  Skin: no rash or erythema  Head: normocephalic and atraumatic  Pulmonary/Chest: normal air movement, no respiratory distress  Abdomen: soft, non-tender, non-distended  Genitourinary: Coto catheter in place draining clear yellow urine  Extremities: no cyanosis, clubbing or edema         Labs:     Recent Labs     07/08/24  0315   *   K 4.8   *   CO2 19*   BUN 25*   CREATININE 0.6   GLUCOSE 180*   CALCIUM 8.1*       Lab Results   Component Value Date/Time    HGB 10.7 07/08/2024 03:15 AM    HCT 34.3 07/08/2024 03:15 AM       No results found for: \"PSA\"      Assessment/Plan:  Distention of right renal collecting system and right ureter    Creatinine unchanged at 0.6  WBC 10.0  Check renal Lasix scan when stable  Continue ICU care    Barak Davila PA-C   [facility]  Urology

## 2024-07-09 ENCOUNTER — APPOINTMENT (OUTPATIENT)
Dept: GENERAL RADIOLOGY | Age: 54
DRG: 917 | End: 2024-07-09
Attending: FAMILY MEDICINE
Payer: MEDICARE

## 2024-07-09 ENCOUNTER — APPOINTMENT (OUTPATIENT)
Dept: MRI IMAGING | Age: 54
DRG: 917 | End: 2024-07-09
Attending: FAMILY MEDICINE
Payer: MEDICARE

## 2024-07-09 LAB
ALBUMIN SERPL-MCNC: 3 G/DL (ref 3.5–5.2)
ALP SERPL-CCNC: 84 U/L (ref 35–104)
ALT SERPL-CCNC: 163 U/L (ref 0–32)
ANION GAP SERPL CALCULATED.3IONS-SCNC: 11 MMOL/L (ref 7–16)
AST SERPL-CCNC: 68 U/L (ref 0–31)
B.E.: -0.4 MMOL/L (ref -3–3)
B.E.: 0.7 MMOL/L (ref -3–3)
BASOPHILS # BLD: 0.01 K/UL (ref 0–0.2)
BASOPHILS NFR BLD: 0 % (ref 0–2)
BILIRUB SERPL-MCNC: 0.4 MG/DL (ref 0–1.2)
BUN SERPL-MCNC: 31 MG/DL (ref 6–20)
CALCIUM SERPL-MCNC: 7.8 MG/DL (ref 8.6–10.2)
CHLORIDE SERPL-SCNC: 111 MMOL/L (ref 98–107)
CO2 SERPL-SCNC: 26 MMOL/L (ref 22–29)
COHB: 0.3 % (ref 0–1.5)
COHB: 0.6 % (ref 0–1.5)
CREAT SERPL-MCNC: 0.6 MG/DL (ref 0.5–1)
CRITICAL: ABNORMAL
CRITICAL: ABNORMAL
CRP SERPL HS-MCNC: 9 MG/L (ref 0–5)
DATE ANALYZED: ABNORMAL
DATE ANALYZED: ABNORMAL
DATE OF COLLECTION: ABNORMAL
DATE OF COLLECTION: ABNORMAL
EKG ATRIAL RATE: 174 BPM
EKG Q-T INTERVAL: 266 MS
EKG QRS DURATION: 82 MS
EKG QTC CALCULATION (BAZETT): 448 MS
EKG R AXIS: 65 DEGREES
EKG T AXIS: -93 DEGREES
EKG VENTRICULAR RATE: 171 BPM
EOSINOPHIL # BLD: 0 K/UL (ref 0.05–0.5)
EOSINOPHILS RELATIVE PERCENT: 0 % (ref 0–6)
ERYTHROCYTE [DISTWIDTH] IN BLOOD BY AUTOMATED COUNT: 15.2 % (ref 11.5–15)
FERRITIN SERPL-MCNC: 55 NG/ML
FIO2: 50 %
FIO2: 50 %
GFR, ESTIMATED: >90 ML/MIN/1.73M2
GLUCOSE SERPL-MCNC: 159 MG/DL (ref 74–99)
HCO3: 23 MMOL/L (ref 22–26)
HCO3: 25.3 MMOL/L (ref 22–26)
HCT VFR BLD AUTO: 28.2 % (ref 34–48)
HGB BLD-MCNC: 9.2 G/DL (ref 11.5–15.5)
HHB: 5.7 % (ref 0–5)
HHB: 58.3 % (ref 0–5)
IMM GRANULOCYTES # BLD AUTO: 0.09 K/UL (ref 0–0.58)
IMM GRANULOCYTES NFR BLD: 1 % (ref 0–5)
IRON SATN MFR SERPL: 9 % (ref 15–50)
IRON SERPL-MCNC: 17 UG/DL (ref 37–145)
LAB: ABNORMAL
LAB: ABNORMAL
LACTATE BLDV-SCNC: 2 MMOL/L (ref 0.5–2.2)
LYMPHOCYTES NFR BLD: 0.88 K/UL (ref 1.5–4)
LYMPHOCYTES RELATIVE PERCENT: 8 % (ref 20–42)
Lab: 441
Lab: 507
MAGNESIUM SERPL-MCNC: 2.2 MG/DL (ref 1.6–2.6)
MCH RBC QN AUTO: 30.5 PG (ref 26–35)
MCHC RBC AUTO-ENTMCNC: 32.6 G/DL (ref 32–34.5)
MCV RBC AUTO: 93.4 FL (ref 80–99.9)
METHB: 0.3 % (ref 0–1.5)
METHB: 0.3 % (ref 0–1.5)
MICROORGANISM SPEC CULT: NORMAL
MICROORGANISM/AGENT SPEC: NORMAL
MODE: AC
MODE: AC
MONOCYTES NFR BLD: 0.74 K/UL (ref 0.1–0.95)
MONOCYTES NFR BLD: 7 % (ref 2–12)
NEUTROPHILS NFR BLD: 84 % (ref 43–80)
NEUTS SEG NFR BLD: 9.19 K/UL (ref 1.8–7.3)
O2 CONTENT: 13.8 ML/DL
O2 CONTENT: 5.8 ML/DL
O2 SATURATION: 41.2 % (ref 92–98.5)
O2 SATURATION: 94.3 % (ref 92–98.5)
O2HB: 40.8 % (ref 94–97)
O2HB: 93.7 % (ref 94–97)
OPERATOR ID: 2962
OPERATOR ID: 5133
PATIENT TEMP: 37 C
PATIENT TEMP: 37 C
PCO2: 33.1 MMHG (ref 35–45)
PCO2: 40.5 MMHG (ref 35–45)
PEEP/CPAP: 5 CMH2O
PEEP/CPAP: 5 CMH2O
PFO2: 0.56 MMHG/%
PFO2: 1.55 MMHG/%
PH BLOOD GAS: 7.41 (ref 7.35–7.45)
PH BLOOD GAS: 7.46 (ref 7.35–7.45)
PHOSPHATE SERPL-MCNC: 2.6 MG/DL (ref 2.5–4.5)
PLATELET CONFIRMATION: NORMAL
PLATELET, FLUORESCENCE: 96 K/UL (ref 130–450)
PMV BLD AUTO: 13.7 FL (ref 7–12)
PO2: 27.8 MMHG (ref 75–100)
PO2: 77.5 MMHG (ref 75–100)
POTASSIUM SERPL-SCNC: 4.4 MMOL/L (ref 3.5–5)
PROCALCITONIN SERPL-MCNC: 0.25 NG/ML (ref 0–0.08)
PROT SERPL-MCNC: 5 G/DL (ref 6.4–8.3)
RBC # BLD AUTO: 3.02 M/UL (ref 3.5–5.5)
RI(T): 10.18
RI(T): 3.12
RR MECHANICAL: 22 B/MIN
RR MECHANICAL: 22 B/MIN
SODIUM SERPL-SCNC: 148 MMOL/L (ref 132–146)
SOURCE, BLOOD GAS: ABNORMAL
SOURCE, BLOOD GAS: ABNORMAL
SPECIMEN DESCRIPTION: NORMAL
THB: 10 G/DL (ref 11.5–16.5)
THB: 10.4 G/DL (ref 11.5–16.5)
TIBC SERPL-MCNC: 188 UG/DL (ref 250–450)
TIME ANALYZED: 447
TIME ANALYZED: 511
VT MECHANICAL: 400 ML
VT MECHANICAL: 400 ML
WBC OTHER # BLD: 10.9 K/UL (ref 4.5–11.5)

## 2024-07-09 PROCEDURE — 70553 MRI BRAIN STEM W/O & W/DYE: CPT

## 2024-07-09 PROCEDURE — 2500000003 HC RX 250 WO HCPCS: Performed by: INTERNAL MEDICINE

## 2024-07-09 PROCEDURE — 2580000003 HC RX 258: Performed by: INTERNAL MEDICINE

## 2024-07-09 PROCEDURE — 82728 ASSAY OF FERRITIN: CPT

## 2024-07-09 PROCEDURE — 94640 AIRWAY INHALATION TREATMENT: CPT

## 2024-07-09 PROCEDURE — 6360000002 HC RX W HCPCS: Performed by: INTERNAL MEDICINE

## 2024-07-09 PROCEDURE — 83540 ASSAY OF IRON: CPT

## 2024-07-09 PROCEDURE — 87205 SMEAR GRAM STAIN: CPT

## 2024-07-09 PROCEDURE — 93010 ELECTROCARDIOGRAM REPORT: CPT | Performed by: INTERNAL MEDICINE

## 2024-07-09 PROCEDURE — 2580000003 HC RX 258: Performed by: STUDENT IN AN ORGANIZED HEALTH CARE EDUCATION/TRAINING PROGRAM

## 2024-07-09 PROCEDURE — 80053 COMPREHEN METABOLIC PANEL: CPT

## 2024-07-09 PROCEDURE — 83735 ASSAY OF MAGNESIUM: CPT

## 2024-07-09 PROCEDURE — 94660 CPAP INITIATION&MGMT: CPT

## 2024-07-09 PROCEDURE — 6370000000 HC RX 637 (ALT 250 FOR IP): Performed by: INTERNAL MEDICINE

## 2024-07-09 PROCEDURE — 2700000000 HC OXYGEN THERAPY PER DAY

## 2024-07-09 PROCEDURE — 83605 ASSAY OF LACTIC ACID: CPT

## 2024-07-09 PROCEDURE — 82805 BLOOD GASES W/O2 SATURATION: CPT

## 2024-07-09 PROCEDURE — 2500000003 HC RX 250 WO HCPCS

## 2024-07-09 PROCEDURE — 6360000002 HC RX W HCPCS: Performed by: STUDENT IN AN ORGANIZED HEALTH CARE EDUCATION/TRAINING PROGRAM

## 2024-07-09 PROCEDURE — 36620 INSERTION CATHETER ARTERY: CPT

## 2024-07-09 PROCEDURE — 6360000002 HC RX W HCPCS

## 2024-07-09 PROCEDURE — 87070 CULTURE OTHR SPECIMN AEROBIC: CPT

## 2024-07-09 PROCEDURE — 0BH17EZ INSERTION OF ENDOTRACHEAL AIRWAY INTO TRACHEA, VIA NATURAL OR ARTIFICIAL OPENING: ICD-10-PCS | Performed by: INTERNAL MEDICINE

## 2024-07-09 PROCEDURE — 6360000004 HC RX CONTRAST MEDICATION: Performed by: RADIOLOGY

## 2024-07-09 PROCEDURE — 94003 VENT MGMT INPAT SUBQ DAY: CPT

## 2024-07-09 PROCEDURE — 6360000002 HC RX W HCPCS: Performed by: NURSE PRACTITIONER

## 2024-07-09 PROCEDURE — 84100 ASSAY OF PHOSPHORUS: CPT

## 2024-07-09 PROCEDURE — 37799 UNLISTED PX VASCULAR SURGERY: CPT

## 2024-07-09 PROCEDURE — 71045 X-RAY EXAM CHEST 1 VIEW: CPT

## 2024-07-09 PROCEDURE — 86618 LYME DISEASE ANTIBODY: CPT

## 2024-07-09 PROCEDURE — 04HY32Z INSERTION OF MONITORING DEVICE INTO LOWER ARTERY, PERCUTANEOUS APPROACH: ICD-10-PCS | Performed by: INTERNAL MEDICINE

## 2024-07-09 PROCEDURE — A9579 GAD-BASE MR CONTRAST NOS,1ML: HCPCS | Performed by: RADIOLOGY

## 2024-07-09 PROCEDURE — 31500 INSERT EMERGENCY AIRWAY: CPT

## 2024-07-09 PROCEDURE — 5A1945Z RESPIRATORY VENTILATION, 24-96 CONSECUTIVE HOURS: ICD-10-PCS | Performed by: INTERNAL MEDICINE

## 2024-07-09 PROCEDURE — 83550 IRON BINDING TEST: CPT

## 2024-07-09 PROCEDURE — 99233 SBSQ HOSP IP/OBS HIGH 50: CPT | Performed by: INTERNAL MEDICINE

## 2024-07-09 PROCEDURE — 2580000003 HC RX 258

## 2024-07-09 PROCEDURE — 2000000000 HC ICU R&B

## 2024-07-09 PROCEDURE — 2580000003 HC RX 258: Performed by: NURSE PRACTITIONER

## 2024-07-09 PROCEDURE — 6370000000 HC RX 637 (ALT 250 FOR IP)

## 2024-07-09 PROCEDURE — 85025 COMPLETE CBC W/AUTO DIFF WBC: CPT

## 2024-07-09 RX ORDER — WATER 10 ML/10ML
INJECTION INTRAMUSCULAR; INTRAVENOUS; SUBCUTANEOUS
Status: COMPLETED
Start: 2024-07-09 | End: 2024-07-09

## 2024-07-09 RX ORDER — VECURONIUM BROMIDE 1 MG/ML
10 INJECTION, POWDER, LYOPHILIZED, FOR SOLUTION INTRAVENOUS ONCE
Status: COMPLETED | OUTPATIENT
Start: 2024-07-09 | End: 2024-07-09

## 2024-07-09 RX ORDER — LORAZEPAM 1 MG/1
2 TABLET ORAL EVERY 12 HOURS
Status: DISCONTINUED | OUTPATIENT
Start: 2024-07-09 | End: 2024-07-10

## 2024-07-09 RX ORDER — PROPOFOL 10 MG/ML
5-50 INJECTION, EMULSION INTRAVENOUS CONTINUOUS
Status: DISCONTINUED | OUTPATIENT
Start: 2024-07-09 | End: 2024-07-12 | Stop reason: HOSPADM

## 2024-07-09 RX ORDER — FENTANYL CITRATE-0.9 % NACL/PF 10 MCG/ML
25-200 PLASTIC BAG, INJECTION (ML) INTRAVENOUS CONTINUOUS
Status: DISCONTINUED | OUTPATIENT
Start: 2024-07-09 | End: 2024-07-12 | Stop reason: HOSPADM

## 2024-07-09 RX ORDER — ZIPRASIDONE MESYLATE 20 MG/ML
20 INJECTION, POWDER, LYOPHILIZED, FOR SOLUTION INTRAMUSCULAR ONCE
Status: DISCONTINUED | OUTPATIENT
Start: 2024-07-09 | End: 2024-07-09

## 2024-07-09 RX ORDER — MORPHINE SULFATE 2 MG/ML
2 INJECTION, SOLUTION INTRAMUSCULAR; INTRAVENOUS ONCE
Status: COMPLETED | OUTPATIENT
Start: 2024-07-09 | End: 2024-07-09

## 2024-07-09 RX ORDER — LORAZEPAM 1 MG/1
1 TABLET ORAL EVERY 4 HOURS PRN
Status: DISCONTINUED | OUTPATIENT
Start: 2024-07-09 | End: 2024-07-09

## 2024-07-09 RX ORDER — METHYLPREDNISOLONE SODIUM SUCCINATE 40 MG/ML
40 INJECTION, POWDER, LYOPHILIZED, FOR SOLUTION INTRAMUSCULAR; INTRAVENOUS DAILY
Status: DISCONTINUED | OUTPATIENT
Start: 2024-07-10 | End: 2024-07-10

## 2024-07-09 RX ORDER — FUROSEMIDE 10 MG/ML
40 INJECTION INTRAMUSCULAR; INTRAVENOUS ONCE
Status: COMPLETED | OUTPATIENT
Start: 2024-07-09 | End: 2024-07-09

## 2024-07-09 RX ORDER — LORAZEPAM 2 MG/ML
1 INJECTION INTRAMUSCULAR EVERY 4 HOURS PRN
Status: DISCONTINUED | OUTPATIENT
Start: 2024-07-09 | End: 2024-07-09

## 2024-07-09 RX ORDER — LORAZEPAM 2 MG/ML
1 INJECTION INTRAMUSCULAR
Status: DISCONTINUED | OUTPATIENT
Start: 2024-07-09 | End: 2024-07-12 | Stop reason: HOSPADM

## 2024-07-09 RX ORDER — SODIUM CHLORIDE, SODIUM LACTATE, POTASSIUM CHLORIDE, AND CALCIUM CHLORIDE .6; .31; .03; .02 G/100ML; G/100ML; G/100ML; G/100ML
500 INJECTION, SOLUTION INTRAVENOUS ONCE
Status: COMPLETED | OUTPATIENT
Start: 2024-07-09 | End: 2024-07-09

## 2024-07-09 RX ADMIN — BUDESONIDE 500 MCG: 0.5 SUSPENSION RESPIRATORY (INHALATION) at 19:42

## 2024-07-09 RX ADMIN — SODIUM CHLORIDE, PRESERVATIVE FREE 10 ML: 5 INJECTION INTRAVENOUS at 22:30

## 2024-07-09 RX ADMIN — LORAZEPAM 1 MG: 2 INJECTION INTRAMUSCULAR; INTRAVENOUS at 10:18

## 2024-07-09 RX ADMIN — HYDROXYCHLOROQUINE SULFATE 200 MG: 200 TABLET ORAL at 22:29

## 2024-07-09 RX ADMIN — CHLORHEXIDINE GLUCONATE 0.12% ORAL RINSE 15 ML: 1.2 LIQUID ORAL at 22:30

## 2024-07-09 RX ADMIN — ENOXAPARIN SODIUM 60 MG: 100 INJECTION SUBCUTANEOUS at 16:46

## 2024-07-09 RX ADMIN — Medication 150 MCG/HR: at 21:10

## 2024-07-09 RX ADMIN — PROPOFOL 40 MCG/KG/MIN: 10 INJECTION, EMULSION INTRAVENOUS at 21:10

## 2024-07-09 RX ADMIN — VENLAFAXINE HYDROCHLORIDE 75 MG: 75 TABLET ORAL at 16:48

## 2024-07-09 RX ADMIN — ACETAMINOPHEN 650 MG: 325 TABLET ORAL at 22:36

## 2024-07-09 RX ADMIN — GADOTERIDOL 13 ML: 279.3 INJECTION, SOLUTION INTRAVENOUS at 22:02

## 2024-07-09 RX ADMIN — DEXMEDETOMIDINE 1.3 MCG/KG/HR: 100 INJECTION, SOLUTION INTRAVENOUS at 11:07

## 2024-07-09 RX ADMIN — LEVALBUTEROL 0.63 MG: 0.63 SOLUTION RESPIRATORY (INHALATION) at 06:05

## 2024-07-09 RX ADMIN — WATER 10 ML: 1 INJECTION INTRAMUSCULAR; INTRAVENOUS; SUBCUTANEOUS at 21:15

## 2024-07-09 RX ADMIN — Medication 75 MCG/HR: at 03:03

## 2024-07-09 RX ADMIN — CEFEPIME 2000 MG: 2 INJECTION, POWDER, FOR SOLUTION INTRAVENOUS at 22:29

## 2024-07-09 RX ADMIN — LEVALBUTEROL 0.63 MG: 0.63 SOLUTION RESPIRATORY (INHALATION) at 10:12

## 2024-07-09 RX ADMIN — PROPOFOL 30 MCG/KG/MIN: 10 INJECTION, EMULSION INTRAVENOUS at 08:21

## 2024-07-09 RX ADMIN — BUDESONIDE 500 MCG: 0.5 SUSPENSION RESPIRATORY (INHALATION) at 06:05

## 2024-07-09 RX ADMIN — LORAZEPAM 2 MG: 1 TABLET ORAL at 13:13

## 2024-07-09 RX ADMIN — FUROSEMIDE 40 MG: 10 INJECTION, SOLUTION INTRAMUSCULAR; INTRAVENOUS at 11:08

## 2024-07-09 RX ADMIN — SODIUM CHLORIDE, POTASSIUM CHLORIDE, SODIUM LACTATE AND CALCIUM CHLORIDE 500 ML: 600; 310; 30; 20 INJECTION, SOLUTION INTRAVENOUS at 03:30

## 2024-07-09 RX ADMIN — LEVALBUTEROL 0.63 MG: 0.63 SOLUTION RESPIRATORY (INHALATION) at 00:26

## 2024-07-09 RX ADMIN — VENLAFAXINE HYDROCHLORIDE 75 MG: 75 TABLET ORAL at 07:57

## 2024-07-09 RX ADMIN — CARVEDILOL 6.25 MG: 6.25 TABLET, FILM COATED ORAL at 07:57

## 2024-07-09 RX ADMIN — CEFEPIME 2000 MG: 2 INJECTION, POWDER, FOR SOLUTION INTRAVENOUS at 07:57

## 2024-07-09 RX ADMIN — SODIUM CHLORIDE, PRESERVATIVE FREE 40 MG: 5 INJECTION INTRAVENOUS at 07:58

## 2024-07-09 RX ADMIN — MORPHINE SULFATE 2 MG: 2 INJECTION, SOLUTION INTRAMUSCULAR; INTRAVENOUS at 13:50

## 2024-07-09 RX ADMIN — ENOXAPARIN SODIUM 60 MG: 100 INJECTION SUBCUTANEOUS at 03:28

## 2024-07-09 RX ADMIN — HYDROXYCHLOROQUINE SULFATE 200 MG: 200 TABLET ORAL at 07:56

## 2024-07-09 RX ADMIN — QUETIAPINE FUMARATE 25 MG: 25 TABLET ORAL at 22:29

## 2024-07-09 RX ADMIN — Medication 75 MCG/HR: at 15:29

## 2024-07-09 RX ADMIN — VECURONIUM BROMIDE 10 MG: 1 INJECTION, POWDER, LYOPHILIZED, FOR SOLUTION INTRAVENOUS at 21:15

## 2024-07-09 RX ADMIN — SODIUM CHLORIDE, PRESERVATIVE FREE 10 ML: 5 INJECTION INTRAVENOUS at 07:58

## 2024-07-09 RX ADMIN — ALPRAZOLAM 2 MG: 1 TABLET ORAL at 07:57

## 2024-07-09 RX ADMIN — ACETAMINOPHEN 650 MG: 325 TABLET ORAL at 13:12

## 2024-07-09 RX ADMIN — ARFORMOTEROL TARTRATE 15 MCG: 15 SOLUTION RESPIRATORY (INHALATION) at 19:42

## 2024-07-09 RX ADMIN — ARFORMOTEROL TARTRATE 15 MCG: 15 SOLUTION RESPIRATORY (INHALATION) at 06:05

## 2024-07-09 RX ADMIN — LEVALBUTEROL 0.63 MG: 0.63 SOLUTION RESPIRATORY (INHALATION) at 16:10

## 2024-07-09 RX ADMIN — PROPOFOL 25 MCG/KG/MIN: 10 INJECTION, EMULSION INTRAVENOUS at 14:28

## 2024-07-09 RX ADMIN — LAMOTRIGINE 100 MG: 100 TABLET ORAL at 22:32

## 2024-07-09 RX ADMIN — CHLORHEXIDINE GLUCONATE 0.12% ORAL RINSE 15 ML: 1.2 LIQUID ORAL at 07:58

## 2024-07-09 RX ADMIN — PROPOFOL 40 MCG/KG/MIN: 10 INJECTION, EMULSION INTRAVENOUS at 18:24

## 2024-07-09 RX ADMIN — DEXMEDETOMIDINE 1.5 MCG/KG/HR: 100 INJECTION, SOLUTION INTRAVENOUS at 11:46

## 2024-07-09 ASSESSMENT — PULMONARY FUNCTION TESTS
PIF_VALUE: 26
PIF_VALUE: 24
PIF_VALUE: 24
PIF_VALUE: 32
PIF_VALUE: 24
PIF_VALUE: 32
PIF_VALUE: 22
PIF_VALUE: 38
PIF_VALUE: 26
PIF_VALUE: 22
PIF_VALUE: 29
PIF_VALUE: 23
PIF_VALUE: 32
PIF_VALUE: 37
PIF_VALUE: 28
PIF_VALUE: 23
PIF_VALUE: 24
PIF_VALUE: 28
PIF_VALUE: 18
PIF_VALUE: 23
PIF_VALUE: 31
PIF_VALUE: 25
PIF_VALUE: 20
PIF_VALUE: 28
PIF_VALUE: 13
PIF_VALUE: 20
PIF_VALUE: 33
PIF_VALUE: 28
PIF_VALUE: 26
PIF_VALUE: 20
PIF_VALUE: 16
PIF_VALUE: 19
PIF_VALUE: 23
PIF_VALUE: 29

## 2024-07-09 ASSESSMENT — PAIN SCALES - GENERAL: PAINLEVEL_OUTOF10: 0

## 2024-07-09 NOTE — PROGRESS NOTES
Date: 7/9/2024    Time: 10:41 AM    Patient Placed On BIPAP/CPAP/ Non-Invasive Ventilation?  Yes    If no must comment.  Facial area red/color change? No           If YES are Blister/Lesion present?No   If yes must notify nursing staff  BIPAP/CPAP skin barrier?  Yes    Skin barrier type:mepilexlite     Comments:     07/09/24 1041   NIV Type   $NIV $Daily Charge   NIV Started/Stopped On   Equipment Type v60   Mode AVAPS   Mask Type Full face mask   Mask Size Medium   Assessment   Pulse (!) 123   Heart Rate Source Monitor   Respirations 25   SpO2 95 %   Level of Consciousness 1.0   Comfort Level Good   Using Accessory Muscles No   Mask Compliance Good   Skin Assessment Clean, dry, & intact   Skin Protection for O2 Device Yes   Orientation Middle   Location Nose   Intervention(s) Skin Barrier   Settings/Measurements   PIP Observed 10 cm H20   CPAP/EPAP 5 cmH2O   IPAP Min 10 cmH2O   IPAP Max 15 cmH2O   Vt (Set, mL) 400 mL   Vt (Measured) 533 mL   Rate Ordered 18   Insp Rise Time (%) 3 %   FiO2  50 %   I Time/ I Time % 0.8 s   Minute Volume (L/min) 13.4 Liters   Mask Leak (lpm) 40 lpm   Patient's Home Machine No   Alarm Settings   Alarms On Y   Low Pressure (cmH2O) 8 cmH2O   High Pressure (cmH2O) 40 cmH2O   Apnea (secs) 20 secs   RR Low (bpm) 14   RR High (bpm) 50 br/min   Patient Observation   Observations red outlet, red cord in wall alarm       Valentina Griffin RCP

## 2024-07-09 NOTE — PATIENT CARE CONFERENCE
Intensive Care Daily Quality Rounding Checklist        ICU Team Members: Dr. Owens, Dr. Poe (resident), charge nurse, bedside nurse, respiratory therapist     ICU Day #: NUMBER: 7     SOFA Score: 8     Intubation Date: July 3     Ventilator Day #: NUMBER: 7    Central Line Insertion Date: July 5                                                    Day #: NUMBER: 5                                                    Indication: CVC Indication: Administration of vasopressors or vesicant medications      Arterial Line Insertion Date: N/A                             Day #:  N/A     Temporary Hemodialysis Catheter Insertion Date: N/A                              Day # N/A     DVT Prophylaxis: Lovenox    GI Prophylaxis: Protonix     [place]oto Catheter Insertion Date: July 3                                        Day #: 7                             Indications: Need for fluid management of the critically ill patient in a critical care setting                             Continued need (if yes, reason documented and discussed with physician): yes     Skin Issues/ Wounds and ordered treatment discussed on rounds: SOS precautions        Goals/ Plans for the Day: Daily labs, replace electrolytes as needed, wean vent as able, wean sedation as tolerated, soft wrist restraints to prevent pulling tubes, continue critical care management, changed precedex to propofol, wean off versed and fentanyl. Taper steroids to prednisone starting tomorrow       Reviewed plan and goals for day with patient and/or representative: reviewed w/  Mekhi

## 2024-07-09 NOTE — PROGRESS NOTES
Withdrew ET tube 2cm from 25cm at the lip to 23cm at the lip per Dr. Owens.     07/09/24 1438   Patient Observation   Pulse 93   Respirations 17   SpO2 93 %   Vent Information   Vent Mode AC/PRVC   Ventilator Settings   Vt (Set, mL) 400 mL   Resp Rate (Set) 20 bpm   PEEP/CPAP (cmH2O) 5   FiO2  50 %   Insp Time (sec) 0.7 sec   Vent Patient Data (Readings)   Vt (Measured) 415 mL   Peak Inspiratory Pressure (cmH2O) 24 cmH2O   Rate Measured 24 br/min   Minute Volume (L/min) 10.1 Liters   Mean Airway Pressure (cmH2O) 11 cmH20   Plateau Pressure (cm H2O) 0 cm H2O   Driving Pressure -5   Inspiratory Time 0.7 sec   I:E Ratio 1:3.30   Flow Sensitivity 3 L/min   I Time/ I Time % 0 s   Backup Apnea On   Backup Rate 20 Breaths Per Minute   Backup Vt 400   Backup I Time 20   Vent Alarm Settings   Low Pressure (cmH2O) 8 cmH2O   High Pressure (cmH2O) 40 cmH2O   Low Minute Volume (lpm) 4.5 L/min   High Minute Volume (lpm) 18 L/min   Low Exhaled Vt (ml) 300 mL   High Exhaled Vt (ml) 900 mL   RR Low (bpm) 20   RR High (bpm) 50 br/min   Apnea (secs) 20 secs   Additional Respiratoray Assessments   Humidification Source Heated wire   Humidification Temp 36.8   Ambu Bag With Mask At Bedside Yes   ETT    Placement Date/Time: 07/09/24 1340   Present on Admission/Arrival: No  Placed By: Licensed provider  Placement Verified By: Auscultation;Capnometry;Direct visualization  Preoxygenation: Yes  Mask Ventilation: Ventilated by mask (1)  Technique: Video l...   Secured At (S)  23 cm   Measured From Lips   ETT Placement Center   Secured By Commercial tube dang

## 2024-07-09 NOTE — PLAN OF CARE
Problem: Discharge Planning  Goal: Discharge to home or other facility with appropriate resources  7/9/2024 0006 by Jero Blackburn RN  Outcome: Progressing  7/8/2024 1556 by Whitney Mars RN  Outcome: Progressing     Problem: Safety - Medical Restraint  Goal: Remains free of injury from restraints (Restraint for Interference with Medical Device)  Description: INTERVENTIONS:  1. Determine that other, less restrictive measures have been tried or would not be effective before applying the restraint  2. Evaluate the patient's condition at the time of restraint application  3. Inform patient/family regarding the reason for restraint  4. Q2H: Monitor safety, psychosocial status, comfort, nutrition and hydration  7/9/2024 0006 by Jero Blackburn RN  Outcome: Progressing  Flowsheets  Taken 7/9/2024 0000 by Jero Blackburn RN  Remains free of injury from restraints (restraint for interference with medical device):   Every 2 hours: Monitor safety, psychosocial status, comfort, nutrition and hydration   Inform patient/family regarding the reason for restraint   Evaluate the patient's condition at the time of restraint application   Determine that other, less restrictive measures have been tried or would not be effective before applying the restraint  Taken 7/8/2024 2200 by Jero Blackburn RN  Remains free of injury from restraints (restraint for interference with medical device):   Every 2 hours: Monitor safety, psychosocial status, comfort, nutrition and hydration   Inform patient/family regarding the reason for restraint   Evaluate the patient's condition at the time of restraint application   Determine that other, less restrictive measures have been tried or would not be effective before applying the restraint  Taken 7/8/2024 2000 by Jero Blackburn RN  Remains free of injury from restraints (restraint for interference with medical device):   Every 2 hours: Monitor safety, psychosocial status, comfort, nutrition and  restraints (restraint for interference with medical device): Every 2 hours: Monitor safety, psychosocial status, comfort, nutrition and hydration  Taken 7/8/2024 0400 by Ashli Shankar  Remains free of injury from restraints (restraint for interference with medical device): Every 2 hours: Monitor safety, psychosocial status, comfort, nutrition and hydration  Taken 7/8/2024 0200 by Ashli Shankar  Remains free of injury from restraints (restraint for interference with medical device): Every 2 hours: Monitor safety, psychosocial status, comfort, nutrition and hydration     Problem: Safety - Adult  Goal: Free from fall injury  7/9/2024 0006 by Jero Blackburn RN  Outcome: Progressing  7/8/2024 1556 by Whitney Mars RN  Outcome: Progressing  Flowsheets (Taken 7/8/2024 1554)  Free From Fall Injury: Instruct family/caregiver on patient safety     Problem: Pain  Goal: Verbalizes/displays adequate comfort level or baseline comfort level  7/9/2024 0006 by Jero Blackburn RN  Outcome: Progressing  7/8/2024 1556 by Whitney Mars RN  Outcome: Progressing     Problem: Skin/Tissue Integrity  Goal: Absence of new skin breakdown  Description: 1.  Monitor for areas of redness and/or skin breakdown  2.  Assess vascular access sites hourly  3.  Every 4-6 hours minimum:  Change oxygen saturation probe site  4.  Every 4-6 hours:  If on nasal continuous positive airway pressure, respiratory therapy assess nares and determine need for appliance change or resting period.  7/9/2024 0006 by Jero Blackburn RN  Outcome: Progressing  7/8/2024 1556 by Whitney Mars RN  Outcome: Progressing     Problem: ABCDS Injury Assessment  Goal: Absence of physical injury  7/9/2024 0006 by Jero Blackburn RN  Outcome: Progressing  7/8/2024 1556 by Whitney Mars RN  Outcome: Progressing  Flowsheets (Taken 7/8/2024 1554)  Absence of Physical Injury: Implement safety measures based on patient assessment     Problem: Nutrition

## 2024-07-09 NOTE — PROCEDURES
PROCEDURE  7/9/24       Time: 0140    INTUBATION  Risks, benefits and alternatives if able (for applicable procedures below) described.   Performed By: Xu Poe MD and ICU Attending Physician.    Indication:  impending respiratory failure and airway compromise.   Informed consent: Consent unable to be obtained due to patient's condition..  Procedure: Following Preoxygenation the patient was pretreated with fentanyl and propofol followed by None. Intubation was performed after single attempt(s) by direct laryngoscopy using a Glidescope and 7.5mm cuffed endotracheal tube was inserted .  Initial post procedure placement:  confirmed by bilateral breath sounds, ETCO2 detection, and absence of sounds over stomach.  Tube Secured @ 24cm at the Lip.   Post procedure chest x-ray: has been ordered but is still pending.  Procedural Complications: None.   Anesthesia Consult:  No.       I was present at the bedside during the entire procedure.    Miguel Owens MD

## 2024-07-09 NOTE — PROGRESS NOTES
Infectious Disease  Progress Note  [facility]    Chief Complaint: shock    Subjective:  Patient is in the ICU. Had fever of 102. She is restless,moving all over the bed. No diarrhea. Has only right iJ line. Got extubated this morning.     Scheduled Meds:   [START ON 7/10/2024] methylPREDNISolone  40 mg IntraVENous Daily    [Held by provider] carvedilol  6.25 mg Oral BID WC    enoxaparin  1 mg/kg SubCUTAneous Q12H    QUEtiapine  25 mg Oral Nightly    brexpiprazole  1 mg Oral Daily    arformoterol tartrate  15 mcg Nebulization BID RT    budesonide  0.5 mg Nebulization BID RT    sodium chloride flush  5-40 mL IntraVENous 2 times per day    pantoprazole (PROTONIX) 40 mg in sodium chloride (PF) 0.9 % 10 mL injection  40 mg IntraVENous Daily    hydroxychloroquine  200 mg Oral BID    cefepime  2,000 mg IntraVENous Q12H    chlorhexidine  15 mL Mouth/Throat BID    ALPRAZolam  2 mg Oral TID    venlafaxine  75 mg Oral BID WC    lamoTRIgine  100 mg Oral Nightly    levalbuterol  0.63 mg Nebulization q8h     Continuous Infusions:   dexmedeTOMIDine (PRECEDEX) 400 mcg in sodium chloride 0.9 % 100 mL infusion 1.5 mcg/kg/hr (07/09/24 1146)    sodium chloride 10 mL/hr at 07/09/24 0637     PRN Meds:LORazepam, potassium chloride, magnesium sulfate, sodium phosphate 10.65 mmol in sodium chloride 0.9 % 250 mL IVPB **OR** sodium phosphate 21.27 mmol in sodium chloride 0.9 % 250 mL IVPB, sodium chloride flush, sodium chloride, acetaminophen    Prior to Admission medications    Medication Sig Start Date End Date Taking? Authorizing Provider   venlafaxine (EFFEXOR XR) 150 MG extended release capsule  1/11/24   Brittni Camilo MD   epinastine (ELESTAT) 0.05 % SOLN  12/29/23   Brittni Camilo MD   alendronate (FOSAMAX) 70 MG tablet  1/20/24   Brittni Camilo MD   loratadine (CLARITIN) 10 MG capsule Take 1 capsule by mouth daily    Brittni Camilo MD   metoprolol succinate (TOPROL XL) 25 MG extended release tablet TAKE 1/2  rashes were noted. No jaundice.  HEENT: Eyes show round, and reactive pupils. Moist mucous membranes, no ulcerations, no thrush.   Neck: Supple to movements. No lymphadenopathy.   Chest: on the vent.  Decreased breath sounds both bases. AVAP.  Cardiovascular: S1 and S2 are rhythmic and regular. No murmurs appreciated.   Abdomen: Positive bowel sounds to auscultation. Benign to palpation. No masses felt. No hepatosplenomegaly.  Genitourinary: sweeney  Extremities: No clubbing, no cyanosis, no edema.  Musculoskeletal: no gross focal abnormalities  Neurological: sedated  Lines: peripheral,  right fem Cvc    Labs, Cultures reviewed  Radiology and other consultants notes reviewed    Microbiology:  Blood cx: negative  MRSA NS negative  Urine cx : negative  Blood cx 7/7: negative  Resp cx: no normal yue.    Assessment:  Shock : likely cardiogenic  Takotsubo cardiomyopathy:   Left lower lung pneumonia:   Fever/leucocytosis: fever likely medication related vs withdrawal from her psych meds.     Plan:    Cont IV cefepime - planned for 7 days.completes today.  Monitor labs  Will follow with you      Electronically signed by SUJATHA NERI MD on 7/9/2024 at 12:13 PM

## 2024-07-09 NOTE — CARE COORDINATION
CASE MANAGEMENT.... Patient is a readmit. Was recently in [facility]. Now admitted to [facility] icu with cardiogenic shock and same s/s of AMS and acute resp. UDS + benzos/cannabis. Required intubation on admission 7/5/24. Had cc done on 7/5/25 and returned to [facility] icu. Extubated today 7/9/24, but unable to tolerate, therefore, patient was reintubated and placed back on iv sedation. Vent settings: AC/PRVC  Rate 20 Peep 5 Fio2 50%. Continues on iv solu medrol and iv maxipime. In addition to critical care, she is being followed by cardio/chf nurse/id/urology. [facility] was already notified and will see patient when appropriate.  Will need to discuss dc plans/needs with patients /patient. At this time, dc needs are TBD. Will need PT/OT for dc planning. Will cont to follow along and assist with needs accordingly.

## 2024-07-09 NOTE — PROGRESS NOTES
Critical Care Team - Daily Progress Note      Date and time: 7/9/2024 11:27 AM  Patient's name:  Greta Moran  Medical Record Number: [id number]  Patient's account/billing number: [id number]  Patient's YOB: 1970  Age: 54 y.o.  Date of Admission: 7/5/2024  9:08 PM  Length of stay during current admission: 4      Primary Care Physician: Brian Houston MD  ICU Attending Physician: Dr. Owens    Code Status: Full Code    Reason for ICU admission: Critical care management      SUBJECTIVE:     OVERNIGHT EVENTS:           Patient was febrile overnight with a maximum temperature of 102.6.  Patient was weaned from Precedex and started on propofol with concerns for Precedex induced drug fevers.  Patient was given 500 cc of IV fluids for low blood pressure.  After propofol was started, Versed was weaned and she is on decreasing trend of fentanyl.  She has had adequate urine output after diuresis yesterday.  She has uptrending hypernatremia with a sodium of 148 this morning. Increase free water flushes. Hemoglobin trending down to 9 2 from 10 yesterday.  Lactic acidosis resolved.  Respiratory alkalosis on ABG. SBT today.     Intake/Output:   I/O this shift:  In: 80 [NG/GT:80]  Out: -   I/O last 3 completed shifts:  In: 4228 [I.V.:1537.3; NG/GT:1588; IV Piggyback:1102.7]  Out: 3405 [Urine:3405]    Awake and following commands: No  Current Ventilation: - Ventilator Settings:    Vt (Set, mL): 400 mL  Resp Rate (Set): 20 bpm  FiO2 : 50 %    PEEP/CPAP (cmH2O): 5     Secretions: None  Sedation: fentanyl and versed  Paralyzed: No  Vasopressors: norepinephrine, vasopressin    ROS:  Unless stated above, ROS is otherwise negative.      Initial HPI + past overnight events: The patient is a 54 y.o. female with significant past medical history of anxiety, arthritis, asthma, bipolar, depression, fibromyalgia, insomnia, lupus, lupus, cognitive impairment, and osteoporosis.  Information limited due to patient's status  problem list and plan of care.I performed the substantive portion of the visit.    Earlier this morning patient was on low-dose propofol and fentanyl.  We placed on pressure support.  Her weaning parameters were acceptable she had a neph of -28, rapid shallow breathing index of 40 she also had air leak.  Intermittently following commands and proceeded with extubating her.  Unfortunately an hour after extubation she developed more agitation and being restless also some wheezing.  Her we proceeded with giving her diuretics and placing her BiPAP her respiratory status settled and improved but we continuously had issues with her restless leg mentation we did try Ativan, Precedex, morphine they came to the point that patient became tachypneic tachycardic A-fib RVR and we had to proceed with reintubating her 4 hours after her initial extubation.  Post second intubation patient's heart rate was back into sinus rhythm.  For further evaluation we will send patient for an MRI.  Will order a EEG of the brain.  Also check respiratory cultures.  And check Lyme titration.    During multidisciplinary team rounds the patient was seen, examined and discussed. This is confirmation that I have personally seen and examined the patient and that the key elements of the encounter were performed by me (> 85 % time).  The medications & laboratory data and imagery was discussed and adjusted where necessary. Key issues of the case were discussed among consultants.     This patient has a high probability of sudden clinically significant deterioration. I managed/supervised life or organ supporting interventions that required frequent physician assessment. I devoted my full attention to the direct care of this patient for the period of time indicated below. In addition to above, time was devoted to teaching and to any procedure.      NOTE: This report, in part or full, may have been transcribed using voice recognition software. Every effort was

## 2024-07-09 NOTE — PROCEDURES
Arterial line placement    Procedure: Left Femoral arterial line placement.     Indications: Continuous monitoring of blood pressure in a patient with hypotension +/- shock, on Levophed.     Anesthesia: Local infiltration of 1% lidocaine.     Consent:  Unable to be obtained due to patient's condition.    Technique: Time Out: Immediately prior to the procedure a \"timeout\" was called to verify the correct patient and procedure. Procedure was done using strict aseptic technique. Kaveh's test was performed and was normal. Left Femoral site was cleaned with chloraprep and draped. Left Femoral was identified, then Lidocaine 1% was infiltrated locally.  Arterial line was inserted, a good blood flow was obtained, after which guidewire was inserted all the way with no resistance. Then the canula was inserted and needle with guidewire was withdrawn. Pulsatile bright red blood flow was observed. The canula was connected to BP monitoring apparatus and a good quality waveform was noted. Then the canula was secured with 2 stay sutures of 2-0 silk after Lidocaine infiltration, following which dressing was applied.     Number of sticks: 1.     Number of Kits used: 1.     Complications: No immediate complication.      Estimated blood loss: About 1 ml.     Comment: Patient tolerated the procedure well.     Xu Poe MD PGY-2  7/9/2024 2:51 PM      \

## 2024-07-09 NOTE — PROGRESS NOTES
Patient was extubated to 5 liters/min via nasal cannula. Breath Sounds post extubation were decreased with exp wheezes. Stridor was not present post extubation. SPO2 was 97%. Suctioned orally for moderate amount clear secreations.      Performed by  Maia Alcantara RCP

## 2024-07-09 NOTE — PROGRESS NOTES
Comprehensive Nutrition Assessment    Type and Reason for Visit:  Reassess    Nutrition Recommendations/Plan:   While pt intubated, recommend resume EN via NGT:    TF RECOMMENDATION:  Peptide based TF (Vital AF 1.2) to goal rate 45 ml/hr with 30 ml water flushes Q 4 hrs  This will provide: 1080 ml/d, 1549 total malena (TF+propofol), 81 g pro, 1056 ml total free water  This regimen will meet 91% energy needs, 108% protein needs    Continue inpatient monitoring     Malnutrition Assessment:  Malnutrition Status:  Mild malnutrition (07/06/24 1111)    Context:  Acute Illness     Findings of the 6 clinical characteristics of malnutrition:  Energy Intake:  Mild decrease in energy intake (Comment) (NPO since adm)  Weight Loss:  Unable to assess     Body Fat Loss:  No significant body fat loss     Muscle Mass Loss:  Mild muscle mass loss Temples (temporalis)  Fluid Accumulation:  Unable to assess (d/t multifactorial)     Strength:  Not Performed    Nutrition Assessment:    Pt s/p cardiac cath 7/5 and negative for CAD-back to [facility]. Pt had been extubated earlier today 7/9, but now required re-intubation and sedation. Recommend resume EN via NGT for nutrition support while pt remains intubated.    Nutrition Related Findings:    intubated/sedated, soft rotund abd +hypoactive BS, Na 148, +I/O 1L, trace to +1 edema Wound Type: None       Current Nutrition Intake & Therapies:    Average Meal Intake: NPO  Average Supplements Intake: NPO  Diet NPO  Additional Calorie Sources:  7/9 propofol = 253 calories    Anthropometric Measures:  Height: 152.4 cm (5')  Ideal Body Weight (IBW): 100 lbs (45 kg)    Admission Body Weight: 61.2 kg (134 lb 14.7 oz) (7/5)  Current Body Weight: 63.8 kg (140 lb 10.5 oz), 146.5 % IBW. Weight Source: Bed Scale (7/7)  Current BMI (kg/m2): 27.5  Usual Body Weight: 48.5 kg (106 lb 15.8 oz) (7/6/23)  % Weight Change (Calculated): 37  Weight Adjustment For: No Adjustment                 BMI Categories:

## 2024-07-09 NOTE — PROGRESS NOTES
Sedated on vent- per nurse gets very agitated at times  Vss  lungs seem clear  reg rhythm  abd- soft and non-tender  ext-minimal ed  Stable- lab noted  Per orders- continue present care- discussed with nurse- will try to extubate today
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
## 2024-07-10 ENCOUNTER — APPOINTMENT (OUTPATIENT)
Dept: GENERAL RADIOLOGY | Age: 54
DRG: 917 | End: 2024-07-10
Attending: FAMILY MEDICINE
Payer: MEDICARE

## 2024-07-10 PROBLEM — A41.9 SEPSIS (HCC): Status: ACTIVE | Noted: 2024-07-10

## 2024-07-10 LAB
ALBUMIN SERPL-MCNC: 3.2 G/DL (ref 3.5–5.2)
ALP SERPL-CCNC: 82 U/L (ref 35–104)
ALT SERPL-CCNC: 135 U/L (ref 0–32)
ANION GAP SERPL CALCULATED.3IONS-SCNC: 10 MMOL/L (ref 7–16)
ANION GAP SERPL CALCULATED.3IONS-SCNC: 9 MMOL/L (ref 7–16)
AST SERPL-CCNC: 74 U/L (ref 0–31)
B.E.: -1.9 MMOL/L (ref -3–3)
B.E.: 2.3 MMOL/L (ref -3–3)
BASOPHILS # BLD: 0.01 K/UL (ref 0–0.2)
BASOPHILS NFR BLD: 0 % (ref 0–2)
BILIRUB SERPL-MCNC: 0.4 MG/DL (ref 0–1.2)
BUN SERPL-MCNC: 25 MG/DL (ref 6–20)
BUN SERPL-MCNC: 28 MG/DL (ref 6–20)
CALCIUM SERPL-MCNC: 7.9 MG/DL (ref 8.6–10.2)
CALCIUM SERPL-MCNC: 7.9 MG/DL (ref 8.6–10.2)
CHLORIDE SERPL-SCNC: 107 MMOL/L (ref 98–107)
CHLORIDE SERPL-SCNC: 111 MMOL/L (ref 98–107)
CO2 SERPL-SCNC: 27 MMOL/L (ref 22–29)
CO2 SERPL-SCNC: 29 MMOL/L (ref 22–29)
COHB: 0.2 % (ref 0–1.5)
COHB: 0.3 % (ref 0–1.5)
CREAT SERPL-MCNC: 0.7 MG/DL (ref 0.5–1)
CREAT SERPL-MCNC: 0.7 MG/DL (ref 0.5–1)
CRITICAL: ABNORMAL
CRITICAL: ABNORMAL
DATE ANALYZED: ABNORMAL
DATE ANALYZED: ABNORMAL
DATE OF COLLECTION: ABNORMAL
DATE OF COLLECTION: ABNORMAL
EOSINOPHIL # BLD: 0 K/UL (ref 0.05–0.5)
EOSINOPHILS RELATIVE PERCENT: 0 % (ref 0–6)
ERYTHROCYTE [DISTWIDTH] IN BLOOD BY AUTOMATED COUNT: 15.2 % (ref 11.5–15)
FIO2: 50 %
FIO2: 50 %
GFR, ESTIMATED: >90 ML/MIN/1.73M2
GFR, ESTIMATED: >90 ML/MIN/1.73M2
GLUCOSE SERPL-MCNC: 135 MG/DL (ref 74–99)
GLUCOSE SERPL-MCNC: 157 MG/DL (ref 74–99)
HCO3: 19.6 MMOL/L (ref 22–26)
HCO3: 25.2 MMOL/L (ref 22–26)
HCT VFR BLD AUTO: 27.8 % (ref 34–48)
HGB BLD-MCNC: 8.9 G/DL (ref 11.5–15.5)
HHB: 5.2 % (ref 0–5)
HHB: 5.5 % (ref 0–5)
IMM GRANULOCYTES # BLD AUTO: 0.09 K/UL (ref 0–0.58)
IMM GRANULOCYTES NFR BLD: 1 % (ref 0–5)
LAB: ABNORMAL
LAB: ABNORMAL
LYMPHOCYTES NFR BLD: 1.72 K/UL (ref 1.5–4)
LYMPHOCYTES RELATIVE PERCENT: 12 % (ref 20–42)
Lab: 1200
Lab: 344
MAGNESIUM SERPL-MCNC: 1.9 MG/DL (ref 1.6–2.6)
MCH RBC QN AUTO: 29.9 PG (ref 26–35)
MCHC RBC AUTO-ENTMCNC: 32 G/DL (ref 32–34.5)
MCV RBC AUTO: 93.3 FL (ref 80–99.9)
METHB: 0.3 % (ref 0–1.5)
METHB: 0.3 % (ref 0–1.5)
MICROORGANISM SPEC CULT: NORMAL
MICROORGANISM/AGENT SPEC: NORMAL
MODE: AC
MODE: AC
MONOCYTES NFR BLD: 0.87 K/UL (ref 0.1–0.95)
MONOCYTES NFR BLD: 6 % (ref 2–12)
NEUTROPHILS NFR BLD: 81 % (ref 43–80)
NEUTS SEG NFR BLD: 11.31 K/UL (ref 1.8–7.3)
O2 CONTENT: 12.4 ML/DL
O2 CONTENT: 13.4 ML/DL
O2 SATURATION: 94.5 % (ref 92–98.5)
O2 SATURATION: 94.8 % (ref 92–98.5)
O2HB: 93.9 % (ref 94–97)
O2HB: 94.3 % (ref 94–97)
OPERATOR ID: 101
OPERATOR ID: 1893
PATIENT TEMP: 37 C
PATIENT TEMP: 37 C
PCO2: 23.3 MMHG (ref 35–45)
PCO2: 32.5 MMHG (ref 35–45)
PEEP/CPAP: 5 CMH2O
PEEP/CPAP: 5 CMH2O
PFO2: 1.54 MMHG/%
PFO2: 1.56 MMHG/%
PH BLOOD GAS: 7.51 (ref 7.35–7.45)
PH BLOOD GAS: 7.54 (ref 7.35–7.45)
PHOSPHATE SERPL-MCNC: 2.4 MG/DL (ref 2.5–4.5)
PHOSPHATE SERPL-MCNC: 3.7 MG/DL (ref 2.5–4.5)
PLATELET, FLUORESCENCE: 114 K/UL (ref 130–450)
PMV BLD AUTO: 13.5 FL (ref 7–12)
PO2: 76.9 MMHG (ref 75–100)
PO2: 78.2 MMHG (ref 75–100)
POTASSIUM SERPL-SCNC: 3.8 MMOL/L (ref 3.5–5)
POTASSIUM SERPL-SCNC: 4.4 MMOL/L (ref 3.5–5)
PROT SERPL-MCNC: 5.3 G/DL (ref 6.4–8.3)
RBC # BLD AUTO: 2.98 M/UL (ref 3.5–5.5)
RI(T): 3.09
RI(T): 3.3
RR MECHANICAL: 18 B/MIN
RR MECHANICAL: 20 B/MIN
SODIUM SERPL-SCNC: 145 MMOL/L (ref 132–146)
SODIUM SERPL-SCNC: 148 MMOL/L (ref 132–146)
SOURCE, BLOOD GAS: ABNORMAL
SOURCE, BLOOD GAS: ABNORMAL
SPECIMEN DESCRIPTION: NORMAL
THB: 10 G/DL (ref 11.5–16.5)
THB: 9.3 G/DL (ref 11.5–16.5)
TIME ANALYZED: 1211
TIME ANALYZED: 352
VT MECHANICAL: 350 ML
VT MECHANICAL: 400 ML
WBC OTHER # BLD: 14 K/UL (ref 4.5–11.5)

## 2024-07-10 PROCEDURE — 6360000002 HC RX W HCPCS

## 2024-07-10 PROCEDURE — 6370000000 HC RX 637 (ALT 250 FOR IP)

## 2024-07-10 PROCEDURE — 6360000002 HC RX W HCPCS: Performed by: INTERNAL MEDICINE

## 2024-07-10 PROCEDURE — 2580000003 HC RX 258: Performed by: INTERNAL MEDICINE

## 2024-07-10 PROCEDURE — 6370000000 HC RX 637 (ALT 250 FOR IP): Performed by: INTERNAL MEDICINE

## 2024-07-10 PROCEDURE — 84100 ASSAY OF PHOSPHORUS: CPT

## 2024-07-10 PROCEDURE — 94003 VENT MGMT INPAT SUBQ DAY: CPT

## 2024-07-10 PROCEDURE — 37799 UNLISTED PX VASCULAR SURGERY: CPT

## 2024-07-10 PROCEDURE — 82805 BLOOD GASES W/O2 SATURATION: CPT

## 2024-07-10 PROCEDURE — 80053 COMPREHEN METABOLIC PANEL: CPT

## 2024-07-10 PROCEDURE — 71045 X-RAY EXAM CHEST 1 VIEW: CPT

## 2024-07-10 PROCEDURE — 80048 BASIC METABOLIC PNL TOTAL CA: CPT

## 2024-07-10 PROCEDURE — 83735 ASSAY OF MAGNESIUM: CPT

## 2024-07-10 PROCEDURE — 87086 URINE CULTURE/COLONY COUNT: CPT

## 2024-07-10 PROCEDURE — 87077 CULTURE AEROBIC IDENTIFY: CPT

## 2024-07-10 PROCEDURE — 85025 COMPLETE CBC W/AUTO DIFF WBC: CPT

## 2024-07-10 PROCEDURE — 99233 SBSQ HOSP IP/OBS HIGH 50: CPT | Performed by: INTERNAL MEDICINE

## 2024-07-10 PROCEDURE — 2580000003 HC RX 258: Performed by: NURSE PRACTITIONER

## 2024-07-10 PROCEDURE — 87154 CUL TYP ID BLD PTHGN 6+ TRGT: CPT

## 2024-07-10 PROCEDURE — 51702 INSERT TEMP BLADDER CATH: CPT

## 2024-07-10 PROCEDURE — 2500000003 HC RX 250 WO HCPCS: Performed by: NURSE PRACTITIONER

## 2024-07-10 PROCEDURE — 87185 SC STD ENZYME DETCJ PER NZM: CPT

## 2024-07-10 PROCEDURE — 2000000000 HC ICU R&B

## 2024-07-10 PROCEDURE — 94640 AIRWAY INHALATION TREATMENT: CPT

## 2024-07-10 PROCEDURE — 87040 BLOOD CULTURE FOR BACTERIA: CPT

## 2024-07-10 PROCEDURE — 2500000003 HC RX 250 WO HCPCS: Performed by: INTERNAL MEDICINE

## 2024-07-10 RX ORDER — LORAZEPAM 1 MG/1
2 TABLET ORAL EVERY 6 HOURS
Status: DISCONTINUED | OUTPATIENT
Start: 2024-07-10 | End: 2024-07-11

## 2024-07-10 RX ORDER — FUROSEMIDE 10 MG/ML
40 INJECTION INTRAMUSCULAR; INTRAVENOUS ONCE
Status: COMPLETED | OUTPATIENT
Start: 2024-07-10 | End: 2024-07-10

## 2024-07-10 RX ORDER — LANSOPRAZOLE 30 MG/1
30 TABLET, ORALLY DISINTEGRATING, DELAYED RELEASE ORAL
Status: DISCONTINUED | OUTPATIENT
Start: 2024-07-11 | End: 2024-07-12 | Stop reason: HOSPADM

## 2024-07-10 RX ADMIN — CHLORHEXIDINE GLUCONATE 0.12% ORAL RINSE 15 ML: 1.2 LIQUID ORAL at 20:16

## 2024-07-10 RX ADMIN — HYDROXYCHLOROQUINE SULFATE 200 MG: 200 TABLET ORAL at 20:16

## 2024-07-10 RX ADMIN — VENLAFAXINE HYDROCHLORIDE 75 MG: 75 TABLET ORAL at 07:57

## 2024-07-10 RX ADMIN — HYDROXYCHLOROQUINE SULFATE 200 MG: 200 TABLET ORAL at 07:57

## 2024-07-10 RX ADMIN — QUETIAPINE FUMARATE 25 MG: 25 TABLET ORAL at 20:16

## 2024-07-10 RX ADMIN — ARFORMOTEROL TARTRATE 15 MCG: 15 SOLUTION RESPIRATORY (INHALATION) at 08:14

## 2024-07-10 RX ADMIN — SODIUM CHLORIDE, PRESERVATIVE FREE 10 ML: 5 INJECTION INTRAVENOUS at 09:33

## 2024-07-10 RX ADMIN — SODIUM CHLORIDE, PRESERVATIVE FREE 10 ML: 5 INJECTION INTRAVENOUS at 20:16

## 2024-07-10 RX ADMIN — ENOXAPARIN SODIUM 60 MG: 100 INJECTION SUBCUTANEOUS at 06:17

## 2024-07-10 RX ADMIN — Medication 150 MCG/HR: at 23:05

## 2024-07-10 RX ADMIN — SENNOSIDES 10 ML: 8.8 LIQUID ORAL at 09:26

## 2024-07-10 RX ADMIN — DOCUSATE SODIUM LIQUID 100 MG: 100 LIQUID ORAL at 09:26

## 2024-07-10 RX ADMIN — ACETAMINOPHEN 650 MG: 325 TABLET ORAL at 15:56

## 2024-07-10 RX ADMIN — BUDESONIDE 500 MCG: 0.5 SUSPENSION RESPIRATORY (INHALATION) at 08:14

## 2024-07-10 RX ADMIN — SODIUM CHLORIDE, PRESERVATIVE FREE 40 MG: 5 INJECTION INTRAVENOUS at 07:57

## 2024-07-10 RX ADMIN — PROPOFOL 50 MCG/KG/MIN: 10 INJECTION, EMULSION INTRAVENOUS at 13:57

## 2024-07-10 RX ADMIN — PROPOFOL 50 MCG/KG/MIN: 10 INJECTION, EMULSION INTRAVENOUS at 17:50

## 2024-07-10 RX ADMIN — BUDESONIDE 500 MCG: 0.5 SUSPENSION RESPIRATORY (INHALATION) at 19:39

## 2024-07-10 RX ADMIN — LORAZEPAM 2 MG: 1 TABLET ORAL at 22:12

## 2024-07-10 RX ADMIN — LAMOTRIGINE 100 MG: 100 TABLET ORAL at 20:16

## 2024-07-10 RX ADMIN — NALOXEGOL OXALATE 25 MG: 12.5 TABLET, FILM COATED ORAL at 14:00

## 2024-07-10 RX ADMIN — Medication 150 MCG/HR: at 11:19

## 2024-07-10 RX ADMIN — LORAZEPAM 2 MG: 1 TABLET ORAL at 03:39

## 2024-07-10 RX ADMIN — LEVALBUTEROL 0.63 MG: 0.63 SOLUTION RESPIRATORY (INHALATION) at 09:13

## 2024-07-10 RX ADMIN — METHYLPREDNISOLONE SODIUM SUCCINATE 40 MG: 40 INJECTION INTRAMUSCULAR; INTRAVENOUS at 07:57

## 2024-07-10 RX ADMIN — POTASSIUM PHOSPHATE, MONOBASIC AND POTASSIUM PHOSPHATE, DIBASIC 10 MMOL: 224; 236 INJECTION, SOLUTION, CONCENTRATE INTRAVENOUS at 06:51

## 2024-07-10 RX ADMIN — ENOXAPARIN SODIUM 60 MG: 100 INJECTION SUBCUTANEOUS at 15:57

## 2024-07-10 RX ADMIN — LEVALBUTEROL 0.63 MG: 0.63 SOLUTION RESPIRATORY (INHALATION) at 16:03

## 2024-07-10 RX ADMIN — PROPOFOL 50 MCG/KG/MIN: 10 INJECTION, EMULSION INTRAVENOUS at 03:41

## 2024-07-10 RX ADMIN — ARFORMOTEROL TARTRATE 15 MCG: 15 SOLUTION RESPIRATORY (INHALATION) at 19:39

## 2024-07-10 RX ADMIN — SODIUM CHLORIDE, PRESERVATIVE FREE 10 ML: 5 INJECTION INTRAVENOUS at 07:52

## 2024-07-10 RX ADMIN — LORAZEPAM 2 MG: 1 TABLET ORAL at 09:26

## 2024-07-10 RX ADMIN — Medication 150 MCG/HR: at 16:00

## 2024-07-10 RX ADMIN — Medication 150 MCG/HR: at 04:19

## 2024-07-10 RX ADMIN — LORAZEPAM 2 MG: 1 TABLET ORAL at 15:57

## 2024-07-10 RX ADMIN — FUROSEMIDE 40 MG: 10 INJECTION, SOLUTION INTRAMUSCULAR; INTRAVENOUS at 09:32

## 2024-07-10 RX ADMIN — PROPOFOL 50 MCG/KG/MIN: 10 INJECTION, EMULSION INTRAVENOUS at 07:53

## 2024-07-10 RX ADMIN — LEVALBUTEROL 0.63 MG: 0.63 SOLUTION RESPIRATORY (INHALATION) at 00:09

## 2024-07-10 RX ADMIN — PROPOFOL 40 MCG/KG/MIN: 10 INJECTION, EMULSION INTRAVENOUS at 23:25

## 2024-07-10 RX ADMIN — ACETAMINOPHEN 650 MG: 325 TABLET ORAL at 07:57

## 2024-07-10 RX ADMIN — DOCUSATE SODIUM LIQUID 100 MG: 100 LIQUID ORAL at 20:16

## 2024-07-10 RX ADMIN — CHLORHEXIDINE GLUCONATE 0.12% ORAL RINSE 15 ML: 1.2 LIQUID ORAL at 07:57

## 2024-07-10 RX ADMIN — VENLAFAXINE HYDROCHLORIDE 75 MG: 75 TABLET ORAL at 15:57

## 2024-07-10 RX ADMIN — LEVALBUTEROL 0.63 MG: 0.63 SOLUTION RESPIRATORY (INHALATION) at 23:28

## 2024-07-10 RX ADMIN — SENNOSIDES 10 ML: 8.8 LIQUID ORAL at 20:16

## 2024-07-10 ASSESSMENT — PULMONARY FUNCTION TESTS
PIF_VALUE: 18
PIF_VALUE: 19
PIF_VALUE: 19
PIF_VALUE: 18
PIF_VALUE: 17
PIF_VALUE: 19
PIF_VALUE: 19
PIF_VALUE: 26
PIF_VALUE: 20
PIF_VALUE: 31
PIF_VALUE: 19
PIF_VALUE: 33
PIF_VALUE: 18
PIF_VALUE: 19
PIF_VALUE: 20
PIF_VALUE: 18
PIF_VALUE: 26
PIF_VALUE: 19
PIF_VALUE: 24
PIF_VALUE: 19
PIF_VALUE: 18
PIF_VALUE: 19
PIF_VALUE: 27
PIF_VALUE: 19

## 2024-07-10 ASSESSMENT — PAIN SCALES - GENERAL
PAINLEVEL_OUTOF10: 0

## 2024-07-10 NOTE — PROGRESS NOTES
Critical Care Team - Daily Progress Note      Date and time: 7/10/2024 10:35 AM  Patient's name:  Greta Moran  Medical Record Number: [id number]  Patient's account/billing number: [id number]  Patient's YOB: 1970  Age: 54 y.o.  Date of Admission: 2024  9:08 PM  Length of stay during current admission: 5      Primary Care Physician: Brian Houston MD  ICU Attending Physician: Dr. Owens    Code Status: Full Code    Reason for ICU admission: Critical care management      SUBJECTIVE:     OVERNIGHT EVENTS:           After spontaneous breathing trials, patient was extubated yesterday however had to be reintubated after 4 hours because patient was getting increasingly agitated, tachycardic was not redirectable.  During that time she did receive 40 mg of Lasix and was on BiPAP when she did maintain her oxygen saturation however she had to be intubated for airway protection and increased amount of agitation.  MRI was done without any acute findings.  Overnight, patient was sedated on propofol and fentanyl and at 1 time did wake up and pulled her Coto out which had to be reinserted.  Otherwise remained and hemodynamically stable.  Spikes of fever - Blood and urine cultures sent. Patient pulled Coto catheter overnight and was replaced. Respiratory alkalosis on the ABG today - will  RR on Vent. Patient has hypernatremia to 148, q1hr 30 cc of Free water flushes through the NG tube and will recheck BMP this afternoon. EEG is planned. Diuresis with 40mg Lasix today. Will attempt SBT tomorrow.       Intake/Output:   I/O this shift:  In: -   Out: 255 [Urine:255]  I/O last 3 completed shifts:  In: 2782.8 [I.V.:979.8; NG/GT:973; IV Piggyback:830.1]  Out: 3460 [Urine:3460]    Awake and following commands: No  Current Ventilation: - Ventilator Settings:    Vt (Set, mL): 350 mL  Resp Rate (Set): 18 bpm  FiO2 : 50 %    PEEP/CPAP (cmH2O): 5     Secretions: None  Sedation: fentanyl and  100.8 °F (38.2 °C) (Bladder)   Resp 18   Ht 1.524 m (5')   Wt 63.8 kg (140 lb 10.5 oz)   LMP 2015 (LMP Unknown)   SpO2 94%   BMI 27.47 kg/m²   Tmax over 24 hours:  Temp (24hrs), Av.4 °F (38 °C), Min:99.3 °F (37.4 °C), Max:101 °F (38.3 °C)      Patient Vitals for the past 6 hrs:   BP Temp Temp src Pulse Resp SpO2   07/10/24 1000 -- (!) 100.8 °F (38.2 °C) Bladder 81 18 94 %   07/10/24 0932 (!) 91/58 -- -- -- -- --   07/10/24 0919 -- -- -- 88 17 95 %   07/10/24 0913 -- -- -- 86 16 95 %   07/10/24 0816 -- -- -- 85 18 94 %   07/10/24 0815 -- -- -- 84 20 94 %   07/10/24 0800 (!) 87/58 (!) 101 °F (38.3 °C) Bladder 87 20 93 %   07/10/24 0700 -- -- -- 86 20 --   07/10/24 0600 -- -- -- 95 18 --   07/10/24 0500 -- -- -- 83 20 93 %         Intake/Output Summary (Last 24 hours) at 7/10/2024 1035  Last data filed at 7/10/2024 0705  Gross per 24 hour   Intake 1150.92 ml   Output 3030 ml   Net -1879.08 ml     Wt Readings from Last 2 Encounters:   24 63.8 kg (140 lb 10.5 oz)   24 61.2 kg (135 lb)     Body mass index is 27.47 kg/m².        Physical Exam  Constitutional:       Appearance: She is ill-appearing.   HENT:      Head: Normocephalic and atraumatic.      Right Ear: External ear normal.      Left Ear: External ear normal.      Nose: Nose normal.      Mouth/Throat:      Mouth: Mucous membranes are moist.   Eyes:      Pupils: Pupils are equal, round, and reactive to light.   Cardiovascular:      Rate and Rhythm: Regular rhythm. Tachycardia present.   Pulmonary:      Comments: Bilateral coarse breath sounds   Abdominal:      General: There is no distension.      Palpations: Abdomen is soft.      Tenderness: There is no abdominal tenderness.   Musculoskeletal:         General: No swelling or deformity.      Cervical back: Neck supple.   Skin:     General: Skin is warm.      Capillary Refill: Capillary refill takes less than 2 seconds.   Neurological:      Comments: Intubated and sedated

## 2024-07-10 NOTE — CARE COORDINATION
CASE MANAGEMENT....Patient remains in icu. Was unable to tolerate extubation yesterday and therefore was re-intubated. Vent settings : AC/PRVC  Rate16 Peep 5 Fio2 50%. On iv sedation. ID/Cardio and Urology on board. Margarito from [facility] following. Spoke with patients  Diego over the phone. Informed him that Greta is being followed by cm/ss for discharge planning and needs. He stated that if Greta does not show signs of improvement then plans are for her to transfer to [facility]. Will cont to follow along and assist with needs accordingly.

## 2024-07-10 NOTE — PROGRESS NOTES
Infectious Disease  Progress Note  [facility]    Chief Complaint: shock    Subjective:  Patient is in the ICU. Got reintubated overnight. Had fevers still.     Scheduled Meds:   LORazepam  2 mg Per NG tube Q6H    docusate  100 mg Per NG tube BID    sennosides  10 mL Per NG tube BID    naloxegol  25 mg Per NG tube Once    [START ON 7/11/2024] lansoprazole  30 mg Per NG tube QAM AC    [Held by provider] carvedilol  6.25 mg Oral BID WC    enoxaparin  1 mg/kg SubCUTAneous Q12H    QUEtiapine  25 mg Oral Nightly    brexpiprazole  1 mg Oral Daily    arformoterol tartrate  15 mcg Nebulization BID RT    budesonide  0.5 mg Nebulization BID RT    sodium chloride flush  5-40 mL IntraVENous 2 times per day    hydroxychloroquine  200 mg Oral BID    chlorhexidine  15 mL Mouth/Throat BID    venlafaxine  75 mg Oral BID WC    lamoTRIgine  100 mg Oral Nightly    levalbuterol  0.63 mg Nebulization q8h     Continuous Infusions:   propofol 50 mcg/kg/min (07/10/24 0753)    fentaNYL 150 mcg/hr (07/10/24 1119)    sodium chloride 10 mL/hr at 07/10/24 0619     PRN Meds:LORazepam, potassium chloride, magnesium sulfate, sodium phosphate 10.65 mmol in sodium chloride 0.9 % 250 mL IVPB **OR** sodium phosphate 21.27 mmol in sodium chloride 0.9 % 250 mL IVPB, sodium chloride flush, sodium chloride, acetaminophen    Prior to Admission medications    Medication Sig Start Date End Date Taking? Authorizing Provider   venlafaxine (EFFEXOR XR) 150 MG extended release capsule  1/11/24   Brittni Camilo MD   epinastine (ELESTAT) 0.05 % SOLN  12/29/23   Brittni Camilo MD   alendronate (FOSAMAX) 70 MG tablet  1/20/24   Brittni Camilo MD   loratadine (CLARITIN) 10 MG capsule Take 1 capsule by mouth daily    Brittni Camilo MD   metoprolol succinate (TOPROL XL) 25 MG extended release tablet TAKE 1/2 TABLET BY MOUTH EVERY DAY 12/14/21   Getachew Saez MD   nitroGLYCERIN (NITROSTAT) 0.4 MG SL tablet PLACE 1 TABLET UNDER TONGUE EVERY 5

## 2024-07-10 NOTE — PATIENT CARE CONFERENCE
Intensive Care Daily Quality Rounding Checklist        ICU Team Members: Dr. Owens, Resident Dr. Poe, Bedside nurse, Charge nurse, Clinical pharmacist      ICU Day #: 8     SOFA Score: 9     Intubation Date: July 9 (extubated 7/9, retubed)     Ventilator Day #: 8     Central Line Insertion Date: July 5                                                    Day #: 6                                                    Indication: CVC Indication: Administration of vasopressors or vesicant medications      Arterial Line Insertion Date: July 9                             Day #:  10     Temporary Hemodialysis Catheter Insertion Date: N/A                              Day # N/A     DVT Prophylaxis: Lovenox    GI Prophylaxis: Protonix     Coto Catheter Insertion Date: July 3                                        Day #: 8                             Indications: Need for fluid management of the critically ill patient in a critical care setting                             Continued need (if yes, reason documented and discussed with physician): Yes     Skin Issues/ Wounds and ordered treatment discussed on rounds: SOS precautions        Goals/ Plans for the Day: Monitor labs and vitals, treat/replace as needed. ETT retracted.  Wean vent and sedation as able. For EEG today.  Recheck cultures. Diurese once. Add bowel regimen. Increase free water. Wrist restraints to prevent pulling tubes. Continue critical care management.      Reviewed plan and goals for day with patient and/or representative: Reviewed w/  Mekhi

## 2024-07-10 NOTE — PROGRESS NOTES
Lab Results   Component Value Date    CKTOTAL 331 (H) 2024     Lab Results   Component Value Date    INR 1.6 2024    INR 1.1 2022    INR 1.1 2021    PROTIME 17.2 (H) 2024    PROTIME 12.4 2022    PROTIME 12.2 2021     Lab Results   Component Value Date    TSH 1.890 2014     No results found for: \"LABA1C\"  No results found for: \"EAG\"  Lab Results   Component Value Date    CHOL 117 2021     Lab Results   Component Value Date    TRIG 84 2021     Lab Results   Component Value Date    HDL 36 (L) 2024    HDL 49 2021     No components found for: \"LDLCALC\", \"LDLCHOLESTEROL\"  Lab Results   Component Value Date    VLDL 17 2024    VLDL 17 2021     No results found for: \"CHOLHDLRATIO\"  Recent Labs     24  0345 24  0315   PROBNP 30,614* 20,833*       Cardiac Tests:    EK2024: Sinus tachycardia 106 bpm.  Normal axis and intervals.  Anterior T wave inversion.  Possible prior septal infarct.    Telemetry: Sinus rhythm 80s, sinus tachycardia, A-fib RVR from 244-344 AM Monday morning    Chest X-ray:   24  The lungs are without acute focal process.  Increasing bilateral pleural  effusions.  No pneumothorax.  The cardiomediastinal silhouette is without  acute process. The osseous structures are without acute process.  Stable  positioning of lines and tubes.      Impression:       Increasing bilateral pleural effusions compared 2024.       Echocardiogram:   TTE 24 SR    Left Ventricle: Severely reduced left ventricular systolic function with a visually estimated EF of 20 - 25%. Left ventricle size is normal. Normal wall thickness. Ventricular mass is normal. Severe global hypokinesis present. Akinesis of the apex. Grade I diastolic dysfunction with normal LAP.    Right Ventricle: Right ventricle size is normal. Normal systolic function. TAPSE is 1.9 cm.    Tricuspid Valve: Top normal RVSP. The estimated RVSP is 39  90s  Seizure  Encephalopathy multifactorial/agitation  Lactic acidosis, resolved  LLL pneumonia/sepsis/fevers  SLE on hydroxychloroquine  Fibromyalgia/bipolar disorder/anxiety and severe panic disorder.     RECOMMENDATIONS:  No further A-fib, maintaining sinus but certainly at risk of recurrent arrhythmias.  Unfortunately difficult liberating from vent requiring reintubation yesterday.  Hemodynamics have improved.  Agitation remains significant problem.    Continue anticoagulation with therapeutic enoxaparin eventually transition to DOAC  Holding carvedilol  Utilize IV digoxin if recurrent A-fib RVR  Continue attempts for vent weaning  Reintroduce/optimize GDMT once hemodynamically appropriate  Aggressive risk factor modification  Further care per critical care, primary service and consultants    Discussed with Dr. Roman Saez MD, [facility] Cardiology    NOTE: This report was transcribed using voice recognition software. Every effort was made to ensure accuracy; however, inadvertent computerized transcription errors may be present.

## 2024-07-10 NOTE — PLAN OF CARE
Problem: Discharge Planning  Goal: Discharge to home or other facility with appropriate resources  Outcome: Progressing  Flowsheets (Taken 7/9/2024 2000)  Discharge to home or other facility with appropriate resources: Identify barriers to discharge with patient and caregiver     Problem: Safety - Medical Restraint  Goal: Remains free of injury from restraints (Restraint for Interference with Medical Device)  Description: INTERVENTIONS:  1. Determine that other, less restrictive measures have been tried or would not be effective before applying the restraint  2. Evaluate the patient's condition at the time of restraint application  3. Inform patient/family regarding the reason for restraint  4. Q2H: Monitor safety, psychosocial status, comfort, nutrition and hydration  Outcome: Progressing  Flowsheets  Taken 7/10/2024 0000 by Shanice Peralta RN  Remains free of injury from restraints (restraint for interference with medical device):   Evaluate the patient's condition at the time of restraint application   Every 2 hours: Monitor safety, psychosocial status, comfort, nutrition and hydration   Inform patient/family regarding the reason for restraint   Determine that other, less restrictive measures have been tried or would not be effective before applying the restraint  Taken 7/9/2024 2200 by Lisa Tran RN  Remains free of injury from restraints (restraint for interference with medical device): Every 2 hours: Monitor safety, psychosocial status, comfort, nutrition and hydration  Taken 7/9/2024 2000 by Shanice Peralta RN  Remains free of injury from restraints (restraint for interference with medical device):   Determine that other, less restrictive measures have been tried or would not be effective before applying the restraint   Inform patient/family regarding the reason for restraint   Evaluate the patient's condition at the time of restraint application   Every 2 hours: Monitor safety, psychosocial status,  positive airway pressure, respiratory therapy assess nares and determine need for appliance change or resting period.  Outcome: Progressing     Problem: ABCDS Injury Assessment  Goal: Absence of physical injury  Outcome: Progressing  Flowsheets (Taken 7/10/2024 0013)  Absence of Physical Injury: Implement safety measures based on patient assessment     Problem: Nutrition Deficit:  Goal: Optimize nutritional status  Outcome: Progressing  Flowsheets (Taken 7/9/2024 1452 by Tejal Maciel, JIMY, CNSC, LD)  Nutrient intake appropriate for improving, restoring, or maintaining nutritional needs: Assess nutritional status and recommend course of action     Problem: Respiratory - Adult  Goal: Achieves optimal ventilation and oxygenation  Outcome: Progressing     Problem: Chronic Conditions and Co-morbidities  Goal: Patient's chronic conditions and co-morbidity symptoms are monitored and maintained or improved  Outcome: Progressing  Flowsheets (Taken 7/9/2024 2000)  Care Plan - Patient's Chronic Conditions and Co-Morbidity Symptoms are Monitored and Maintained or Improved: Monitor and assess patient's chronic conditions and comorbid symptoms for stability, deterioration, or improvement

## 2024-07-10 NOTE — PROGRESS NOTES
Sedated on vent  Vss  lungs clear  reg rhythm  abd- soft and non-tender  ext- no edema- random movements of arms-legs  Stable- lab noted- did not tolerate extubation  Continue present care- discussed with nurse

## 2024-07-10 NOTE — PROGRESS NOTES
ETT retracted one cm to 22@lip     07/10/24 0818   ETT    Placement Date/Time: 07/09/24 1340   Present on Admission/Arrival: No  Placed By: Licensed provider  Placement Verified By: Auscultation;Capnometry;Direct visualization  Preoxygenation: Yes  Mask Ventilation: Ventilated by mask (1)  Technique: Video l...   Secured At 22 cm   Measured From [facility]   ETT Placement Center   Secured By Commercial tube dang

## 2024-07-10 NOTE — PLAN OF CARE
Problem: Safety - Medical Restraint  Goal: Remains free of injury from restraints (Restraint for Interference with Medical Device)  Description: INTERVENTIONS:  1. Determine that other, less restrictive measures have been tried or would not be effective before applying the restraint  2. Evaluate the patient's condition at the time of restraint application  3. Inform patient/family regarding the reason for restraint  4. Q2H: Monitor safety, psychosocial status, comfort, nutrition and hydration  7/10/2024 0838 by Ying Schwarz RN  Outcome: Progressing  Flowsheets  Taken 7/10/2024 0600 by Lisa Tran RN  Remains free of injury from restraints (restraint for interference with medical device): Every 2 hours: Monitor safety, psychosocial status, comfort, nutrition and hydration  Taken 7/10/2024 0400 by Shanice Peralta RN  Remains free of injury from restraints (restraint for interference with medical device):   Determine that other, less restrictive measures have been tried or would not be effective before applying the restraint   Inform patient/family regarding the reason for restraint   Evaluate the patient's condition at the time of restraint application   Every 2 hours: Monitor safety, psychosocial status, comfort, nutrition and hydration  Taken 7/10/2024 0200 by Shanice Peralta RN  Remains free of injury from restraints (restraint for interference with medical device):   Inform patient/family regarding the reason for restraint   Every 2 hours: Monitor safety, psychosocial status, comfort, nutrition and hydration   Determine that other, less restrictive measures have been tried or would not be effective before applying the restraint   Evaluate the patient's condition at the time of restraint application

## 2024-07-11 ENCOUNTER — APPOINTMENT (OUTPATIENT)
Dept: GENERAL RADIOLOGY | Age: 54
DRG: 917 | End: 2024-07-11
Attending: FAMILY MEDICINE
Payer: MEDICARE

## 2024-07-11 ENCOUNTER — APPOINTMENT (OUTPATIENT)
Dept: NEUROLOGY | Age: 54
DRG: 917 | End: 2024-07-11
Attending: FAMILY MEDICINE
Payer: MEDICARE

## 2024-07-11 LAB
ALBUMIN SERPL-MCNC: 3.2 G/DL (ref 3.5–5.2)
ALP SERPL-CCNC: 87 U/L (ref 35–104)
ALT SERPL-CCNC: 97 U/L (ref 0–32)
AMMONIA PLAS-SCNC: 24 UMOL/L (ref 11–51)
ANION GAP SERPL CALCULATED.3IONS-SCNC: 8 MMOL/L (ref 7–16)
APPEARANCE FLD: NORMAL
AST SERPL-CCNC: 40 U/L (ref 0–31)
B.E.: 1.6 MMOL/L (ref -3–3)
BASOPHILS # BLD: 0.01 K/UL (ref 0–0.2)
BASOPHILS NFR BLD: 0 % (ref 0–2)
BILIRUB SERPL-MCNC: 0.3 MG/DL (ref 0–1.2)
BODY FLD TYPE: NORMAL
BUN SERPL-MCNC: 23 MG/DL (ref 6–20)
CALCIUM SERPL-MCNC: 8.1 MG/DL (ref 8.6–10.2)
CHLORIDE SERPL-SCNC: 105 MMOL/L (ref 98–107)
CLOT CHECK: NORMAL
CO2 SERPL-SCNC: 31 MMOL/L (ref 22–29)
COHB: 0.2 % (ref 0–1.5)
COLOR FLD: YELLOW
CREAT SERPL-MCNC: 0.5 MG/DL (ref 0.5–1)
CRITICAL: ABNORMAL
CRITICAL: ABNORMAL
DATE ANALYZED: ABNORMAL
DATE ANALYZED: ABNORMAL
DATE OF COLLECTION: ABNORMAL
DATE OF COLLECTION: ABNORMAL
EOSINOPHIL # BLD: 0.09 K/UL (ref 0.05–0.5)
EOSINOPHILS RELATIVE PERCENT: 1 % (ref 0–6)
ERYTHROCYTE [DISTWIDTH] IN BLOOD BY AUTOMATED COUNT: 15.3 % (ref 11.5–15)
FIO2: 55 %
GFR, ESTIMATED: >90 ML/MIN/1.73M2
GLUCOSE FLD-MCNC: 116 MG/DL
GLUCOSE SERPL-MCNC: 126 MG/DL (ref 74–99)
HCO3: 25.4 MMOL/L (ref 22–26)
HCT VFR BLD AUTO: 27.6 % (ref 34–48)
HGB BLD-MCNC: 8.7 G/DL (ref 11.5–15.5)
HHB: 6.8 % (ref 0–5)
IMM GRANULOCYTES # BLD AUTO: 0.14 K/UL (ref 0–0.58)
IMM GRANULOCYTES NFR BLD: 1 % (ref 0–5)
LAB: ABNORMAL
LAB: ABNORMAL
LDH FLD L TO P-CCNC: 139 U/L
LYMPHOCYTES NFR BLD: 1.69 K/UL (ref 1.5–4)
LYMPHOCYTES RELATIVE PERCENT: 15 % (ref 20–42)
Lab: 1540
Lab: 407
Lab: ABNORMAL
MAGNESIUM SERPL-MCNC: 2 MG/DL (ref 1.6–2.6)
MCH RBC QN AUTO: 29.7 PG (ref 26–35)
MCHC RBC AUTO-ENTMCNC: 31.5 G/DL (ref 32–34.5)
MCV RBC AUTO: 94.2 FL (ref 80–99.9)
METHB: 0.3 % (ref 0–1.5)
MICROORGANISM SPEC CULT: NO GROWTH
MODE: AC
MONOCYTES NFR BLD: 0.84 K/UL (ref 0.1–0.95)
MONOCYTES NFR BLD: 7 % (ref 2–12)
MONOCYTES NFR FLD: 11 %
NEUTROPHILS NFR BLD: 76 % (ref 43–80)
NEUTROPHILS NFR FLD: 89 %
NEUTS SEG NFR BLD: 8.69 K/UL (ref 1.8–7.3)
O2 CONTENT: 12.6 ML/DL
O2 SATURATION: 93.2 % (ref 92–98.5)
O2HB: 92.7 % (ref 94–97)
OPERATOR ID: 366
OPERATOR ID: 7296
PATIENT TEMP: 37 C
PCO2: 36.9 MMHG (ref 35–45)
PEEP/CPAP: 5 CMH2O
PFO2: 1.3 MMHG/%
PH BLOOD GAS: 7.46 (ref 7.35–7.45)
PH FLUID: ABNORMAL
PHOSPHATE SERPL-MCNC: 3.3 MG/DL (ref 2.5–4.5)
PLATELET, FLUORESCENCE: 125 K/UL (ref 130–450)
PMV BLD AUTO: 13.1 FL (ref 7–12)
PO2: 71.5 MMHG (ref 75–100)
POTASSIUM SERPL-SCNC: 3.5 MMOL/L (ref 3.5–5)
POTASSIUM SERPL-SCNC: 4 MMOL/L (ref 3.5–5)
PROT FLD-MCNC: 1.8 G/DL
PROT SERPL-MCNC: 5.4 G/DL (ref 6.4–8.3)
RBC # BLD AUTO: 2.93 M/UL (ref 3.5–5.5)
RBC # FLD: 3000 CELLS/UL
RI(T): 3.91
RR MECHANICAL: 16 B/MIN
SERVICE CMNT-IMP: NORMAL
SODIUM SERPL-SCNC: 144 MMOL/L (ref 132–146)
SOURCE, BLOOD GAS: ABNORMAL
SOURCE, BLOOD GAS: ABNORMAL
SPECIMEN DESCRIPTION: NORMAL
SPECIMEN TYPE: NORMAL
THB: 9.6 G/DL (ref 11.5–16.5)
TIME ANALYZED: 1550
TIME ANALYZED: 416
TRIGL SERPL-MCNC: 122 MG/DL
VT MECHANICAL: 350 ML
WBC # FLD: 47 CELLS/UL
WBC OTHER # BLD: 11.5 K/UL (ref 4.5–11.5)

## 2024-07-11 PROCEDURE — 83986 ASSAY PH BODY FLUID NOS: CPT

## 2024-07-11 PROCEDURE — 95822 EEG COMA OR SLEEP ONLY: CPT | Performed by: PSYCHIATRY & NEUROLOGY

## 2024-07-11 PROCEDURE — 94003 VENT MGMT INPAT SUBQ DAY: CPT

## 2024-07-11 PROCEDURE — 2580000003 HC RX 258: Performed by: INTERNAL MEDICINE

## 2024-07-11 PROCEDURE — 88112 CYTOPATH CELL ENHANCE TECH: CPT

## 2024-07-11 PROCEDURE — 80053 COMPREHEN METABOLIC PANEL: CPT

## 2024-07-11 PROCEDURE — 02HV33Z INSERTION OF INFUSION DEVICE INTO SUPERIOR VENA CAVA, PERCUTANEOUS APPROACH: ICD-10-PCS | Performed by: INTERNAL MEDICINE

## 2024-07-11 PROCEDURE — 84157 ASSAY OF PROTEIN OTHER: CPT

## 2024-07-11 PROCEDURE — 2500000003 HC RX 250 WO HCPCS: Performed by: INTERNAL MEDICINE

## 2024-07-11 PROCEDURE — 87015 SPECIMEN INFECT AGNT CONCNTJ: CPT

## 2024-07-11 PROCEDURE — 6370000000 HC RX 637 (ALT 250 FOR IP): Performed by: INTERNAL MEDICINE

## 2024-07-11 PROCEDURE — 83615 LACTATE (LD) (LDH) ENZYME: CPT

## 2024-07-11 PROCEDURE — 95819 EEG AWAKE AND ASLEEP: CPT

## 2024-07-11 PROCEDURE — 84478 ASSAY OF TRIGLYCERIDES: CPT

## 2024-07-11 PROCEDURE — 6370000000 HC RX 637 (ALT 250 FOR IP)

## 2024-07-11 PROCEDURE — 89051 BODY FLUID CELL COUNT: CPT

## 2024-07-11 PROCEDURE — C1751 CATH, INF, PER/CENT/MIDLINE: HCPCS

## 2024-07-11 PROCEDURE — 6360000002 HC RX W HCPCS: Performed by: INTERNAL MEDICINE

## 2024-07-11 PROCEDURE — 71045 X-RAY EXAM CHEST 1 VIEW: CPT

## 2024-07-11 PROCEDURE — 82140 ASSAY OF AMMONIA: CPT

## 2024-07-11 PROCEDURE — 83735 ASSAY OF MAGNESIUM: CPT

## 2024-07-11 PROCEDURE — 85025 COMPLETE CBC W/AUTO DIFF WBC: CPT

## 2024-07-11 PROCEDURE — 99233 SBSQ HOSP IP/OBS HIGH 50: CPT | Performed by: INTERNAL MEDICINE

## 2024-07-11 PROCEDURE — 2000000000 HC ICU R&B

## 2024-07-11 PROCEDURE — 37799 UNLISTED PX VASCULAR SURGERY: CPT

## 2024-07-11 PROCEDURE — 94640 AIRWAY INHALATION TREATMENT: CPT

## 2024-07-11 PROCEDURE — 87116 MYCOBACTERIA CULTURE: CPT

## 2024-07-11 PROCEDURE — 82805 BLOOD GASES W/O2 SATURATION: CPT

## 2024-07-11 PROCEDURE — 87206 SMEAR FLUORESCENT/ACID STAI: CPT

## 2024-07-11 PROCEDURE — 6370000000 HC RX 637 (ALT 250 FOR IP): Performed by: FAMILY MEDICINE

## 2024-07-11 PROCEDURE — 0W993ZZ DRAINAGE OF RIGHT PLEURAL CAVITY, PERCUTANEOUS APPROACH: ICD-10-PCS | Performed by: INTERNAL MEDICINE

## 2024-07-11 PROCEDURE — 87070 CULTURE OTHR SPECIMN AEROBIC: CPT

## 2024-07-11 PROCEDURE — 88305 TISSUE EXAM BY PATHOLOGIST: CPT

## 2024-07-11 PROCEDURE — 84132 ASSAY OF SERUM POTASSIUM: CPT

## 2024-07-11 PROCEDURE — 36569 INSJ PICC 5 YR+ W/O IMAGING: CPT

## 2024-07-11 PROCEDURE — 84100 ASSAY OF PHOSPHORUS: CPT

## 2024-07-11 PROCEDURE — 87071 CULTURE AEROBIC QUANT OTHER: CPT

## 2024-07-11 PROCEDURE — 76937 US GUIDE VASCULAR ACCESS: CPT

## 2024-07-11 PROCEDURE — 82945 GLUCOSE OTHER FLUID: CPT

## 2024-07-11 PROCEDURE — 87205 SMEAR GRAM STAIN: CPT

## 2024-07-11 RX ORDER — QUETIAPINE FUMARATE 25 MG/1
50 TABLET, FILM COATED ORAL 2 TIMES DAILY
Status: DISCONTINUED | OUTPATIENT
Start: 2024-07-11 | End: 2024-07-12 | Stop reason: HOSPADM

## 2024-07-11 RX ORDER — SODIUM CHLORIDE 0.9 % (FLUSH) 0.9 %
5-40 SYRINGE (ML) INJECTION PRN
Status: DISCONTINUED | OUTPATIENT
Start: 2024-07-11 | End: 2024-07-12 | Stop reason: HOSPADM

## 2024-07-11 RX ORDER — SODIUM CHLORIDE 9 MG/ML
INJECTION, SOLUTION INTRAVENOUS PRN
Status: DISCONTINUED | OUTPATIENT
Start: 2024-07-11 | End: 2024-07-12 | Stop reason: HOSPADM

## 2024-07-11 RX ORDER — SODIUM CHLORIDE 0.9 % (FLUSH) 0.9 %
5-40 SYRINGE (ML) INJECTION EVERY 12 HOURS SCHEDULED
Status: DISCONTINUED | OUTPATIENT
Start: 2024-07-11 | End: 2024-07-12 | Stop reason: HOSPADM

## 2024-07-11 RX ORDER — QUETIAPINE FUMARATE 25 MG/1
25 TABLET, FILM COATED ORAL 2 TIMES DAILY
Status: DISCONTINUED | OUTPATIENT
Start: 2024-07-11 | End: 2024-07-11

## 2024-07-11 RX ORDER — LACTULOSE 10 G/15ML
20 SOLUTION ORAL ONCE
Status: COMPLETED | OUTPATIENT
Start: 2024-07-11 | End: 2024-07-11

## 2024-07-11 RX ORDER — CARVEDILOL 3.12 MG/1
3.12 TABLET ORAL 2 TIMES DAILY WITH MEALS
Status: DISCONTINUED | OUTPATIENT
Start: 2024-07-11 | End: 2024-07-12 | Stop reason: HOSPADM

## 2024-07-11 RX ORDER — LORAZEPAM 1 MG/1
2 TABLET ORAL EVERY 4 HOURS
Status: DISCONTINUED | OUTPATIENT
Start: 2024-07-11 | End: 2024-07-12 | Stop reason: HOSPADM

## 2024-07-11 RX ADMIN — VENLAFAXINE HYDROCHLORIDE 75 MG: 75 TABLET ORAL at 16:30

## 2024-07-11 RX ADMIN — NALOXEGOL OXALATE 25 MG: 12.5 TABLET, FILM COATED ORAL at 11:53

## 2024-07-11 RX ADMIN — LAMOTRIGINE 100 MG: 100 TABLET ORAL at 20:45

## 2024-07-11 RX ADMIN — PROPOFOL 40 MCG/KG/MIN: 10 INJECTION, EMULSION INTRAVENOUS at 11:58

## 2024-07-11 RX ADMIN — SODIUM CHLORIDE, PRESERVATIVE FREE 10 ML: 5 INJECTION INTRAVENOUS at 20:46

## 2024-07-11 RX ADMIN — LANSOPRAZOLE 30 MG: 30 TABLET, ORALLY DISINTEGRATING, DELAYED RELEASE ORAL at 06:10

## 2024-07-11 RX ADMIN — HYDROXYCHLOROQUINE SULFATE 200 MG: 200 TABLET ORAL at 20:45

## 2024-07-11 RX ADMIN — LACTULOSE 20 G: 20 SOLUTION ORAL at 11:52

## 2024-07-11 RX ADMIN — LEVALBUTEROL 0.63 MG: 0.63 SOLUTION RESPIRATORY (INHALATION) at 15:59

## 2024-07-11 RX ADMIN — ENOXAPARIN SODIUM 60 MG: 100 INJECTION SUBCUTANEOUS at 16:28

## 2024-07-11 RX ADMIN — SENNOSIDES 10 ML: 8.8 LIQUID ORAL at 08:24

## 2024-07-11 RX ADMIN — Medication 175 MCG/HR: at 05:30

## 2024-07-11 RX ADMIN — CARVEDILOL 3.12 MG: 3.12 TABLET, FILM COATED ORAL at 16:32

## 2024-07-11 RX ADMIN — LORAZEPAM 2 MG: 1 TABLET ORAL at 03:37

## 2024-07-11 RX ADMIN — VENLAFAXINE HYDROCHLORIDE 75 MG: 75 TABLET ORAL at 08:03

## 2024-07-11 RX ADMIN — LEVALBUTEROL 0.63 MG: 0.63 SOLUTION RESPIRATORY (INHALATION) at 23:52

## 2024-07-11 RX ADMIN — DOCUSATE SODIUM LIQUID 100 MG: 100 LIQUID ORAL at 08:24

## 2024-07-11 RX ADMIN — PROPOFOL 45 MCG/KG/MIN: 10 INJECTION, EMULSION INTRAVENOUS at 15:36

## 2024-07-11 RX ADMIN — BUDESONIDE 500 MCG: 0.5 SUSPENSION RESPIRATORY (INHALATION) at 07:46

## 2024-07-11 RX ADMIN — ACETAMINOPHEN 650 MG: 325 TABLET ORAL at 12:55

## 2024-07-11 RX ADMIN — Medication 200 MCG/HR: at 19:20

## 2024-07-11 RX ADMIN — QUETIAPINE FUMARATE 25 MG: 25 TABLET ORAL at 09:49

## 2024-07-11 RX ADMIN — POTASSIUM CHLORIDE 20 MEQ: 29.8 INJECTION, SOLUTION INTRAVENOUS at 06:21

## 2024-07-11 RX ADMIN — LORAZEPAM 2 MG: 1 TABLET ORAL at 16:30

## 2024-07-11 RX ADMIN — ENOXAPARIN SODIUM 60 MG: 100 INJECTION SUBCUTANEOUS at 04:46

## 2024-07-11 RX ADMIN — Medication 175 MCG/HR: at 15:48

## 2024-07-11 RX ADMIN — ACETAMINOPHEN 650 MG: 325 TABLET ORAL at 18:56

## 2024-07-11 RX ADMIN — LEVALBUTEROL 0.63 MG: 0.63 SOLUTION RESPIRATORY (INHALATION) at 07:46

## 2024-07-11 RX ADMIN — LORAZEPAM 2 MG: 1 TABLET ORAL at 20:40

## 2024-07-11 RX ADMIN — PROPOFOL 30 MCG/KG/MIN: 10 INJECTION, EMULSION INTRAVENOUS at 20:40

## 2024-07-11 RX ADMIN — Medication 175 MCG/HR: at 10:11

## 2024-07-11 RX ADMIN — DOCUSATE SODIUM LIQUID 100 MG: 100 LIQUID ORAL at 21:40

## 2024-07-11 RX ADMIN — SODIUM CHLORIDE, PRESERVATIVE FREE 10 ML: 5 INJECTION INTRAVENOUS at 16:52

## 2024-07-11 RX ADMIN — BUDESONIDE 500 MCG: 0.5 SUSPENSION RESPIRATORY (INHALATION) at 19:26

## 2024-07-11 RX ADMIN — CHLORHEXIDINE GLUCONATE 0.12% ORAL RINSE 15 ML: 1.2 LIQUID ORAL at 20:41

## 2024-07-11 RX ADMIN — HYDROXYCHLOROQUINE SULFATE 200 MG: 200 TABLET ORAL at 08:23

## 2024-07-11 RX ADMIN — PROPOFOL 45 MCG/KG/MIN: 10 INJECTION, EMULSION INTRAVENOUS at 04:25

## 2024-07-11 RX ADMIN — ARFORMOTEROL TARTRATE 15 MCG: 15 SOLUTION RESPIRATORY (INHALATION) at 19:26

## 2024-07-11 RX ADMIN — QUETIAPINE FUMARATE 50 MG: 25 TABLET ORAL at 20:41

## 2024-07-11 RX ADMIN — ARFORMOTEROL TARTRATE 15 MCG: 15 SOLUTION RESPIRATORY (INHALATION) at 07:46

## 2024-07-11 RX ADMIN — LORAZEPAM 2 MG: 1 TABLET ORAL at 08:24

## 2024-07-11 RX ADMIN — SODIUM CHLORIDE, PRESERVATIVE FREE 10 ML: 5 INJECTION INTRAVENOUS at 08:43

## 2024-07-11 RX ADMIN — SODIUM CHLORIDE, PRESERVATIVE FREE 10 ML: 5 INJECTION INTRAVENOUS at 20:41

## 2024-07-11 RX ADMIN — LORAZEPAM 2 MG: 1 TABLET ORAL at 11:53

## 2024-07-11 RX ADMIN — POTASSIUM CHLORIDE 20 MEQ: 29.8 INJECTION, SOLUTION INTRAVENOUS at 07:15

## 2024-07-11 RX ADMIN — CHLORHEXIDINE GLUCONATE 0.12% ORAL RINSE 15 ML: 1.2 LIQUID ORAL at 08:34

## 2024-07-11 RX ADMIN — ACETAMINOPHEN 650 MG: 325 TABLET ORAL at 04:23

## 2024-07-11 RX ADMIN — LORAZEPAM 1 MG: 2 INJECTION INTRAMUSCULAR; INTRAVENOUS at 16:48

## 2024-07-11 ASSESSMENT — PULMONARY FUNCTION TESTS
PIF_VALUE: 29
PIF_VALUE: 21
PIF_VALUE: 20
PIF_VALUE: 19
PIF_VALUE: 25
PIF_VALUE: 17
PIF_VALUE: 22
PIF_VALUE: 19
PIF_VALUE: 23
PIF_VALUE: 22
PIF_VALUE: 22
PIF_VALUE: 25
PIF_VALUE: 22
PIF_VALUE: 18
PIF_VALUE: 19
PIF_VALUE: 20
PIF_VALUE: 21
PIF_VALUE: 11
PIF_VALUE: 21
PIF_VALUE: 20
PIF_VALUE: 19
PIF_VALUE: 16
PIF_VALUE: 19

## 2024-07-11 ASSESSMENT — PAIN SCALES - GENERAL: PAINLEVEL_OUTOF10: 0

## 2024-07-11 NOTE — PATIENT CARE CONFERENCE
Intensive Care Daily Quality Rounding Checklist      ICU Team Members: Dr. Owens, Bedside nurse, Charge nurse, Clinical pharmacist, Respiratory therapist     ICU Day #: NUMBER: 9    SOFA Score: 8    Intubation Date: July 9, extubated and reintubated     Ventilator Day #: NUMBER: 9    Central Line Insertion Date: July 5        Day #: NUMBER: 7 - To Remove        Indication: CVCIndication: Hemodynamically unstable requiring volume resuscitation     Arterial Line Insertion Date: July 9      Day #: NUMBER: 3    Temporary Hemodialysis Catheter Insertion Date:  N/A      Day #: N/A    DVT Prophylaxis: Lovenox    GI Prophylaxis: Protonix    Coto Catheter Insertion Date: July 3       Day #: 9      Indications: Need for fluid management of the critically ill patient in a critical care setting      Continued need (if yes, reason documented and discussed with physician): Yes    Skin Issues/ Wounds and ordered treatment discussed on rounds: SOS precautions    Goals/ Plans for the Day: Monitor labs and vitals, treat/replace as needed. Wean vent as able. No weaning sedation until EEG complete. Start [facility] transfer for neurology. Additional bowel regimen, lactulose an movantic. Check triglycerides and ammonia. Increase ativan and Seroquel. Plan thoracentesis.    Reviewed plan and goals for day with patient and/or representative: Yes

## 2024-07-11 NOTE — PLAN OF CARE
Problem: Safety - Medical Restraint  Goal: Remains free of injury from restraints (Restraint for Interference with Medical Device)  Description: INTERVENTIONS:  1. Determine that other, less restrictive measures have been tried or would not be effective before applying the restraint  2. Evaluate the patient's condition at the time of restraint application  3. Inform patient/family regarding the reason for restraint  4. Q2H: Monitor safety, psychosocial status, comfort, nutrition and hydration  Outcome: Progressing  Flowsheets  Taken 7/11/2024 0000 by Alyson Gonzalez RN  Remains free of injury from restraints (restraint for interference with medical device): Every 2 hours: Monitor safety, psychosocial status, comfort, nutrition and hydration  Taken 7/10/2024 2200 by Alyson Gonzalez RN  Remains free of injury from restraints (restraint for interference with medical device): Every 2 hours: Monitor safety, psychosocial status, comfort, nutrition and hydration  Taken 7/10/2024 2000 by Alyson Gonzalez RN  Remains free of injury from restraints (restraint for interference with medical device): Every 2 hours: Monitor safety, psychosocial status, comfort, nutrition and hydration  Taken 7/10/2024 1800 by Ying Schwarz RN  Remains free of injury from restraints (restraint for interference with medical device): Every 2 hours: Monitor safety, psychosocial status, comfort, nutrition and hydration  Taken 7/10/2024 1600 by Ying Schwarz RN  Remains free of injury from restraints (restraint for interference with medical device): Every 2 hours: Monitor safety, psychosocial status, comfort, nutrition and hydration  Taken 7/10/2024 1200 by Ying Schwarz RN  Remains free of injury from restraints (restraint for interference with medical device): Every 2 hours: Monitor safety, psychosocial status, comfort, nutrition and hydration     Problem: Safety - Adult  Goal: Free from fall injury  Outcome:  Progressing  Flowsheets (Taken 7/10/2024 2039)  Free From Fall Injury: Instruct family/caregiver on patient safety     Problem: Pain  Goal: Verbalizes/displays adequate comfort level or baseline comfort level  Outcome: Progressing  Flowsheets  Taken 7/10/2024 1600 by Ying Schwarz RN  Verbalizes/displays adequate comfort level or baseline comfort level: Assess pain using appropriate pain scale  Taken 7/10/2024 1200 by Ying Schwarz, RN  Verbalizes/displays adequate comfort level or baseline comfort level: Assess pain using appropriate pain scale

## 2024-07-11 NOTE — PROGRESS NOTES
Sedated on vent  Vss  lungs clear  reg rhythm  abd- soft and non-tender  ext- no edema  neuro- random movements- opens eyes- does not follow commands  Stable-lab noted  Continue present care- awaiting EEG- possible transfer to [place]

## 2024-07-11 NOTE — PROGRESS NOTES
Critical Care Team - Daily Progress Note      Date and time: 7/11/2024 10:49 AM  Patient's name:  Greta Moran  Medical Record Number: [id number]  Patient's account/billing number: [id number]  Patient's YOB: 1970  Age: 54 y.o.  Date of Admission: 7/5/2024  9:08 PM  Length of stay during current admission: 6      Primary Care Physician: Brian Houston MD  ICU Attending Physician: Dr. Owens    Code Status: Full Code    Reason for ICU admission: Critical care management      SUBJECTIVE:     OVERNIGHT EVENTS:           Patient remains intubated and sedated.  She is currently on propofol and fentanyl.  Also scheduled dose of Ativan 2 mg every 6 hours it appears that Ativan only last for about 4 hours and she becomes agitated about an hour hour and a half before the next dose.  EEG is planned to be done today.  Responded well to diuresis yesterday urine output with 3.555 L.      Intake/Output:   I/O this shift:  In: 0   Out: 100 [Urine:100]  I/O last 3 completed shifts:  In: 3718.3 [I.V.:1258.2; NG/GT:2096; IV Piggyback:364.1]  Out: 4330 [Urine:4330]    Awake and following commands: No  Current Ventilation: - Ventilator Settings:    Vt (Set, mL): 350 mL  Resp Rate (Set): 14 bpm  FiO2 : 60 %    PEEP/CPAP (cmH2O): 5     Secretions: None  Sedation: fentanyl and versed  Paralyzed: No  Vasopressors: norepinephrine, vasopressin    ROS:  Unless stated above, ROS is otherwise negative.      Initial HPI + past overnight events: The patient is a 54 y.o. female with significant past medical history of anxiety, arthritis, asthma, bipolar, depression, fibromyalgia, insomnia, lupus, lupus, cognitive impairment, and osteoporosis.  Information limited due to patient's status and no family present during my examination.  Information solely from EMR and ED physician.  Patient presented to the emergency room with syncopal possible seizure witnessed by boyfriend.  When EMS arrived patient was found unresponsive  than left.  Will plan for a diagnostic and therapeutic thoracentesis of the right effusion for today.  Explained the findings to the , he is agreeable to proceed with the procedure.           Cardiovascular   # Shock   - likely cardiogenic in nature as EF again 10%  - History of Takotsubo cardiomyopathy August 2021 felt to be brought on by panic attack, with follow-up stress test with no ischemia and echo revealing resolution: ECHO (9/2/2021) EF 60-65%  - wean vasopressors to off  - LHC without obstructive CAD  - 7/6 stop heparin   - 7/7 continue weaning pressors  - 7/8 continue weaning pressors  - 7/9 Not on any pressor support, Solumedrol OD    # Decompensated systolic heart failure   - due to  Takotsubo cardiomyopathy  - LHC negative  - diuresis soon  - will need HF medications initiated when she is not hypotensive   - Elevated proBNP 30, 000 < 20, 000  - Elevated troponin     # New onset Afib 7/8  - On Coreg  - On Therapeutic Lovenox will eventually need to be transitioned to DOAC    Gastrointestinal   - CT Chest and CT abdomen WO contrast -concern for query pyelonephritis in the CT scan, no evidence of urinary tract infection on urinalysis  - Continues Tube feeding    - Bowel regimen    - 7/10 started on Movantik   - Senna and colace       Renal   # Lactic acidosis   - Possibly from agitation/seizure/sepsis   - Continue fluid resuscitation and trend lactic  -Resolved 7/4  - Resolved 7/8    #Dilated right renal collecting system   -Urology consulted  -No current treatment intervention at this time.  -Recommendations for renal Lasix scan when stable    # Hypernatremia  - 7/9 Na 148, Increase free water flushes  - 7/10 sodium has improved down to 144     Infectious Disease   # Sepsis of unknown origin  - Sepsis criteria met, febrile Tmax 100.9, tachycardic, hypotensive, and tachypneic  - Unclear source at this time, CXR unremarkable, initial lactic 8.8, no leukocytosis, UA unremarkable, follow blood and

## 2024-07-11 NOTE — PROCEDURES
[facility] Neurodiagnostic Report    MRN: [id number]  PATIENT NAME: Greta Moran  DATE OF REPORT: 2024   DATE OF SERVICE: 2024  PHYSICIAN NAME: Curtis Gabriel DO  STUDY ORDERED BY: Miguel Owens MD      Patient's : 1970  Patient's Age: 54 y.o.  Gender: female    PROCEDURE: Routine EEG with video      Clinical Interpretation:   This abnormal study showed evidence of:    A moderate to severe nonspecific encephalopathy    No seizures or epileptiform discharges were noted during this study.      ____________________________  Electronically signed by: Curtis Gabriel DO, 2024 1:15 PM      Patient Clinical Information   Reason for Study: The patient is undergoing evaluation for altered mental status  Patient State: Comatose  Primary neurological diagnosis: Altered mental status  Primary indication for monitoring: Diagnosis of nonconvulsive seizures    Pertinent Medications and Treatments    Lorazepam     Quetiapine     propofol infusion    fentanyl     venlafaxine     lamotrigine     Sedatives administered: Yes  Intubated: Yes  Pharmacological paralytic: No    Reporting Period  Start of Study: 1033, 2024   End of Study:  1058, 2024       EEG Description  Digital video and scalp EEG monitoring was performed using the standard protocol for this laboratory. Scalp electrodes were applied in the international 10/20 system. Multiple digital montage arrangements were utilized for evaluation. EKG and video were recorded.     Background:      Occipital rhythm (posterior dominant rhythm or PDR): Absent   Voltage: Medium  Organization: poor  Reactivity to eye opening/closure: Not tested    Drowsiness: Absent  Sleep: Absent    Comments: The background is composed primarily of generalized irregular theta and delta activity with superimposed generalized low amplitude beta activity also noted consistent with medication effect.    Technical and Activation  Procedures:  Hyperventilation: Not done  Photic stimulation: Not done  Reactivity to stimulation: Not tested    Abnormalities:    I. Seizures?  None    II. Rhythmic or Periodic Patterns?  None    III. Other Abnormalities?  None

## 2024-07-11 NOTE — PLAN OF CARE
Problem: Discharge Planning  Goal: Discharge to home or other facility with appropriate resources  Outcome: Not Progressing  Flowsheets (Taken 7/11/2024 0800)  Discharge to home or other facility with appropriate resources: Identify barriers to discharge with patient and caregiver     Problem: Safety - Medical Restraint  Goal: Remains free of injury from restraints (Restraint for Interference with Medical Device)  Description: INTERVENTIONS:  1. Determine that other, less restrictive measures have been tried or would not be effective before applying the restraint  2. Evaluate the patient's condition at the time of restraint application  3. Inform patient/family regarding the reason for restraint  4. Q2H: Monitor safety, psychosocial status, comfort, nutrition and hydration  7/11/2024 1137 by Marshall Ludwig RN  Outcome: Not Progressing  Flowsheets  Taken 7/11/2024 1000 by Marshall Ludwig RN  Remains free of injury from restraints (restraint for interference with medical device): Every 2 hours: Monitor safety, psychosocial status, comfort, nutrition and hydration  Taken 7/11/2024 0922 by Marshall Ludwig RN  Remains free of injury from restraints (restraint for interference with medical device): Every 2 hours: Monitor safety, psychosocial status, comfort, nutrition and hydration  Taken 7/11/2024 0800 by Marshall Ludwig RN  Remains free of injury from restraints (restraint for interference with medical device): Every 2 hours: Monitor safety, psychosocial status, comfort, nutrition and hydration  Taken 7/11/2024 0600 by Alyson Gonzalez RN  Remains free of injury from restraints (restraint for interference with medical device): Every 2 hours: Monitor safety, psychosocial status, comfort, nutrition and hydration  Taken 7/11/2024 0400 by Alyson Gonzalez RN  Remains free of injury from restraints (restraint for interference with medical device): Every 2 hours: Monitor safety, psychosocial status, comfort, nutrition and  2039)  Free From Fall Injury: Instruct family/caregiver on patient safety     Problem: Pain  Goal: Verbalizes/displays adequate comfort level or baseline comfort level  7/11/2024 1137 by Marshall Ludwig RN  Outcome: Not Progressing  Flowsheets (Taken 7/11/2024 0800)  Verbalizes/displays adequate comfort level or baseline comfort level: Assess pain using appropriate pain scale  7/11/2024 0006 by Alyson Gonzalez RN  Outcome: Progressing  Flowsheets  Taken 7/10/2024 1600 by Ying Schwarz RN  Verbalizes/displays adequate comfort level or baseline comfort level: Assess pain using appropriate pain scale  Taken 7/10/2024 1200 by Ying Schwarz RN  Verbalizes/displays adequate comfort level or baseline comfort level: Assess pain using appropriate pain scale     Problem: Respiratory - Adult  Goal: Achieves optimal ventilation and oxygenation  Outcome: Not Progressing     Problem: Chronic Conditions and Co-morbidities  Goal: Patient's chronic conditions and co-morbidity symptoms are monitored and maintained or improved  Outcome: Not Progressing  Flowsheets (Taken 7/11/2024 0800)  Care Plan - Patient's Chronic Conditions and Co-Morbidity Symptoms are Monitored and Maintained or Improved: Monitor and assess patient's chronic conditions and comorbid symptoms for stability, deterioration, or improvement

## 2024-07-11 NOTE — PROGRESS NOTES
Infectious Disease  Progress Note  [facility]    Chief Complaint: shock    Subjective:  Patient is in the ICU. No fever overnight. Still restless. Intubated, sedated.     Scheduled Meds:   LORazepam  2 mg Per NG tube Q4H    naloxegol  25 mg Oral QAM AC    QUEtiapine  25 mg Oral BID    docusate  100 mg Per NG tube BID    sennosides  10 mL Per NG tube BID    lansoprazole  30 mg Per NG tube QAM AC    [Held by provider] carvedilol  6.25 mg Oral BID WC    enoxaparin  1 mg/kg SubCUTAneous Q12H    brexpiprazole  1 mg Oral Daily    arformoterol tartrate  15 mcg Nebulization BID RT    budesonide  0.5 mg Nebulization BID RT    sodium chloride flush  5-40 mL IntraVENous 2 times per day    hydroxychloroquine  200 mg Oral BID    chlorhexidine  15 mL Mouth/Throat BID    venlafaxine  75 mg Oral BID WC    lamoTRIgine  100 mg Oral Nightly    levalbuterol  0.63 mg Nebulization q8h     Continuous Infusions:   propofol 45 mcg/kg/min (07/11/24 1422)    fentaNYL 175 mcg/hr (07/11/24 1011)    sodium chloride Stopped (07/10/24 1045)     PRN Meds:LORazepam, potassium chloride, magnesium sulfate, sodium phosphate 10.65 mmol in sodium chloride 0.9 % 250 mL IVPB **OR** sodium phosphate 21.27 mmol in sodium chloride 0.9 % 250 mL IVPB, sodium chloride flush, sodium chloride, acetaminophen    Prior to Admission medications    Medication Sig Start Date End Date Taking? Authorizing Provider   venlafaxine (EFFEXOR XR) 150 MG extended release capsule  1/11/24   Brittni Camilo MD   epinastine (ELESTAT) 0.05 % SOLN  12/29/23   Brittni Camilo MD   alendronate (FOSAMAX) 70 MG tablet  1/20/24   Brittni Camilo MD   loratadine (CLARITIN) 10 MG capsule Take 1 capsule by mouth daily    Brittni Camilo MD   metoprolol succinate (TOPROL XL) 25 MG extended release tablet TAKE 1/2 TABLET BY MOUTH EVERY DAY 12/14/21   Getachew Saez MD   nitroGLYCERIN (NITROSTAT) 0.4 MG SL tablet PLACE 1 TABLET UNDER TONGUE EVERY 5 MINS, UP TO 3 DOSES AS

## 2024-07-11 NOTE — CARE COORDINATION
CASE MANAGEMENT.....Patient remains in icu. Intubated on vent/iv sedation. [facility] following. Transfer to [facility] initiated today for Cardiogenic shock needing neuro eval. Ambulance forms in chart. Will follow along.

## 2024-07-11 NOTE — PROGRESS NOTES
7/11/2024 10:48 AM  Greta Moran  [id number]    Subjective:      Still in the ICU  Intubated and sedated  Bill is present today  States possible transfer to [facility]    Review of Systems  Unable to obtain due to intubated and sedated    Scheduled Meds:   lactulose  20 g Per NG tube Once    LORazepam  2 mg Per NG tube Q4H    naloxegol  25 mg Oral QAM AC    QUEtiapine  25 mg Oral BID    docusate  100 mg Per NG tube BID    sennosides  10 mL Per NG tube BID    lansoprazole  30 mg Per NG tube QAM AC    [Held by provider] carvedilol  6.25 mg Oral BID WC    enoxaparin  1 mg/kg SubCUTAneous Q12H    brexpiprazole  1 mg Oral Daily    arformoterol tartrate  15 mcg Nebulization BID RT    budesonide  0.5 mg Nebulization BID RT    sodium chloride flush  5-40 mL IntraVENous 2 times per day    hydroxychloroquine  200 mg Oral BID    chlorhexidine  15 mL Mouth/Throat BID    venlafaxine  75 mg Oral BID WC    lamoTRIgine  100 mg Oral Nightly    levalbuterol  0.63 mg Nebulization q8h       Objective:  Vitals:    07/11/24 1000   BP: (!) 95/50   Pulse: 72   Resp: 15   Temp:    SpO2: 93%         Allergies: Patient has no known allergies.    General Appearance: alert and oriented to person, place and time and in no acute distress  Skin: no rash or erythema  Head: normocephalic and atraumatic  Pulmonary/Chest: normal air movement, no respiratory distress  Abdomen: soft, non-tender, non-distended  Genitourinary: Coto catheter in place draining clear yellow urine  Extremities: no cyanosis, clubbing or edema         Labs:     Recent Labs     07/11/24  0408 07/11/24  0930     --    K 3.5 4.0     --    CO2 31*  --    BUN 23*  --    CREATININE 0.5  --    GLUCOSE 126*  --    CALCIUM 8.1*  --        Lab Results   Component Value Date/Time    HGB 8.7 07/11/2024 04:08 AM    HCT 27.6 07/11/2024 04:08 AM       No results found for: \"PSA\"      Assessment/Plan:  Distention of right renal collecting system and right ureter    Creatinine

## 2024-07-11 NOTE — PROGRESS NOTES
met briefly with patient's  and checked in on how he is doing.  provided active listening, contact information for the chaplains' office, and assured him of support when/if needed.  expressed appreciation for coming by.

## 2024-07-11 NOTE — PROGRESS NOTES
Patient understands condition, prognosis and need for follow up care. mass or thrombus.   Compared to prior echo from 8/21/21 - LV EF is improved from 30-35% to   60-65%.    Stress test:    Regadenoson SPECT MPI 9/2021  Gated images demonstrate mild apical hypokinesis, with a calculated  left ventricular ejection fraction of 65%. End-diastolic volume 86 mL.  TID ratio 1.18.      IMPRESSION:     1. The myocardial perfusion is normal.  2. No evidence of stress-induced ischemia or prior myocardial  infarction.  3. Normal LV systolic function, EF 65% with mild apical hypokinesis.  4. There is no transient ischemic dilation.  5. Low risk myocardial perfusion study.    Cardiac catheterization:   [facility] 7/5/24 [facility]  No angiographically significant stenosis. LVEDP 38     ----------------------------------------------------------------------------------------------------------------------------------------------------------------  IMPRESSION:  Cardiogenic shock resolved  Acute, recurrent, heart failure with reduced ejection fraction.  proBNP 8600 -> 30,000  Recurrent cardiomyopathy, nonischemic likely Takotsubo EF 20-25% with apical akinesis.  No LVOT obstruction.  Prior history of Takotsubo 2021, recovered (EF 30-35% -> 60-65%)  Elevated hs-cTnT  ng/L and downtrending with EKG changes, due to above  New onset atrial fibrillation RVR noted 7/8/2024, spontaneously converted to sinus  Acute hypoxic respiratory failure, failed extubation, reintubated 7/9/2024  Hypernatremia improving  Abnormal LFTs  Anemia 8.9 thrombocytopenia 90s  Seizure  Encephalopathy multifactorial/agitation  Lactic acidosis, resolved  LLL pneumonia/sepsis/fevers  SLE on hydroxychloroquine  Fibromyalgia/bipolar disorder/anxiety and severe panic disorder.     RECOMMENDATIONS:  No further A-fib, maintaining sinus but certainly at risk of recurrent arrhythmias.  Unfortunately difficult liberating from vent requiring reintubation yesterday.  Hemodynamics have improved.  Agitation remains significant problem.    Continue

## 2024-07-11 NOTE — PROCEDURES
PICC    Catheter insertion date: 7/11/2024     Product Number:  [id number]   Lot No: [id number]   Gauge: 18X2   Lumen: DOUBLE   L Basilic    Vein Diameter: 0.44cm   Arm circumference at insertion site: 24cm   Catheter Length: 40cm   Internal Length: 40cm   External Catheter Length: 0cm   Ultrasound Used: yes  VPS [place] confirms PICC tip is placed in the lower 1/3 of the SVC or at the Cavoatrial junction.  Floor nurse notified PICC is okay to use.   : BRANDON Navarro RN [facility]     Please note ALL other veins are currently non compressible and unusable.

## 2024-07-11 NOTE — PROCEDURES
Thoracentesis Procedure Note  Indication: Pleural effusion, removal of fluid for diagnostic purposes, and removal of fluid for therapeutic purposes    Consent: The spouse was counseled regarding the procedure, it's indications, risks, potential complications and alternatives and any questions were answered. Consent was obtained.    Procedure: The patient was placed in the 45 degrees recumbent position and the appropriate landmarks were identified.  The skin over the puncture site in the right posterior chest wall was prepped with betadine and draped in a sterile fashion.  Local anesthesia was obtained by infiltration using 1% Lidocaine without epinephrine. A thoracentesis catheter was then advanced into the pleural space over a needle and the needle was withdrawn.  Pleural fluid was returned which was serous. A total volume of 900 cc was withdrawn which was sent to the lab for appropriate testing.  The catheter was then withdrawn and a sterile dressing was placed over the site.  A post procedure film is pending.    The patient tolerated the procedure well.    Complications: None  I was present at the bedside and supervised Dr. Poe throughout the entire procedure.

## 2024-07-12 ENCOUNTER — APPOINTMENT (OUTPATIENT)
Dept: GENERAL RADIOLOGY | Age: 54
DRG: 917 | End: 2024-07-12
Attending: FAMILY MEDICINE
Payer: MEDICARE

## 2024-07-12 VITALS
SYSTOLIC BLOOD PRESSURE: 161 MMHG | DIASTOLIC BLOOD PRESSURE: 84 MMHG | RESPIRATION RATE: 16 BRPM | TEMPERATURE: 99.9 F | BODY MASS INDEX: 27.61 KG/M2 | OXYGEN SATURATION: 94 % | HEIGHT: 60 IN | HEART RATE: 115 BPM | WEIGHT: 140.65 LBS

## 2024-07-12 PROBLEM — G93.40 ENCEPHALOPATHY ACUTE: Status: ACTIVE | Noted: 2024-07-12

## 2024-07-12 LAB
ALBUMIN SERPL-MCNC: 2.6 G/DL (ref 3.5–5.2)
ALP SERPL-CCNC: 247 U/L (ref 35–104)
ALT SERPL-CCNC: 73 U/L (ref 0–32)
ANION GAP SERPL CALCULATED.3IONS-SCNC: 7 MMOL/L (ref 7–16)
AST SERPL-CCNC: 61 U/L (ref 0–31)
B.E.: 2.1 MMOL/L (ref -3–3)
B.E.: 5.4 MMOL/L (ref -3–3)
BASOPHILS # BLD: 0.01 K/UL (ref 0–0.2)
BASOPHILS NFR BLD: 0 % (ref 0–2)
BILIRUB SERPL-MCNC: 0.6 MG/DL (ref 0–1.2)
BILIRUB UR QL STRIP: NEGATIVE
BNP SERPL-MCNC: ABNORMAL PG/ML (ref 0–125)
BUN SERPL-MCNC: 16 MG/DL (ref 6–20)
CALCIUM SERPL-MCNC: 7.7 MG/DL (ref 8.6–10.2)
CHLORIDE SERPL-SCNC: 105 MMOL/L (ref 98–107)
CLARITY UR: CLEAR
CO2 SERPL-SCNC: 30 MMOL/L (ref 22–29)
COHB: 0.3 % (ref 0–1.5)
COHB: 0.3 % (ref 0–1.5)
COLOR UR: YELLOW
CREAT SERPL-MCNC: 0.5 MG/DL (ref 0.5–1)
CRITICAL: ABNORMAL
CRITICAL: ABNORMAL
DATE ANALYZED: ABNORMAL
DATE ANALYZED: ABNORMAL
DATE OF COLLECTION: ABNORMAL
DATE OF COLLECTION: ABNORMAL
EOSINOPHIL # BLD: 0.58 K/UL (ref 0.05–0.5)
EOSINOPHILS RELATIVE PERCENT: 5 % (ref 0–6)
ERYTHROCYTE [DISTWIDTH] IN BLOOD BY AUTOMATED COUNT: 15.1 % (ref 11.5–15)
FIO2: 50 %
FIO2: 60 %
GFR, ESTIMATED: >90 ML/MIN/1.73M2
GLUCOSE BLD-MCNC: 109 MG/DL (ref 74–99)
GLUCOSE SERPL-MCNC: 110 MG/DL (ref 74–99)
GLUCOSE UR STRIP-MCNC: NEGATIVE MG/DL
HCO3: 25.9 MMOL/L (ref 22–26)
HCO3: 29.4 MMOL/L (ref 22–26)
HCT VFR BLD AUTO: 26.5 % (ref 34–48)
HGB BLD-MCNC: 8.6 G/DL (ref 11.5–15.5)
HGB UR QL STRIP.AUTO: NEGATIVE
HHB: 6.2 % (ref 0–5)
HHB: 7 % (ref 0–5)
IMM GRANULOCYTES # BLD AUTO: 0.09 K/UL (ref 0–0.58)
IMM GRANULOCYTES NFR BLD: 1 % (ref 0–5)
KETONES UR STRIP-MCNC: ABNORMAL MG/DL
LAB: ABNORMAL
LAB: ABNORMAL
LEUKOCYTE ESTERASE UR QL STRIP: ABNORMAL
LYMPHOCYTES NFR BLD: 1.75 K/UL (ref 1.5–4)
LYMPHOCYTES RELATIVE PERCENT: 14 % (ref 20–42)
Lab: 2008
Lab: 450
MAGNESIUM SERPL-MCNC: 1.7 MG/DL (ref 1.6–2.6)
MCH RBC QN AUTO: 30.3 PG (ref 26–35)
MCHC RBC AUTO-ENTMCNC: 32.5 G/DL (ref 32–34.5)
MCV RBC AUTO: 93.3 FL (ref 80–99.9)
METHB: 0.3 % (ref 0–1.5)
METHB: 0.3 % (ref 0–1.5)
MICROORGANISM SPEC CULT: NORMAL
MICROORGANISM SPEC CULT: NORMAL
MODE: AC
MODE: AC
MONOCYTES NFR BLD: 0.69 K/UL (ref 0.1–0.95)
MONOCYTES NFR BLD: 6 % (ref 2–12)
NEUTROPHILS NFR BLD: 74 % (ref 43–80)
NEUTS SEG NFR BLD: 9.04 K/UL (ref 1.8–7.3)
NITRITE UR QL STRIP: NEGATIVE
O2 CONTENT: 11.8 ML/DL
O2 CONTENT: 12.8 ML/DL
O2 SATURATION: 93 % (ref 92–98.5)
O2 SATURATION: 93.8 % (ref 92–98.5)
O2HB: 92.4 % (ref 94–97)
O2HB: 93.2 % (ref 94–97)
OPERATOR ID: 7296
OPERATOR ID: ABNORMAL
PATIENT TEMP: 37 C
PATIENT TEMP: 37 C
PCO2: 37.4 MMHG (ref 35–45)
PCO2: 40.8 MMHG (ref 35–45)
PEEP/CPAP: 5 CMH2O
PEEP/CPAP: 8 CMH2O
PFO2: 1.13 MMHG/%
PFO2: 1.5 MMHG/%
PH BLOOD GAS: 7.46 (ref 7.35–7.45)
PH BLOOD GAS: 7.48 (ref 7.35–7.45)
PH UR STRIP: 6 [PH] (ref 5–9)
PHOSPHATE SERPL-MCNC: 3.6 MG/DL (ref 2.5–4.5)
PLATELET # BLD AUTO: 129 K/UL (ref 130–450)
PMV BLD AUTO: 12.4 FL (ref 7–12)
PO2: 68.1 MMHG (ref 75–100)
PO2: 74.8 MMHG (ref 75–100)
POTASSIUM SERPL-SCNC: 3.6 MMOL/L (ref 3.5–5)
PROT SERPL-MCNC: 5.1 G/DL (ref 6.4–8.3)
PROT UR STRIP-MCNC: NEGATIVE MG/DL
RBC # BLD AUTO: 2.84 M/UL (ref 3.5–5.5)
RBC #/AREA URNS HPF: ABNORMAL /HPF
RI(T): 3.2
RI(T): 4.62
RR MECHANICAL: 14 B/MIN
RR MECHANICAL: 14 B/MIN
SERVICE CMNT-IMP: NORMAL
SERVICE CMNT-IMP: NORMAL
SODIUM SERPL-SCNC: 142 MMOL/L (ref 132–146)
SOURCE, BLOOD GAS: ABNORMAL
SOURCE, BLOOD GAS: ABNORMAL
SP GR UR STRIP: 1.02 (ref 1–1.03)
SPECIMEN DESCRIPTION: NORMAL
SPECIMEN DESCRIPTION: NORMAL
THB: 8.9 G/DL (ref 11.5–16.5)
THB: 9.8 G/DL (ref 11.5–16.5)
TIME ANALYZED: 2020
TIME ANALYZED: 457
UROBILINOGEN UR STRIP-ACNC: 0.2 EU/DL (ref 0–1)
VT MECHANICAL: 350 ML
VT MECHANICAL: 350 ML
WBC #/AREA URNS HPF: ABNORMAL /HPF
WBC OTHER # BLD: 12.2 K/UL (ref 4.5–11.5)

## 2024-07-12 PROCEDURE — 6360000002 HC RX W HCPCS

## 2024-07-12 PROCEDURE — 94003 VENT MGMT INPAT SUBQ DAY: CPT

## 2024-07-12 PROCEDURE — 99233 SBSQ HOSP IP/OBS HIGH 50: CPT | Performed by: INTERNAL MEDICINE

## 2024-07-12 PROCEDURE — 71045 X-RAY EXAM CHEST 1 VIEW: CPT

## 2024-07-12 PROCEDURE — 83735 ASSAY OF MAGNESIUM: CPT

## 2024-07-12 PROCEDURE — P9047 ALBUMIN (HUMAN), 25%, 50ML: HCPCS

## 2024-07-12 PROCEDURE — 82962 GLUCOSE BLOOD TEST: CPT

## 2024-07-12 PROCEDURE — 80053 COMPREHEN METABOLIC PANEL: CPT

## 2024-07-12 PROCEDURE — 85025 COMPLETE CBC W/AUTO DIFF WBC: CPT

## 2024-07-12 PROCEDURE — 6360000002 HC RX W HCPCS: Performed by: INTERNAL MEDICINE

## 2024-07-12 PROCEDURE — 6370000000 HC RX 637 (ALT 250 FOR IP): Performed by: INTERNAL MEDICINE

## 2024-07-12 PROCEDURE — 6360000002 HC RX W HCPCS: Performed by: NURSE PRACTITIONER

## 2024-07-12 PROCEDURE — 87040 BLOOD CULTURE FOR BACTERIA: CPT

## 2024-07-12 PROCEDURE — 87205 SMEAR GRAM STAIN: CPT

## 2024-07-12 PROCEDURE — 37799 UNLISTED PX VASCULAR SURGERY: CPT

## 2024-07-12 PROCEDURE — 6370000000 HC RX 637 (ALT 250 FOR IP): Performed by: FAMILY MEDICINE

## 2024-07-12 PROCEDURE — 87070 CULTURE OTHR SPECIMN AEROBIC: CPT

## 2024-07-12 PROCEDURE — 2500000003 HC RX 250 WO HCPCS: Performed by: INTERNAL MEDICINE

## 2024-07-12 PROCEDURE — 83880 ASSAY OF NATRIURETIC PEPTIDE: CPT

## 2024-07-12 PROCEDURE — 94640 AIRWAY INHALATION TREATMENT: CPT

## 2024-07-12 PROCEDURE — 6370000000 HC RX 637 (ALT 250 FOR IP)

## 2024-07-12 PROCEDURE — 81001 URINALYSIS AUTO W/SCOPE: CPT

## 2024-07-12 PROCEDURE — 2580000003 HC RX 258

## 2024-07-12 PROCEDURE — 82805 BLOOD GASES W/O2 SATURATION: CPT

## 2024-07-12 PROCEDURE — 84100 ASSAY OF PHOSPHORUS: CPT

## 2024-07-12 PROCEDURE — 87081 CULTURE SCREEN ONLY: CPT

## 2024-07-12 PROCEDURE — 87086 URINE CULTURE/COLONY COUNT: CPT

## 2024-07-12 RX ORDER — SODIUM CHLORIDE, SODIUM LACTATE, POTASSIUM CHLORIDE, AND CALCIUM CHLORIDE .6; .31; .03; .02 G/100ML; G/100ML; G/100ML; G/100ML
500 INJECTION, SOLUTION INTRAVENOUS ONCE
Status: COMPLETED | OUTPATIENT
Start: 2024-07-12 | End: 2024-07-12

## 2024-07-12 RX ORDER — ALBUMIN (HUMAN) 12.5 G/50ML
25 SOLUTION INTRAVENOUS ONCE
Status: COMPLETED | OUTPATIENT
Start: 2024-07-12 | End: 2024-07-12

## 2024-07-12 RX ORDER — MAGNESIUM SULFATE IN WATER 40 MG/ML
2000 INJECTION, SOLUTION INTRAVENOUS ONCE
Status: COMPLETED | OUTPATIENT
Start: 2024-07-12 | End: 2024-07-12

## 2024-07-12 RX ORDER — POTASSIUM CHLORIDE 29.8 MG/ML
20 INJECTION INTRAVENOUS
Status: COMPLETED | OUTPATIENT
Start: 2024-07-12 | End: 2024-07-12

## 2024-07-12 RX ADMIN — ARFORMOTEROL TARTRATE 15 MCG: 15 SOLUTION RESPIRATORY (INHALATION) at 19:26

## 2024-07-12 RX ADMIN — LEVALBUTEROL 0.63 MG: 0.63 SOLUTION RESPIRATORY (INHALATION) at 06:18

## 2024-07-12 RX ADMIN — POTASSIUM CHLORIDE 20 MEQ: 29.8 INJECTION, SOLUTION INTRAVENOUS at 06:59

## 2024-07-12 RX ADMIN — BUDESONIDE 500 MCG: 0.5 SUSPENSION RESPIRATORY (INHALATION) at 19:26

## 2024-07-12 RX ADMIN — Medication 150 MCG/HR: at 17:54

## 2024-07-12 RX ADMIN — MAGNESIUM SULFATE HEPTAHYDRATE 2000 MG: 40 INJECTION, SOLUTION INTRAVENOUS at 09:32

## 2024-07-12 RX ADMIN — SODIUM CHLORIDE, POTASSIUM CHLORIDE, SODIUM LACTATE AND CALCIUM CHLORIDE 500 ML: 600; 310; 30; 20 INJECTION, SOLUTION INTRAVENOUS at 12:30

## 2024-07-12 RX ADMIN — LORAZEPAM 2 MG: 1 TABLET ORAL at 04:58

## 2024-07-12 RX ADMIN — VENLAFAXINE HYDROCHLORIDE 75 MG: 75 TABLET ORAL at 16:01

## 2024-07-12 RX ADMIN — LORAZEPAM 1 MG: 2 INJECTION INTRAMUSCULAR; INTRAVENOUS at 16:51

## 2024-07-12 RX ADMIN — LEVALBUTEROL 0.63 MG: 0.63 SOLUTION RESPIRATORY (INHALATION) at 15:29

## 2024-07-12 RX ADMIN — LORAZEPAM 1 MG: 2 INJECTION INTRAMUSCULAR; INTRAVENOUS at 19:50

## 2024-07-12 RX ADMIN — PROPOFOL 30 MCG/KG/MIN: 10 INJECTION, EMULSION INTRAVENOUS at 05:53

## 2024-07-12 RX ADMIN — NALOXEGOL OXALATE 25 MG: 12.5 TABLET, FILM COATED ORAL at 08:14

## 2024-07-12 RX ADMIN — BUDESONIDE 500 MCG: 0.5 SUSPENSION RESPIRATORY (INHALATION) at 06:18

## 2024-07-12 RX ADMIN — LORAZEPAM 1 MG: 2 INJECTION INTRAMUSCULAR; INTRAVENOUS at 13:40

## 2024-07-12 RX ADMIN — LANSOPRAZOLE 30 MG: 30 TABLET, ORALLY DISINTEGRATING, DELAYED RELEASE ORAL at 04:58

## 2024-07-12 RX ADMIN — CHLORHEXIDINE GLUCONATE 0.12% ORAL RINSE 15 ML: 1.2 LIQUID ORAL at 08:15

## 2024-07-12 RX ADMIN — HYDROXYCHLOROQUINE SULFATE 200 MG: 200 TABLET ORAL at 08:30

## 2024-07-12 RX ADMIN — POTASSIUM CHLORIDE 20 MEQ: 29.8 INJECTION, SOLUTION INTRAVENOUS at 08:00

## 2024-07-12 RX ADMIN — ALBUMIN (HUMAN) 25 G: 0.25 INJECTION, SOLUTION INTRAVENOUS at 12:52

## 2024-07-12 RX ADMIN — ENOXAPARIN SODIUM 60 MG: 100 INJECTION SUBCUTANEOUS at 16:01

## 2024-07-12 RX ADMIN — LORAZEPAM 2 MG: 1 TABLET ORAL at 00:22

## 2024-07-12 RX ADMIN — PROPOFOL 50 MCG/KG/MIN: 10 INJECTION, EMULSION INTRAVENOUS at 19:51

## 2024-07-12 RX ADMIN — Medication 200 MCG/HR: at 06:03

## 2024-07-12 RX ADMIN — LORAZEPAM 2 MG: 1 TABLET ORAL at 15:59

## 2024-07-12 RX ADMIN — PIPERACILLIN AND TAZOBACTAM 4500 MG: 4; .5 INJECTION, POWDER, LYOPHILIZED, FOR SOLUTION INTRAVENOUS at 11:43

## 2024-07-12 RX ADMIN — ACETAMINOPHEN 650 MG: 325 TABLET ORAL at 00:22

## 2024-07-12 RX ADMIN — Medication 200 MCG/HR: at 11:30

## 2024-07-12 RX ADMIN — ACETAMINOPHEN 650 MG: 325 TABLET ORAL at 15:58

## 2024-07-12 RX ADMIN — PROPOFOL 50 MCG/KG/MIN: 10 INJECTION, EMULSION INTRAVENOUS at 10:34

## 2024-07-12 RX ADMIN — PIPERACILLIN AND TAZOBACTAM 4500 MG: 4; .5 INJECTION, POWDER, LYOPHILIZED, FOR SOLUTION INTRAVENOUS at 15:56

## 2024-07-12 RX ADMIN — LORAZEPAM 2 MG: 1 TABLET ORAL at 11:28

## 2024-07-12 RX ADMIN — ARFORMOTEROL TARTRATE 15 MCG: 15 SOLUTION RESPIRATORY (INHALATION) at 06:18

## 2024-07-12 RX ADMIN — QUETIAPINE FUMARATE 50 MG: 25 TABLET ORAL at 08:15

## 2024-07-12 RX ADMIN — ACETAMINOPHEN 650 MG: 325 TABLET ORAL at 08:15

## 2024-07-12 RX ADMIN — VENLAFAXINE HYDROCHLORIDE 75 MG: 75 TABLET ORAL at 08:15

## 2024-07-12 RX ADMIN — LORAZEPAM 1 MG: 2 INJECTION INTRAMUSCULAR; INTRAVENOUS at 09:24

## 2024-07-12 RX ADMIN — Medication 200 MCG/HR: at 01:01

## 2024-07-12 RX ADMIN — LORAZEPAM 2 MG: 1 TABLET ORAL at 08:14

## 2024-07-12 ASSESSMENT — PULMONARY FUNCTION TESTS
PIF_VALUE: 16
PIF_VALUE: 22
PIF_VALUE: 10
PIF_VALUE: 18
PIF_VALUE: 17
PIF_VALUE: 20
PIF_VALUE: 12
PIF_VALUE: 19
PIF_VALUE: 19
PIF_VALUE: 15
PIF_VALUE: 22
PIF_VALUE: 22
PIF_VALUE: 18
PIF_VALUE: 25
PIF_VALUE: 21
PIF_VALUE: 20
PIF_VALUE: 18
PIF_VALUE: 12
PIF_VALUE: 20
PIF_VALUE: 14
PIF_VALUE: 14
PIF_VALUE: 21

## 2024-07-12 ASSESSMENT — PAIN SCALES - GENERAL
PAINLEVEL_OUTOF10: 0

## 2024-07-12 NOTE — PLAN OF CARE
Problem: Discharge Planning  Goal: Discharge to home or other facility with appropriate resources  Outcome: Progressing  Flowsheets (Taken 7/11/2024 2000)  Discharge to home or other facility with appropriate resources: Identify barriers to discharge with patient and caregiver     Problem: Safety - Medical Restraint  Goal: Remains free of injury from restraints (Restraint for Interference with Medical Device)  Description: INTERVENTIONS:  1. Determine that other, less restrictive measures have been tried or would not be effective before applying the restraint  2. Evaluate the patient's condition at the time of restraint application  3. Inform patient/family regarding the reason for restraint  4. Q2H: Monitor safety, psychosocial status, comfort, nutrition and hydration  Outcome: Progressing  Flowsheets  Taken 7/12/2024 0800 by Whitney Mars RN  Remains free of injury from restraints (restraint for interference with medical device): Every 2 hours: Monitor safety, psychosocial status, comfort, nutrition and hydration  Taken 7/12/2024 0600 by Shanice Peralta RN  Remains free of injury from restraints (restraint for interference with medical device):   Determine that other, less restrictive measures have been tried or would not be effective before applying the restraint   Evaluate the patient's condition at the time of restraint application   Inform patient/family regarding the reason for restraint   Every 2 hours: Monitor safety, psychosocial status, comfort, nutrition and hydration  Taken 7/12/2024 0400 by Shanice Peralta RN  Remains free of injury from restraints (restraint for interference with medical device):   Determine that other, less restrictive measures have been tried or would not be effective before applying the restraint   Inform patient/family regarding the reason for restraint   Evaluate the patient's condition at the time of restraint application   Every 2 hours: Monitor safety, psychosocial status,

## 2024-07-12 NOTE — PROGRESS NOTES
Comprehensive Nutrition Assessment    Type and Reason for Visit:  Reassess    Nutrition Recommendations/Plan:   Continue current TF as tolerated, while EN needed for nutrition support      Malnutrition Assessment:  Malnutrition Status:  Mild malnutrition (07/06/24 1111)    Context:  Acute Illness     Findings of the 6 clinical characteristics of malnutrition:  Energy Intake:  Mild decrease in energy intake (Comment) (NPO since adm)  Weight Loss:  Unable to assess     Body Fat Loss:  No significant body fat loss     Muscle Mass Loss:  Mild muscle mass loss Temples (temporalis)  Fluid Accumulation:  Unable to assess (d/t multifactorial)     Strength:  Not Performed    Nutrition Assessment:    Pt remains intubated/sedated. Awaiting transfer to [facility]. Currently on propofol providing 504 malena, but TF held at times with weaning attempts. S/p thoracentesis 7/11 w/900 ml off.  No change to current EN regimen recommended at this time. Will continue to monitor POC and may recommend TF changes, if pt remains intubated and on propofol.    Nutrition Related Findings:    intubated/sedated, febrile, NGT bridled/clamped, soft round abd +BS, no edema, I/O WNL, elevated LFT, NH3 WNL, Na 142 Wound Type: None       Current Nutrition Intake & Therapies:    Average Meal Intake: NPO  Average Supplements Intake: NPO  Current Tube Feeding (TF) Orders:  Feeding Route: Nasogastric  Formula: Peptide Based  Schedule: Continuous  Feeding Regimen: 45 ml/hr  Additives/Modulars: None  Water Flushes: 40 ml/hr = 960 ml /d  Current TF & Flush Orders Provides: Hold when vent wean trials  Goal TF & Flush Orders Provides: 1080 ml/d, 1296 malena, 81 g pro, 1836 ml total free water  Additional Calorie Sources:  7/12 propofol = 504 malena    Anthropometric Measures:  Height: 152.4 cm (5')  Ideal Body Weight (IBW): 100 lbs (45 kg)    Admission Body Weight: 61.2 kg (134 lb 14.7 oz) (7/5)  Current Body Weight: 63.8 kg (140 lb 10.5 oz), 146.5 % IBW. Weight

## 2024-07-12 NOTE — PLAN OF CARE
Problem: Discharge Planning  Goal: Discharge to home or other facility with appropriate resources  7/12/2024 1409 by Whitney Mars RN  Outcome: Progressing  7/12/2024 0816 by Shanice Peralta RN  Outcome: Progressing  Flowsheets (Taken 7/11/2024 2000)  Discharge to home or other facility with appropriate resources: Identify barriers to discharge with patient and caregiver     Problem: Safety - Medical Restraint  Goal: Remains free of injury from restraints (Restraint for Interference with Medical Device)  Description: INTERVENTIONS:  1. Determine that other, less restrictive measures have been tried or would not be effective before applying the restraint  2. Evaluate the patient's condition at the time of restraint application  3. Inform patient/family regarding the reason for restraint  4. Q2H: Monitor safety, psychosocial status, comfort, nutrition and hydration  7/12/2024 1409 by Whitney Mars RN  Outcome: Progressing  Flowsheets  Taken 7/12/2024 1400  Remains free of injury from restraints (restraint for interference with medical device): Every 2 hours: Monitor safety, psychosocial status, comfort, nutrition and hydration  Taken 7/12/2024 1200  Remains free of injury from restraints (restraint for interference with medical device): Every 2 hours: Monitor safety, psychosocial status, comfort, nutrition and hydration  Taken 7/12/2024 1000  Remains free of injury from restraints (restraint for interference with medical device): Every 2 hours: Monitor safety, psychosocial status, comfort, nutrition and hydration  7/12/2024 0816 by Shanice Peralta RN  Outcome: Progressing  Flowsheets  Taken 7/12/2024 0800 by Whitney Mars RN  Remains free of injury from restraints (restraint for interference with medical device): Every 2 hours: Monitor safety, psychosocial status, comfort, nutrition and hydration  Taken 7/12/2024 0600 by Shanice Peralta RN  Remains free of injury from restraints (restraint for interference  psychosocial status, comfort, nutrition and hydration   Evaluate the patient's condition at the time of restraint application   Inform patient/family regarding the reason for restraint   Determine that other, less restrictive measures have been tried or would not be effective before applying the restraint  Taken 7/11/2024 2000 by Shanice Peralta RN  Remains free of injury from restraints (restraint for interference with medical device):   Determine that other, less restrictive measures have been tried or would not be effective before applying the restraint   Every 2 hours: Monitor safety, psychosocial status, comfort, nutrition and hydration   Evaluate the patient's condition at the time of restraint application   Inform patient/family regarding the reason for restraint     Problem: Safety - Adult  Goal: Free from fall injury  7/12/2024 1409 by Whitney Mars RN  Outcome: Progressing  Flowsheets (Taken 7/12/2024 1408)  Free From Fall Injury: Instruct family/caregiver on patient safety  7/12/2024 0816 by Shanice Peralta RN  Outcome: Progressing  Flowsheets (Taken 7/12/2024 0411)  Free From Fall Injury: Instruct family/caregiver on patient safety     Problem: Pain  Goal: Verbalizes/displays adequate comfort level or baseline comfort level  7/12/2024 1409 by Whitney Mars RN  Outcome: Progressing  7/12/2024 0816 by Shanice Peralta RN  Outcome: Progressing     Problem: Skin/Tissue Integrity  Goal: Absence of new skin breakdown  Description: 1.  Monitor for areas of redness and/or skin breakdown  2.  Assess vascular access sites hourly  3.  Every 4-6 hours minimum:  Change oxygen saturation probe site  4.  Every 4-6 hours:  If on nasal continuous positive airway pressure, respiratory therapy assess nares and determine need for appliance change or resting period.  7/12/2024 1409 by Whitney Mars RN  Outcome: Progressing  7/12/2024 0816 by Shanice Peralta RN  Outcome: Progressing     Problem: ABCDS Injury

## 2024-07-12 NOTE — PATIENT CARE CONFERENCE
Intensive Care Daily Quality Rounding Checklist        ICU Team Members: Dr. Owens, Bedside nurse, Charge nurse, Clinical pharmacist, Respiratory therapist      ICU Day #: 10     SOFA Score: 8     Intubation Date: July 9, extubated and reintubated      Ventilator Day #: 10     Central Line Insertion Date:                                                     Day #:                                                     Indication:       Arterial Line Insertion Date: July 9                             Day #: 4     Temporary Hemodialysis Catheter Insertion Date:  N/A                             Day #: N/A     DVT Prophylaxis: Lovenox    GI Prophylaxis: Prevacid     Coto Catheter Insertion Date: July 3                                        Day #: 10                             Indications: Need for fluid management of the critically ill patient in a critical care setting                             Continued need (if yes, reason documented and discussed with physician): Yes     Skin Issues/ Wounds and ordered treatment discussed on rounds: SOS precautions     Goals/ Plans for the Day: Monitor labs and vitals, treat/replace as needed. Wean vent as able.  [facility] transfer for neurology. Increase ativan. Zosyn and respiratory culture.      Reviewed plan and goals for day with patient and/or representative: Yes

## 2024-07-12 NOTE — PROGRESS NOTES
30,000  Recurrent cardiomyopathy, nonischemic likely Takotsubo EF 20-25% with apical akinesis.  No LVOT obstruction.  Prior history of Takotsubo 2021, recovered (EF 30-35% -> 60-65%)  Elevated hs-cTnT  ng/L and downtrending with EKG changes, due to above  New onset atrial fibrillation RVR noted 7/8/2024, spontaneously converted to sinus  Acute hypoxic respiratory failure, failed extubation, reintubated 7/9/2024  Hypernatremia improved  Abnormal LFTs  Anemia 8.6 thrombocytopenia 90s-120s  Seizure  Encephalopathy multifactorial/agitation  Lactic acidosis, resolved  LLL pneumonia/sepsis/fevers  SLE on hydroxychloroquine  Fibromyalgia/bipolar disorder/anxiety and severe panic disorder.     RECOMMENDATIONS:  No further A-fib, maintaining sinus but certainly at risk of recurrent arrhythmias.  Unfortunately difficult liberating from vent requiring reintubation.  Hemodynamics have improved but are fluctuating likely related to sedation, she is intermittently hypertensive and hypotensive.  Agitation/encephalopathy remains significant problem.  Respiratory status worsening with increasing oxygen requirements and worsening chest x-ray pulmonary edema or ARDS?    Diuresis if blood pressure tolerates  Add on repeat proBNP  Continue anticoagulation with therapeutic enoxaparin eventually transition to DOAC  Holding carvedilol resume if BP tolerates  Utilize IV digoxin if recurrent A-fib RVR  Continue attempts for vent weaning  Reintroduce/optimize GDMT once hemodynamically appropriate  Aggressive risk factor modification  Further care per critical care, primary service and consultants  Apparently transfer to [facility] is being considered  Prognosis appears guarded    Discussed with Dr. Rosemarie Saez MD, [facility]    NOTE: This report was transcribed using voice recognition software. Every effort was made to ensure accuracy; however, inadvertent computerized transcription errors may be

## 2024-07-12 NOTE — PROGRESS NOTES
Infectious Disease  Progress Note  [facility]    Chief Complaint: shock    Subjective:  Patient is in the ICU. Still restless. Intubated, sedated. Got sicker this morning. Had a temp of 101.2 and intermittently tachycardic, hypoxic.     Scheduled Meds:   magnesium sulfate  2,000 mg IntraVENous Once    piperacillin-tazobactam  4,500 mg IntraVENous Q8H    LORazepam  2 mg Per NG tube Q4H    naloxegol  25 mg Oral QAM AC    carvedilol  3.125 mg Oral BID WC    QUEtiapine  50 mg Oral BID    sodium chloride flush  5-40 mL IntraVENous 2 times per day    docusate  100 mg Per NG tube BID    sennosides  10 mL Per NG tube BID    lansoprazole  30 mg Per NG tube QAM AC    enoxaparin  1 mg/kg SubCUTAneous Q12H    brexpiprazole  1 mg Oral Daily    arformoterol tartrate  15 mcg Nebulization BID RT    budesonide  0.5 mg Nebulization BID RT    hydroxychloroquine  200 mg Oral BID    chlorhexidine  15 mL Mouth/Throat BID    venlafaxine  75 mg Oral BID WC    lamoTRIgine  100 mg Oral Nightly    levalbuterol  0.63 mg Nebulization q8h     Continuous Infusions:   sodium chloride      propofol 30 mcg/kg/min (07/12/24 0553)    fentaNYL 200 mcg/hr (07/12/24 0603)    sodium chloride Stopped (07/10/24 1045)     PRN Meds:sodium chloride flush, sodium chloride, LORazepam, potassium chloride, magnesium sulfate, sodium phosphate 10.65 mmol in sodium chloride 0.9 % 250 mL IVPB **OR** sodium phosphate 21.27 mmol in sodium chloride 0.9 % 250 mL IVPB, sodium chloride, acetaminophen    Prior to Admission medications    Medication Sig Start Date End Date Taking? Authorizing Provider   venlafaxine (EFFEXOR XR) 150 MG extended release capsule  1/11/24   Brittni Camilo MD   epinastine (ELESTAT) 0.05 % SOLN  12/29/23   Brittni Camilo MD   alendronate (FOSAMAX) 70 MG tablet  1/20/24   Brittni Camilo MD   loratadine (CLARITIN) 10 MG capsule Take 1 capsule by mouth daily    ProviderBrittni MD   metoprolol succinate (TOPROL XL) 25 MG extended  release tablet TAKE 1/2 TABLET BY MOUTH EVERY DAY 12/14/21   Getachew Saez MD   nitroGLYCERIN (NITROSTAT) 0.4 MG SL tablet PLACE 1 TABLET UNDER TONGUE EVERY 5 MINS, UP TO 3 DOSES AS NEEDED FOR CHEST PAIN 8/23/21   Brittni Camilo MD   calcium carbonate 600 MG TABS tablet Take 1 tablet by mouth daily    Brittni Camilo MD   sucralfate (CARAFATE) 1 GM tablet Take 1 tablet by mouth 4 times daily 9/3/21   Hakeem Vera MD   aluminum & magnesium hydroxide-simethicone (MAALOX) 200-200-20 MG/5ML SUSP suspension Take 30 mLs by mouth every 6 hours as needed for Indigestion  Patient not taking: Reported on 8/31/2022 9/2/21   Brian Houston MD   omeprazole (PRILOSEC) 20 MG delayed release capsule Take 20 mg by mouth daily    Brittni Camilo MD   brexpiprazole (REXULTI) 1 MG TABS tablet Take 1 tablet by mouth daily    Brittni Camilo MD   vitamin D (CHOLECALCIFEROL) 25 MCG (1000 UT) TABS tablet Take by mouth daily     Brittni Camilo MD   hydroxychloroquine (PLAQUENIL) 200 MG tablet Take by mouth 2 times daily     Brittni Camilo MD   ALPRAZolam (XANAX) 1 MG tablet Take 2 mg by mouth 3 times daily.  4/20/19   Brittni Camilo MD   PROVENTIL  (90 Base) MCG/ACT inhaler INHALE 2 PUFFS EVERY 4 HOURS AS NEEDED 12/29/17   Brittni Camilo MD   lamoTRIgine (LAMICTAL) 200 MG tablet Take 1 tablet by mouth 2 tabs at night 12/11/17   Brittni Camilo MD   zolpidem (AMBIEN) 10 MG tablet Take  by mouth nightly as needed for Sleep.    Brittni Camilo MD   traZODone (DESYREL) 150 MG tablet Take 2 tablets by mouth nightly    Brittni Camilo MD        ROS:  Cannot be obtained pt is AMS, intubated,       BP (!) 77/43   Pulse 97   Temp (!) 100.9 °F (38.3 °C) (Bladder)   Resp 16   Ht 1.524 m (5')   Wt 63.8 kg (140 lb 10.5 oz)   LMP 12/04/2015 (LMP Unknown)   SpO2 97%   BMI 27.47 kg/m²     Physical Exam  Constitutional: The patient is restless   Skin: Warm

## 2024-07-12 NOTE — CARE COORDINATION
CASE MANAGEMENT.....Patient remains in icu. Re-intubated 7/9. On vent and iv sedation. EEG revealed moderate to severe nonspecific encephalopathy. Receiving iv ativan prn. PICC line placed yesterday. Started on iv zosyn today. Had right sided thoracentesis yesterday for 900cc serous fluid. [facility] following. Transfer to [facility] initiated 7/11 for Cardiogenic shock needing neuro eval. Ambulance forms in chart. Will follow along

## 2024-07-12 NOTE — PROGRESS NOTES
Critical Care Team - Daily Progress Note      Date and time: 7/12/2024 11:18 AM  Patient's name:  Greta Moran  Medical Record Number: [id number]  Patient's account/billing number: [id number]  Patient's YOB: 1970  Age: 54 y.o.  Date of Admission: 7/5/2024  9:08 PM  Length of stay during current admission: 7      Primary Care Physician: Brian Houston MD  ICU Attending Physician: Dr. Owens    Code Status: Full Code    Reason for ICU admission: Critical care management      SUBJECTIVE:     OVERNIGHT EVENTS:           Patient continues to be febrile with a maximum temperature of 101.2.  Patient on propofol and fentanyl for sedation however did have episodes where she got agitated and moving trying to move to the left side of the bed although she was intubated and sedated.  She did again pull out her Coto.  During those times patient was tachycardic to the 120s.  Elevation in her liver enzymes compared to yesterday this morning.  Leukocytosis of 12.2, hemoglobin remained stable.      Patient did become hypoxic and her PEEP was increased. She is developing bilateral haziness involving both lung fields, more so towards the right on x-ray.  Repeat blood culture, respiratory culture and urine culture sent.  She will be started on Zosyn as per recommendations from ID.      Intake/Output:   I/O this shift:  In: -   Out: 325 [Urine:325]  I/O last 3 completed shifts:  In: 3989.5 [I.V.:1120.7; NG/GT:2774; IV Piggyback:94.8]  Out: 3025 [Urine:2025; Other:1000]    Awake and following commands: No  Current Ventilation: - Ventilator Settings:    Vt (Set, mL): 350 mL  Resp Rate (Set): 14 bpm  FiO2 : 65 %    PEEP/CPAP (cmH2O): 10     Secretions: None  Sedation: fentanyl and versed  Paralyzed: No  Vasopressors: norepinephrine, vasopressin    ROS:  Unless stated above, ROS is otherwise negative.      Initial HPI + past overnight events: The patient is a 54 y.o. female with significant past medical history of  baseline CRP, PCT and ESR  - Received vancomycin and cefepime in ED will continue for now   - 7/4 Added Acyclovir with concerns of Meningitis  - 7/5 continue cefepime - vancomycin and acyclovir stopped  - ID consult appreciated - Continues Cefepime  - 7/7 fevers, blood cultures sent, haziness in the left lower lung  - 7/10 fevers - Blood, urine and respiratory cultures sent. - Completed Cefepime 7 days course. ID following.   - 7/12 fever, new haziness in the bilateral lung fields more so towards the right side.  Repeat blood culture, respiratory culture, urine cultures were sent.  Zosyn started.     Hematology/Oncology   # Continue hemoglobin trend  -Transfuse if less than 7     Endocrine   # SLE  - Continue Plaquenil  - Check TSH and free T4    Social/Spiritual/DNR/Other  Code status: Full Code  Diet Diet NPO  ADULT TUBE FEEDING; Nasogastric; Peptide Based; Continuous; 10; Yes; 10; Q 4 hours; 45; 40; Q 1 hour - Tube Feeds   Stress ulcer prophylaxis: Protonix  DVT prophylaxis: Lovenox  Consultations needed: Yes cardiology  Transfer out of ICU today? No    Lines/catheters  Date  Left basilic PICC  7/9 Left Femoral arterial line    Per family's request we will transfer to [facility] for further neurology evaluation.  She is excepted and waiting for a bed.      Spouse has been updated at the bedside and all questions were answered.    Xu Poe MD     I personally saw, examined and provided care for the patient. Radiographs, labs and medication list were reviewed by me independently. I spoke with bedside nursing, therapists and consultants. Critical care services and times documented are independent of procedures and multidisciplinary rounds with Residents. Additionally comprehensive, multidisciplinary rounds were conducted with the MICU team. The case was discussed in detail and plans for care were established. Review of Residents documentation was conducted and revisions were made as appropriate. I agree

## 2024-07-12 NOTE — PROGRESS NOTES
Sedated on vent- per nurse got very agitated and oxygen sat dropped- better now with increased sedation  Vss  lungs clear  reg rhythm  abd- soft and non-tender  ext- no edema  Stable- still intermittent elevated temp- cultures negative- lb noted  Continue present care- awaiting transfer to [place]

## 2024-07-13 LAB
ACB COMPLEX DNA BLD POS QL NAA+NON-PROBE: NOT DETECTED
B BURGDOR AB SER IA-ACNC: 0.19 IV
B FRAGILIS DNA BLD POS QL NAA+NON-PROBE: NOT DETECTED
BIOFIRE TEST COMMENT: NORMAL
C ALBICANS DNA BLD POS QL NAA+NON-PROBE: NOT DETECTED
C AURIS DNA BLD POS QL NAA+NON-PROBE: NOT DETECTED
C GATTII+NEOFOR DNA BLD POS QL NAA+N-PRB: NOT DETECTED
C GLABRATA DNA BLD POS QL NAA+NON-PROBE: NOT DETECTED
C KRUSEI DNA BLD POS QL NAA+NON-PROBE: NOT DETECTED
C PARAP DNA BLD POS QL NAA+NON-PROBE: NOT DETECTED
C TROPICLS DNA BLD POS QL NAA+NON-PROBE: NOT DETECTED
E CLOAC COMP DNA BLD POS NAA+NON-PROBE: NOT DETECTED
E COLI DNA BLD POS QL NAA+NON-PROBE: NOT DETECTED
E FAECALIS DNA BLD POS QL NAA+NON-PROBE: NOT DETECTED
E FAECIUM DNA BLD POS QL NAA+NON-PROBE: NOT DETECTED
ENTEROBACTERALES DNA BLD POS NAA+N-PRB: NOT DETECTED
GP B STREP DNA BLD POS QL NAA+NON-PROBE: NOT DETECTED
HAEM INFLU DNA BLD POS QL NAA+NON-PROBE: NOT DETECTED
K OXYTOCA DNA BLD POS QL NAA+NON-PROBE: NOT DETECTED
KLEBSIELLA SP DNA BLD POS QL NAA+NON-PRB: NOT DETECTED
KLEBSIELLA SP DNA BLD POS QL NAA+NON-PRB: NOT DETECTED
L MONOCYTOG DNA BLD POS QL NAA+NON-PROBE: NOT DETECTED
MICROORGANISM SPEC CULT: NORMAL
MICROORGANISM SPEC CULT: NORMAL
MICROORGANISM/AGENT SPEC: NORMAL
N MEN DNA BLD POS QL NAA+NON-PROBE: NOT DETECTED
P AERUGINOSA DNA BLD POS NAA+NON-PROBE: NOT DETECTED
PROTEUS SP DNA BLD POS QL NAA+NON-PROBE: NOT DETECTED
REPORT STATUS: NORMAL
S AUREUS DNA BLD POS QL NAA+NON-PROBE: NOT DETECTED
S AUREUS+CONS DNA BLD POS NAA+NON-PROBE: NOT DETECTED
S EPIDERMIDIS DNA BLD POS QL NAA+NON-PRB: NOT DETECTED
S LUGDUNENSIS DNA BLD POS QL NAA+NON-PRB: NOT DETECTED
S MALTOPHILIA DNA BLD POS QL NAA+NON-PRB: NOT DETECTED
S MARCESCENS DNA BLD POS NAA+NON-PROBE: NOT DETECTED
S PNEUM DNA BLD POS QL NAA+NON-PROBE: NOT DETECTED
S PYO DNA BLD POS QL NAA+NON-PROBE: NOT DETECTED
SALMONELLA DNA BLD POS QL NAA+NON-PROBE: NOT DETECTED
SERVICE CMNT-IMP: NORMAL
SPECIMEN DESCRIPTION: NORMAL
SPECIMEN DESCRIPTION: NORMAL
STREPTOCOCCUS DNA BLD POS NAA+NON-PROBE: NOT DETECTED

## 2024-07-13 NOTE — FLOWSHEET NOTE
Discharged via [facility] air transport. Propofol and Fentanyl infusing, vent secure, hisband Bill here at bedside and aware of room number at new facility. NO BELONGINGS here.

## 2024-07-14 LAB
MICROORGANISM SPEC CULT: NO GROWTH
MICROORGANISM SPEC CULT: NORMAL
MICROORGANISM/AGENT SPEC: NORMAL
SERVICE CMNT-IMP: NORMAL
SPECIMEN DESCRIPTION: NORMAL

## 2024-07-15 LAB
ACB COMPLEX DNA BLD POS QL NAA+NON-PROBE: NOT DETECTED
B FRAGILIS DNA BLD POS QL NAA+NON-PROBE: NOT DETECTED
BIOFIRE TEST COMMENT: ABNORMAL
C ALBICANS DNA BLD POS QL NAA+NON-PROBE: NOT DETECTED
C AURIS DNA BLD POS QL NAA+NON-PROBE: NOT DETECTED
C GATTII+NEOFOR DNA BLD POS QL NAA+N-PRB: NOT DETECTED
C GLABRATA DNA BLD POS QL NAA+NON-PROBE: NOT DETECTED
C KRUSEI DNA BLD POS QL NAA+NON-PROBE: NOT DETECTED
C PARAP DNA BLD POS QL NAA+NON-PROBE: NOT DETECTED
C TROPICLS DNA BLD POS QL NAA+NON-PROBE: NOT DETECTED
E CLOAC COMP DNA BLD POS NAA+NON-PROBE: NOT DETECTED
E COLI DNA BLD POS QL NAA+NON-PROBE: NOT DETECTED
E FAECALIS DNA BLD POS QL NAA+NON-PROBE: NOT DETECTED
E FAECIUM DNA BLD POS QL NAA+NON-PROBE: NOT DETECTED
ENTEROBACTERALES DNA BLD POS NAA+N-PRB: NOT DETECTED
GP B STREP DNA BLD POS QL NAA+NON-PROBE: NOT DETECTED
HAEM INFLU DNA BLD POS QL NAA+NON-PROBE: NOT DETECTED
K OXYTOCA DNA BLD POS QL NAA+NON-PROBE: NOT DETECTED
KLEBSIELLA SP DNA BLD POS QL NAA+NON-PRB: NOT DETECTED
KLEBSIELLA SP DNA BLD POS QL NAA+NON-PRB: NOT DETECTED
L MONOCYTOG DNA BLD POS QL NAA+NON-PROBE: NOT DETECTED
MICROORGANISM SPEC CULT: ABNORMAL
MICROORGANISM SPEC CULT: NORMAL
MICROORGANISM/AGENT SPEC: ABNORMAL
N MEN DNA BLD POS QL NAA+NON-PROBE: NOT DETECTED
NON-GYN CYTOLOGY REPORT: NORMAL
P AERUGINOSA DNA BLD POS NAA+NON-PROBE: NOT DETECTED
PROTEUS SP DNA BLD POS QL NAA+NON-PROBE: NOT DETECTED
S AUREUS DNA BLD POS QL NAA+NON-PROBE: NOT DETECTED
S AUREUS+CONS DNA BLD POS NAA+NON-PROBE: NOT DETECTED
S EPIDERMIDIS DNA BLD POS QL NAA+NON-PRB: NOT DETECTED
S LUGDUNENSIS DNA BLD POS QL NAA+NON-PRB: NOT DETECTED
S MALTOPHILIA DNA BLD POS QL NAA+NON-PRB: NOT DETECTED
S MARCESCENS DNA BLD POS NAA+NON-PROBE: NOT DETECTED
S PNEUM DNA BLD POS QL NAA+NON-PROBE: NOT DETECTED
S PYO DNA BLD POS QL NAA+NON-PROBE: NOT DETECTED
SALMONELLA DNA BLD POS QL NAA+NON-PROBE: NOT DETECTED
SERVICE CMNT-IMP: ABNORMAL
SERVICE CMNT-IMP: NORMAL
SPECIMEN DESCRIPTION: ABNORMAL
SPECIMEN DESCRIPTION: NORMAL
STREPTOCOCCUS DNA BLD POS NAA+NON-PROBE: NOT DETECTED

## 2024-07-15 NOTE — DISCHARGE SUMMARY
[place] [address]                            DISCHARGE SUMMARY      PATIENT NAME: JENI KURTZ               : 1970  MED REC NO: [id number]                        ROOM: 0205  ACCOUNT NO: [id number]                       ADMIT DATE: 2024  PROVIDER: Brian Houston MD      PRINCIPAL DIAGNOSIS:  Cardiogenic shock.    SECONDARY DIAGNOSES:  Acute respiratory failure with ventilator placement, cardiomyopathy-(takotsubo).  Other secondary diagnoses include severe hypotension, acute liver dysfunction, elevated lactic acid, elevated BNP, hypoalbuminemia, asthma, elevated troponin, bipolar disorder, systemic lupus erythematosus, fibromyalgia, altered mental status, probable left lower lobe pneumonia-sepsis criteria met, acute metabolic encephalopathy, atrial fibrillation with rapid ventricular response, anemia, distention of right ureter and collection system, acute nonspecific encephalopathy per EEG.    The patient is a white female who became suddenly unresponsive at home.  She was brought to the emergency room, was very agitated, had to be sedated, subsequently placed on a ventilator, and she is admitted to the intensive care unit.    PHYSICAL EXAMINATION:  At the time of my exam, she was sedated on the ventilator and no abnormalities on exam noted.    SIGNIFICANT X-RAYS AND LABS:  Please refer to the chart.    HOSPITAL COURSE:  The patient was admitted, appropriate blood work was obtained.  She was in intensive care and had an intensivist consult.  The ventilator was continued.  She was hypotensive and required vasopressors as well as fluids.  She had a cardiology consultation as well as Infectious Disease consultation and urologic consultation.  She had an echocardiogram done, which showed severe reduction in her ejection fraction of approximately 20% to 25%.  She subsequently had a cardiac catheterization, which

## 2024-07-16 LAB
MICROORGANISM SPEC CULT: NORMAL
MICROORGANISM/AGENT SPEC: NORMAL
SERVICE CMNT-IMP: NORMAL
SPECIMEN DESCRIPTION: NORMAL

## 2024-07-17 LAB
MICROORGANISM SPEC CULT: NORMAL
MICROORGANISM SPEC CULT: NORMAL
SERVICE CMNT-IMP: NORMAL
SERVICE CMNT-IMP: NORMAL
SPECIMEN DESCRIPTION: NORMAL
SPECIMEN DESCRIPTION: NORMAL

## 2024-08-02 ENCOUNTER — PREP FOR PROCEDURE (OUTPATIENT)
Dept: SURGERY | Age: 54
End: 2024-08-02

## 2024-08-02 RX ORDER — SODIUM CHLORIDE 9 MG/ML
INJECTION, SOLUTION INTRAVENOUS
Status: CANCELLED | OUTPATIENT
Start: 2024-08-02 | End: 2024-08-02

## 2024-08-05 ENCOUNTER — ANESTHESIA EVENT (OUTPATIENT)
Dept: ENDOSCOPY | Age: 54
End: 2024-08-05

## 2024-08-05 PROBLEM — R79.89 ELEVATED TROPONIN: Status: RESOLVED | Noted: 2024-07-04 | Resolved: 2024-08-05

## 2024-08-05 ASSESSMENT — LIFESTYLE VARIABLES: SMOKING_STATUS: 0

## 2024-08-05 NOTE — ANESTHESIA PRE PROCEDURE
ECG  When compared with ECG of 04-JUL-2024 09:17,  Atrial fibrillation has replaced Sinus rhythm  Vent. rate has increased BY  65 BPM  Non-specific change in ST segment in Inferior leads  Non-specific change in ST segment in Lateral leads    ECHO 2021:   Summary   Left ventricle is mildly dilated. LVEDD 5.3 cm.   LV systolic function is moderately reduced.   Ejection fraction is visually estimated at 30-35%.   Grade I diastolic dysfunction.   The apex is akinetic; severe hypokinesis of the apical segments; mild   hypokinesis of the mid segments.   Basal function is normal.   Normal left ventricular wall thickness.   Normal right ventricular size and function.   The left atrium is mildly dilated.   Findings are suggestive of Takotsubo cardiomyopathy; however obstructive   CAD may have a similar appearance and should be excluded.         Neuro/Psych:   (+) neuromuscular disease:, psychiatric history (Bipolar affective):depression/anxiety              ROS comment: Acute metabolic encephalopathy  Altered mental status  Ambulatory dysfunction  Low back pain  Lumbar spondylosis  Lupus   Memory difficulty  Mild cognitive impairment with memory loss  Insomnia   GI/Hepatic/Renal:   (+) GERD: no interval change         ROS comment: Constipation  Occult blood positive stool.   Endo/Other:    (+) blood dyscrasia (Eliquis LD 4 days ago; Hbg 10.9 g/dL): anticoagulation therapy and anemia, arthritis (Fibromyalgia and chroinc pain): OA..          Pt had no PAT visit        ROS comment: History of polysubstance abuse Abdominal:         (-) obese       Vascular: negative vascular ROS.         Other Findings: NG right nare - no output            Anesthesia Plan      MAC     ASA 4     (Dysphagia)  Induction: intravenous.      Anesthetic plan and risks discussed with patient.      Plan discussed with CRNA.              Saul Murdock DO   8/5/2024    History, data, and pertinent studies from chart review.  Above represents

## 2024-08-06 ENCOUNTER — ANESTHESIA (OUTPATIENT)
Dept: ENDOSCOPY | Age: 54
End: 2024-08-06

## 2024-08-06 PROCEDURE — 6360000002 HC RX W HCPCS: Performed by: SURGERY

## 2024-08-06 PROCEDURE — 6360000002 HC RX W HCPCS: Performed by: NURSE ANESTHETIST, CERTIFIED REGISTERED

## 2024-08-06 PROCEDURE — 2580000003 HC RX 258: Performed by: SURGERY

## 2024-08-06 RX ORDER — PROPOFOL 10 MG/ML
INJECTION, EMULSION INTRAVENOUS PRN
Status: DISCONTINUED | OUTPATIENT
Start: 2024-08-06 | End: 2024-08-06 | Stop reason: SDUPTHER

## 2024-08-06 RX ADMIN — WATER 2000 MG: 1 INJECTION INTRAMUSCULAR; INTRAVENOUS; SUBCUTANEOUS at 07:23

## 2024-08-06 RX ADMIN — SODIUM CHLORIDE: 9 INJECTION, SOLUTION INTRAVENOUS at 07:22

## 2024-08-06 RX ADMIN — PROPOFOL 20 MG: 10 INJECTION, EMULSION INTRAVENOUS at 07:27

## 2024-08-06 RX ADMIN — PROPOFOL 40 MG: 10 INJECTION, EMULSION INTRAVENOUS at 07:24

## 2024-08-06 NOTE — ANESTHESIA POSTPROCEDURE EVALUATION
Department of Anesthesiology  Postprocedure Note    Patient: Greta Moran  MRN: [id number]  YOB: 1970  Date of evaluation: 8/6/2024    Procedure Summary       Date: 08/06/24 Room / Location: [address]    Anesthesia Start: 0722 Anesthesia Stop: 0733    Procedure: ESOPHAGOGASTRODUODENOSCOPY PERCUTANEOUS ENDOSCOPIC GASTROSTOMY TUBE PLACEMENT Diagnosis:       Problems with swallowing and mastication      (Problems with swallowing and mastication [R13.10])    Surgeons: Jero Steward MD Responsible Provider: Saul Murdock DO    Anesthesia Type: MAC ASA Status: 4            Anesthesia Type: No value filed.    Alistair Phase I: Alistair Score: 10    Alistair Phase II: Alistair Score: 10    Anesthesia Post Evaluation    Patient location during evaluation: bedside  Patient participation: complete - patient participated  Level of consciousness: awake  Airway patency: patent  Nausea & Vomiting: no nausea and no vomiting  Cardiovascular status: hemodynamically stable and blood pressure returned to baseline  Respiratory status: room air and acceptable  Hydration status: euvolemic  Pain management: satisfactory to patient        No notable events documented.

## 2024-08-19 ENCOUNTER — TELEPHONE (OUTPATIENT)
Dept: CARDIOLOGY CLINIC | Age: 54
End: 2024-08-19

## 2024-09-25 ENCOUNTER — OFFICE VISIT (OUTPATIENT)
Dept: CARDIOLOGY CLINIC | Age: 54
End: 2024-09-25
Payer: MEDICARE

## 2024-09-25 VITALS
DIASTOLIC BLOOD PRESSURE: 60 MMHG | RESPIRATION RATE: 18 BRPM | BODY MASS INDEX: 24.76 KG/M2 | WEIGHT: 126.1 LBS | SYSTOLIC BLOOD PRESSURE: 110 MMHG | HEART RATE: 98 BPM | HEIGHT: 60 IN

## 2024-09-25 DIAGNOSIS — I51.81 TAKOTSUBO CARDIOMYOPATHY: Primary | ICD-10-CM

## 2024-09-25 DIAGNOSIS — I42.9 CARDIOMYOPATHY, UNSPECIFIED TYPE (HCC): ICD-10-CM

## 2024-09-25 DIAGNOSIS — I50.42 CHRONIC COMBINED SYSTOLIC AND DIASTOLIC CONGESTIVE HEART FAILURE (HCC): ICD-10-CM

## 2024-09-25 PROBLEM — I50.43 ACUTE ON CHRONIC COMBINED SYSTOLIC AND DIASTOLIC CONGESTIVE HEART FAILURE (HCC): Status: RESOLVED | Noted: 2024-07-06 | Resolved: 2024-09-25

## 2024-09-25 PROBLEM — R57.0 CARDIOGENIC SHOCK: Status: RESOLVED | Noted: 2024-07-06 | Resolved: 2024-09-25

## 2024-09-25 PROBLEM — I21.4 NSTEMI (NON-ST ELEVATED MYOCARDIAL INFARCTION) (HCC): Status: RESOLVED | Noted: 2021-08-20 | Resolved: 2024-09-25

## 2024-09-25 PROCEDURE — 3017F COLORECTAL CA SCREEN DOC REV: CPT | Performed by: INTERNAL MEDICINE

## 2024-09-25 PROCEDURE — G8420 CALC BMI NORM PARAMETERS: HCPCS | Performed by: INTERNAL MEDICINE

## 2024-09-25 PROCEDURE — G8427 DOCREV CUR MEDS BY ELIG CLIN: HCPCS | Performed by: INTERNAL MEDICINE

## 2024-09-25 PROCEDURE — 1036F TOBACCO NON-USER: CPT | Performed by: INTERNAL MEDICINE

## 2024-09-25 PROCEDURE — 99214 OFFICE O/P EST MOD 30 MIN: CPT | Performed by: INTERNAL MEDICINE

## 2024-09-25 PROCEDURE — 93000 ELECTROCARDIOGRAM COMPLETE: CPT | Performed by: INTERNAL MEDICINE

## 2024-09-25 RX ORDER — CLONAZEPAM 0.5 MG/1
0.5 TABLET ORAL 3 TIMES DAILY PRN
COMMUNITY

## 2024-09-25 RX ORDER — FERROUS SULFATE 325(65) MG
325 TABLET ORAL
COMMUNITY

## 2024-09-25 RX ORDER — METOPROLOL SUCCINATE 25 MG/1
25 TABLET, EXTENDED RELEASE ORAL DAILY
Qty: 30 TABLET | Refills: 3 | Status: SHIPPED | OUTPATIENT
Start: 2024-09-25

## 2024-09-27 ENCOUNTER — HOSPITAL ENCOUNTER (OUTPATIENT)
Dept: CARDIOLOGY | Age: 54
Discharge: HOME | End: 2024-09-29
Attending: INTERNAL MEDICINE
Payer: MEDICARE

## 2024-09-27 VITALS
SYSTOLIC BLOOD PRESSURE: 110 MMHG | WEIGHT: 126 LBS | DIASTOLIC BLOOD PRESSURE: 60 MMHG | HEIGHT: 60 IN | BODY MASS INDEX: 24.74 KG/M2

## 2024-09-27 DIAGNOSIS — I50.42 CHRONIC COMBINED SYSTOLIC AND DIASTOLIC CONGESTIVE HEART FAILURE (HCC): ICD-10-CM

## 2024-09-27 LAB
ECHO BSA: 1.56 M2
ECHO EST RA PRESSURE: 3 MMHG
ECHO LA VOL A-L A2C: 55 ML (ref 22–52)
ECHO LA VOL A-L A4C: 44 ML (ref 22–52)
ECHO LA VOL MOD A2C: 53 ML (ref 22–52)
ECHO LA VOL MOD A4C: 39 ML (ref 22–52)
ECHO LA VOLUME AREA LENGTH: 51 ML
ECHO LA VOLUME INDEX A-L A2C: 36 ML/M2 (ref 16–34)
ECHO LA VOLUME INDEX A-L A4C: 29 ML/M2 (ref 16–34)
ECHO LA VOLUME INDEX AREA LENGTH: 33 ML/M2 (ref 16–34)
ECHO LA VOLUME INDEX MOD A2C: 35 ML/M2 (ref 16–34)
ECHO LA VOLUME INDEX MOD A4C: 25 ML/M2 (ref 16–34)
ECHO LV EJECTION FRACTION A2C: 56 %
ECHO LV EJECTION FRACTION A4C: 56 %
ECHO LV FRACTIONAL SHORTENING: 27 % (ref 28–44)
ECHO LV INTERNAL DIMENSION DIASTOLE INDEX: 2.94 CM/M2
ECHO LV INTERNAL DIMENSION DIASTOLIC: 4.5 CM (ref 3.9–5.3)
ECHO LV INTERNAL DIMENSION SYSTOLIC INDEX: 2.16 CM/M2
ECHO LV INTERNAL DIMENSION SYSTOLIC: 3.3 CM
ECHO LV IVSD: 1 CM (ref 0.6–0.9)
ECHO LV IVSS: 1.2 CM
ECHO LV MASS 2D: 142.9 G (ref 67–162)
ECHO LV MASS INDEX 2D: 93.4 G/M2 (ref 43–95)
ECHO LV POSTERIOR WALL DIASTOLIC: 0.9 CM (ref 0.6–0.9)
ECHO LV POSTERIOR WALL SYSTOLIC: 1.2 CM
ECHO LV RELATIVE WALL THICKNESS RATIO: 0.4
ECHO MV A VELOCITY: 0.73 M/S
ECHO MV E DECELERATION TIME (DT): 175.8 MS
ECHO MV E VELOCITY: 0.6 M/S
ECHO MV E/A RATIO: 0.82
ECHO RIGHT VENTRICULAR SYSTOLIC PRESSURE (RVSP): 23 MMHG
ECHO RV INTERNAL DIMENSION: 3.1 CM
ECHO TV REGURGITANT MAX VELOCITY: 2.23 M/S
ECHO TV REGURGITANT PEAK GRADIENT: 20 MMHG

## 2024-09-27 PROCEDURE — 93308 TTE F-UP OR LMTD: CPT

## 2024-09-30 LAB
ECHO BSA: 1.56 M2
ECHO EST RA PRESSURE: 3 MMHG
ECHO LA VOL A-L A2C: 55 ML (ref 22–52)
ECHO LA VOL A-L A4C: 44 ML (ref 22–52)
ECHO LA VOL MOD A2C: 53 ML (ref 22–52)
ECHO LA VOL MOD A4C: 39 ML (ref 22–52)
ECHO LA VOLUME AREA LENGTH: 51 ML
ECHO LA VOLUME INDEX A-L A2C: 36 ML/M2 (ref 16–34)
ECHO LA VOLUME INDEX A-L A4C: 29 ML/M2 (ref 16–34)
ECHO LA VOLUME INDEX AREA LENGTH: 33 ML/M2 (ref 16–34)
ECHO LA VOLUME INDEX MOD A2C: 35 ML/M2 (ref 16–34)
ECHO LA VOLUME INDEX MOD A4C: 25 ML/M2 (ref 16–34)
ECHO LV EJECTION FRACTION A2C: 56 %
ECHO LV EJECTION FRACTION A4C: 56 %
ECHO LV EJECTION FRACTION BIPLANE: 56 % (ref 55–100)
ECHO LV FRACTIONAL SHORTENING: 27 % (ref 28–44)
ECHO LV INTERNAL DIMENSION DIASTOLE INDEX: 2.94 CM/M2
ECHO LV INTERNAL DIMENSION DIASTOLIC: 4.5 CM (ref 3.9–5.3)
ECHO LV INTERNAL DIMENSION SYSTOLIC INDEX: 2.16 CM/M2
ECHO LV INTERNAL DIMENSION SYSTOLIC: 3.3 CM
ECHO LV IVSD: 1 CM (ref 0.6–0.9)
ECHO LV IVSS: 1.2 CM
ECHO LV MASS 2D: 142.9 G (ref 67–162)
ECHO LV MASS INDEX 2D: 93.4 G/M2 (ref 43–95)
ECHO LV POSTERIOR WALL DIASTOLIC: 0.9 CM (ref 0.6–0.9)
ECHO LV POSTERIOR WALL SYSTOLIC: 1.2 CM
ECHO LV RELATIVE WALL THICKNESS RATIO: 0.4
ECHO MV A VELOCITY: 0.73 M/S
ECHO MV E DECELERATION TIME (DT): 175.8 MS
ECHO MV E VELOCITY: 0.6 M/S
ECHO MV E/A RATIO: 0.82
ECHO RIGHT VENTRICULAR SYSTOLIC PRESSURE (RVSP): 23 MMHG
ECHO RV INTERNAL DIMENSION: 3.1 CM
ECHO RV TAPSE: 2.1 CM (ref 1.7–?)
ECHO TV REGURGITANT MAX VELOCITY: 2.23 M/S
ECHO TV REGURGITANT PEAK GRADIENT: 20 MMHG

## 2024-10-15 ENCOUNTER — TELEPHONE (OUTPATIENT)
Dept: CARDIOLOGY CLINIC | Age: 54
End: 2024-10-15

## 2024-10-15 NOTE — TELEPHONE ENCOUNTER
Patient notified of Echo results and will try to keep stress managed per Dr. Saez. Patient will continue same medications and  follow up as scheduled.

## 2024-10-15 NOTE — TELEPHONE ENCOUNTER
----- Message from Dr. Getachew Saez MD sent at 10/14/2024 11:15 PM EDT -----  Echo looks great. Heart function has completely normalized. This is suggestive that had a recurrence of stress cardiomyopathy (broken heart syndrome) but things look good now. Stress management important. Cont same f/u as scheduled.

## 2024-11-29 ENCOUNTER — HOSPITAL ENCOUNTER (OUTPATIENT)
Dept: PULMONOLOGY | Age: 54
Discharge: HOME | End: 2024-11-29
Payer: MEDICARE

## 2024-11-29 ENCOUNTER — HOSPITAL ENCOUNTER (OUTPATIENT)
Dept: CT IMAGING | Age: 54
Discharge: HOME | End: 2024-12-01
Payer: MEDICARE

## 2024-11-29 ENCOUNTER — HOSPITAL ENCOUNTER (OUTPATIENT)
Age: 54
Discharge: HOME | End: 2024-11-29
Payer: MEDICARE

## 2024-11-29 DIAGNOSIS — Z87.891 HISTORY OF SMOKING 30 OR MORE PACK YEARS: ICD-10-CM

## 2024-11-29 DIAGNOSIS — J44.9 CHRONIC OBSTRUCTIVE PULMONARY DISEASE, UNSPECIFIED COPD TYPE (HCC): ICD-10-CM

## 2024-11-29 LAB
BASOPHILS # BLD: 0.06 K/UL (ref 0–0.2)
BASOPHILS NFR BLD: 1 % (ref 0–2)
EOSINOPHIL # BLD: 0.47 K/UL (ref 0.05–0.5)
EOSINOPHILS RELATIVE PERCENT: 8 % (ref 0–6)
ERYTHROCYTE [DISTWIDTH] IN BLOOD BY AUTOMATED COUNT: 13.6 % (ref 11.5–15)
HCT VFR BLD AUTO: 44.5 % (ref 34–48)
HGB BLD-MCNC: 14.6 G/DL (ref 11.5–15.5)
IMM GRANULOCYTES # BLD AUTO: <0.03 K/UL (ref 0–0.58)
IMM GRANULOCYTES NFR BLD: 0 % (ref 0–5)
LYMPHOCYTES NFR BLD: 2.11 K/UL (ref 1.5–4)
LYMPHOCYTES RELATIVE PERCENT: 34 % (ref 20–42)
MCH RBC QN AUTO: 29.4 PG (ref 26–35)
MCHC RBC AUTO-ENTMCNC: 32.8 G/DL (ref 32–34.5)
MCV RBC AUTO: 89.7 FL (ref 80–99.9)
MONOCYTES NFR BLD: 0.69 K/UL (ref 0.1–0.95)
MONOCYTES NFR BLD: 11 % (ref 2–12)
NEUTROPHILS NFR BLD: 46 % (ref 43–80)
NEUTS SEG NFR BLD: 2.82 K/UL (ref 1.8–7.3)
PLATELET # BLD AUTO: 258 K/UL (ref 130–450)
PMV BLD AUTO: 10.5 FL (ref 7–12)
RBC # BLD AUTO: 4.96 M/UL (ref 3.5–5.5)
WBC OTHER # BLD: 6.2 K/UL (ref 4.5–11.5)

## 2024-11-29 PROCEDURE — 94060 EVALUATION OF WHEEZING: CPT

## 2024-11-29 PROCEDURE — 94726 PLETHYSMOGRAPHY LUNG VOLUMES: CPT

## 2024-11-29 PROCEDURE — 94729 DIFFUSING CAPACITY: CPT

## 2024-11-29 PROCEDURE — 71271 CT THORAX LUNG CANCER SCR C-: CPT

## 2024-11-29 PROCEDURE — 85025 COMPLETE CBC W/AUTO DIFF WBC: CPT

## 2024-11-29 PROCEDURE — 36415 COLL VENOUS BLD VENIPUNCTURE: CPT

## 2024-11-30 NOTE — PROCEDURES
[place] [address]                           PULMONARY FUNCTION      PATIENT NAME: JENI KURTZ               : 1970  MED REC NO: [id number]                        ROOM:   ACCOUNT NO: [id number]                       ADMIT DATE: 2024  PROVIDER: Ferny Jaquez MD      DATE OF PROCEDURE: 2024    SURGEON:  Ferny Jaquez MD    REFERRING PHYSICIAN:  CASS BURR    54-year-old female, 60 inches height, 125 pounds.    FVC is 2.71, Z-score of -0.28, pre bronchodilator and 2.99, and Z-score of 0.4 post bronchodilator.  FEV1 is 1.77, Z-score of -1.53 pre bronchodilator and 1.81, Z-score of -1.39 post bronchodilator, ratio of 0.65 with Z-score of -2.07 pre bronchodilator and 0.61, Z-score of -2.59 post bronchodilator.  FEF25% to 75% is 1.24, Z-score of -1.52 pre bronchodilator and 1.54, Z-score of -1.01 post bronchodilator.  MVV is 30.58, Z-score of -5.11.  SVC 2.69, Z-score of -0.34 pre bronchodilator and 2.72, Z-score of -0.25 post bronchodilator.  RV 6.02, Z-score of 11.3 pre bronchodilator and 3.84, Z-score 5.56 post bronchodilator.  TLC 8.71, Z-score of 7.94 pre bronchodilator and 6.56, Z-score of 3.93 post bronchodilator.  Ratio is 69, Z-score 5.73 pre bronchodilator and 58.  Z-score of 3.77 post bronchodilator.  DLCO corrected is 18.56, Z-score of -0.05.  VA 4.54, Z-score 0.86.  Ratio 4.17, 94% predicted.    IMPRESSION:  Mild obstructive lung disease with hyperinflation and airtrapping with a significant bronchodilator response.  There is flattening of both inspiratory and expiratory flow volume loops, which may indicate a fixed airway obstruction.  CT of the chest and neck may be necessary.  Please correlate clinically.          FERNY JAQUEZ MD      D:  2024 13:27:33     T:  2024 01:24:29     ALONDRA/TORY  Job #:  [id number]     Doc#:  [id number]

## 2024-12-05 ENCOUNTER — TELEPHONE (OUTPATIENT)
Dept: ENT CLINIC | Age: 54
End: 2024-12-05

## 2024-12-05 ENCOUNTER — PREP FOR PROCEDURE (OUTPATIENT)
Dept: ENT CLINIC | Age: 54
End: 2024-12-05

## 2024-12-05 ENCOUNTER — OFFICE VISIT (OUTPATIENT)
Dept: ENT CLINIC | Age: 54
End: 2024-12-05
Payer: MEDICARE

## 2024-12-05 VITALS
SYSTOLIC BLOOD PRESSURE: 102 MMHG | TEMPERATURE: 97.8 F | WEIGHT: 130.1 LBS | DIASTOLIC BLOOD PRESSURE: 78 MMHG | BODY MASS INDEX: 25.54 KG/M2 | HEIGHT: 60 IN | HEART RATE: 101 BPM

## 2024-12-05 DIAGNOSIS — J38.2 VOCAL CORD NODULE: ICD-10-CM

## 2024-12-05 DIAGNOSIS — Z91.89: ICD-10-CM

## 2024-12-05 DIAGNOSIS — R06.1 INSPIRATORY STRIDOR: ICD-10-CM

## 2024-12-05 DIAGNOSIS — J38.6 POSTERIOR GLOTTIC STENOSIS: Primary | ICD-10-CM

## 2024-12-05 PROCEDURE — 3017F COLORECTAL CA SCREEN DOC REV: CPT | Performed by: OTOLARYNGOLOGY

## 2024-12-05 PROCEDURE — 1036F TOBACCO NON-USER: CPT | Performed by: OTOLARYNGOLOGY

## 2024-12-05 PROCEDURE — G8484 FLU IMMUNIZE NO ADMIN: HCPCS | Performed by: OTOLARYNGOLOGY

## 2024-12-05 PROCEDURE — 99204 OFFICE O/P NEW MOD 45 MIN: CPT | Performed by: OTOLARYNGOLOGY

## 2024-12-05 PROCEDURE — G8427 DOCREV CUR MEDS BY ELIG CLIN: HCPCS | Performed by: OTOLARYNGOLOGY

## 2024-12-05 PROCEDURE — G8419 CALC BMI OUT NRM PARAM NOF/U: HCPCS | Performed by: OTOLARYNGOLOGY

## 2024-12-05 PROCEDURE — 31575 DIAGNOSTIC LARYNGOSCOPY: CPT | Performed by: OTOLARYNGOLOGY

## 2024-12-05 RX ORDER — PREDNISONE 20 MG/1
40 TABLET ORAL DAILY
Qty: 10 TABLET | Refills: 0 | Status: ON HOLD | OUTPATIENT
Start: 2024-12-05 | End: 2024-12-10

## 2024-12-05 ASSESSMENT — ENCOUNTER SYMPTOMS
SHORTNESS OF BREATH: 1
RHINORRHEA: 0
ALLERGIC/IMMUNOLOGIC NEGATIVE: 1
SORE THROAT: 0
STRIDOR: 1

## 2024-12-05 NOTE — PROGRESS NOTES
Department of Otolaryngology  Office Consult Note  12/5/24          Subjective:        Chief Complaint:  had concerns including New Patient ( NP-anatomic airway obstruction; referred by Leona/).     Patient ID: Greta Moran is a 54 y.o. female.    HPI: Patient presents as  new patient for difficulty breathing and change in voice. Patient was intubated 6 months ago for 18 days total a few different times for cardiogenic shock and cardiomyopathy leading to acute respiratory failure and hypoxia.  Patient was then in rehab for several months and was discharged 4 months ago.  Since hospital admission and rehab discharge she has had progressive worsening shortness of breath, dyspnea on exertion, noisy breathing.  She did have PEG tube but has been able to eat and drink without difficulty.  She has been seen by pulmonology for her difficulty breathing and noisy breathing, trialed on several inhalers and nebulizers without any improvement of symptoms.  Had CT chest done that was clear with no evidence of significant lung disease or significant subglottic stenosis.  Now she can walk several feet and then becomes short of breath.  Denies previous similar history.  Denies surgery to the neck.  Denies seasonal allergies, postnasal drainage, reflux symptoms.        Review of Systems   Constitutional: Negative.    HENT:  Negative for congestion, ear discharge, ear pain, hearing loss, rhinorrhea, sneezing and sore throat.    Respiratory:  Positive for shortness of breath and stridor.    Cardiovascular:  Negative for chest pain.   Musculoskeletal: Negative.    Skin: Negative.    Allergic/Immunologic: Negative.    Neurological: Negative.    Psychiatric/Behavioral: Negative.     All other systems reviewed and are negative.        Past Medical History:   Diagnosis Date    Anxiety     dr matamoros 224 in [facility] counseling    Arthritis     Asthma     Atrial fibrillation (HCC)     Bipolar 2 disorder (HCC)     Bruising

## 2024-12-05 NOTE — PROGRESS NOTES
[facility] PRE-ADMISSION TESTING INSTRUCTIONS    The Preadmission Testing patient is instructed accordingly using the following criteria (check applicable):    ARRIVAL INSTRUCTIONS:   Arrival Time: 0830    [x] Parking the day of Surgery is located in the Main Entrance lot.  Upon entering through the main entrance (Entrance A) make an immediate right to the surgery reception desk    [x] Bring photo ID and insurance card    [] Bring in a copy of Living will or Durable Power of  papers.    [x] Please be sure to arrange for a responsible adult to provide transportation to and from the hospital    [x] Please arrange for a responsible adult to be with you for the 24 hour period post procedure, due to having anesthesia    [x] Please notify surgeon if you develop any illness between now and time of surgery (cold, cough, sore throat, fever, nausea, vomiting) or any signs of infections  including skin, wounds, and dental.    [x] If you awake am of surgery not feeling well or have temperature >100 please call [phone].    GENERAL INSTRUCTIONS:    [x] No solid foods after midnight, may have up to 8oz of water until 4 hours prior to surgery. No gum, no candy, no mints.  NPO time: 0630       [x] You may brush your teeth    [x] Take medications as instructed     [x] Bring inhalers day of surgery    [x] Stop herbal supplements and vitamins 5 days prior to procedure    [x] Follow preop dosing of blood thinners per physician instructions    [] Take 1/2 dose of evening insulin, but no insulin after midnight    [] No oral diabetic medications after midnight    [] If diabetic and have low blood sugar or feel symptomatic, take 1-2oz apple juice only    [] Bring urine specimen day of surgery    [x] Shower or bath with soap, lather and rinse well, AM of Surgery, no lotion, powders or creams to surgical site    [x] Please do not wear any nail polish, make up, hair products, body spray, aftershave,  cologne or perfume on the day of surgery    [x] Jewelry, body piercings, eyeglasses, contact lenses and dentures are not permitted into surgery (bring cases)    [] Follow bowel prep as instructed per surgeon    [x] No tobacco products within 24 hours of surgery     [x] No alcohol or illegal drug use, marijuana included, within 24 hours of surgery.    [x] You can expect a call the business day prior to procedure to notify you if your arrival time changes    [x] If you receive a survey after surgery we would greatly appreciate your comments    [] Parent/guardian of a minor must accompany their child and remain on the premises  the entire time they are under our care     [] Pediatric patients may bring favorite toy, blanket or comfort item with them    [] A caregiver or family member must remain with the patient during their stay if they are mentally handicapped, have dementia, disoriented or unable to use a call light or would be a safety concern if left unattended    [x]  The Outpatient Pharmacy is available to fill your prescription here on your day of surgery, ask your preop nurse for details    [] Other instructions    EDUCATIONAL MATERIALS PROVIDED:    [] PAT Preoperative Education Packet/Booklet     [] Medication List    [] Transfusion bracelet given to patient. Patient instructed to place on wrist or bring bracelet to the hospital day of surgery.  Patient informed that if bracelet is lost or forgotten at home, bloodwork will have to be repeated which could cause a delay in surgery.    [] Shower with soap, lather and rinse well, and use CHG wipes provided the evening before surgery as instructed    [] Incentive spirometer with instructions

## 2024-12-05 NOTE — H&P
Department of Otolaryngology  Office Consult Note  12/5/24              Subjective:         Chief Complaint:  had concerns including New Patient ( NP-anatomic airway obstruction; referred by Leona/).      Patient ID: Greta Moran is a 54 y.o. female.     HPI: Patient presents as  new patient for difficulty breathing and change in voice. Patient was intubated 6 months ago for 18 days total a few different times for cardiogenic shock and cardiomyopathy leading to acute respiratory failure and hypoxia.  Patient was then in rehab for several months and was discharged 4 months ago.  Since hospital admission and rehab discharge she has had progressive worsening shortness of breath, dyspnea on exertion, noisy breathing.  She did have PEG tube but has been able to eat and drink without difficulty.  She has been seen by pulmonology for her difficulty breathing and noisy breathing, trialed on several inhalers and nebulizers without any improvement of symptoms.  Had CT chest done that was clear with no evidence of significant lung disease or significant subglottic stenosis.  Now she can walk several feet and then becomes short of breath.  Denies previous similar history.  Denies surgery to the neck.  Denies seasonal allergies, postnasal drainage, reflux symptoms.          Review of Systems   Constitutional: Negative.    HENT:  Negative for congestion, ear discharge, ear pain, hearing loss, rhinorrhea, sneezing and sore throat.    Respiratory:  Positive for shortness of breath and stridor.    Cardiovascular:  Negative for chest pain.   Musculoskeletal: Negative.    Skin: Negative.    Allergic/Immunologic: Negative.    Neurological: Negative.    Psychiatric/Behavioral: Negative.     All other systems reviewed and are negative.           Past Medical History        Past Medical History:   Diagnosis Date    Anxiety       dr matamoros 224 in \[facility]\"" counseling    Arthritis      Asthma      Atrial fibrillation (HCC)    respiratory failure, trach dependence, T-E fistula.  Patient understands and agrees to proceed.  She has been at the same status for several months without any worsening of her symptoms.  She feels at baseline today.  Discussed risk-benefit alternative of tracheostomy today versus tomorrow.  Patient is stable and would like to wait until tomorrow.  Discussed signs and symptoms in which to return to the ED.     Will plan for OR tomorrow.  Advised to hold all anticoagulation, she did take her Eliquis this morning.           This note may be dictated with vocal recognition software. All grammatical or semantic errors should be considered accordingly.      Electronically signed by Jean-Paul Tilley DO on 12/5/24 at11:03 AM EST

## 2024-12-05 NOTE — TELEPHONE ENCOUNTER
Prior Authorization Form:      DEMOGRAPHICS:                     Patient Name:  Jeni Moran  Patient :  1970            Insurance:  Payor: MEDICARE / Plan: MEDICARE PART A AND B / Product Type: *No Product type* /   Insurance ID Number:    Payer/Plan Subscr  Sex Relation Sub. Ins. ID Effective Group Num   1. MEDICARE - MEJENI PIÑA 1970 Female Self [id number] 3/1/21                                    [address] [address]   2. PROGRESSIVE -* JENI MORAN 1970 Female Self [id number]                                    [address]         DIAGNOSIS & PROCEDURE:                       Procedure/Operation: DIRECT LARYNGOSCOPY WITH LASER ABLATION POSTERIOR GLOTTIC STENOSIS TRACHEOSTOMY           CPT Code: 88532, 43657    Diagnosis:  POSTERIOR GLOTTIC STENOSIS    ICD10 Code: J38.6    Location:  [facility]    Surgeon:  CLARIBEL FRAZIER DO    SCHEDULING INFORMATION:                          Date: 2024    Time: N/A              Anesthesia:  General                                                       Status:  Outpatient        Special Comments:  CO2 LASER       Electronically signed by Dalila Omer LPN on 2024 at 12:27 PM

## 2024-12-05 NOTE — PATIENT INSTRUCTIONS
sure to check with our office or the hospital on time frame for the drugs to be out of your system.    SHOULD YOUR INSURANCE CHANGE AT ANY TIME YOU MUST CONTACT OUR OFFICE. FAILURE TO DO SO MAY RESULT IN YOUR SURGERY BEING RESCHEDULED OR YOU MAY BE CHARGED AS SELF-PAY.    Due to the high demand for surgery at our practice, if you cancel or reschedule your surgery two (2) times we may not reschedule you.    If you need FMLA or Short Term Disability paperwork completed for your surgery, please complete your portion, ensure your name and date of birth are on them and fax them to [phone] asap. Paperwork can take up to 2 weeks to be completed.    If you have any questions or concerns regarding your surgery, please contact the Surgery Jqduivurp-[phone] option 2.    If you need medical clearance, you are responsible to contact your physician(s) to schedule an appointment for clearance. If clearance is not completed within 30 days of your surgery it may be cancelled. Our office will fax the appropriate forms that need to be completed to your physician(s).    ** ALL TONSILLECTOMY PATIENTS**-- IF YOU EXPERIENCE A POST OPERATIVE BLEED, PER REQUEST OF YOUR SURGEON PLEASE REPORT TO [facility] OR [facility] ONLY.     The location of your surgery will call you the day prior to your surgery date to let you know what time you have to be there and any other details. (they usually don't call until late afternoon- early evening.)- IF YOU HAVE QUESTIONS REGARDING THE TIME OF YOUR SURGERY, PLEASE CALL THE FACILITY YOU ARE SCHEDULED AT.           [facility], [address] [place] [address] will call you a couple days prior to surgery and give you further instructions, if you have any questions, you can reach them at [phone] (per Pre-Admission testing, EKG is required for all patients age 55+, have a diagnosis of hypertension, diabetes, or on dialysis).           [facility]

## 2024-12-06 ENCOUNTER — ANESTHESIA EVENT (OUTPATIENT)
Dept: OPERATING ROOM | Age: 54
End: 2024-12-06
Payer: MEDICARE

## 2024-12-06 ENCOUNTER — ANESTHESIA (OUTPATIENT)
Dept: OPERATING ROOM | Age: 54
End: 2024-12-06
Payer: MEDICARE

## 2024-12-06 ENCOUNTER — HOSPITAL ENCOUNTER (INPATIENT)
Age: 54
LOS: 7 days | Discharge: HOME HEALTH | End: 2024-12-13
Attending: OTOLARYNGOLOGY | Admitting: OTOLARYNGOLOGY
Payer: MEDICARE

## 2024-12-06 ENCOUNTER — APPOINTMENT (OUTPATIENT)
Dept: GENERAL RADIOLOGY | Age: 54
End: 2024-12-06
Attending: OTOLARYNGOLOGY
Payer: MEDICARE

## 2024-12-06 DIAGNOSIS — G89.18 POST-OPERATIVE PAIN: Primary | ICD-10-CM

## 2024-12-06 DIAGNOSIS — J38.6 POSTERIOR GLOTTIC STENOSIS: ICD-10-CM

## 2024-12-06 PROBLEM — J38.00 VOCAL CORD PARALYSIS: Status: ACTIVE | Noted: 2024-12-06

## 2024-12-06 LAB — GLUCOSE BLD-MCNC: 112 MG/DL (ref 74–99)

## 2024-12-06 PROCEDURE — 82947 ASSAY GLUCOSE BLOOD QUANT: CPT

## 2024-12-06 PROCEDURE — 6360000002 HC RX W HCPCS

## 2024-12-06 PROCEDURE — 3600000013 HC SURGERY LEVEL 3 ADDTL 15MIN: Performed by: OTOLARYNGOLOGY

## 2024-12-06 PROCEDURE — 6360000002 HC RX W HCPCS: Performed by: STUDENT IN AN ORGANIZED HEALTH CARE EDUCATION/TRAINING PROGRAM

## 2024-12-06 PROCEDURE — 6370000000 HC RX 637 (ALT 250 FOR IP): Performed by: OTOLARYNGOLOGY

## 2024-12-06 PROCEDURE — 7100000001 HC PACU RECOVERY - ADDTL 15 MIN: Performed by: OTOLARYNGOLOGY

## 2024-12-06 PROCEDURE — 0B110F4 BYPASS TRACHEA TO CUTANEOUS WITH TRACHEOSTOMY DEVICE, OPEN APPROACH: ICD-10-PCS | Performed by: OTOLARYNGOLOGY

## 2024-12-06 PROCEDURE — 94640 AIRWAY INHALATION TREATMENT: CPT

## 2024-12-06 PROCEDURE — 6360000002 HC RX W HCPCS: Performed by: ANESTHESIOLOGY

## 2024-12-06 PROCEDURE — 71045 X-RAY EXAM CHEST 1 VIEW: CPT

## 2024-12-06 PROCEDURE — 0CBV8ZX EXCISION OF LEFT VOCAL CORD, VIA NATURAL OR ARTIFICIAL OPENING ENDOSCOPIC, DIAGNOSTIC: ICD-10-PCS | Performed by: OTOLARYNGOLOGY

## 2024-12-06 PROCEDURE — 3700000001 HC ADD 15 MINUTES (ANESTHESIA): Performed by: OTOLARYNGOLOGY

## 2024-12-06 PROCEDURE — 2720000010 HC SURG SUPPLY STERILE: Performed by: OTOLARYNGOLOGY

## 2024-12-06 PROCEDURE — 3700000000 HC ANESTHESIA ATTENDED CARE: Performed by: OTOLARYNGOLOGY

## 2024-12-06 PROCEDURE — 99233 SBSQ HOSP IP/OBS HIGH 50: CPT | Performed by: STUDENT IN AN ORGANIZED HEALTH CARE EDUCATION/TRAINING PROGRAM

## 2024-12-06 PROCEDURE — 2500000003 HC RX 250 WO HCPCS

## 2024-12-06 PROCEDURE — 2580000003 HC RX 258

## 2024-12-06 PROCEDURE — 7100000000 HC PACU RECOVERY - FIRST 15 MIN: Performed by: OTOLARYNGOLOGY

## 2024-12-06 PROCEDURE — 2060000000 HC ICU INTERMEDIATE R&B

## 2024-12-06 PROCEDURE — 2709999900 HC NON-CHARGEABLE SUPPLY: Performed by: OTOLARYNGOLOGY

## 2024-12-06 PROCEDURE — 3600000003 HC SURGERY LEVEL 3 BASE: Performed by: OTOLARYNGOLOGY

## 2024-12-06 PROCEDURE — 6360000002 HC RX W HCPCS: Performed by: OTOLARYNGOLOGY

## 2024-12-06 PROCEDURE — 0C5S8ZZ DESTRUCTION OF LARYNX, VIA NATURAL OR ARTIFICIAL OPENING ENDOSCOPIC: ICD-10-PCS | Performed by: OTOLARYNGOLOGY

## 2024-12-06 PROCEDURE — 6370000000 HC RX 637 (ALT 250 FOR IP): Performed by: STUDENT IN AN ORGANIZED HEALTH CARE EDUCATION/TRAINING PROGRAM

## 2024-12-06 PROCEDURE — 88305 TISSUE EXAM BY PATHOLOGIST: CPT

## 2024-12-06 RX ORDER — FENTANYL CITRATE 50 UG/ML
25 INJECTION, SOLUTION INTRAMUSCULAR; INTRAVENOUS EVERY 5 MIN PRN
Status: DISCONTINUED | OUTPATIENT
Start: 2024-12-06 | End: 2024-12-06 | Stop reason: HOSPADM

## 2024-12-06 RX ORDER — LORAZEPAM 2 MG/ML
0.5 INJECTION INTRAMUSCULAR EVERY 6 HOURS PRN
Status: DISCONTINUED | OUTPATIENT
Start: 2024-12-06 | End: 2024-12-13 | Stop reason: HOSPADM

## 2024-12-06 RX ORDER — SODIUM CHLORIDE 9 MG/ML
INJECTION, SOLUTION INTRAVENOUS
Status: DISCONTINUED | OUTPATIENT
Start: 2024-12-06 | End: 2024-12-06 | Stop reason: SDUPTHER

## 2024-12-06 RX ORDER — IPRATROPIUM BROMIDE AND ALBUTEROL SULFATE 2.5; .5 MG/3ML; MG/3ML
1 SOLUTION RESPIRATORY (INHALATION) EVERY 4 HOURS
Status: DISCONTINUED | OUTPATIENT
Start: 2024-12-06 | End: 2024-12-06 | Stop reason: SDUPTHER

## 2024-12-06 RX ORDER — HYDROXYCHLOROQUINE SULFATE 200 MG/1
200 TABLET, FILM COATED ORAL 2 TIMES DAILY
Status: DISCONTINUED | OUTPATIENT
Start: 2024-12-06 | End: 2024-12-13 | Stop reason: HOSPADM

## 2024-12-06 RX ORDER — ARFORMOTEROL TARTRATE 15 UG/2ML
15 SOLUTION RESPIRATORY (INHALATION)
Status: DISCONTINUED | OUTPATIENT
Start: 2024-12-06 | End: 2024-12-13 | Stop reason: HOSPADM

## 2024-12-06 RX ORDER — MIDAZOLAM HYDROCHLORIDE 1 MG/ML
INJECTION, SOLUTION INTRAMUSCULAR; INTRAVENOUS
Status: DISCONTINUED | OUTPATIENT
Start: 2024-12-06 | End: 2024-12-06 | Stop reason: SDUPTHER

## 2024-12-06 RX ORDER — DEXAMETHASONE SODIUM PHOSPHATE 4 MG/ML
INJECTION, SOLUTION INTRA-ARTICULAR; INTRALESIONAL; INTRAMUSCULAR; INTRAVENOUS; SOFT TISSUE
Status: DISCONTINUED | OUTPATIENT
Start: 2024-12-06 | End: 2024-12-06 | Stop reason: SDUPTHER

## 2024-12-06 RX ORDER — ROCURONIUM BROMIDE 10 MG/ML
INJECTION, SOLUTION INTRAVENOUS
Status: DISCONTINUED | OUTPATIENT
Start: 2024-12-06 | End: 2024-12-06 | Stop reason: SDUPTHER

## 2024-12-06 RX ORDER — FENTANYL CITRATE 50 UG/ML
INJECTION, SOLUTION INTRAMUSCULAR; INTRAVENOUS
Status: DISCONTINUED | OUTPATIENT
Start: 2024-12-06 | End: 2024-12-06 | Stop reason: SDUPTHER

## 2024-12-06 RX ORDER — DEXAMETHASONE SODIUM PHOSPHATE 10 MG/ML
10 INJECTION, SOLUTION INTRAMUSCULAR; INTRAVENOUS EVERY 8 HOURS
Status: COMPLETED | OUTPATIENT
Start: 2024-12-06 | End: 2024-12-07

## 2024-12-06 RX ORDER — ALBUTEROL SULFATE 0.63 MG/3ML
0.63 SOLUTION RESPIRATORY (INHALATION) EVERY 4 HOURS
Status: DISCONTINUED | OUTPATIENT
Start: 2024-12-06 | End: 2024-12-06 | Stop reason: SDUPTHER

## 2024-12-06 RX ORDER — IPRATROPIUM BROMIDE AND ALBUTEROL SULFATE 2.5; .5 MG/3ML; MG/3ML
1 SOLUTION RESPIRATORY (INHALATION)
Status: DISCONTINUED | OUTPATIENT
Start: 2024-12-06 | End: 2024-12-06 | Stop reason: HOSPADM

## 2024-12-06 RX ORDER — DIPHENHYDRAMINE HYDROCHLORIDE 50 MG/ML
12.5 INJECTION INTRAMUSCULAR; INTRAVENOUS
Status: DISCONTINUED | OUTPATIENT
Start: 2024-12-06 | End: 2024-12-06 | Stop reason: HOSPADM

## 2024-12-06 RX ORDER — PROCHLORPERAZINE EDISYLATE 5 MG/ML
5 INJECTION INTRAMUSCULAR; INTRAVENOUS
Status: DISCONTINUED | OUTPATIENT
Start: 2024-12-06 | End: 2024-12-06 | Stop reason: HOSPADM

## 2024-12-06 RX ORDER — SODIUM CHLORIDE 9 MG/ML
INJECTION, SOLUTION INTRAVENOUS PRN
Status: DISCONTINUED | OUTPATIENT
Start: 2024-12-06 | End: 2024-12-06 | Stop reason: HOSPADM

## 2024-12-06 RX ORDER — LAMOTRIGINE 100 MG/1
200 TABLET ORAL 2 TIMES DAILY
Status: DISCONTINUED | OUTPATIENT
Start: 2024-12-06 | End: 2024-12-13 | Stop reason: HOSPADM

## 2024-12-06 RX ORDER — PROPOFOL 10 MG/ML
INJECTION, EMULSION INTRAVENOUS
Status: DISCONTINUED | OUTPATIENT
Start: 2024-12-06 | End: 2024-12-06 | Stop reason: SDUPTHER

## 2024-12-06 RX ORDER — VENLAFAXINE 75 MG/1
75 TABLET ORAL 2 TIMES DAILY
Status: DISCONTINUED | OUTPATIENT
Start: 2024-12-06 | End: 2024-12-09

## 2024-12-06 RX ORDER — ONDANSETRON 2 MG/ML
INJECTION INTRAMUSCULAR; INTRAVENOUS
Status: DISCONTINUED | OUTPATIENT
Start: 2024-12-06 | End: 2024-12-06 | Stop reason: SDUPTHER

## 2024-12-06 RX ORDER — GLUCAGON 1 MG/ML
1 KIT INJECTION PRN
Status: DISCONTINUED | OUTPATIENT
Start: 2024-12-06 | End: 2024-12-06 | Stop reason: HOSPADM

## 2024-12-06 RX ORDER — SODIUM CHLORIDE 0.9 % (FLUSH) 0.9 %
5-40 SYRINGE (ML) INJECTION PRN
Status: DISCONTINUED | OUTPATIENT
Start: 2024-12-06 | End: 2024-12-06 | Stop reason: HOSPADM

## 2024-12-06 RX ORDER — DEXTROSE MONOHYDRATE 100 MG/ML
INJECTION, SOLUTION INTRAVENOUS CONTINUOUS PRN
Status: DISCONTINUED | OUTPATIENT
Start: 2024-12-06 | End: 2024-12-06 | Stop reason: HOSPADM

## 2024-12-06 RX ORDER — SODIUM CHLORIDE 0.9 % (FLUSH) 0.9 %
5-40 SYRINGE (ML) INJECTION EVERY 12 HOURS SCHEDULED
Status: DISCONTINUED | OUTPATIENT
Start: 2024-12-06 | End: 2024-12-06 | Stop reason: HOSPADM

## 2024-12-06 RX ORDER — TRIAMCINOLONE ACETONIDE 40 MG/ML
INJECTION, SUSPENSION INTRA-ARTICULAR; INTRAMUSCULAR PRN
Status: DISCONTINUED | OUTPATIENT
Start: 2024-12-06 | End: 2024-12-06 | Stop reason: ALTCHOICE

## 2024-12-06 RX ORDER — ONDANSETRON 2 MG/ML
4 INJECTION INTRAMUSCULAR; INTRAVENOUS
Status: DISCONTINUED | OUTPATIENT
Start: 2024-12-06 | End: 2024-12-06 | Stop reason: HOSPADM

## 2024-12-06 RX ORDER — LIDOCAINE HYDROCHLORIDE AND EPINEPHRINE 10; 10 MG/ML; UG/ML
INJECTION, SOLUTION INFILTRATION; PERINEURAL PRN
Status: DISCONTINUED | OUTPATIENT
Start: 2024-12-06 | End: 2024-12-06 | Stop reason: ALTCHOICE

## 2024-12-06 RX ORDER — METOPROLOL SUCCINATE 25 MG/1
25 TABLET, EXTENDED RELEASE ORAL DAILY
Status: DISCONTINUED | OUTPATIENT
Start: 2024-12-07 | End: 2024-12-13 | Stop reason: HOSPADM

## 2024-12-06 RX ORDER — LOSARTAN POTASSIUM 25 MG/1
25 TABLET ORAL DAILY
Status: DISCONTINUED | OUTPATIENT
Start: 2024-12-07 | End: 2024-12-13

## 2024-12-06 RX ORDER — EPINEPHRINE NASAL SOLUTION 1 MG/ML
SOLUTION NASAL PRN
Status: DISCONTINUED | OUTPATIENT
Start: 2024-12-06 | End: 2024-12-06 | Stop reason: ALTCHOICE

## 2024-12-06 RX ORDER — PANTOPRAZOLE SODIUM 40 MG/1
40 TABLET, DELAYED RELEASE ORAL
Status: DISCONTINUED | OUTPATIENT
Start: 2024-12-07 | End: 2024-12-13 | Stop reason: HOSPADM

## 2024-12-06 RX ORDER — ALBUTEROL SULFATE 0.83 MG/ML
2.5 SOLUTION RESPIRATORY (INHALATION) EVERY 4 HOURS PRN
Status: DISCONTINUED | OUTPATIENT
Start: 2024-12-06 | End: 2024-12-13 | Stop reason: HOSPADM

## 2024-12-06 RX ORDER — LIDOCAINE HYDROCHLORIDE 20 MG/ML
5 INJECTION, SOLUTION EPIDURAL; INFILTRATION; INTRACAUDAL; PERINEURAL ONCE
Status: COMPLETED | OUTPATIENT
Start: 2024-12-06 | End: 2024-12-06

## 2024-12-06 RX ORDER — FERROUS SULFATE 325(65) MG
325 TABLET ORAL
Status: DISCONTINUED | OUTPATIENT
Start: 2024-12-07 | End: 2024-12-11

## 2024-12-06 RX ORDER — BUDESONIDE 0.5 MG/2ML
1 INHALANT ORAL
Status: DISCONTINUED | OUTPATIENT
Start: 2024-12-06 | End: 2024-12-13 | Stop reason: HOSPADM

## 2024-12-06 RX ORDER — LIDOCAINE HYDROCHLORIDE 20 MG/ML
INJECTION, SOLUTION EPIDURAL; INFILTRATION; INTRACAUDAL; PERINEURAL
Status: DISCONTINUED | OUTPATIENT
Start: 2024-12-06 | End: 2024-12-06 | Stop reason: SDUPTHER

## 2024-12-06 RX ORDER — ALBUTEROL SULFATE 90 UG/1
1 INHALANT RESPIRATORY (INHALATION)
Status: DISCONTINUED | OUTPATIENT
Start: 2024-12-06 | End: 2024-12-06 | Stop reason: CLARIF

## 2024-12-06 RX ORDER — CLONAZEPAM 0.5 MG/1
0.5 TABLET ORAL 3 TIMES DAILY PRN
Status: DISCONTINUED | OUTPATIENT
Start: 2024-12-06 | End: 2024-12-13 | Stop reason: HOSPADM

## 2024-12-06 RX ORDER — NALOXONE HYDROCHLORIDE 0.4 MG/ML
INJECTION, SOLUTION INTRAMUSCULAR; INTRAVENOUS; SUBCUTANEOUS PRN
Status: DISCONTINUED | OUTPATIENT
Start: 2024-12-06 | End: 2024-12-06 | Stop reason: HOSPADM

## 2024-12-06 RX ADMIN — HYDROXYCHLOROQUINE SULFATE 200 MG: 200 TABLET ORAL at 20:38

## 2024-12-06 RX ADMIN — AMPICILLIN SODIUM AND SULBACTAM SODIUM 3000 MG: 2; 1 INJECTION, POWDER, FOR SOLUTION INTRAMUSCULAR; INTRAVENOUS at 16:00

## 2024-12-06 RX ADMIN — DEXAMETHASONE SODIUM PHOSPHATE 10 MG: 4 INJECTION, SOLUTION INTRAMUSCULAR; INTRAVENOUS at 11:04

## 2024-12-06 RX ADMIN — HYDROMORPHONE HYDROCHLORIDE 0.5 MG: 1 INJECTION, SOLUTION INTRAMUSCULAR; INTRAVENOUS; SUBCUTANEOUS at 12:57

## 2024-12-06 RX ADMIN — SODIUM CHLORIDE: 9 INJECTION, SOLUTION INTRAVENOUS at 10:54

## 2024-12-06 RX ADMIN — CLONAZEPAM 0.5 MG: 0.5 TABLET ORAL at 20:38

## 2024-12-06 RX ADMIN — LIDOCAINE HYDROCHLORIDE 5 ML: 20 INJECTION, SOLUTION EPIDURAL; INFILTRATION; INTRACAUDAL; PERINEURAL at 10:35

## 2024-12-06 RX ADMIN — LORAZEPAM 0.5 MG: 2 INJECTION INTRAMUSCULAR; INTRAVENOUS at 17:22

## 2024-12-06 RX ADMIN — SUGAMMADEX 200 MG: 100 INJECTION, SOLUTION INTRAVENOUS at 12:29

## 2024-12-06 RX ADMIN — IPRATROPIUM BROMIDE 0.5 MG: 0.5 SOLUTION RESPIRATORY (INHALATION) at 20:26

## 2024-12-06 RX ADMIN — LAMOTRIGINE 200 MG: 100 TABLET ORAL at 20:38

## 2024-12-06 RX ADMIN — FENTANYL CITRATE 100 MCG: 50 INJECTION, SOLUTION INTRAMUSCULAR; INTRAVENOUS at 11:01

## 2024-12-06 RX ADMIN — MIDAZOLAM 2 MG: 1 INJECTION INTRAMUSCULAR; INTRAVENOUS at 10:54

## 2024-12-06 RX ADMIN — AMPICILLIN SODIUM AND SULBACTAM SODIUM 3000 MG: 2; 1 INJECTION, POWDER, FOR SOLUTION INTRAMUSCULAR; INTRAVENOUS at 20:06

## 2024-12-06 RX ADMIN — DEXAMETHASONE SODIUM PHOSPHATE 10 MG: 10 INJECTION, SOLUTION INTRAMUSCULAR; INTRAVENOUS at 18:26

## 2024-12-06 RX ADMIN — BUDESONIDE 1000 MCG: 0.5 INHALANT RESPIRATORY (INHALATION) at 20:26

## 2024-12-06 RX ADMIN — VENLAFAXINE HYDROCHLORIDE 75 MG: 75 TABLET ORAL at 20:38

## 2024-12-06 RX ADMIN — PROPOFOL 200 MG: 10 INJECTION, EMULSION INTRAVENOUS at 11:01

## 2024-12-06 RX ADMIN — ONDANSETRON 4 MG: 2 INJECTION INTRAMUSCULAR; INTRAVENOUS at 11:04

## 2024-12-06 RX ADMIN — ROCURONIUM BROMIDE 50 MG: 10 INJECTION, SOLUTION INTRAVENOUS at 11:04

## 2024-12-06 RX ADMIN — ARFORMOTEROL TARTRATE 15 MCG: 15 SOLUTION RESPIRATORY (INHALATION) at 20:26

## 2024-12-06 RX ADMIN — HYDROMORPHONE HYDROCHLORIDE 0.5 MG: 1 INJECTION, SOLUTION INTRAMUSCULAR; INTRAVENOUS; SUBCUTANEOUS at 13:13

## 2024-12-06 RX ADMIN — FENTANYL CITRATE 100 MCG: 50 INJECTION, SOLUTION INTRAMUSCULAR; INTRAVENOUS at 11:30

## 2024-12-06 RX ADMIN — LIDOCAINE HYDROCHLORIDE 100 MG: 20 INJECTION, SOLUTION EPIDURAL; INFILTRATION; INTRACAUDAL; PERINEURAL at 11:01

## 2024-12-06 RX ADMIN — WATER 2000 MG: 1 INJECTION INTRAMUSCULAR; INTRAVENOUS; SUBCUTANEOUS at 11:04

## 2024-12-06 RX ADMIN — LORAZEPAM 0.5 MG: 2 INJECTION INTRAMUSCULAR; INTRAVENOUS at 23:36

## 2024-12-06 ASSESSMENT — PAIN DESCRIPTION - DESCRIPTORS
DESCRIPTORS: DISCOMFORT

## 2024-12-06 ASSESSMENT — PAIN DESCRIPTION - LOCATION
LOCATION: NECK;THROAT
LOCATION: NECK;THROAT
LOCATION: NECK

## 2024-12-06 ASSESSMENT — PAIN - FUNCTIONAL ASSESSMENT
PAIN_FUNCTIONAL_ASSESSMENT: ACTIVITIES ARE NOT PREVENTED
PAIN_FUNCTIONAL_ASSESSMENT: 0-10
PAIN_FUNCTIONAL_ASSESSMENT: ACTIVITIES ARE NOT PREVENTED

## 2024-12-06 ASSESSMENT — PAIN SCALES - GENERAL
PAINLEVEL_OUTOF10: 0
PAINLEVEL_OUTOF10: 7
PAINLEVEL_OUTOF10: 7
PAINLEVEL_OUTOF10: 3

## 2024-12-06 ASSESSMENT — PAIN DESCRIPTION - ORIENTATION
ORIENTATION: INNER
ORIENTATION: INNER

## 2024-12-06 NOTE — PROGRESS NOTES
5649 report called to 4 [facility] rn and called out to family waiting room to have any family go to room 444

## 2024-12-06 NOTE — PLAN OF CARE
Problem: Discharge Planning  Goal: Discharge to home or other facility with appropriate resources  Outcome: Progressing     Problem: Pain  Goal: Verbalizes/displays adequate comfort level or baseline comfort level  Outcome: Progressing     Problem: Chronic Conditions and Co-morbidities  Goal: Patient's chronic conditions and co-morbidity symptoms are monitored and maintained or improved  Outcome: Progressing     Problem: Skin/Tissue Integrity  Goal: Absence of new skin breakdown  Description: 1.  Monitor for areas of redness and/or skin breakdown  2.  Assess vascular access sites hourly  3.  Every 4-6 hours minimum:  Change oxygen saturation probe site  4.  Every 4-6 hours:  If on nasal continuous positive airway pressure, respiratory therapy assess nares and determine need for appliance change or resting period.  Outcome: Progressing

## 2024-12-06 NOTE — ANESTHESIA PRE PROCEDURE
FB   Dental:    (+) poor dentition      Pulmonary: breath sounds clear to auscultation  (+)           asthma:                           ROS comment: 30 pack year hx. Quit 11 years ago.    Cardiovascular:    (+) CAD:, dysrhythmias: atrial fibrillation, CHF (echo in September showed that this has resolved.):      ECG reviewed  Rhythm: regular  Rate: normal  Echocardiogram reviewed         Beta Blocker:  Not on Beta Blocker         Neuro/Psych:   (+) psychiatric history (Bipolar):depression/anxiety             GI/Hepatic/Renal:   (+) GERD:          Endo/Other:    (+) : arthritis:..                  ROS comment: Hx of substance abuse Abdominal:             Vascular: negative vascular ROS.         Other Findings:       Anesthesia Plan      general     ASA 4       Induction: intravenous.    MIPS: Postoperative opioids intended and Prophylactic antiemetics administered.  Anesthetic plan and risks discussed with patient.    Use of blood products discussed with patient whom consented to blood products.    Plan discussed with CRNA.                  Martell Gamez DO   12/6/2024      Chart reviewed and patient assessed. Agreed with above note. Ethan DOZIER CRNA

## 2024-12-06 NOTE — PROGRESS NOTES
4 Eyes Skin Assessment     NAME:  Greta Moran  YOB: 1970  MEDICAL RECORD NUMBER:  [id number]    The patient is being assessed for  Admission    I agree that at least one RN has performed a thorough Head to Toe Skin Assessment on the patient. ALL assessment sites listed below have been assessed.      Areas assessed by both nurses:    Head, Face, Ears, Shoulders, Back, Chest, Arms, Elbows, Hands, Sacrum. Buttock, Coccyx, Ischium, Legs. Feet and Heels, and Under Medical Devices         Does the Patient have a Wound? No noted wound(s)       Kory Prevention initiated by RN: Yes  Wound Care Orders initiated by RN: No    Pressure Injury (Stage 3,4, Unstageable, DTI, NWPT, and Complex wounds) if present, place Wound referral order by RN under : No    New Ostomies, if present place, Ostomy referral order under : No     Nurse 1 eSignature: Electronically signed by Candida Kahn RN on 12/6/24 at 4:50 PM EST    **SHARE this note so that the co-signing nurse can place an eSignature**    Nurse 2 eSignature: {Esignature:[id number]}

## 2024-12-06 NOTE — OP NOTE
Operative Note      Patient: Greta Moran  YOB: 1970  MRN: [id number]    Date of Procedure: 12/6/2024    Pre-Op Diagnosis Codes:      * Posterior glottic stenosis [J38.6]    Post-Op Diagnosis: Same       Procedure(s):  Open Tracheostomy   2. DIRECT LARYNGOSCOPY WITH LASER ABLATION OF POSTERIOR GLOTTIC STENOSIS (CO2 LASER) AND STEROID INJECTION    Surgeon(s):  Jean-Paul Tilley DO    Assistant:   Resident: Micheal Dubois DO    Anesthesia: General    Estimated Blood Loss (mL): less than 20 cc    Complications: None    Specimens:   ID Type Source Tests Collected by Time Destination   A : LEFT VOCAL CORD LESION Tissue Tissue SURGICAL PATHOLOGY Jean-Paul Tilley DO 12/6/2024 1152        Implants:  * No implants in log *      Drains:   Gastrostomy/Enterostomy/Jejunostomy Tube Percutaneous Endoscopic Gastrostomy (PEG) RLQ 1 20 fr (Active)       Findings:  Stenosis to interarytenoid area with very limited abduction of the arytenoids and true vocal cords. Vocal cords movable with direct palpation   Stenosis lasered with some improvement of abduction but still restricted     Indications: Patient is a 54-year-old female with a history of prolonged intubation for 18 days for cardiogenic shock and cardiomyopathy.  Presented due to progressive worsening of shortness of breath dyspnea on exertion noisy breathing.  She was found to have bilateral vocal fold immobility with posterior glottic stenosis.  She is recommended to undergo open tracheostomy as well as direct laryngoscopy with laser excision of posterior glottic stenosis and steroid injection.  The risks and benefits of this including tracheoesophageal fistula and even death were discussed with the patient and she wishes to proceed.    Detailed Description of Procedure:   Patient was again consented preoperatively.  She was brought to the OR and placed supine on the OR table for induction via endotracheal intubation.  This was accomplished via glide scope with  6-0 endotracheal tube with stylette in place which was able to pass between the ADducted vocal cords without difficulty.  Following confirmation of proper tube placement appropriate timeout was performed.  The patient was prepped and draped in a sterile fashion and 1% lidocaine with epinephrine 1-100,000 was injected into the neck.  A horizontal incision was made in the neck just inferior to the cricoid using a #15 blade.  Further dissection was carried down in the neck via Bovie cautery and [employer] retractors.  The isthmus of the thyroid gland was encountered and was  via bipolar cautery.  This gave an open view of the tracheal rings.  Anesthesia advance the endotracheal tube several centimeters.  The airway was then entered with a #15 blade between the first and second tracheal rings horizontally and vertical cuts inferiorly were made with curved heavy Hatfield scissors in order to form a Hao flap.  The hao flap was then sutured to the skin using a 3-0 chromic suture.  A laser safe endotracheal tube was then placed in the stoma and inflated. CO2 return was noted with tube in correct place.     Attention was then turned to the direct laryngoscopy.  The patient was turned 90 degrees from anesthesia. The patient's face and all skin was covered with wet blue towels to protect during laser.  Wet eye pads were also placed over patients eyes. Tooth guard was placed over patients upper dentition. The Onofre laryngoscope was used to obtain visualize the oral cavity, oropharynx, and larynx with findings noted above. Aadequate visualization of the supraglottis and glottis was obtained and scope suspended from the Hatfield stand with a Jako.  The larynx was first visualized with sapp moise telescope, this was then passed through the cords and subglottic region was evaluated with no evidence of stenosis and the tube was seen in proper position in the trachea.  The vocal cord  was then used to spread the cords

## 2024-12-06 NOTE — PROGRESS NOTES
Message sent to Dr. Arguello regarding patiemnt having severe anxiety and wanting something for it. New orders received

## 2024-12-06 NOTE — PROGRESS NOTES
Patient admitted to medicine status post open tracheostomy and direct laryngoscopy with laser excision of posterior glottic stenosis.  Routine trach care per orders.  Keep extra 6-0 cuffed Shiley trach as well as 4-0 cuffed Shiley trach at the bedside at all times as well as the obturator and suture removal kit.  Suction trach every 2 hours and as needed while awake with saline bullets to loosen mucus.  Change inner cannula daily and as needed.  Keep trach ties and sutures in place until first trach change in 5 to 7 days.  Decadron 10 mg every 8 hours for 3 doses as well as Unasyn and chest x-ray ordered.  Okay to advance as tolerated to soft diet.  ENT will continue to follow.  Contact on-call ENT resident with any further questions or concerns.    Micheal Dubois DO,  Resident Physician, PGY-3  Department of Otolaryngology, [facility]

## 2024-12-06 NOTE — ANESTHESIA POSTPROCEDURE EVALUATION
Department of Anesthesiology  Postprocedure Note    Patient: Greta Moran  MRN: [id number]  YOB: 1970  Date of evaluation: 12/6/2024    Procedure Summary       Date: 12/06/24 Room / Location: [address]    Anesthesia Start: 1054 Anesthesia Stop: 1248    Procedures:       DIRECT LARYNGOSCOPY LASER ABLATION      POSTERIOR GLOTTIC STENOSIS TRACHEOSTOMY (CO2 LASER) (Neck) Diagnosis:       Posterior glottic stenosis      (Posterior glottic stenosis [J38.6])    Surgeons: Jean-Paul Tilley DO Responsible Provider: Martell Gamez DO    Anesthesia Type: general ASA Status: 4            Anesthesia Type: No value filed.    Alistair Phase I: Alistair Score: 10    Alistair Phase II:      Anesthesia Post Evaluation    Patient location during evaluation: PACU  Patient participation: complete - patient participated  Level of consciousness: awake  Pain score: 2  Airway patency: patent  Nausea & Vomiting: no nausea and no vomiting  Cardiovascular status: blood pressure returned to baseline and hemodynamically stable  Respiratory status: humidified trach mask.  Hydration status: euvolemic  Pain management: adequate        No notable events documented.

## 2024-12-06 NOTE — CONSULTS
[facility]ist Group   Consult for Medical Management      Reason for Consult:  Medical management    History of Present Illness:  54 y.o. female with a history of atrial fibrillation on Eliquis, myocardial infarction July 2024, CHF, bipolar, fibromyalgia, GERD, history of blood clots, lupus, osteoporosis, multiple endotracheal intubations, asthma presents with posterior glottic stenosis s/p direct laryngoscope laser ablation and posterior glottic stenosis tracheostomy and steroid injection.  Procedure was done on 12/6 with Dr. Tilley.  Consulted for medical management.     Informant(s) for H&P:  Patient and EMR    REVIEW OF SYSTEMS:  Complete ROS performed with patient, pertinent positives and negatives are listed in the HPI.    PMH:  Past Medical History:   Diagnosis Date    Anxiety     dr matamoros 224 in [facility] counseling    Arthritis     Asthma     Atrial fibrillation (HCC)     Bipolar 2 disorder (HCC)     Bruising tendency     CHF (congestive heart failure) (HCC)     Chronic back pain     Fibromyalgia     GERD (gastroesophageal reflux disease)     Glucose intolerance (impaired glucose tolerance) 06/15/2020    hyperglycemia    Hx of blood clots     Insomnia     Lupus     Memory difficulty 06/15/2020    Mild cognitive impairment with memory loss 06/15/2020    Osteoporosis     Spondylosis     lumbar    Thumb pain     left - for OR 4-16-19        Surgical History:  Past Surgical History:   Procedure Laterality Date    ABDOMEN SURGERY      fatty tumor removed     ARTHROPLASTY Left 4/16/2019    LEFT THUMB TRAPEZIECTOMY WITH LIGAMENT RECONSTRUCTION LEFT PARTIAL TRAPEZOIDECTOMY LEFT DEQUERVAINS RELEASE LEFT CARPAL TUNNEL RELEASE performed by Jacky Morrell MD at [facility] OR    CARDIAC PROCEDURE N/A 7/5/2024    Left heart cath / coronary angiography performed by Miles Biggs DO at [facility] CARDIAC CATH LAB    CARPAL TUNNEL RELEASE      right    COLONOSCOPY      FINGER  normal  Neck: neck supple and non tender without mass . Tracheostomy site with trach mask. Minor dried blood at site.   Pulmonary/Chest: auscultation bilaterally- no  rales or normal air movement, no respiratory distress. Minor wheezing and rhonci in bilateral lung fields.   Cardiovascular: normal rate, normal S1 and S2 and no carotid bruits  Abdomen: soft, non-tender, non-distended, normal bowel sounds, no masses or organomegaly  Extremities: no cyanosis, no clubbing and no edema  Neurologic: no cranial nerve deficit.  Unable to speak related to vocal cord paralysis        LABS:  No results for input(s): \"NA\", \"K\", \"CL\", \"CO2\", \"BUN\", \"CREATININE\", \"GLUCOSE\", \"CALCIUM\" in the last 72 hours.    No results for input(s): \"WBC\", \"RBC\", \"HGB\", \"HCT\", \"MCV\", \"MCH\", \"MCHC\", \"RDW\", \"PLT\", \"MPV\" in the last 72 hours.    Recent Labs     12/06/24  0853   POCGLU 112*           Radiology:   XR CHEST PORTABLE   Final Result   1. Tracheostomy tube appears to be in appropriate position.   2. No acute cardiopulmonary disease.             EKG: None on file for this admission      ASSESSMENT:      Principal Problem:    Vocal cord paralysis  Resolved Problems:    * No resolved hospital problems. *      PLAN:    Posterior glottic stenosis- s/p direct laryngoscope laser ablation and posterior glottic stenosis tracheostomy and steroid injection on 12/6 with Dr. Tilley. Managed by otolaryngology  Atrial fibrillation-resume Eliquis  Asthma-no acute exacerbation.  Continue albuterol nebulization.  CHF-resume losartan, Toprol XL.  Monitor I's and O's and daily weights.  Bipolar- resume lamictal  GERD-continue PPI  History of blood clots-continue Eliquis  Lupus- resume plaquenil   Osteoporosis  Myocardial infarction July 2024     Thank you very much for this interesting consult and we will continue to follow along. Feel free to call with any questions at [phone].      Electronically signed by Alyson Fuentes, APRN - CNP on 12/6/2024 at

## 2024-12-07 LAB
ANION GAP SERPL CALCULATED.3IONS-SCNC: 16 MMOL/L (ref 7–16)
BUN SERPL-MCNC: 13 MG/DL (ref 6–20)
CALCIUM SERPL-MCNC: 9.4 MG/DL (ref 8.6–10.2)
CHLORIDE SERPL-SCNC: 106 MMOL/L (ref 98–107)
CO2 SERPL-SCNC: 20 MMOL/L (ref 22–29)
CREAT SERPL-MCNC: 0.6 MG/DL (ref 0.5–1)
ERYTHROCYTE [DISTWIDTH] IN BLOOD BY AUTOMATED COUNT: 13.8 % (ref 11.5–15)
GFR, ESTIMATED: >90 ML/MIN/1.73M2
GLUCOSE SERPL-MCNC: 153 MG/DL (ref 74–99)
HCT VFR BLD AUTO: 40.4 % (ref 34–48)
HGB BLD-MCNC: 13.4 G/DL (ref 11.5–15.5)
MCH RBC QN AUTO: 29.5 PG (ref 26–35)
MCHC RBC AUTO-ENTMCNC: 33.2 G/DL (ref 32–34.5)
MCV RBC AUTO: 89 FL (ref 80–99.9)
PLATELET # BLD AUTO: 229 K/UL (ref 130–450)
PMV BLD AUTO: 11.3 FL (ref 7–12)
POTASSIUM SERPL-SCNC: 4.3 MMOL/L (ref 3.5–5)
RBC # BLD AUTO: 4.54 M/UL (ref 3.5–5.5)
SODIUM SERPL-SCNC: 142 MMOL/L (ref 132–146)
WBC OTHER # BLD: 16.6 K/UL (ref 4.5–11.5)

## 2024-12-07 PROCEDURE — 92597 ORAL SPEECH DEVICE EVAL: CPT

## 2024-12-07 PROCEDURE — 2700000000 HC OXYGEN THERAPY PER DAY

## 2024-12-07 PROCEDURE — 99232 SBSQ HOSP IP/OBS MODERATE 35: CPT | Performed by: INTERNAL MEDICINE

## 2024-12-07 PROCEDURE — 6370000000 HC RX 637 (ALT 250 FOR IP): Performed by: STUDENT IN AN ORGANIZED HEALTH CARE EDUCATION/TRAINING PROGRAM

## 2024-12-07 PROCEDURE — 85027 COMPLETE CBC AUTOMATED: CPT

## 2024-12-07 PROCEDURE — 6360000002 HC RX W HCPCS: Performed by: STUDENT IN AN ORGANIZED HEALTH CARE EDUCATION/TRAINING PROGRAM

## 2024-12-07 PROCEDURE — 6360000002 HC RX W HCPCS

## 2024-12-07 PROCEDURE — 94640 AIRWAY INHALATION TREATMENT: CPT

## 2024-12-07 PROCEDURE — 80048 BASIC METABOLIC PNL TOTAL CA: CPT

## 2024-12-07 PROCEDURE — 2580000003 HC RX 258

## 2024-12-07 PROCEDURE — 2060000000 HC ICU INTERMEDIATE R&B

## 2024-12-07 RX ORDER — OXYCODONE HYDROCHLORIDE 5 MG/1
5 TABLET ORAL EVERY 4 HOURS PRN
Status: DISCONTINUED | OUTPATIENT
Start: 2024-12-07 | End: 2024-12-13 | Stop reason: HOSPADM

## 2024-12-07 RX ADMIN — CLONAZEPAM 0.5 MG: 0.5 TABLET ORAL at 21:14

## 2024-12-07 RX ADMIN — VENLAFAXINE HYDROCHLORIDE 75 MG: 75 TABLET ORAL at 21:10

## 2024-12-07 RX ADMIN — AMPICILLIN SODIUM AND SULBACTAM SODIUM 3000 MG: 2; 1 INJECTION, POWDER, FOR SOLUTION INTRAMUSCULAR; INTRAVENOUS at 02:21

## 2024-12-07 RX ADMIN — AMPICILLIN SODIUM AND SULBACTAM SODIUM 3000 MG: 2; 1 INJECTION, POWDER, FOR SOLUTION INTRAMUSCULAR; INTRAVENOUS at 21:10

## 2024-12-07 RX ADMIN — OXYCODONE 5 MG: 5 TABLET ORAL at 13:23

## 2024-12-07 RX ADMIN — AMPICILLIN SODIUM AND SULBACTAM SODIUM 3000 MG: 2; 1 INJECTION, POWDER, FOR SOLUTION INTRAMUSCULAR; INTRAVENOUS at 09:27

## 2024-12-07 RX ADMIN — VENLAFAXINE HYDROCHLORIDE 75 MG: 75 TABLET ORAL at 09:00

## 2024-12-07 RX ADMIN — IPRATROPIUM BROMIDE 0.5 MG: 0.5 SOLUTION RESPIRATORY (INHALATION) at 02:05

## 2024-12-07 RX ADMIN — HYDROXYCHLOROQUINE SULFATE 200 MG: 200 TABLET ORAL at 09:01

## 2024-12-07 RX ADMIN — FERROUS SULFATE TAB 325 MG (65 MG ELEMENTAL FE) 325 MG: 325 (65 FE) TAB at 09:00

## 2024-12-07 RX ADMIN — IPRATROPIUM BROMIDE 0.5 MG: 0.5 SOLUTION RESPIRATORY (INHALATION) at 14:07

## 2024-12-07 RX ADMIN — BUDESONIDE 1000 MCG: 0.5 INHALANT RESPIRATORY (INHALATION) at 19:50

## 2024-12-07 RX ADMIN — BUDESONIDE 1000 MCG: 0.5 INHALANT RESPIRATORY (INHALATION) at 09:16

## 2024-12-07 RX ADMIN — AMPICILLIN SODIUM AND SULBACTAM SODIUM 3000 MG: 2; 1 INJECTION, POWDER, FOR SOLUTION INTRAMUSCULAR; INTRAVENOUS at 13:29

## 2024-12-07 RX ADMIN — LAMOTRIGINE 200 MG: 100 TABLET ORAL at 21:10

## 2024-12-07 RX ADMIN — CLONAZEPAM 0.5 MG: 0.5 TABLET ORAL at 09:10

## 2024-12-07 RX ADMIN — IPRATROPIUM BROMIDE 0.5 MG: 0.5 SOLUTION RESPIRATORY (INHALATION) at 09:16

## 2024-12-07 RX ADMIN — LOSARTAN POTASSIUM 25 MG: 25 TABLET, FILM COATED ORAL at 09:00

## 2024-12-07 RX ADMIN — ARFORMOTEROL TARTRATE 15 MCG: 15 SOLUTION RESPIRATORY (INHALATION) at 09:16

## 2024-12-07 RX ADMIN — DEXAMETHASONE SODIUM PHOSPHATE 10 MG: 10 INJECTION, SOLUTION INTRAMUSCULAR; INTRAVENOUS at 10:55

## 2024-12-07 RX ADMIN — LAMOTRIGINE 200 MG: 100 TABLET ORAL at 09:01

## 2024-12-07 RX ADMIN — LORAZEPAM 0.5 MG: 2 INJECTION INTRAMUSCULAR; INTRAVENOUS at 06:00

## 2024-12-07 RX ADMIN — METOPROLOL SUCCINATE 25 MG: 25 TABLET, FILM COATED, EXTENDED RELEASE ORAL at 10:55

## 2024-12-07 RX ADMIN — APIXABAN 5 MG: 5 TABLET, FILM COATED ORAL at 09:00

## 2024-12-07 RX ADMIN — HYDROXYCHLOROQUINE SULFATE 200 MG: 200 TABLET ORAL at 21:10

## 2024-12-07 RX ADMIN — APIXABAN 5 MG: 5 TABLET, FILM COATED ORAL at 21:10

## 2024-12-07 RX ADMIN — DEXAMETHASONE SODIUM PHOSPHATE 10 MG: 10 INJECTION, SOLUTION INTRAMUSCULAR; INTRAVENOUS at 02:23

## 2024-12-07 RX ADMIN — LORAZEPAM 0.5 MG: 2 INJECTION INTRAMUSCULAR; INTRAVENOUS at 18:21

## 2024-12-07 RX ADMIN — IPRATROPIUM BROMIDE 0.5 MG: 0.5 SOLUTION RESPIRATORY (INHALATION) at 19:50

## 2024-12-07 RX ADMIN — ARFORMOTEROL TARTRATE 15 MCG: 15 SOLUTION RESPIRATORY (INHALATION) at 19:50

## 2024-12-07 RX ADMIN — PANTOPRAZOLE SODIUM 40 MG: 40 TABLET, DELAYED RELEASE ORAL at 05:12

## 2024-12-07 ASSESSMENT — PAIN DESCRIPTION - ORIENTATION: ORIENTATION: MID

## 2024-12-07 ASSESSMENT — PAIN DESCRIPTION - DESCRIPTORS: DESCRIPTORS: DISCOMFORT

## 2024-12-07 ASSESSMENT — PAIN SCALES - GENERAL
PAINLEVEL_OUTOF10: 7
PAINLEVEL_OUTOF10: 2

## 2024-12-07 ASSESSMENT — PAIN DESCRIPTION - LOCATION: LOCATION: THROAT

## 2024-12-07 NOTE — PROGRESS NOTES
OTOLARYNGOLOGY  DAILY PROGRESS NOTE  2024    Subjective:     No acute events overnight. AFVSS. Tolerating trach mask.     Objective:     /65   Pulse 89   Temp 98.8 °F (37.1 °C) (Oral)   Resp 20   Ht 1.524 m (5')   Wt 57 kg (125 lb 9.6 oz)   LMP 2015 (LMP Unknown)   SpO2 99%   BMI 24.53 kg/m²   PULSE OXIMETRY RANGE: SpO2  Av.3 %  Min: 94 %  Max: 99 %  I/O last 3 completed shifts:  In: -   Out: 305 [Urine:300; Blood:5]    GENERAL:  Awake, alert, cooperative, no apparent distress  HENT: Normocephalic, atraumatic, 6-0 Shiley cuffed trach in place, minimal secretions  EYES: No sclera icterus, pupils equal  LUNGS:  No increased work of breathing, no stridor  CARDIOVASCULAR:  RR      Assessment/Plan:     54 y.o. female with history of posterior glottic stenosis who is now POD#1 s/p direct laryngoscopy with biopsy, interarytenoid laser ablation and tracheostomy    - S/p DL w/ biopsy: final pathology pending  - Continue routine trach care with Q2H suctioning and cleaning inner cannula. Have extra 6-0 cuffed Shiley trach at the bedside as well as flexible suction catheters. Keep faceplate sutures and trach ties in place as these are only to be removed by ENT. Will plan for trach change in approximately 5-7 days from her surgery.   - Continue supplemental humidified O2 via trach mask as needed. Wean O2 requirements as able. Breathing treatment provided this morning.   - Will place trach DME orders prior to DC   - Advance to full liquid diet  - SLP/PMV orders placed  - Continue IV Unasyn x 3 days  - Appreciate primary medical service assistance with her care     Patient seen, examined and case discussed with attending on call    Electronically signed by Marco Antonio French DO on 2024 at 10:27 AM

## 2024-12-07 NOTE — PROGRESS NOTES
[facility] Hospitalist Progress Note    Admitting Date and Time: 12/6/2024  8:38 AM  Admit Dx: Posterior glottic stenosis [J38.6]  Vocal cord paralysis [J38.00]    Subjective:  Patient is being followed for Posterior glottic stenosis [J38.6]  Vocal cord paralysis [J38.00]   Pt seen and examined this morning  No acute events overnight  No acute distress    ROS: denies fever, chills, cp, sob, n/v, HA unless stated above.      ampicillin-sulbactam  3,000 mg IntraVENous Q6H    ferrous sulfate  325 mg Oral Daily with breakfast    budesonide  1 mg Nebulization BID RT    And    arformoterol tartrate  15 mcg Nebulization BID RT    And    ipratropium  0.5 mg Nebulization Q6H RT    hydroxychloroquine  200 mg Oral BID    lamoTRIgine  200 mg Oral BID    losartan  25 mg Oral Daily    metoprolol succinate  25 mg Oral Daily    pantoprazole  40 mg Oral QAM AC    venlafaxine  75 mg Oral BID    apixaban  5 mg Per NG tube BID     oxyCODONE, 5 mg, Q4H PRN  LORazepam, 0.5 mg, Q6H PRN  albuterol, 2.5 mg, Q4H PRN  clonazePAM, 0.5 mg, TID PRN         Objective:    /64   Pulse 100   Temp 99 °F (37.2 °C) (Oral)   Resp 20   Ht 1.524 m (5')   Wt 57 kg (125 lb 9.6 oz)   LMP 12/04/2015 (LMP Unknown)   SpO2 98%   BMI 24.53 kg/m²     General Appearance: alert and oriented to person, place and time and in no acute distress  Skin: warm and dry  Head: normocephalic and atraumatic  Eyes: pupils equal, round, and reactive to light, extraocular eye movements intact, conjunctivae normal  Neck: neck supple and non tender without mass. Trach in place  Pulmonary/Chest: clear to auscultation bilaterally- no wheezes, rales or rhonchi, normal air movement, no respiratory distress  Cardiovascular: normal rate, normal S1 and S2 and no carotid bruits  Abdomen: soft, non-tender, non-distended, normal bowel sounds, no masses or organomegaly  Extremities: no cyanosis, no clubbing and no edema  Neurologic: no cranial nerve deficit and speech

## 2024-12-07 NOTE — PLAN OF CARE
Problem: Discharge Planning  Goal: Discharge to home or other facility with appropriate resources  Outcome: Progressing  Flowsheets (Taken 12/7/2024 0815)  Discharge to home or other facility with appropriate resources:   Identify discharge learning needs (meds, wound care, etc)   Refer to discharge planning if patient needs post-hospital services based on physician order or complex needs related to functional status, cognitive ability or social support system     Problem: Pain  Goal: Verbalizes/displays adequate comfort level or baseline comfort level  Outcome: Progressing     Problem: Chronic Conditions and Co-morbidities  Goal: Patient's chronic conditions and co-morbidity symptoms are monitored and maintained or improved  Outcome: Progressing  Flowsheets (Taken 12/7/2024 0815)  Care Plan - Patient's Chronic Conditions and Co-Morbidity Symptoms are Monitored and Maintained or Improved:   Monitor and assess patient's chronic conditions and comorbid symptoms for stability, deterioration, or improvement   Collaborate with multidisciplinary team to address chronic and comorbid conditions and prevent exacerbation or deterioration   Update acute care plan with appropriate goals if chronic or comorbid symptoms are exacerbated and prevent overall improvement and discharge     Problem: Skin/Tissue Integrity  Goal: Absence of new skin breakdown  Description: 1.  Monitor for areas of redness and/or skin breakdown  2.  Assess vascular access sites hourly  3.  Every 4-6 hours minimum:  Change oxygen saturation probe site  4.  Every 4-6 hours:  If on nasal continuous positive airway pressure, respiratory therapy assess nares and determine need for appliance change or resting period.  Outcome: Progressing     Problem: ABCDS Injury Assessment  Goal: Absence of physical injury  Outcome: Progressing     Problem: Safety - Adult  Goal: Free from fall injury  Outcome: Progressing  Flowsheets (Taken 12/7/2024 0815)  Free From Fall  Injury: Instruct family/caregiver on patient safety

## 2024-12-07 NOTE — PROGRESS NOTES
CEDRICFRANCIS ES SPEAKING VALVE (PMV)  ASSESSMENT AND   PLAN OF CARE  PATIENT NAME:  Greta Moran  (female)     MRN:  [id number]    :  1970  (54 y.o.)  STATUS:  Inpatient: Room 0446/0446-A    TODAY'S DATE:  2024  ORDER DATE, DESCRIPTION AND REFERRING PROVIDER:   24 1030  SLP Speaking Valve Eval and Treat  Start:  24 1030,   End:  24 1030,   ONE TIME,   Standing Count:  1 Occurrences,   R         Gillian, Marco Antonio ONEAL, DO     REASON FOR REFERRAL: s/p trach   EVALUATING THERAPIST: FRANKIE De Oliveira       SPEECH THERAPY  PLAN OF CARE   The Speaking Valve POC is established based on physician order, diagnosis and results of clinical assessment     ST warranted 3-5x/ week for LOS or until goals met for PMV training to assist in communication of wants/ needs/ thoughts and aide in trach weaning/ eventual decannulation.     Conditions Requiring Skilled Therapeutic Intervention for communication/ PMV :    S/p trach    Specific communication interventions to include:     Training in use of Speaking Valve    Specific instructions for next treatment:   trial of speaking valve    Patient Treatment Goals:    LONG TERM GOAL:    Pt will utilize PMV to maximize communication needs.    SHORT TERM GOALS:    1. Pt will tolerate PMV up to 15 minutes for purposeful voicing of short phrases and sentences with minimal assist.    2. Pt will demonstrate ability to don/dof PMV with minimal cues in 80% of attempts with minimal assist.      Patient/family Goal:    TO communicate with use of speaking valve    Plan of care discussed with Patient and Family   The Patient and Family understand(s) the diagnosis, prognosis and plan of care     Rehabilitation Potential/Prognosis: good                    ADMITTING DIAGNOSIS: Posterior glottic stenosis [J38.6]  Vocal cord paralysis [J38.00]    VISIT DIAGNOSIS:      PATIENT REPORT/COMPLAINT: unable to verbally communicate      Consult received for assessment of integrity of voice

## 2024-12-08 PROBLEM — E87.6 HYPOKALEMIA: Status: ACTIVE | Noted: 2024-12-08

## 2024-12-08 LAB
ALBUMIN SERPL-MCNC: 3.7 G/DL (ref 3.5–5.2)
ALP SERPL-CCNC: 102 U/L (ref 35–104)
ALT SERPL-CCNC: 11 U/L (ref 0–32)
ANION GAP SERPL CALCULATED.3IONS-SCNC: 16 MMOL/L (ref 7–16)
AST SERPL-CCNC: 20 U/L (ref 0–31)
BASOPHILS # BLD: 0.02 K/UL (ref 0–0.2)
BASOPHILS NFR BLD: 0 % (ref 0–2)
BILIRUB SERPL-MCNC: 0.5 MG/DL (ref 0–1.2)
BUN SERPL-MCNC: 8 MG/DL (ref 6–20)
CALCIUM SERPL-MCNC: 8.7 MG/DL (ref 8.6–10.2)
CHLORIDE SERPL-SCNC: 105 MMOL/L (ref 98–107)
CO2 SERPL-SCNC: 19 MMOL/L (ref 22–29)
CREAT SERPL-MCNC: 0.7 MG/DL (ref 0.5–1)
EOSINOPHIL # BLD: 0.01 K/UL (ref 0.05–0.5)
EOSINOPHILS RELATIVE PERCENT: 0 % (ref 0–6)
ERYTHROCYTE [DISTWIDTH] IN BLOOD BY AUTOMATED COUNT: 13.8 % (ref 11.5–15)
GFR, ESTIMATED: >90 ML/MIN/1.73M2
GLUCOSE SERPL-MCNC: 127 MG/DL (ref 74–99)
HCT VFR BLD AUTO: 39.3 % (ref 34–48)
HGB BLD-MCNC: 12.8 G/DL (ref 11.5–15.5)
IMM GRANULOCYTES # BLD AUTO: 0.03 K/UL (ref 0–0.58)
IMM GRANULOCYTES NFR BLD: 0 % (ref 0–5)
LYMPHOCYTES NFR BLD: 2.67 K/UL (ref 1.5–4)
LYMPHOCYTES RELATIVE PERCENT: 22 % (ref 20–42)
MCH RBC QN AUTO: 29.6 PG (ref 26–35)
MCHC RBC AUTO-ENTMCNC: 32.6 G/DL (ref 32–34.5)
MCV RBC AUTO: 91 FL (ref 80–99.9)
MONOCYTES NFR BLD: 1.02 K/UL (ref 0.1–0.95)
MONOCYTES NFR BLD: 8 % (ref 2–12)
NEUTROPHILS NFR BLD: 69 % (ref 43–80)
NEUTS SEG NFR BLD: 8.33 K/UL (ref 1.8–7.3)
PLATELET # BLD AUTO: 217 K/UL (ref 130–450)
PMV BLD AUTO: 10.9 FL (ref 7–12)
POTASSIUM SERPL-SCNC: 3.4 MMOL/L (ref 3.5–5)
PROT SERPL-MCNC: 6.4 G/DL (ref 6.4–8.3)
RBC # BLD AUTO: 4.32 M/UL (ref 3.5–5.5)
SODIUM SERPL-SCNC: 140 MMOL/L (ref 132–146)
WBC OTHER # BLD: 12.1 K/UL (ref 4.5–11.5)

## 2024-12-08 PROCEDURE — 80053 COMPREHEN METABOLIC PANEL: CPT

## 2024-12-08 PROCEDURE — 2700000000 HC OXYGEN THERAPY PER DAY

## 2024-12-08 PROCEDURE — 2060000000 HC ICU INTERMEDIATE R&B

## 2024-12-08 PROCEDURE — 99232 SBSQ HOSP IP/OBS MODERATE 35: CPT | Performed by: INTERNAL MEDICINE

## 2024-12-08 PROCEDURE — 6370000000 HC RX 637 (ALT 250 FOR IP): Performed by: STUDENT IN AN ORGANIZED HEALTH CARE EDUCATION/TRAINING PROGRAM

## 2024-12-08 PROCEDURE — 94640 AIRWAY INHALATION TREATMENT: CPT

## 2024-12-08 PROCEDURE — 2580000003 HC RX 258

## 2024-12-08 PROCEDURE — 6360000002 HC RX W HCPCS: Performed by: STUDENT IN AN ORGANIZED HEALTH CARE EDUCATION/TRAINING PROGRAM

## 2024-12-08 PROCEDURE — 85025 COMPLETE CBC W/AUTO DIFF WBC: CPT

## 2024-12-08 PROCEDURE — 31541 LARYNSCOP W/TUMR EXC + SCOPE: CPT | Performed by: OTOLARYNGOLOGY

## 2024-12-08 PROCEDURE — 31571 LARYNGOSCOP W/VC INJ + SCOPE: CPT | Performed by: OTOLARYNGOLOGY

## 2024-12-08 PROCEDURE — 94669 MECHANICAL CHEST WALL OSCILL: CPT

## 2024-12-08 PROCEDURE — 6360000002 HC RX W HCPCS

## 2024-12-08 PROCEDURE — 31610 TRACHEOSTOMY FENEST SKIN FLP: CPT | Performed by: OTOLARYNGOLOGY

## 2024-12-08 RX ORDER — GUAIFENESIN 400 MG/1
800 TABLET ORAL 3 TIMES DAILY
Status: DISCONTINUED | OUTPATIENT
Start: 2024-12-08 | End: 2024-12-13 | Stop reason: HOSPADM

## 2024-12-08 RX ORDER — ACETYLCYSTEINE 100 MG/ML
4 SOLUTION ORAL; RESPIRATORY (INHALATION) EVERY 4 HOURS
Status: DISCONTINUED | OUTPATIENT
Start: 2024-12-08 | End: 2024-12-13 | Stop reason: HOSPADM

## 2024-12-08 RX ORDER — GUAIFENESIN 400 MG/1
800 TABLET ORAL 3 TIMES DAILY
Status: DISCONTINUED | OUTPATIENT
Start: 2024-12-08 | End: 2024-12-08

## 2024-12-08 RX ORDER — POTASSIUM CHLORIDE 1500 MG/1
40 TABLET, EXTENDED RELEASE ORAL ONCE
Status: COMPLETED | OUTPATIENT
Start: 2024-12-08 | End: 2024-12-08

## 2024-12-08 RX ADMIN — APIXABAN 5 MG: 5 TABLET, FILM COATED ORAL at 20:09

## 2024-12-08 RX ADMIN — IPRATROPIUM BROMIDE 0.5 MG: 0.5 SOLUTION RESPIRATORY (INHALATION) at 13:42

## 2024-12-08 RX ADMIN — LOSARTAN POTASSIUM 25 MG: 25 TABLET, FILM COATED ORAL at 08:17

## 2024-12-08 RX ADMIN — POTASSIUM CHLORIDE 40 MEQ: 1500 TABLET, EXTENDED RELEASE ORAL at 10:04

## 2024-12-08 RX ADMIN — AMPICILLIN SODIUM AND SULBACTAM SODIUM 3000 MG: 2; 1 INJECTION, POWDER, FOR SOLUTION INTRAMUSCULAR; INTRAVENOUS at 08:22

## 2024-12-08 RX ADMIN — HYDROXYCHLOROQUINE SULFATE 200 MG: 200 TABLET ORAL at 20:09

## 2024-12-08 RX ADMIN — APIXABAN 5 MG: 5 TABLET, FILM COATED ORAL at 08:17

## 2024-12-08 RX ADMIN — BUDESONIDE 1000 MCG: 0.5 INHALANT RESPIRATORY (INHALATION) at 08:47

## 2024-12-08 RX ADMIN — PANTOPRAZOLE SODIUM 40 MG: 40 TABLET, DELAYED RELEASE ORAL at 06:04

## 2024-12-08 RX ADMIN — ARFORMOTEROL TARTRATE 15 MCG: 15 SOLUTION RESPIRATORY (INHALATION) at 20:00

## 2024-12-08 RX ADMIN — CLONAZEPAM 0.5 MG: 0.5 TABLET ORAL at 20:09

## 2024-12-08 RX ADMIN — GUAIFENESIN 800 MG: 400 TABLET ORAL at 15:06

## 2024-12-08 RX ADMIN — CLONAZEPAM 0.5 MG: 0.5 TABLET ORAL at 08:27

## 2024-12-08 RX ADMIN — VENLAFAXINE HYDROCHLORIDE 75 MG: 75 TABLET ORAL at 08:17

## 2024-12-08 RX ADMIN — AMPICILLIN SODIUM AND SULBACTAM SODIUM 3000 MG: 2; 1 INJECTION, POWDER, FOR SOLUTION INTRAMUSCULAR; INTRAVENOUS at 20:13

## 2024-12-08 RX ADMIN — LAMOTRIGINE 200 MG: 100 TABLET ORAL at 08:17

## 2024-12-08 RX ADMIN — ALBUTEROL SULFATE 2.5 MG: 2.5 SOLUTION RESPIRATORY (INHALATION) at 16:16

## 2024-12-08 RX ADMIN — AMPICILLIN SODIUM AND SULBACTAM SODIUM 3000 MG: 2; 1 INJECTION, POWDER, FOR SOLUTION INTRAMUSCULAR; INTRAVENOUS at 13:20

## 2024-12-08 RX ADMIN — IPRATROPIUM BROMIDE 0.5 MG: 0.5 SOLUTION RESPIRATORY (INHALATION) at 03:12

## 2024-12-08 RX ADMIN — ACETYLCYSTEINE 400 MG: 100 INHALANT RESPIRATORY (INHALATION) at 16:16

## 2024-12-08 RX ADMIN — IPRATROPIUM BROMIDE 0.5 MG: 0.5 SOLUTION RESPIRATORY (INHALATION) at 20:00

## 2024-12-08 RX ADMIN — LAMOTRIGINE 200 MG: 100 TABLET ORAL at 20:09

## 2024-12-08 RX ADMIN — GUAIFENESIN 800 MG: 400 TABLET ORAL at 00:21

## 2024-12-08 RX ADMIN — GUAIFENESIN 800 MG: 400 TABLET ORAL at 08:17

## 2024-12-08 RX ADMIN — METOPROLOL SUCCINATE 25 MG: 25 TABLET, FILM COATED, EXTENDED RELEASE ORAL at 08:17

## 2024-12-08 RX ADMIN — HYDROXYCHLOROQUINE SULFATE 200 MG: 200 TABLET ORAL at 08:17

## 2024-12-08 RX ADMIN — FERROUS SULFATE TAB 325 MG (65 MG ELEMENTAL FE) 325 MG: 325 (65 FE) TAB at 08:17

## 2024-12-08 RX ADMIN — ARFORMOTEROL TARTRATE 15 MCG: 15 SOLUTION RESPIRATORY (INHALATION) at 08:47

## 2024-12-08 RX ADMIN — ACETYLCYSTEINE 400 MG: 100 INHALANT RESPIRATORY (INHALATION) at 20:00

## 2024-12-08 RX ADMIN — BUDESONIDE 1000 MCG: 0.5 INHALANT RESPIRATORY (INHALATION) at 20:00

## 2024-12-08 RX ADMIN — AMPICILLIN SODIUM AND SULBACTAM SODIUM 3000 MG: 2; 1 INJECTION, POWDER, FOR SOLUTION INTRAMUSCULAR; INTRAVENOUS at 02:50

## 2024-12-08 RX ADMIN — GUAIFENESIN 800 MG: 400 TABLET ORAL at 20:09

## 2024-12-08 RX ADMIN — VENLAFAXINE HYDROCHLORIDE 75 MG: 75 TABLET ORAL at 20:09

## 2024-12-08 RX ADMIN — OXYCODONE 5 MG: 5 TABLET ORAL at 08:27

## 2024-12-08 RX ADMIN — ACETYLCYSTEINE 400 MG: 100 INHALANT RESPIRATORY (INHALATION) at 13:42

## 2024-12-08 RX ADMIN — IPRATROPIUM BROMIDE 0.5 MG: 0.5 SOLUTION RESPIRATORY (INHALATION) at 08:47

## 2024-12-08 ASSESSMENT — PAIN SCALES - GENERAL
PAINLEVEL_OUTOF10: 0
PAINLEVEL_OUTOF10: 5
PAINLEVEL_OUTOF10: 0

## 2024-12-08 ASSESSMENT — PAIN DESCRIPTION - DESCRIPTORS: DESCRIPTORS: DISCOMFORT

## 2024-12-08 ASSESSMENT — PAIN DESCRIPTION - ORIENTATION: ORIENTATION: MID

## 2024-12-08 ASSESSMENT — PAIN DESCRIPTION - LOCATION: LOCATION: THROAT

## 2024-12-08 ASSESSMENT — PAIN DESCRIPTION - PAIN TYPE: TYPE: SURGICAL PAIN

## 2024-12-08 NOTE — PROGRESS NOTES
Message sent to Dr. French regarding patient for a respiratory vest to assist with secretions, new order received.

## 2024-12-08 NOTE — PLAN OF CARE
Problem: Discharge Planning  Goal: Discharge to home or other facility with appropriate resources  12/8/2024 0925 by Tony Gustafson RN  Outcome: Progressing     Problem: Pain  Goal: Verbalizes/displays adequate comfort level or baseline comfort level  12/8/2024 0925 by Tony Gustafson RN  Outcome: Progressing     Problem: Chronic Conditions and Co-morbidities  Goal: Patient's chronic conditions and co-morbidity symptoms are monitored and maintained or improved  12/8/2024 0925 by Tony Gustafson RN  Outcome: Progressing     Problem: Skin/Tissue Integrity  Goal: Absence of new skin breakdown  Description: 1.  Monitor for areas of redness and/or skin breakdown  2.  Assess vascular access sites hourly  3.  Every 4-6 hours minimum:  Change oxygen saturation probe site  4.  Every 4-6 hours:  If on nasal continuous positive airway pressure, respiratory therapy assess nares and determine need for appliance change or resting period.  12/8/2024 0925 by Tony Gustafson RN  Outcome: Progressing     Problem: ABCDS Injury Assessment  Goal: Absence of physical injury  12/8/2024 0925 by Tony Gustafson RN  Outcome: Progressing     Problem: Safety - Adult  Goal: Free from fall injury  12/8/2024 0925 by Tony Gustafson RN  Outcome: Progressing

## 2024-12-08 NOTE — PROGRESS NOTES
OTOLARYNGOLOGY  DAILY PROGRESS NOTE  2024    Subjective:     No acute events overnight. AFVSS. Tolerating trach mask with moderate secretions.     Objective:     /64   Pulse 87   Temp 98.4 °F (36.9 °C) (Oral)   Resp 20   Ht 1.524 m (5')   Wt 59.6 kg (131 lb 6.3 oz)   LMP 2015 (LMP Unknown)   SpO2 97%   BMI 25.66 kg/m²   PULSE OXIMETRY RANGE: SpO2  Av %  Min: 95 %  Max: 98 %  I/O last 3 completed shifts:  In: -   Out: 500 [Urine:500]    GENERAL:  Awake, alert, cooperative, no apparent distress  HENT: Normocephalic, atraumatic, 6-0 Shiley cuffed trach in place, moderate secretions  EYES: No sclera icterus, pupils equal  LUNGS:  No increased work of breathing, no stridor  CARDIOVASCULAR:  RR      Assessment/Plan:     54 y.o. female with history of posterior glottic stenosis who is now POD#2 s/p direct laryngoscopy with biopsy, interarytenoid laser ablation, kenalog injection and tracheostomy with Dr. Tilley.     - S/p DL w/ biopsy: final pathology pending  - Continue routine trach care with Q2H suctioning and cleaning inner cannula. Have extra 6-0 cuffed Shiley trach at the bedside as well as flexible suction catheters. Keep faceplate sutures and trach ties in place as these are only to be removed by ENT. Will plan for trach change in approximately 5-7 days from her surgery.   - Continue supplemental humidified O2 via trach mask as needed. Wean O2 requirements as able. Breathing treatment provided this morning.   - Pulmicort/Brovana/Atrovent/Mucomyst breathing treatments ordered  - Chest vest per RT  - Start oral Mucinex 800 mg TID for thick secretions   - Will place trach DME orders prior to DC   - Milagro prn for pain control  - Continue full liquid diet  - SLP/PMV orders placed. Plan for MBSS  and advance as able.   - Continue IV Unasyn x 3 days  - Appreciate primary medical service assistance with her care     Patient seen, examined and case discussed with attending on

## 2024-12-08 NOTE — PROGRESS NOTES
Consulted for new piv line possibly from squad.  Documentation in the chart shows Rand TORRES in Preop placed piv on 12/06.  IV site is asymptomatic, pt has no complains.  Line flushes easily and has good blood return.  Would recommend keeping piv in place at this time since it was placed in [facility]. Spoke with Tony TORRES.     Electronically signed by Deandra Garcia RN on 12/8/2024 at 12:15 PM

## 2024-12-08 NOTE — PROGRESS NOTES
Messaged Dr French regarding patient's trach care. States to clean cannula Qshift, but does not need to be changed daily unless needed.

## 2024-12-08 NOTE — PROGRESS NOTES
[facility] Hospitalist Progress Note    Admitting Date and Time: 12/6/2024  8:38 AM  Admit Dx: Posterior glottic stenosis [J38.6]  Vocal cord paralysis [J38.00]    Subjective:  Patient is being followed for Posterior glottic stenosis [J38.6]  Vocal cord paralysis [J38.00]   Pt seen and examined this morning  No acute events overnight  No acute distress    ROS: denies fever, chills, cp, sob, n/v, HA unless stated above.      guaiFENesin  800 mg Oral TID    ampicillin-sulbactam  3,000 mg IntraVENous Q6H    ferrous sulfate  325 mg Oral Daily with breakfast    budesonide  1 mg Nebulization BID RT    And    arformoterol tartrate  15 mcg Nebulization BID RT    And    ipratropium  0.5 mg Nebulization Q6H RT    hydroxychloroquine  200 mg Oral BID    lamoTRIgine  200 mg Oral BID    losartan  25 mg Oral Daily    metoprolol succinate  25 mg Oral Daily    pantoprazole  40 mg Oral QAM AC    venlafaxine  75 mg Oral BID    apixaban  5 mg Per NG tube BID     oxyCODONE, 5 mg, Q4H PRN  LORazepam, 0.5 mg, Q6H PRN  albuterol, 2.5 mg, Q4H PRN  clonazePAM, 0.5 mg, TID PRN         Objective:    /64   Pulse 87   Temp 98.4 °F (36.9 °C) (Oral)   Resp 20   Ht 1.524 m (5')   Wt 59.6 kg (131 lb 6.3 oz)   LMP 12/04/2015 (LMP Unknown)   SpO2 97%   BMI 25.66 kg/m²     General Appearance: alert and oriented to person, place and time and in no acute distress  Skin: warm and dry  Head: normocephalic and atraumatic  Eyes: pupils equal, round, and reactive to light, extraocular eye movements intact, conjunctivae normal  Neck: neck supple and non tender without mass. Trach in place  Pulmonary/Chest: clear to auscultation bilaterally- no wheezes, rales or rhonchi, normal air movement, no respiratory distress  Cardiovascular: normal rate, normal S1 and S2 and no carotid bruits  Abdomen: soft, non-tender, non-distended, normal bowel sounds, no masses or organomegaly  Extremities: no cyanosis, no clubbing and no edema  Neurologic: no  cranial nerve deficit and speech normal        Recent Labs     12/07/24  0930      K 4.3      CO2 20*   BUN 13   CREATININE 0.6   GLUCOSE 153*   CALCIUM 9.4       Recent Labs     12/07/24  0930 12/08/24  0830   WBC 16.6* 12.1*   RBC 4.54 4.32   HGB 13.4 12.8   HCT 40.4 39.3   MCV 89.0 91.0   MCH 29.5 29.6   MCHC 33.2 32.6   RDW 13.8 13.8    217   MPV 11.3 10.9         Assessment and Plan:     Principal Problem:    Vocal cord paralysis  Active Problems:    Posterior glottic stenosis  Resolved Problems:    * No resolved hospital problems. *      Status post posterior glottic stenosis s/p direct laryngoscope laser ablation and posterior glottic stenosis tracheostomy and steroid injection.  Procedure was done on 12/6 with Dr. Tilley.  Pain control as tolerated.  Monitor labs daily.  ENT following. Trach care. Adv diet as tolerated. Unasyn  A-fib-rate controlled.  Continue beta-blocker continue patient's Eliquis  Asthma-not in acute exacerbation.  Continue DuoNebs as needed.  History of CHF-continue beta-blocker and ARB.  Strict I/O and daily weights  Bipolar-continue Lamictal  GERD-continue PPI  Lupus history-continue Plaquenil  Essential hypertension-continue losartan  History of DVT-continue Eliquis   Anxiety/depression-continue home medications.    Will continue to follow      NOTE: This report was transcribed using voice recognition software. Every effort was made to ensure accuracy; however, inadvertent computerized transcription errors may be present.  Electronically signed by Dana Lubin MD on 12/8/2024 at 9:03 AM

## 2024-12-09 ENCOUNTER — TELEPHONE (OUTPATIENT)
Dept: ENT CLINIC | Age: 54
End: 2024-12-09

## 2024-12-09 ENCOUNTER — APPOINTMENT (OUTPATIENT)
Dept: GENERAL RADIOLOGY | Age: 54
End: 2024-12-09
Attending: OTOLARYNGOLOGY
Payer: MEDICARE

## 2024-12-09 LAB
ALBUMIN SERPL-MCNC: 3.3 G/DL (ref 3.5–5.2)
ALP SERPL-CCNC: 96 U/L (ref 35–104)
ALT SERPL-CCNC: 12 U/L (ref 0–32)
ANION GAP SERPL CALCULATED.3IONS-SCNC: 11 MMOL/L (ref 7–16)
AST SERPL-CCNC: 21 U/L (ref 0–31)
BASOPHILS # BLD: 0.02 K/UL (ref 0–0.2)
BASOPHILS NFR BLD: 0 % (ref 0–2)
BILIRUB SERPL-MCNC: 0.4 MG/DL (ref 0–1.2)
BUN SERPL-MCNC: 6 MG/DL (ref 6–20)
CALCIUM SERPL-MCNC: 8.6 MG/DL (ref 8.6–10.2)
CHLORIDE SERPL-SCNC: 108 MMOL/L (ref 98–107)
CO2 SERPL-SCNC: 22 MMOL/L (ref 22–29)
CREAT SERPL-MCNC: 0.8 MG/DL (ref 0.5–1)
EOSINOPHIL # BLD: 0.27 K/UL (ref 0.05–0.5)
EOSINOPHILS RELATIVE PERCENT: 3 % (ref 0–6)
ERYTHROCYTE [DISTWIDTH] IN BLOOD BY AUTOMATED COUNT: 13.8 % (ref 11.5–15)
GFR, ESTIMATED: 86 ML/MIN/1.73M2
GLUCOSE SERPL-MCNC: 92 MG/DL (ref 74–99)
HCT VFR BLD AUTO: 40.1 % (ref 34–48)
HGB BLD-MCNC: 12.7 G/DL (ref 11.5–15.5)
IMM GRANULOCYTES # BLD AUTO: 0.03 K/UL (ref 0–0.58)
IMM GRANULOCYTES NFR BLD: 0 % (ref 0–5)
LYMPHOCYTES NFR BLD: 2.47 K/UL (ref 1.5–4)
LYMPHOCYTES RELATIVE PERCENT: 30 % (ref 20–42)
MCH RBC QN AUTO: 29.6 PG (ref 26–35)
MCHC RBC AUTO-ENTMCNC: 31.7 G/DL (ref 32–34.5)
MCV RBC AUTO: 93.5 FL (ref 80–99.9)
MONOCYTES NFR BLD: 0.88 K/UL (ref 0.1–0.95)
MONOCYTES NFR BLD: 11 % (ref 2–12)
NEUTROPHILS NFR BLD: 56 % (ref 43–80)
NEUTS SEG NFR BLD: 4.6 K/UL (ref 1.8–7.3)
PLATELET # BLD AUTO: 190 K/UL (ref 130–450)
PMV BLD AUTO: 11.5 FL (ref 7–12)
POTASSIUM SERPL-SCNC: 3.7 MMOL/L (ref 3.5–5)
PROT SERPL-MCNC: 5.8 G/DL (ref 6.4–8.3)
RBC # BLD AUTO: 4.29 M/UL (ref 3.5–5.5)
SODIUM SERPL-SCNC: 141 MMOL/L (ref 132–146)
WBC OTHER # BLD: 8.3 K/UL (ref 4.5–11.5)

## 2024-12-09 PROCEDURE — 2580000003 HC RX 258

## 2024-12-09 PROCEDURE — 94669 MECHANICAL CHEST WALL OSCILL: CPT

## 2024-12-09 PROCEDURE — 6370000000 HC RX 637 (ALT 250 FOR IP): Performed by: STUDENT IN AN ORGANIZED HEALTH CARE EDUCATION/TRAINING PROGRAM

## 2024-12-09 PROCEDURE — 85025 COMPLETE CBC W/AUTO DIFF WBC: CPT

## 2024-12-09 PROCEDURE — 80053 COMPREHEN METABOLIC PANEL: CPT

## 2024-12-09 PROCEDURE — 6360000002 HC RX W HCPCS

## 2024-12-09 PROCEDURE — 92611 MOTION FLUOROSCOPY/SWALLOW: CPT

## 2024-12-09 PROCEDURE — 74230 X-RAY XM SWLNG FUNCJ C+: CPT

## 2024-12-09 PROCEDURE — 94640 AIRWAY INHALATION TREATMENT: CPT

## 2024-12-09 PROCEDURE — 6360000002 HC RX W HCPCS: Performed by: STUDENT IN AN ORGANIZED HEALTH CARE EDUCATION/TRAINING PROGRAM

## 2024-12-09 PROCEDURE — 92526 ORAL FUNCTION THERAPY: CPT

## 2024-12-09 PROCEDURE — 36415 COLL VENOUS BLD VENIPUNCTURE: CPT

## 2024-12-09 PROCEDURE — 2500000003 HC RX 250 WO HCPCS: Performed by: RADIOLOGY

## 2024-12-09 PROCEDURE — 6370000000 HC RX 637 (ALT 250 FOR IP): Performed by: FAMILY MEDICINE

## 2024-12-09 PROCEDURE — 2060000000 HC ICU INTERMEDIATE R&B

## 2024-12-09 PROCEDURE — 2700000000 HC OXYGEN THERAPY PER DAY

## 2024-12-09 RX ORDER — VENLAFAXINE HYDROCHLORIDE 75 MG/1
75 CAPSULE, EXTENDED RELEASE ORAL 2 TIMES DAILY
Status: DISCONTINUED | OUTPATIENT
Start: 2024-12-09 | End: 2024-12-13 | Stop reason: HOSPADM

## 2024-12-09 RX ADMIN — GUAIFENESIN 800 MG: 400 TABLET ORAL at 20:58

## 2024-12-09 RX ADMIN — ACETYLCYSTEINE 400 MG: 100 INHALANT RESPIRATORY (INHALATION) at 12:57

## 2024-12-09 RX ADMIN — BARIUM SULFATE 70 G: 0.81 POWDER, FOR SUSPENSION ORAL at 14:33

## 2024-12-09 RX ADMIN — OXYCODONE 5 MG: 5 TABLET ORAL at 18:45

## 2024-12-09 RX ADMIN — FERROUS SULFATE TAB 325 MG (65 MG ELEMENTAL FE) 325 MG: 325 (65 FE) TAB at 09:40

## 2024-12-09 RX ADMIN — CLONAZEPAM 0.5 MG: 0.5 TABLET ORAL at 20:58

## 2024-12-09 RX ADMIN — AMPICILLIN SODIUM AND SULBACTAM SODIUM 3000 MG: 2; 1 INJECTION, POWDER, FOR SOLUTION INTRAMUSCULAR; INTRAVENOUS at 02:51

## 2024-12-09 RX ADMIN — ACETYLCYSTEINE 400 MG: 100 INHALANT RESPIRATORY (INHALATION) at 21:38

## 2024-12-09 RX ADMIN — APIXABAN 5 MG: 5 TABLET, FILM COATED ORAL at 09:40

## 2024-12-09 RX ADMIN — LORAZEPAM 0.5 MG: 2 INJECTION INTRAMUSCULAR; INTRAVENOUS at 11:01

## 2024-12-09 RX ADMIN — ALBUTEROL SULFATE 2.5 MG: 2.5 SOLUTION RESPIRATORY (INHALATION) at 16:58

## 2024-12-09 RX ADMIN — METOPROLOL SUCCINATE 25 MG: 25 TABLET, FILM COATED, EXTENDED RELEASE ORAL at 09:40

## 2024-12-09 RX ADMIN — AMPICILLIN SODIUM AND SULBACTAM SODIUM 3000 MG: 2; 1 INJECTION, POWDER, FOR SOLUTION INTRAMUSCULAR; INTRAVENOUS at 09:44

## 2024-12-09 RX ADMIN — HYDROXYCHLOROQUINE SULFATE 200 MG: 200 TABLET ORAL at 09:39

## 2024-12-09 RX ADMIN — IPRATROPIUM BROMIDE 0.5 MG: 0.5 SOLUTION RESPIRATORY (INHALATION) at 07:54

## 2024-12-09 RX ADMIN — LAMOTRIGINE 200 MG: 100 TABLET ORAL at 09:40

## 2024-12-09 RX ADMIN — IPRATROPIUM BROMIDE 0.5 MG: 0.5 SOLUTION RESPIRATORY (INHALATION) at 12:57

## 2024-12-09 RX ADMIN — ACETYLCYSTEINE 400 MG: 100 INHALANT RESPIRATORY (INHALATION) at 16:57

## 2024-12-09 RX ADMIN — LAMOTRIGINE 200 MG: 100 TABLET ORAL at 20:58

## 2024-12-09 RX ADMIN — ACETYLCYSTEINE 400 MG: 100 INHALANT RESPIRATORY (INHALATION) at 07:54

## 2024-12-09 RX ADMIN — ARFORMOTEROL TARTRATE 15 MCG: 15 SOLUTION RESPIRATORY (INHALATION) at 07:54

## 2024-12-09 RX ADMIN — GUAIFENESIN 800 MG: 400 TABLET ORAL at 16:09

## 2024-12-09 RX ADMIN — BARIUM SULFATE 10 ML: 400 PASTE ORAL at 14:32

## 2024-12-09 RX ADMIN — OXYCODONE 5 MG: 5 TABLET ORAL at 09:38

## 2024-12-09 RX ADMIN — IPRATROPIUM BROMIDE 0.5 MG: 0.5 SOLUTION RESPIRATORY (INHALATION) at 21:39

## 2024-12-09 RX ADMIN — VENLAFAXINE HYDROCHLORIDE 75 MG: 75 CAPSULE, EXTENDED RELEASE ORAL at 09:40

## 2024-12-09 RX ADMIN — BUDESONIDE 1000 MCG: 0.5 INHALANT RESPIRATORY (INHALATION) at 21:38

## 2024-12-09 RX ADMIN — LOSARTAN POTASSIUM 25 MG: 25 TABLET, FILM COATED ORAL at 09:40

## 2024-12-09 RX ADMIN — ACETYLCYSTEINE 400 MG: 100 INHALANT RESPIRATORY (INHALATION) at 02:19

## 2024-12-09 RX ADMIN — BUDESONIDE 1000 MCG: 0.5 INHALANT RESPIRATORY (INHALATION) at 07:54

## 2024-12-09 RX ADMIN — APIXABAN 5 MG: 5 TABLET, FILM COATED ORAL at 20:58

## 2024-12-09 RX ADMIN — ARFORMOTEROL TARTRATE 15 MCG: 15 SOLUTION RESPIRATORY (INHALATION) at 21:38

## 2024-12-09 RX ADMIN — AMPICILLIN SODIUM AND SULBACTAM SODIUM 3000 MG: 2; 1 INJECTION, POWDER, FOR SOLUTION INTRAMUSCULAR; INTRAVENOUS at 16:11

## 2024-12-09 RX ADMIN — PANTOPRAZOLE SODIUM 40 MG: 40 TABLET, DELAYED RELEASE ORAL at 06:43

## 2024-12-09 RX ADMIN — GUAIFENESIN 800 MG: 400 TABLET ORAL at 09:40

## 2024-12-09 RX ADMIN — PETROLATUM: 420 OINTMENT TOPICAL at 18:45

## 2024-12-09 RX ADMIN — BARIUM SULFATE 120 ML: 400 SUSPENSION ORAL at 14:32

## 2024-12-09 RX ADMIN — HYDROXYCHLOROQUINE SULFATE 200 MG: 200 TABLET ORAL at 20:59

## 2024-12-09 RX ADMIN — VENLAFAXINE HYDROCHLORIDE 75 MG: 75 CAPSULE, EXTENDED RELEASE ORAL at 20:58

## 2024-12-09 RX ADMIN — IPRATROPIUM BROMIDE 0.5 MG: 0.5 SOLUTION RESPIRATORY (INHALATION) at 02:19

## 2024-12-09 RX ADMIN — AMPICILLIN SODIUM AND SULBACTAM SODIUM 3000 MG: 2; 1 INJECTION, POWDER, FOR SOLUTION INTRAMUSCULAR; INTRAVENOUS at 21:01

## 2024-12-09 ASSESSMENT — PAIN SCALES - GENERAL
PAINLEVEL_OUTOF10: 5
PAINLEVEL_OUTOF10: 0
PAINLEVEL_OUTOF10: 0
PAINLEVEL_OUTOF10: 6
PAINLEVEL_OUTOF10: 5

## 2024-12-09 ASSESSMENT — PAIN DESCRIPTION - ORIENTATION: ORIENTATION: ANTERIOR;MID

## 2024-12-09 ASSESSMENT — PAIN DESCRIPTION - LOCATION
LOCATION: OTHER (COMMENT)
LOCATION: OTHER (COMMENT)

## 2024-12-09 ASSESSMENT — PAIN DESCRIPTION - DESCRIPTORS: DESCRIPTORS: ACHING;CRAMPING;CRUSHING

## 2024-12-09 NOTE — PROGRESS NOTES
Greta Moran  1970      I have discussed the case, including pertinent history and exam findings with the resident. I have seen and examined the patient and the key elements of the encounter have been performed by me.  I agree with the assessment, plan and orders as documented by the resident.              Remainder of medical problems as per resident note.      Electronically signed by Jean-Paul Tilley DO on 24 at 1:21 PM EST         OTOLARYNGOLOGY  DAILY PROGRESS NOTE  2024    Subjective:     No acute events overnight. AFVSS. Tolerating trach mask with improved secretions. Tolerating liquids.    Objective:     BP (!) 107/55   Pulse 84   Temp 98.2 °F (36.8 °C) (Oral)   Resp 20   Ht 1.524 m (5')   Wt 59.6 kg (131 lb 6.3 oz)   LMP 2015 (LMP Unknown)   SpO2 98%   BMI 25.66 kg/m²   PULSE OXIMETRY RANGE: SpO2  Av.6 %  Min: 96 %  Max: 100 %  I/O last 3 completed shifts:  In: -   Out: 200 [Urine:200]    GENERAL:  Awake, alert, cooperative, no apparent distress  HENT: Normocephalic, atraumatic, 6-0 Shiley cuffed trach in place, mild secretions  EYES: No sclera icterus, pupils equal  LUNGS:  No increased work of breathing, no stridor  CARDIOVASCULAR:  RR      Assessment/Plan:     54 y.o. female with history of posterior glottic stenosis who is now POD#3 s/p direct laryngoscopy with biopsy, interarytenoid laser ablation, kenalog injection and tracheostomy with Dr. Tilley.     - S/p DL w/ biopsy: final pathology pending  - Continue routine trach care with Q2H suctioning and cleaning inner cannula. Have extra 6-0 cuffed Shiley trach at the bedside as well as flexible suction catheters. Keep faceplate sutures and trach ties in place as these are only to be removed by ENT. Will plan for trach change in approximately 5-7 days from her surgery.   - Continue supplemental humidified O2 via trach mask as needed. Wean O2 requirements as able. Breathing treatment provided this morning.   -  Pulmicort/Brovana/Atrovent/Mucomyst breathing treatments ordered  - Chest vest per RT  - oral Mucinex 800 mg TID for thick secretions   - Trach DME orders placed  - Milagro prn for pain control  - Continue full liquid diet  - SLP/PMV orders placed. Plan for MBSS 12/9 and advance as able.   - Continue IV Unasyn x 3 days (will 12/10)  - Appreciate primary medical service assistance with her care     Patient seen, examined and case discussed with attending on call    Electronically signed by Micheal Dubois DO on 12/9/2024 at 6:42 AM

## 2024-12-09 NOTE — CARE COORDINATION
CASE MANAGEMENT....POD 3. Trach placement. # 6 Shiley/cuffed. Met with patient and  at the bedside. Greta is independent from home. She has nebulizer, walker, cane and shower chair through [employer]. Has history with [facility] and [facility]. PCP is Dr Houston. Discharge plan is home with dme/c. List of choices provided for both. Referrals called to Renata with [facility] and to Beatriz with [facility]. Await input from both. She is currently tolerating TM 8L/35% Fio2 with supplemental humidified o2. Greta and her  have been receptive to trach care instructions. Orders for trach supplies noted and orders for [facility] obtained. ENT on board for trach management. Plan for trach change Wed 12/11/24-Fri 12/13/24. Receiving eliquis-on pta, iv unasyn q6hrs, aerosols and chest vest treatments. Suction q 2hrs. Will cont to follow along and assist with needs accordingly.     UPDATE..... [facility] cannot accommodate patients needs. Patient/ update. Referrals sent to [facility]/DME. Await input.     UPDATE.... [facility] can accept patient. Patient/ now interested in [facility]. Referral called Lindsay-she will check to see if they can accommodate trach care.     UPDATE.... [facility] cannot accommodate trach care. Plan will remain home with [facility]. Need to confirm with Elder from [facility] that he can accept.

## 2024-12-09 NOTE — PROGRESS NOTES
SPEECH/LANGUAGE PATHOLOGY  VIDEOFLUOROSCOPIC STUDY OF SWALLOWING (MBS)   and PLAN OF CARE    PATIENT NAME:  Greta Moran  (female)     MRN:  [id number]    :  1970  (54 y.o.)  STATUS:  Inpatient: Room 0446/0446-A    TODAY'S DATE:  2024  ORDER DATE, DESCRIPTION AND REFERRING PROVIDER:  Dr. Gillian MOTA MODIFIED BARIUM SWALLOW W VIDEO   REASON FOR REFERRAL: Assess oropharyngeal swallow function   EVALUATING THERAPIST: Queenie Dalton SLP      RESULTS:      DYSPHAGIA DIAGNOSIS:  functional oropharyngeal swallow for age/premorbid functioning    DIET RECOMMENDATIONS:  Regular consistency solids (IDDSI level 7) with  thin liquids (IDDSI level 0)    FEEDING RECOMMENDATIONS:    Assistance level:  No assistance needed     Compensatory strategies recommended: Thorough oral care to prevent colonization of oral bacteria   Upright in bed/ chair as tolerated  Fully alert for all PO  SINGLE cup sips     Discussed recommendations with:  patient nurse in person    Laryngeal Penetration and Aspiration:  Penetration WITHOUT aspiration was observed in today's study with  thin liquid    SPEECH THERAPY  PLAN OF CARE   The dysphagia POC is established based on physician order and dysphagia diagnosis    Dysphagia therapy is not recommended following today's session due to independent with implementation of strategies/modifications.       Conditions Requiring Skilled Therapeutic Intervention for dysphagia:    Not applicable    SPECIFIC DYSPHAGIA INTERVENTIONS TO INCLUDE:     Not applicable no therapy warranted     Specific instructions for next treatment:  not applicable   Treatment Goals:    Short Term Goals:  Not applicable no therapy warranted     Long Term Goals:   Not applicable no therapy warranted      Patient/family Goal:    not applicable    Plan of care discussed with Patient   The Patient understand(s) the diagnosis, prognosis and plan of care     Rehabilitation Potential/Prognosis: good                      ADMITTING

## 2024-12-09 NOTE — PROGRESS NOTES
12/09/24 0421   Oxygen Therapy/Pulse Ox   O2 Therapy Oxygen humidified   $Oxygen $Daily Charge   O2 Device (S)  Trach collar  (full at this time and +mist at pt.)   O2 Flow Rate (L/min) 8 L/min   FiO2  35 %

## 2024-12-09 NOTE — PROGRESS NOTES
Feels ok  Vss- BP on low side  lungs clear  reg rhythm  abd- soft and non-tender  ext- no edema  Stable- lab pending  Per orders- continue present care

## 2024-12-09 NOTE — PROGRESS NOTES
SPEECH LANGUAGE PATHOLOGY  DAILY PROGRESS NOTE      PATIENT NAME:  Greta Moran      :  1970          TODAY'S DATE:  2024 ROOM:  [facility]6/0446-A    Current Diet: ADULT DIET; Full Liquid    Patient seen for ongoing PMV trials. Family at bedside and reports patient with emesis prior to SLP entering room. Patient nurse cleared patient for PMV trials after ensuring she was medically appropriate. Patient with Shiley #6 cuffed trach, with cuff already deflated. Increased work of breathing noted before PVM trials, however, patient reports this has been baseline since entering the hospital. Before trials, patient's HR at 111 and oxygen saturation at 96%. Patient's HR maintained during trials, however, patient desaturated to 86 toward the end of the session. Moderate-severe backflow noted in 3x trials when PMV was removed. SLP reeducated patient on importance of breathing in through the trach and out through her nose/mouth. Patient verbalized understanding. Patient with adequate voicing during trial. Education provided to patient and family regarding basic PMV care. Recommend continue PMV trials with SLP only due to backpressure.    Recommendation: continue current POC      CPT code(s) 65522  speech/language tx  Total minutes :  15 minutes    Fani Timmons  Speech Pathology     Queenie Dalton M.S. CCC-SLP/L  Speech Language Pathologist  [id number]

## 2024-12-10 LAB
ALBUMIN SERPL-MCNC: 3.7 G/DL (ref 3.5–5.2)
ALP SERPL-CCNC: 107 U/L (ref 35–104)
ALT SERPL-CCNC: 12 U/L (ref 0–32)
ANION GAP SERPL CALCULATED.3IONS-SCNC: 12 MMOL/L (ref 7–16)
AST SERPL-CCNC: 21 U/L (ref 0–31)
BASOPHILS # BLD: 0.02 K/UL (ref 0–0.2)
BASOPHILS NFR BLD: 0 % (ref 0–2)
BILIRUB SERPL-MCNC: 0.6 MG/DL (ref 0–1.2)
BUN SERPL-MCNC: 8 MG/DL (ref 6–20)
CALCIUM SERPL-MCNC: 8.7 MG/DL (ref 8.6–10.2)
CHLORIDE SERPL-SCNC: 108 MMOL/L (ref 98–107)
CO2 SERPL-SCNC: 24 MMOL/L (ref 22–29)
CREAT SERPL-MCNC: 0.8 MG/DL (ref 0.5–1)
EOSINOPHIL # BLD: 0.51 K/UL (ref 0.05–0.5)
EOSINOPHILS RELATIVE PERCENT: 5 % (ref 0–6)
ERYTHROCYTE [DISTWIDTH] IN BLOOD BY AUTOMATED COUNT: 13.5 % (ref 11.5–15)
FERRITIN SERPL-MCNC: 182 NG/ML
GFR, ESTIMATED: 82 ML/MIN/1.73M2
GLUCOSE SERPL-MCNC: 107 MG/DL (ref 74–99)
HCT VFR BLD AUTO: 41.1 % (ref 34–48)
HGB BLD-MCNC: 13.5 G/DL (ref 11.5–15.5)
IMM GRANULOCYTES # BLD AUTO: 0.04 K/UL (ref 0–0.58)
IMM GRANULOCYTES NFR BLD: 0 % (ref 0–5)
IRON SATN MFR SERPL: 26 % (ref 15–50)
IRON SERPL-MCNC: 58 UG/DL (ref 37–145)
LYMPHOCYTES NFR BLD: 2.33 K/UL (ref 1.5–4)
LYMPHOCYTES RELATIVE PERCENT: 24 % (ref 20–42)
MCH RBC QN AUTO: 30 PG (ref 26–35)
MCHC RBC AUTO-ENTMCNC: 32.8 G/DL (ref 32–34.5)
MCV RBC AUTO: 91.3 FL (ref 80–99.9)
MONOCYTES NFR BLD: 0.95 K/UL (ref 0.1–0.95)
MONOCYTES NFR BLD: 10 % (ref 2–12)
NEUTROPHILS NFR BLD: 60 % (ref 43–80)
NEUTS SEG NFR BLD: 5.71 K/UL (ref 1.8–7.3)
PLATELET # BLD AUTO: 194 K/UL (ref 130–450)
PMV BLD AUTO: 11.3 FL (ref 7–12)
POTASSIUM SERPL-SCNC: 3.6 MMOL/L (ref 3.5–5)
PROT SERPL-MCNC: 6.7 G/DL (ref 6.4–8.3)
RBC # BLD AUTO: 4.5 M/UL (ref 3.5–5.5)
SODIUM SERPL-SCNC: 144 MMOL/L (ref 132–146)
TIBC SERPL-MCNC: 219 UG/DL (ref 250–450)
WBC OTHER # BLD: 9.6 K/UL (ref 4.5–11.5)

## 2024-12-10 PROCEDURE — 92507 TX SP LANG VOICE COMM INDIV: CPT

## 2024-12-10 PROCEDURE — 6370000000 HC RX 637 (ALT 250 FOR IP): Performed by: STUDENT IN AN ORGANIZED HEALTH CARE EDUCATION/TRAINING PROGRAM

## 2024-12-10 PROCEDURE — 2580000003 HC RX 258

## 2024-12-10 PROCEDURE — 92526 ORAL FUNCTION THERAPY: CPT

## 2024-12-10 PROCEDURE — 6370000000 HC RX 637 (ALT 250 FOR IP): Performed by: FAMILY MEDICINE

## 2024-12-10 PROCEDURE — 6360000002 HC RX W HCPCS

## 2024-12-10 PROCEDURE — 82728 ASSAY OF FERRITIN: CPT

## 2024-12-10 PROCEDURE — 80053 COMPREHEN METABOLIC PANEL: CPT

## 2024-12-10 PROCEDURE — 83550 IRON BINDING TEST: CPT

## 2024-12-10 PROCEDURE — 94669 MECHANICAL CHEST WALL OSCILL: CPT

## 2024-12-10 PROCEDURE — 6360000002 HC RX W HCPCS: Performed by: STUDENT IN AN ORGANIZED HEALTH CARE EDUCATION/TRAINING PROGRAM

## 2024-12-10 PROCEDURE — 2700000000 HC OXYGEN THERAPY PER DAY

## 2024-12-10 PROCEDURE — 94640 AIRWAY INHALATION TREATMENT: CPT

## 2024-12-10 PROCEDURE — 83540 ASSAY OF IRON: CPT

## 2024-12-10 PROCEDURE — 2060000000 HC ICU INTERMEDIATE R&B

## 2024-12-10 PROCEDURE — 85025 COMPLETE CBC W/AUTO DIFF WBC: CPT

## 2024-12-10 PROCEDURE — 36415 COLL VENOUS BLD VENIPUNCTURE: CPT

## 2024-12-10 RX ORDER — LAMOTRIGINE 200 MG/1
200 TABLET ORAL 2 TIMES DAILY
Qty: 30 TABLET | Refills: 3
Start: 2024-12-10

## 2024-12-10 RX ADMIN — LORAZEPAM 0.5 MG: 2 INJECTION INTRAMUSCULAR; INTRAVENOUS at 16:23

## 2024-12-10 RX ADMIN — AMPICILLIN SODIUM AND SULBACTAM SODIUM 3000 MG: 2; 1 INJECTION, POWDER, FOR SOLUTION INTRAMUSCULAR; INTRAVENOUS at 03:19

## 2024-12-10 RX ADMIN — GUAIFENESIN 800 MG: 400 TABLET ORAL at 09:50

## 2024-12-10 RX ADMIN — ACETYLCYSTEINE 400 MG: 100 INHALANT RESPIRATORY (INHALATION) at 20:23

## 2024-12-10 RX ADMIN — ACETYLCYSTEINE 400 MG: 100 INHALANT RESPIRATORY (INHALATION) at 08:33

## 2024-12-10 RX ADMIN — CLONAZEPAM 0.5 MG: 0.5 TABLET ORAL at 20:17

## 2024-12-10 RX ADMIN — ARFORMOTEROL TARTRATE 15 MCG: 15 SOLUTION RESPIRATORY (INHALATION) at 08:33

## 2024-12-10 RX ADMIN — IPRATROPIUM BROMIDE 0.5 MG: 0.5 SOLUTION RESPIRATORY (INHALATION) at 12:39

## 2024-12-10 RX ADMIN — APIXABAN 5 MG: 5 TABLET, FILM COATED ORAL at 20:17

## 2024-12-10 RX ADMIN — FERROUS SULFATE TAB 325 MG (65 MG ELEMENTAL FE) 325 MG: 325 (65 FE) TAB at 09:50

## 2024-12-10 RX ADMIN — IPRATROPIUM BROMIDE 0.5 MG: 0.5 SOLUTION RESPIRATORY (INHALATION) at 01:56

## 2024-12-10 RX ADMIN — OXYCODONE 5 MG: 5 TABLET ORAL at 09:49

## 2024-12-10 RX ADMIN — OXYCODONE 5 MG: 5 TABLET ORAL at 16:23

## 2024-12-10 RX ADMIN — BUDESONIDE 1000 MCG: 0.5 INHALANT RESPIRATORY (INHALATION) at 20:23

## 2024-12-10 RX ADMIN — METOPROLOL SUCCINATE 25 MG: 25 TABLET, FILM COATED, EXTENDED RELEASE ORAL at 09:49

## 2024-12-10 RX ADMIN — VENLAFAXINE HYDROCHLORIDE 75 MG: 75 CAPSULE, EXTENDED RELEASE ORAL at 09:50

## 2024-12-10 RX ADMIN — IPRATROPIUM BROMIDE 0.5 MG: 0.5 SOLUTION RESPIRATORY (INHALATION) at 08:33

## 2024-12-10 RX ADMIN — HYDROXYCHLOROQUINE SULFATE 200 MG: 200 TABLET ORAL at 09:50

## 2024-12-10 RX ADMIN — ACETYLCYSTEINE 400 MG: 100 INHALANT RESPIRATORY (INHALATION) at 16:03

## 2024-12-10 RX ADMIN — IPRATROPIUM BROMIDE 0.5 MG: 0.5 SOLUTION RESPIRATORY (INHALATION) at 20:23

## 2024-12-10 RX ADMIN — GUAIFENESIN 800 MG: 400 TABLET ORAL at 20:16

## 2024-12-10 RX ADMIN — VENLAFAXINE HYDROCHLORIDE 75 MG: 75 CAPSULE, EXTENDED RELEASE ORAL at 20:17

## 2024-12-10 RX ADMIN — ACETYLCYSTEINE 400 MG: 100 INHALANT RESPIRATORY (INHALATION) at 12:39

## 2024-12-10 RX ADMIN — CLONAZEPAM 0.5 MG: 0.5 TABLET ORAL at 09:49

## 2024-12-10 RX ADMIN — ACETYLCYSTEINE 400 MG: 100 INHALANT RESPIRATORY (INHALATION) at 01:56

## 2024-12-10 RX ADMIN — ALBUTEROL SULFATE 2.5 MG: 2.5 SOLUTION RESPIRATORY (INHALATION) at 01:56

## 2024-12-10 RX ADMIN — LAMOTRIGINE 200 MG: 100 TABLET ORAL at 09:50

## 2024-12-10 RX ADMIN — HYDROXYCHLOROQUINE SULFATE 200 MG: 200 TABLET ORAL at 20:17

## 2024-12-10 RX ADMIN — PANTOPRAZOLE SODIUM 40 MG: 40 TABLET, DELAYED RELEASE ORAL at 05:53

## 2024-12-10 RX ADMIN — ARFORMOTEROL TARTRATE 15 MCG: 15 SOLUTION RESPIRATORY (INHALATION) at 20:23

## 2024-12-10 RX ADMIN — ALBUTEROL SULFATE 2.5 MG: 2.5 SOLUTION RESPIRATORY (INHALATION) at 16:03

## 2024-12-10 RX ADMIN — LAMOTRIGINE 200 MG: 100 TABLET ORAL at 20:17

## 2024-12-10 RX ADMIN — APIXABAN 5 MG: 5 TABLET, FILM COATED ORAL at 09:50

## 2024-12-10 RX ADMIN — BUDESONIDE 1000 MCG: 0.5 INHALANT RESPIRATORY (INHALATION) at 08:33

## 2024-12-10 RX ADMIN — GUAIFENESIN 800 MG: 400 TABLET ORAL at 16:23

## 2024-12-10 ASSESSMENT — PAIN DESCRIPTION - DESCRIPTORS
DESCRIPTORS: ACHING
DESCRIPTORS: ACHING;THROBBING

## 2024-12-10 ASSESSMENT — PAIN SCALES - GENERAL
PAINLEVEL_OUTOF10: 4
PAINLEVEL_OUTOF10: 2
PAINLEVEL_OUTOF10: 5
PAINLEVEL_OUTOF10: 0

## 2024-12-10 ASSESSMENT — PAIN DESCRIPTION - ORIENTATION
ORIENTATION: MID;LEFT;RIGHT
ORIENTATION: LEFT;MID;RIGHT

## 2024-12-10 ASSESSMENT — PAIN DESCRIPTION - LOCATION
LOCATION: NECK
LOCATION: NECK

## 2024-12-10 NOTE — PROGRESS NOTES
No c/o  Vss  lungs clear  reg rhythm  abd- soft and non-tender  ext- no edema  Stable- lab pending  Per orders- continue present care

## 2024-12-10 NOTE — PROGRESS NOTES
SPEECH LANGUAGE PATHOLOGY  DAILY PROGRESS NOTE      PATIENT NAME:  Greta Moran      :  1970          TODAY'S DATE:  12/10/2024 [facility]    Current Diet: ADULT DIET; Regular  ADULT ORAL NUTRITION SUPPLEMENT; Breakfast, Lunch, Dinner; Standard High Calorie/High Protein Oral Supplement    Patient seen for ongoing PMV trials. Upon entering the room, patient receiving breathing treatment. Breathing treatment was completed, and RT suctioned trach prior to PMV trials. Before trials, patient's HR was 109 and oxygen saturation was 96%. Oxygen saturation maintained between 95-96% throughout trials. HR fluctuated between . Minimal secretions noted throughout session. Patient with decreased work of breathing compared to previous date. Minimal-moderate amount of backflow noted inconsistently when PMV was removed, which is an improvement compared to previous date. Patient achieved adequate phonation for conversation. Recommend continue PMV trials with SLP only due to back pressure. Patient educated on PMV use and care at this time.     Recommendation: continue current POC      CPT code(s) 68261  speech/language tx  Total minutes :  20 minutes    Fani Timmons  Speech Pathology     BRADLEY Ochoa.S. CCC-SLP/L  Speech Language Pathologist  [id number]

## 2024-12-10 NOTE — CARE COORDINATION
CASE MANAGEMENT.... Confirmed with Genesis that [facility] can accept patient with start of care on Fri 12/13/24. Also confirmed with Elder from [facility] that they can accommodate trach/resp equipment and have supplies ready for dc on 12/13/24. He will provide CM with ez script to be signed by ENT. Orders for HHC/Trach supplies in place. Has nebulizer at home. Patient updated on the above and agreeable. Per ENT, plan for trach exchange tomorrow. Chest vest per Resp. PMV training with speech. Passed swallow eval. Ok for regular diet. Will follow.     UPDATE.... ENT to complete and sign EZ script that's in patients chart. Will need faxed to Elder with [facility] [place] [place] at [phone].

## 2024-12-10 NOTE — PROGRESS NOTES
OTOLARYNGOLOGY  DAILY PROGRESS NOTE  12/10/2024    Subjective:     No acute events overnight. AFVSS. Tolerating trach mask with improved secretions. Tolerating regular diet.    Objective:     BP (!) 112/58   Pulse 89   Temp 99 °F (37.2 °C) (Temporal)   Resp 22   Ht 1.524 m (5')   Wt 59.6 kg (131 lb 6.3 oz)   LMP 2015 (LMP Unknown)   SpO2 95%   BMI 25.66 kg/m²   PULSE OXIMETRY RANGE: SpO2  Av %  Min: 95 %  Max: 99 %  I/O last 3 completed shifts:  In: -   Out: 300 [Urine:300]    GENERAL:  Awake, alert, cooperative, no apparent distress  HENT: Normocephalic, atraumatic, 6-0 Shiley cuffed trach in place, mild secretions  EYES: No sclera icterus, pupils equal  LUNGS:  No increased work of breathing, no stridor  CARDIOVASCULAR:  RR      Assessment/Plan:     54 y.o. female with history of posterior glottic stenosis who is now POD#3 s/p direct laryngoscopy with biopsy, interarytenoid laser ablation, kenalog injection and tracheostomy with Dr. Tilley.     - S/p DL w/ biopsy: final pathology pending  - Continue routine trach care with Q2H suctioning and cleaning inner cannula. Have extra 6-0 cuffed Shiley trach at the bedside as well as flexible suction catheters. Keep faceplate sutures and trach ties in place as these are only to be removed by ENT. Will plan for trach change in approximately 5-7 days from her surgery (likely )   - Continue supplemental humidified O2 via trach mask as needed. Wean O2 requirements as able. Breathing treatment provided this morning.   - Pulmicort/Brovana/Atrovent/Mucomyst breathing treatments ordered  - Chest vest per RT  - oral Mucinex 800 mg TID for thick secretions   - Trach DME orders placed  - Milagro prn for pain control  - Continue full liquid diet  - passed MBSS, continue regular diet  - PMV training only with speech  -  Unasyn completed.      Patient seen, examined and case discussed with attending on call    Electronically signed by Cornel Owens DO on 12/10/2024 at

## 2024-12-11 LAB
ALBUMIN SERPL-MCNC: 3.5 G/DL (ref 3.5–5.2)
ALP SERPL-CCNC: 105 U/L (ref 35–104)
ALT SERPL-CCNC: 11 U/L (ref 0–32)
ANION GAP SERPL CALCULATED.3IONS-SCNC: 11 MMOL/L (ref 7–16)
AST SERPL-CCNC: 19 U/L (ref 0–31)
BASOPHILS # BLD: 0.03 K/UL (ref 0–0.2)
BASOPHILS NFR BLD: 0 % (ref 0–2)
BILIRUB SERPL-MCNC: 0.4 MG/DL (ref 0–1.2)
BUN SERPL-MCNC: 7 MG/DL (ref 6–20)
CALCIUM SERPL-MCNC: 8.8 MG/DL (ref 8.6–10.2)
CHLORIDE SERPL-SCNC: 106 MMOL/L (ref 98–107)
CO2 SERPL-SCNC: 22 MMOL/L (ref 22–29)
CREAT SERPL-MCNC: 0.8 MG/DL (ref 0.5–1)
EOSINOPHIL # BLD: 0.73 K/UL (ref 0.05–0.5)
EOSINOPHILS RELATIVE PERCENT: 7 % (ref 0–6)
ERYTHROCYTE [DISTWIDTH] IN BLOOD BY AUTOMATED COUNT: 13.3 % (ref 11.5–15)
GFR, ESTIMATED: 86 ML/MIN/1.73M2
GLUCOSE SERPL-MCNC: 110 MG/DL (ref 74–99)
HCT VFR BLD AUTO: 42.4 % (ref 34–48)
HGB BLD-MCNC: 13.7 G/DL (ref 11.5–15.5)
IMM GRANULOCYTES # BLD AUTO: 0.05 K/UL (ref 0–0.58)
IMM GRANULOCYTES NFR BLD: 1 % (ref 0–5)
LYMPHOCYTES NFR BLD: 1.3 K/UL (ref 1.5–4)
LYMPHOCYTES RELATIVE PERCENT: 12 % (ref 20–42)
MCH RBC QN AUTO: 29.8 PG (ref 26–35)
MCHC RBC AUTO-ENTMCNC: 32.3 G/DL (ref 32–34.5)
MCV RBC AUTO: 92.2 FL (ref 80–99.9)
MONOCYTES NFR BLD: 0.94 K/UL (ref 0.1–0.95)
MONOCYTES NFR BLD: 9 % (ref 2–12)
NEUTROPHILS NFR BLD: 72 % (ref 43–80)
NEUTS SEG NFR BLD: 7.74 K/UL (ref 1.8–7.3)
PLATELET # BLD AUTO: 193 K/UL (ref 130–450)
PMV BLD AUTO: 11.2 FL (ref 7–12)
POTASSIUM SERPL-SCNC: 3.7 MMOL/L (ref 3.5–5)
PROT SERPL-MCNC: 6.7 G/DL (ref 6.4–8.3)
RBC # BLD AUTO: 4.6 M/UL (ref 3.5–5.5)
SODIUM SERPL-SCNC: 139 MMOL/L (ref 132–146)
SURGICAL PATHOLOGY REPORT: NORMAL
WBC OTHER # BLD: 10.8 K/UL (ref 4.5–11.5)

## 2024-12-11 PROCEDURE — 92507 TX SP LANG VOICE COMM INDIV: CPT

## 2024-12-11 PROCEDURE — 6370000000 HC RX 637 (ALT 250 FOR IP): Performed by: STUDENT IN AN ORGANIZED HEALTH CARE EDUCATION/TRAINING PROGRAM

## 2024-12-11 PROCEDURE — 6360000002 HC RX W HCPCS: Performed by: STUDENT IN AN ORGANIZED HEALTH CARE EDUCATION/TRAINING PROGRAM

## 2024-12-11 PROCEDURE — 85025 COMPLETE CBC W/AUTO DIFF WBC: CPT

## 2024-12-11 PROCEDURE — 6370000000 HC RX 637 (ALT 250 FOR IP): Performed by: FAMILY MEDICINE

## 2024-12-11 PROCEDURE — 94640 AIRWAY INHALATION TREATMENT: CPT

## 2024-12-11 PROCEDURE — 2060000000 HC ICU INTERMEDIATE R&B

## 2024-12-11 PROCEDURE — 80053 COMPREHEN METABOLIC PANEL: CPT

## 2024-12-11 PROCEDURE — 2700000000 HC OXYGEN THERAPY PER DAY

## 2024-12-11 PROCEDURE — 36415 COLL VENOUS BLD VENIPUNCTURE: CPT

## 2024-12-11 PROCEDURE — 94669 MECHANICAL CHEST WALL OSCILL: CPT

## 2024-12-11 RX ADMIN — HYDROXYCHLOROQUINE SULFATE 200 MG: 200 TABLET ORAL at 20:27

## 2024-12-11 RX ADMIN — CLONAZEPAM 0.5 MG: 0.5 TABLET ORAL at 20:27

## 2024-12-11 RX ADMIN — ACETYLCYSTEINE 400 MG: 100 INHALANT RESPIRATORY (INHALATION) at 01:37

## 2024-12-11 RX ADMIN — IPRATROPIUM BROMIDE 0.5 MG: 0.5 SOLUTION RESPIRATORY (INHALATION) at 13:20

## 2024-12-11 RX ADMIN — GUAIFENESIN 800 MG: 400 TABLET ORAL at 20:28

## 2024-12-11 RX ADMIN — APIXABAN 5 MG: 5 TABLET, FILM COATED ORAL at 08:12

## 2024-12-11 RX ADMIN — OXYCODONE 5 MG: 5 TABLET ORAL at 15:22

## 2024-12-11 RX ADMIN — LAMOTRIGINE 200 MG: 100 TABLET ORAL at 20:27

## 2024-12-11 RX ADMIN — ACETYLCYSTEINE 400 MG: 100 INHALANT RESPIRATORY (INHALATION) at 09:31

## 2024-12-11 RX ADMIN — ACETYLCYSTEINE 400 MG: 100 INHALANT RESPIRATORY (INHALATION) at 16:19

## 2024-12-11 RX ADMIN — IPRATROPIUM BROMIDE 0.5 MG: 0.5 SOLUTION RESPIRATORY (INHALATION) at 01:37

## 2024-12-11 RX ADMIN — IPRATROPIUM BROMIDE 0.5 MG: 0.5 SOLUTION RESPIRATORY (INHALATION) at 09:31

## 2024-12-11 RX ADMIN — ARFORMOTEROL TARTRATE 15 MCG: 15 SOLUTION RESPIRATORY (INHALATION) at 09:31

## 2024-12-11 RX ADMIN — HYDROXYCHLOROQUINE SULFATE 200 MG: 200 TABLET ORAL at 08:12

## 2024-12-11 RX ADMIN — GUAIFENESIN 800 MG: 400 TABLET ORAL at 15:22

## 2024-12-11 RX ADMIN — CLONAZEPAM 0.5 MG: 0.5 TABLET ORAL at 08:13

## 2024-12-11 RX ADMIN — IPRATROPIUM BROMIDE 0.5 MG: 0.5 SOLUTION RESPIRATORY (INHALATION) at 21:15

## 2024-12-11 RX ADMIN — VENLAFAXINE HYDROCHLORIDE 75 MG: 75 CAPSULE, EXTENDED RELEASE ORAL at 08:13

## 2024-12-11 RX ADMIN — ALBUTEROL SULFATE 2.5 MG: 2.5 SOLUTION RESPIRATORY (INHALATION) at 16:19

## 2024-12-11 RX ADMIN — APIXABAN 5 MG: 5 TABLET, FILM COATED ORAL at 20:27

## 2024-12-11 RX ADMIN — VENLAFAXINE HYDROCHLORIDE 75 MG: 75 CAPSULE, EXTENDED RELEASE ORAL at 20:27

## 2024-12-11 RX ADMIN — BUDESONIDE 1000 MCG: 0.5 INHALANT RESPIRATORY (INHALATION) at 09:31

## 2024-12-11 RX ADMIN — OXYCODONE 5 MG: 5 TABLET ORAL at 08:13

## 2024-12-11 RX ADMIN — ACETYLCYSTEINE 400 MG: 100 INHALANT RESPIRATORY (INHALATION) at 13:19

## 2024-12-11 RX ADMIN — ALBUTEROL SULFATE 2.5 MG: 2.5 SOLUTION RESPIRATORY (INHALATION) at 13:19

## 2024-12-11 RX ADMIN — BUDESONIDE 1000 MCG: 0.5 INHALANT RESPIRATORY (INHALATION) at 21:15

## 2024-12-11 RX ADMIN — LOSARTAN POTASSIUM 25 MG: 25 TABLET, FILM COATED ORAL at 08:12

## 2024-12-11 RX ADMIN — GUAIFENESIN 800 MG: 400 TABLET ORAL at 08:12

## 2024-12-11 RX ADMIN — PANTOPRAZOLE SODIUM 40 MG: 40 TABLET, DELAYED RELEASE ORAL at 06:07

## 2024-12-11 RX ADMIN — LORAZEPAM 0.5 MG: 2 INJECTION INTRAMUSCULAR; INTRAVENOUS at 15:22

## 2024-12-11 RX ADMIN — METOPROLOL SUCCINATE 25 MG: 25 TABLET, FILM COATED, EXTENDED RELEASE ORAL at 08:12

## 2024-12-11 RX ADMIN — ACETYLCYSTEINE 400 MG: 100 INHALANT RESPIRATORY (INHALATION) at 21:15

## 2024-12-11 RX ADMIN — ARFORMOTEROL TARTRATE 15 MCG: 15 SOLUTION RESPIRATORY (INHALATION) at 21:15

## 2024-12-11 RX ADMIN — LAMOTRIGINE 200 MG: 100 TABLET ORAL at 08:12

## 2024-12-11 ASSESSMENT — PAIN DESCRIPTION - DESCRIPTORS
DESCRIPTORS: ACHING
DESCRIPTORS: ACHING

## 2024-12-11 ASSESSMENT — PAIN DESCRIPTION - ORIENTATION
ORIENTATION: MID;LEFT;RIGHT
ORIENTATION: RIGHT;LEFT

## 2024-12-11 ASSESSMENT — PAIN - FUNCTIONAL ASSESSMENT
PAIN_FUNCTIONAL_ASSESSMENT: ACTIVITIES ARE NOT PREVENTED
PAIN_FUNCTIONAL_ASSESSMENT: ACTIVITIES ARE NOT PREVENTED

## 2024-12-11 ASSESSMENT — PAIN SCALES - GENERAL
PAINLEVEL_OUTOF10: 4
PAINLEVEL_OUTOF10: 4
PAINLEVEL_OUTOF10: 0

## 2024-12-11 ASSESSMENT — PAIN DESCRIPTION - LOCATION
LOCATION: NECK
LOCATION: NECK

## 2024-12-11 NOTE — PROGRESS NOTES
OTOLARYNGOLOGY  DAILY PROGRESS NOTE  2024    Subjective:     No acute events overnight. AFVSS. Tolerating trach mask with improved secretions. Tolerating regular diet.    Objective:     /63   Pulse 98   Temp 98.5 °F (36.9 °C) (Oral)   Resp 20   Ht 1.524 m (5')   Wt 55.7 kg (122 lb 14.4 oz)   LMP 2015 (LMP Unknown)   SpO2 95%   BMI 24.00 kg/m²   PULSE OXIMETRY RANGE: SpO2  Av.5 %  Min: 94 %  Max: 99 %  No intake/output data recorded.    GENERAL:  Awake, alert, cooperative, no apparent distress  HENT: Normocephalic, atraumatic, 6-0 Shiley cuffed trach in place, mild secretions  EYES: No sclera icterus, pupils equal  LUNGS:  No increased work of breathing, no stridor  CARDIOVASCULAR:  RR    Procedure Trach change 24  Patient was laid in a supine position and the 5-point sutures were cut from the faceplate and the 6-0 Shiley cuffed trach was removed. Stoma was evaluated and found to be without abscess or breakdown. After suctioning additional secretions, 6-0 uncuffed shiley trach was placed and secured with velcro trach ties. Trach was confirmed to be in proper tracheal lumen position as depicted below the scope. Patient tolerated procedure well. Nursing updated        Assessment/Plan:     54 y.o. female with history of posterior glottic stenosis who is now POD#3 s/p direct laryngoscopy with biopsy, interarytenoid laser ablation, kenalog injection and tracheostomy with Dr. Tilley.     - S/p DL w/ biopsy: final pathology pending  - Continue routine trach care with Q2H suctioning and cleaning inner cannula. Have extra 6-0 cuffed and uncuffed Shiley trach at the bedside as well as flexible suction catheters.   - Trach changed from 6-0 cuffed to uncuffed today    Keep extra inner cannulas at bedside  - Continue supplemental humidified O2 via trach mask as needed. Wean O2 requirements as able. Breathing treatment provided this morning.   - Pulmicort/Brovana/Atrovent/Mucomyst

## 2024-12-11 NOTE — PROGRESS NOTES
SPEECH LANGUAGE PATHOLOGY  DAILY PROGRESS NOTE        PATIENT NAME:  Greta Moran      :  1970          TODAY'S DATE:  2024 ROOM:  [facility]-A    Patient was seen for ongoing PMV trials. Trach changed to a 6-0 uncuffed shiley earlier this morning. RN suctioned trach prior to PMV trials. Before trials, patient's HR was 89 and Sp02% was 96. Patient tolerated the PMV for the duration of 5-8 minutes while maintaining conversation with the therapist. Adequate phonation was displayed. Minimal secretions and backflow were noted. Patient's HR ranged between 89-97 and Sp02% maintained between 95-97% with PMV in place. Patient was also educated on how to take the PMV on and off. She successfully placed and removed the PMV x2 when provided visual assistance (e.g., phone camera).     Recommendation: Continue PMV trials with the SLP only.       CPT code(s) 76909  speech/language tx  Total minutes :  20 minutes     Yosi Zaidi M.S., CCC-SLP/L   Speech-Language Pathologist  [id number]

## 2024-12-12 PROCEDURE — 92507 TX SP LANG VOICE COMM INDIV: CPT

## 2024-12-12 PROCEDURE — 6370000000 HC RX 637 (ALT 250 FOR IP): Performed by: FAMILY MEDICINE

## 2024-12-12 PROCEDURE — 94640 AIRWAY INHALATION TREATMENT: CPT

## 2024-12-12 PROCEDURE — 2060000000 HC ICU INTERMEDIATE R&B

## 2024-12-12 PROCEDURE — 2700000000 HC OXYGEN THERAPY PER DAY

## 2024-12-12 PROCEDURE — 6370000000 HC RX 637 (ALT 250 FOR IP): Performed by: STUDENT IN AN ORGANIZED HEALTH CARE EDUCATION/TRAINING PROGRAM

## 2024-12-12 PROCEDURE — 6360000002 HC RX W HCPCS: Performed by: STUDENT IN AN ORGANIZED HEALTH CARE EDUCATION/TRAINING PROGRAM

## 2024-12-12 PROCEDURE — 94669 MECHANICAL CHEST WALL OSCILL: CPT

## 2024-12-12 RX ADMIN — HYDROXYCHLOROQUINE SULFATE 200 MG: 200 TABLET ORAL at 20:09

## 2024-12-12 RX ADMIN — ACETYLCYSTEINE 400 MG: 100 INHALANT RESPIRATORY (INHALATION) at 12:47

## 2024-12-12 RX ADMIN — IPRATROPIUM BROMIDE 0.5 MG: 0.5 SOLUTION RESPIRATORY (INHALATION) at 09:35

## 2024-12-12 RX ADMIN — GUAIFENESIN 800 MG: 400 TABLET ORAL at 15:18

## 2024-12-12 RX ADMIN — HYDROXYCHLOROQUINE SULFATE 200 MG: 200 TABLET ORAL at 09:08

## 2024-12-12 RX ADMIN — GUAIFENESIN 800 MG: 400 TABLET ORAL at 20:09

## 2024-12-12 RX ADMIN — ALBUTEROL SULFATE 2.5 MG: 2.5 SOLUTION RESPIRATORY (INHALATION) at 01:20

## 2024-12-12 RX ADMIN — LAMOTRIGINE 200 MG: 100 TABLET ORAL at 09:08

## 2024-12-12 RX ADMIN — IPRATROPIUM BROMIDE 0.5 MG: 0.5 SOLUTION RESPIRATORY (INHALATION) at 01:20

## 2024-12-12 RX ADMIN — GUAIFENESIN 800 MG: 400 TABLET ORAL at 09:08

## 2024-12-12 RX ADMIN — ACETYLCYSTEINE 400 MG: 100 INHALANT RESPIRATORY (INHALATION) at 01:20

## 2024-12-12 RX ADMIN — VENLAFAXINE HYDROCHLORIDE 75 MG: 75 CAPSULE, EXTENDED RELEASE ORAL at 09:08

## 2024-12-12 RX ADMIN — LAMOTRIGINE 200 MG: 100 TABLET ORAL at 20:09

## 2024-12-12 RX ADMIN — BUDESONIDE 1000 MCG: 0.5 INHALANT RESPIRATORY (INHALATION) at 09:35

## 2024-12-12 RX ADMIN — ARFORMOTEROL TARTRATE 15 MCG: 15 SOLUTION RESPIRATORY (INHALATION) at 20:30

## 2024-12-12 RX ADMIN — APIXABAN 5 MG: 5 TABLET, FILM COATED ORAL at 20:10

## 2024-12-12 RX ADMIN — CLONAZEPAM 0.5 MG: 0.5 TABLET ORAL at 09:08

## 2024-12-12 RX ADMIN — ACETYLCYSTEINE 400 MG: 100 INHALANT RESPIRATORY (INHALATION) at 09:36

## 2024-12-12 RX ADMIN — BUDESONIDE 1000 MCG: 0.5 INHALANT RESPIRATORY (INHALATION) at 20:29

## 2024-12-12 RX ADMIN — ACETYLCYSTEINE 400 MG: 100 INHALANT RESPIRATORY (INHALATION) at 20:29

## 2024-12-12 RX ADMIN — IPRATROPIUM BROMIDE 0.5 MG: 0.5 SOLUTION RESPIRATORY (INHALATION) at 20:29

## 2024-12-12 RX ADMIN — CLONAZEPAM 0.5 MG: 0.5 TABLET ORAL at 20:09

## 2024-12-12 RX ADMIN — LOSARTAN POTASSIUM 25 MG: 25 TABLET, FILM COATED ORAL at 09:08

## 2024-12-12 RX ADMIN — ACETYLCYSTEINE 400 MG: 100 INHALANT RESPIRATORY (INHALATION) at 17:16

## 2024-12-12 RX ADMIN — METOPROLOL SUCCINATE 25 MG: 25 TABLET, FILM COATED, EXTENDED RELEASE ORAL at 09:08

## 2024-12-12 RX ADMIN — ACETYLCYSTEINE 400 MG: 100 INHALANT RESPIRATORY (INHALATION) at 04:32

## 2024-12-12 RX ADMIN — ALBUTEROL SULFATE 2.5 MG: 2.5 SOLUTION RESPIRATORY (INHALATION) at 17:16

## 2024-12-12 RX ADMIN — APIXABAN 5 MG: 5 TABLET, FILM COATED ORAL at 09:08

## 2024-12-12 RX ADMIN — IPRATROPIUM BROMIDE 0.5 MG: 0.5 SOLUTION RESPIRATORY (INHALATION) at 12:47

## 2024-12-12 RX ADMIN — VENLAFAXINE HYDROCHLORIDE 75 MG: 75 CAPSULE, EXTENDED RELEASE ORAL at 20:09

## 2024-12-12 RX ADMIN — PANTOPRAZOLE SODIUM 40 MG: 40 TABLET, DELAYED RELEASE ORAL at 06:49

## 2024-12-12 RX ADMIN — ARFORMOTEROL TARTRATE 15 MCG: 15 SOLUTION RESPIRATORY (INHALATION) at 09:35

## 2024-12-12 RX ADMIN — ALBUTEROL SULFATE 2.5 MG: 2.5 SOLUTION RESPIRATORY (INHALATION) at 04:32

## 2024-12-12 ASSESSMENT — PAIN SCALES - GENERAL: PAINLEVEL_OUTOF10: 0

## 2024-12-12 NOTE — PROGRESS NOTES
SPEECH LANGUAGE PATHOLOGY  DAILY PROGRESS NOTE      PATIENT NAME:  Greta Moran      :  1970          TODAY'S DATE:  2024 ROOM:  [facility]6/[facility]    Current Diet: ADULT DIET; Regular  ADULT ORAL NUTRITION SUPPLEMENT; Breakfast, Lunch, Dinner; Standard High Calorie/High Protein Oral Supplement    Patient seen for ongoing PMV trials. Before trials, HR at 86 and oxygen saturation at 95%. During trials, oxygen saturation maintained at 95% and HR fluctuated between 86-91. During session, mild-moderate back pressure noted in x2 trials and moderate-severe back pressure in x1 trials. Patient able to achieve phonation, however hoarse vocal quality noted this date. Patient independently don/doffed PMV. However, minimal-moderate verbal and visual cues required to instruct patient on how to remove PMV due to patient pulling at trach. Patient instructed on proper technique and independently removed PMV by end of session.     Recommendation: continue current POC      CPT code(s) 35220  speech/language tx  Total minutes :  15 minutes    Fani Timmons  Speech Pathology     BRADLEY Ochoa.S. CCC-SLP/L  Speech Language Pathologist  [id number]

## 2024-12-12 NOTE — PROGRESS NOTES
Feels ok- no c/o  Vss  lungs clear  reg rhythm  abd- soft and non-tender  ext- no edema  Stable  Continue present care- home when ok with ENT

## 2024-12-12 NOTE — CARE COORDINATION
CASE MANAGEMENT...Per Sofia from [facility], start of care is available tomorrow 12/13/24 as planned. States patient needs to by discharged and out of hospital by noon. Elder with [facility] notified and was informed equipment will be delivered between 9-10 am. Nursing and patient updated.... will follow.

## 2024-12-12 NOTE — PROGRESS NOTES
OTOLARYNGOLOGY  DAILY PROGRESS NOTE  2024    Subjective:     No acute events overnight. AFVSS. Tolerating trach mask with improved secretions. Tolerating PMV trials. Tolerating regular diet.    Objective:     BP (!) 120/55   Pulse 94   Temp 99 °F (37.2 °C) (Oral)   Resp 22   Ht 1.524 m (5')   Wt 55.7 kg (122 lb 14.4 oz)   LMP 2015 (LMP Unknown)   SpO2 99%   BMI 24.00 kg/m²   PULSE OXIMETRY RANGE: SpO2  Av.1 %  Min: 96 %  Max: 100 %  I/O last 3 completed shifts:  In: 60 [P.O.:60]  Out: 675 [Urine:675]    GENERAL:  Awake, alert, cooperative, no apparent distress  HENT: Normocephalic, atraumatic, 6-0 Shiley cuffed trach in place, mild secretions  EYES: No sclera icterus, pupils equal  LUNGS:  No increased work of breathing, no stridor  CARDIOVASCULAR:  RR      Assessment/Plan:     54 y.o. female with history of posterior glottic stenosis who is now POD#3 s/p direct laryngoscopy with biopsy, interarytenoid laser ablation, kenalog injection and tracheostomy with Dr. Tilley.     - S/p DL w/ biopsy: final pathology pending  - Continue routine trach care with Q2H suctioning and cleaning inner cannula. Have extra 6-0 cuffed and uncuffed Shiley trach at the bedside as well as flexible suction catheters.   - Trach changed from 6-0 cuffed to uncuffed today    Keep extra inner cannulas at bedside  - Continue supplemental humidified O2 via trach mask as needed. Wean O2 requirements as able. Breathing treatment provided this morning.   - Pulmicort/Brovana/Atrovent/Mucomyst breathing treatments ordered  - Chest vest per RT  - oral Mucinex 800 mg TID for thick secretions   - Trach DME orders placed  - Milagro prn for pain control  - Continue full liquid diet  - passed MBSS, continue regular diet  - PMV training only with speech  -Dispo: plan is home with [facility], tentatively DC date is  per CM.    Patient seen, examined and case discussed with attending on call    Electronically signed by Cornel Owens DO on  12/12/2024 at 4:16 PM

## 2024-12-12 NOTE — FLOWSHEET NOTE
Patient and  educated on how to perform suctioning and cleaning of her trach site. Patient verbalized understanding and demonstrated techniques properly. All questions and concerns addressed and answered at this time.

## 2024-12-12 NOTE — CARE COORDINATION
Bedside RN; please utilize below template and paste into a progress note    Pulse ox was ______% on room air at rest.  Ambulated patient on room air.  Oxygen saturation was ______% on room air while ambulating.  Oxygen applied.  Recovery pulse ox was ______% on _______ liters of oxygen while ambulating.    SILVIA Sharma, RN  Manager Care Coordination  [facility]

## 2024-12-12 NOTE — PROGRESS NOTES
Pulse ox was 98% on room air at rest.   Ambulated patient on room air.   Oxygen saturation was 94% on room air while ambulating.   Recovery pulse ox was 98% on room air after ambulation.

## 2024-12-12 NOTE — PROGRESS NOTES
Nutrition Assessment    Type and Reason for Visit:  Initial, LOS    Nutrition Recommendations/Plan:   Continue current diet  Will discontinue ONS  Monitor      Nutrition Assessment:    Pt w/ posterior glottic stenosis s/p open tracheostomy and direct laryngoscopy w/ laser excision of posterior glottic stenosis 12/6. Pt with functional swallow per SLP s/p MBSS 12/9. Hx lupus, bipolar, CHF, impaired glucose tolerance, multiple endotracheal intubations. Pt reports good meal intake and doesn't like Ensure, will discontinue ONS.    Nutrition Related Findings:    A&O X4, trach mask, -1.4L, no edema, abd soft, +BS   Wound Type: None       Current Nutrition Intake & Therapies:    Average Meal Intake: % (per intake clipboard & pt)  Average Supplements Intake: 0% (per pt)  ADULT DIET; Regular  ADULT ORAL NUTRITION SUPPLEMENT; Breakfast, Lunch, Dinner; Standard High Calorie/High Protein Oral Supplement    Anthropometric Measures:  Height: 152.4 cm (5')  Ideal Body Weight (IBW): 100 lbs (45 kg)    Admission Body Weight: 57 kg (125 lb 9.6 oz) (12/7 bed)  Current Body Weight: 55.7 kg (122 lb 14.4 oz) (12/11), 122.9 % IBW. Weight Source: Bed scale  Current BMI (kg/m2): 24  Usual Body Weight: 66.5 kg (146 lb 8 oz) (7/6/24 per EMR)     % Weight Change (Calculated): -16.1                    BMI Categories: Normal Weight (BMI 18.5-24.9)    Nutrition Diagnosis:   No nutrition diagnosis at this time     Nutrition Interventions:   Food and/or Nutrient Delivery: Continue Current Diet, Discontinue Oral Nutrition Supplement  Nutrition Education/Counseling: No recommendation at this time  Coordination of Nutrition Care: Continue to monitor while inpatient       Goals:  Goals: PO intake 75% or greater, by next RD assessment          Nutrition Monitoring and Evaluation:      Food/Nutrient Intake Outcomes: Food and Nutrient Intake  Physical Signs/Symptoms Outcomes: Biochemical Data, GI Status, Fluid Status or Edema, Nutrition Focused

## 2024-12-12 NOTE — PROGRESS NOTES
Physician Progress Note      PATIENT:               JENI KURTZ  [facility] #:                  [id number]  :                       1970  ADMIT DATE:       2024 8:38 AM  DISCH DATE:  RESPONDING  PROVIDER #:        Brian Houston MD          QUERY TEXT:    Pt admitted with glottic stenosis and has CHF documented. Pt noted to have an   EF of 20-25% in 2024 and improved to an EF of 55-60% in 2024. If possible, please document in progress notes and discharge summary   further specificity regarding the type and acuity of CHF or if CHF has   resolved:    The medical record reflects the following:  Risk Factors: History of cardiogenic shock with cardiomyopathy  Clinical Indicators: Per TTE on 24 \"...Severely reduced left ventricular   systolic function with a visually estimated EF of 20 - 25%. Grade I diastolic   dysfunction with normal LAP...\", Per TTE on 24 \"...Normal left   ventricular systolic function with a visually estimated EF of 55 - 60%. Normal   diastolic function...\", Per anesthesia pre procedure note on  \"...CHF   (echo in September showed that this has resolved.)...\", Per internal med   progress note on  \"...History of CHF-continue beta-blocker and ARB...\"  Treatment: Losartan, PO Metoprolol, strict I's and O's, daily weights  Options provided:  -- Chronic CHF with recovered EF  -- History of CHF only. CHF resolved.  -- Other - I will add my own diagnosis  -- Disagree - Not applicable / Not valid  -- Disagree - Clinically unable to determine / Unknown  -- Refer to Clinical Documentation Reviewer    PROVIDER RESPONSE TEXT:    This patient has history of CHF only. CHF resolved.    Query created by: Genesis Watt on 2024 2:03 PM      Electronically signed by:  Brian Houston MD 2024 5:50 AM

## 2024-12-13 VITALS
RESPIRATION RATE: 18 BRPM | WEIGHT: 126.98 LBS | SYSTOLIC BLOOD PRESSURE: 113 MMHG | TEMPERATURE: 98.9 F | DIASTOLIC BLOOD PRESSURE: 55 MMHG | HEART RATE: 85 BPM | BODY MASS INDEX: 24.93 KG/M2 | OXYGEN SATURATION: 97 % | HEIGHT: 60 IN

## 2024-12-13 PROBLEM — I95.9 HYPOTENSION: Status: ACTIVE | Noted: 2024-12-13

## 2024-12-13 PROCEDURE — 6370000000 HC RX 637 (ALT 250 FOR IP): Performed by: STUDENT IN AN ORGANIZED HEALTH CARE EDUCATION/TRAINING PROGRAM

## 2024-12-13 PROCEDURE — 6360000002 HC RX W HCPCS: Performed by: STUDENT IN AN ORGANIZED HEALTH CARE EDUCATION/TRAINING PROGRAM

## 2024-12-13 PROCEDURE — 94669 MECHANICAL CHEST WALL OSCILL: CPT

## 2024-12-13 PROCEDURE — 6370000000 HC RX 637 (ALT 250 FOR IP): Performed by: FAMILY MEDICINE

## 2024-12-13 PROCEDURE — 92507 TX SP LANG VOICE COMM INDIV: CPT

## 2024-12-13 PROCEDURE — 94640 AIRWAY INHALATION TREATMENT: CPT

## 2024-12-13 PROCEDURE — 2700000000 HC OXYGEN THERAPY PER DAY

## 2024-12-13 RX ORDER — OXYCODONE HYDROCHLORIDE 5 MG/1
5 TABLET ORAL EVERY 6 HOURS PRN
Qty: 8 TABLET | Refills: 0 | Status: SHIPPED | OUTPATIENT
Start: 2024-12-13 | End: 2024-12-16

## 2024-12-13 RX ADMIN — ACETYLCYSTEINE 400 MG: 100 INHALANT RESPIRATORY (INHALATION) at 09:40

## 2024-12-13 RX ADMIN — LAMOTRIGINE 200 MG: 100 TABLET ORAL at 08:15

## 2024-12-13 RX ADMIN — METOPROLOL SUCCINATE 25 MG: 25 TABLET, FILM COATED, EXTENDED RELEASE ORAL at 08:14

## 2024-12-13 RX ADMIN — IPRATROPIUM BROMIDE 0.5 MG: 0.5 SOLUTION RESPIRATORY (INHALATION) at 09:40

## 2024-12-13 RX ADMIN — ARFORMOTEROL TARTRATE 15 MCG: 15 SOLUTION RESPIRATORY (INHALATION) at 09:40

## 2024-12-13 RX ADMIN — ACETYLCYSTEINE 400 MG: 100 INHALANT RESPIRATORY (INHALATION) at 04:23

## 2024-12-13 RX ADMIN — BUDESONIDE 1000 MCG: 0.5 INHALANT RESPIRATORY (INHALATION) at 09:40

## 2024-12-13 RX ADMIN — PANTOPRAZOLE SODIUM 40 MG: 40 TABLET, DELAYED RELEASE ORAL at 06:11

## 2024-12-13 RX ADMIN — APIXABAN 5 MG: 5 TABLET, FILM COATED ORAL at 08:13

## 2024-12-13 RX ADMIN — ACETYLCYSTEINE 400 MG: 100 INHALANT RESPIRATORY (INHALATION) at 01:32

## 2024-12-13 RX ADMIN — IPRATROPIUM BROMIDE 0.5 MG: 0.5 SOLUTION RESPIRATORY (INHALATION) at 01:32

## 2024-12-13 RX ADMIN — GUAIFENESIN 800 MG: 400 TABLET ORAL at 08:16

## 2024-12-13 RX ADMIN — VENLAFAXINE HYDROCHLORIDE 75 MG: 75 CAPSULE, EXTENDED RELEASE ORAL at 08:14

## 2024-12-13 RX ADMIN — HYDROXYCHLOROQUINE SULFATE 200 MG: 200 TABLET ORAL at 08:14

## 2024-12-13 RX ADMIN — ALBUTEROL SULFATE 2.5 MG: 2.5 SOLUTION RESPIRATORY (INHALATION) at 04:24

## 2024-12-13 ASSESSMENT — PAIN SCALES - GENERAL
PAINLEVEL_OUTOF10: 0
PAINLEVEL_OUTOF10: 0

## 2024-12-13 NOTE — DISCHARGE INSTR - DIET

## 2024-12-13 NOTE — PROGRESS NOTES
SPEECH LANGUAGE PATHOLOGY  DAILY PROGRESS NOTE      PATIENT NAME:  Greta Moran      :  1970          TODAY'S DATE:  2024 ROOM:  [facility]6/0446-A    Current Diet: ADULT DIET; Regular    Patient seen for ongoing PMV trials. Prior to trials, patient's heart rate at 91 and oxygen saturation was at 95%. During trials, heart rate fluctuated between 85-87 and oxygen saturation fluctuated between 94-97%. Minimal backflow noted when removing the PMV in x1 trials, which is an improvement compared to previous date. Deep exhalation exercises were initiated to minimize build up of pressure behind the PMV. Patient with moderate benefit as evidenced by an absence of backflow observed in remaining trials. Patient with hoarse voice during trials, however, she reports vocal quality is close to baseline prior to trach. Patient independently taking PMV on and off with use of visual aid (mirror). Patient provided with handout and reeducated on safety information and basic care of PMV (e.g., do not wear PMV while sleeping, let it air dry completely after cleaning, etc.). SLP educated patient on use of PMV for 10 minute periods at a time and then checking for backpressure. Also discussed that if/when backpressure reduces, can wear PMV for longer periods as tolerated. She verbalized understanding and was able to teach back information to SLP.     Recommendation: continue current POC      CPT code(s) 38355  speech/language tx  Total minutes :  30 minutes    Fani Timmons  Speech Pathology     Queenie Dalton M.S. CCC-SLP/L  Speech Language Pathologist  [id number]

## 2024-12-13 NOTE — DISCHARGE INSTRUCTIONS
ENT Post-Op Instructions    Follow up with Dr. Tilley  in 1 week(s). CALL OFFICE TO SCHEDULE/CONFIRM APPOINTMENT    Please follow the instructions below:    DIET INSTRUCTIONS:  Regular diet.    ACTIVITY INSTRUCTIONS:  Increase activity as tolerated    Elevate Head of bed   No heavy lifting or strenuous activity until your cleared at your post-operative appointment   No driving while taking pain medication    WOUND/DRESSING INSTRUCTIONS:  Suction trach every 2 hours and as needed  Change inner cannula as needed  Follow [facility] instruction     MEDICATION INSTRUCTIONS:  Take medication as prescribed.  When taking pain medications, you may experience dizziness or drowsiness.  Do not drink alcohol or drive when taking these medications.  You may take Ibuprofen (over the counter) as per directions for mild pain.  Do not take any other acetaminophen (Tylenol) products while taking your pain medication.  You may experience constipation while taking narcotic pain medication - You may take over the counter stool softeners: docuscate (Colace) or sennosides S (Senokot - S).       Call physician for any of the following or for questions/concerns:   Fever over 101° F    Redness, swelling, hardness or warmth at the wound site (s)  Unrelieved nausea/vomiting    Foul smelling or cloudy drainage at the wound site (s)   Unrelieved pain or increase in pain     Increase in shortness of breath

## 2024-12-13 NOTE — PLAN OF CARE
Problem: Discharge Planning  Goal: Discharge to home or other facility with appropriate resources  12/13/2024 1042 by Kylah Ceron RN  Outcome: Progressing     Problem: Pain  Goal: Verbalizes/displays adequate comfort level or baseline comfort level  12/13/2024 1042 by Kylah Ceron RN  Outcome: Progressing     Problem: Chronic Conditions and Co-morbidities  Goal: Patient's chronic conditions and co-morbidity symptoms are monitored and maintained or improved  12/13/2024 1042 by Kylah Ceron RN  Outcome: Progressing     Problem: Skin/Tissue Integrity  Goal: Absence of new skin breakdown  Description: 1.  Monitor for areas of redness and/or skin breakdown  2.  Assess vascular access sites hourly  3.  Every 4-6 hours minimum:  Change oxygen saturation probe site  4.  Every 4-6 hours:  If on nasal continuous positive airway pressure, respiratory therapy assess nares and determine need for appliance change or resting period.  12/13/2024 1042 by Kylah Ceron RN  Outcome: Progressing     Problem: ABCDS Injury Assessment  Goal: Absence of physical injury  12/13/2024 1042 by Kylah Ceron RN  Outcome: Progressing     Problem: Safety - Adult  Goal: Free from fall injury  12/13/2024 1042 by Kylah Ceron RN  Outcome: Progressing     Problem: Neurosensory - Adult  Goal: Achieves stable or improved neurological status  12/13/2024 1042 by Kylah Ceron RN  Outcome: Progressing     Problem: Neurosensory - Adult  Goal: Achieves maximal functionality and self care  12/13/2024 1042 by Kylah Ceron RN  Outcome: Progressing     Problem: Respiratory - Adult  Goal: Achieves optimal ventilation and oxygenation  12/13/2024 1042 by Kylah Ceron RN  Outcome: Progressing     Problem: Cardiovascular - Adult  Goal: Maintains optimal cardiac output and hemodynamic stability  12/13/2024 1042 by Kylah Ceron RN  Outcome: Progressing     Problem: Cardiovascular - Adult  Goal: Absence of cardiac dysrhythmias or at  baseline  12/13/2024 1042 by Kylah Ceron RN  Outcome: Progressing     Problem: Skin/Tissue Integrity - Adult  Goal: Skin integrity remains intact  12/13/2024 1042 by Kylah Ceron RN  Outcome: Progressing     Problem: Skin/Tissue Integrity - Adult  Goal: Incisions, wounds, or drain sites healing without S/S of infection  12/13/2024 1042 by Kylah Ceron RN  Outcome: Progressing     Problem: Skin/Tissue Integrity - Adult  Goal: Oral mucous membranes remain intact  12/13/2024 1042 by Kylah Ceron RN  Outcome: Progressing     Problem: Gastrointestinal - Adult  Goal: Minimal or absence of nausea and vomiting  12/13/2024 1042 by Kylah Ceron RN  Outcome: Progressing     Problem: Gastrointestinal - Adult  Goal: Maintains or returns to baseline bowel function  12/13/2024 1042 by Kylah Ceron RN  Outcome: Progressing     Problem: Gastrointestinal - Adult  Goal: Maintains adequate nutritional intake  12/13/2024 1042 by Kylah Ceron RN  Outcome: Progressing     Problem: Anxiety  Goal: Will report anxiety at manageable levels  Description: INTERVENTIONS:  1. Administer medication as ordered  2. Teach and rehearse alternative coping skills  3. Provide emotional support with 1:1 interaction with staff  12/13/2024 1042 by Kylah Ceron RN  Outcome: Progressing     Problem: Coping  Goal: Pt/Family able to verbalize concerns and demonstrate effective coping strategies  Description: INTERVENTIONS:  1. Assist patient/family to identify coping skills, available support systems and cultural and spiritual values  2. Provide emotional support, including active listening and acknowledgement of concerns of patient and caregivers  3. Reduce environmental stimuli, as able  4. Instruct patient/family in relaxation techniques, as appropriate  5. Assess for spiritual pain/suffering and initiate Spiritual Care, Psychosocial Clinical Specialist consults as needed  12/13/2024 1042 by Kylah Ceron RN  Outcome: Progressing      oriented to person, place, time and situation

## 2024-12-13 NOTE — CARE COORDINATION
CASE MANAGEMENT.... Discharge is planned for today. All home going needs in place. Need discharge order and for patient to be out of the hospital by noon in order for [facility] to start care today. Perfect[employer] Message sent to Dr Barboza with ENT regarding the above and he will place orders.     UPDATE..... Discharge orders placed. Nursing and patient updated.  will provide transport home.

## 2024-12-13 NOTE — PROGRESS NOTES
Feels ok- no c/o  Vss- BP still on low side  lungs clear  reg rhythm  abd- soft and non-tender  ext- no edema  Stable- persist decreased BP  Per orders- continue present care- stop Cozaar- home when ok with ENT

## 2024-12-13 NOTE — CARE COORDINATION
Spoke with Genesis at [facility] by Hammad this AM and confirmed that SOC will be TODAY    Taco Henderson, MSN, RN  Manager Care Coordination  [facility]

## 2025-01-02 ENCOUNTER — OFFICE VISIT (OUTPATIENT)
Dept: ENT CLINIC | Age: 55
End: 2025-01-02

## 2025-01-02 VITALS
WEIGHT: 122.1 LBS | TEMPERATURE: 97 F | BODY MASS INDEX: 23.97 KG/M2 | HEIGHT: 60 IN | SYSTOLIC BLOOD PRESSURE: 133 MMHG | HEART RATE: 105 BPM | DIASTOLIC BLOOD PRESSURE: 82 MMHG

## 2025-01-02 DIAGNOSIS — R06.1 INSPIRATORY STRIDOR: ICD-10-CM

## 2025-01-02 DIAGNOSIS — J38.3 VOCAL CORD DYSPLASIA: ICD-10-CM

## 2025-01-02 DIAGNOSIS — Z93.0 TRACHEOSTOMY DEPENDENCE (HCC): ICD-10-CM

## 2025-01-02 DIAGNOSIS — J38.2 VOCAL CORD NODULE: ICD-10-CM

## 2025-01-02 DIAGNOSIS — J38.6 POSTERIOR GLOTTIC STENOSIS: Primary | ICD-10-CM

## 2025-01-02 ASSESSMENT — ENCOUNTER SYMPTOMS
ALLERGIC/IMMUNOLOGIC NEGATIVE: 1
EYES NEGATIVE: 1
RESPIRATORY NEGATIVE: 1

## 2025-01-02 NOTE — PROGRESS NOTES
1/2/2025), Disp: 30 tablet, Rfl: 3    apixaban (ELIQUIS) 5 MG TABS tablet, 1 tablet by Per NG tube route 2 times daily (Patient not taking: Reported on 1/2/2025), Disp: , Rfl:     albuterol (PROVENTIL) (2.5 MG/3ML) 0.083% nebulizer solution, Take 3 mLs by nebulization every 4 hours as needed for Wheezing (Patient not taking: Reported on 1/2/2025), Disp: , Rfl:     venlafaxine (EFFEXOR XR) 150 MG extended release capsule, Take 75 mg by mouth in the morning and at bedtime (Patient not taking: Reported on 1/2/2025), Disp: , Rfl:     hydroxychloroquine (PLAQUENIL) 200 MG tablet, Take by mouth 2 times daily  (Patient not taking: Reported on 1/2/2025), Disp: , Rfl:   Patient has no known allergies.  Social History     Tobacco Use    Smoking status: Former     Current packs/day: 0.00     Average packs/day: 1 pack/day for 30.0 years (30.0 ttl pk-yrs)     Types: Cigarettes     Start date: 1/27/1984     Quit date: 1/27/2014     Years since quitting: 10.9    Smokeless tobacco: Never    Tobacco comments:     stop    Vaping Use    Vaping status: Never Used   Substance Use Topics    Alcohol use: Not Currently     Alcohol/week: 3.0 standard drinks of alcohol     Types: 3 Cans of beer per week     Comment: quit    Drug use: No     Family History   Problem Relation Age of Onset    Cancer Father         LEUKEMIA    Cancer Maternal Grandmother         lung and brain ca           Objective:   /82 (Site: Right Upper Arm, Position: Sitting, Cuff Size: Medium Adult)   Pulse (!) 105   Temp 97 °F (36.1 °C)   Ht 1.524 m (5')   Wt 55.4 kg (122 lb 1.6 oz)   LMP 12/04/2015 (LMP Unknown)   BMI 23.85 kg/m²     Physical Exam  Vitals reviewed.   Constitutional:       Appearance: Normal appearance.   HENT:      Head: Normocephalic.      Right Ear: Tympanic membrane, ear canal and external ear normal.      Left Ear: Tympanic membrane, ear canal and external ear normal.      Nose: Nose normal.      Mouth/Throat:      Mouth: Mucous

## 2025-01-03 ENCOUNTER — PREP FOR PROCEDURE (OUTPATIENT)
Dept: ENT CLINIC | Age: 55
End: 2025-01-03

## 2025-01-06 ENCOUNTER — TELEPHONE (OUTPATIENT)
Dept: CARDIOLOGY CLINIC | Age: 55
End: 2025-01-06

## 2025-01-06 NOTE — TELEPHONE ENCOUNTER
Patient needs cardiac clearance for Direct Laryngoscopy with ablation posterior glottie stenosis to be done on 1/15/25 at [facility] to be done by  Dr. Tilley..    Patient denies any chest pain, shortness of breath or palpitations since last visit in 9/25/24.  Patient is able to complete all activities of daily living, including; climbing one flight of stairs and walking one block without cardiac symptoms. Please advise.

## 2025-01-13 NOTE — PROGRESS NOTES
PAT call completed with pt and . Pt stated she follows with Dr Saez yearly and has next office visit in March 2025. Pt denies chest pain and any cardiac symptoms. Cardiac history reviewed with Dr Murdock. No orders received.

## 2025-01-13 NOTE — PROGRESS NOTES
[facility] PRE-ADMISSION TESTING INSTRUCTIONS    The Preadmission Testing patient is instructed accordingly using the following criteria (check applicable):    ARRIVAL INSTRUCTIONS:  [x] Parking the day of Surgery is located in the Main Entrance lot.  Upon entering the door, make an immediate right to the surgery reception desk    [x] Bring photo ID and insurance card    [x] Bring in a copy of Living will or Durable Power of  papers.    [x] Please be sure to arrange for responsible adult to provide transportation to and from the hospital    [x] Please arrange for responsible adult to be with you for the 24 hour period post procedure due to having anesthesia    [x] If you awake am of surgery not feeling well or have temperature >100 please call [phone]    GENERAL INSTRUCTIONS:    [x] May have clear liquids until 4 hours prior to surgery. Examples include water, fruit juices (no pulp), jello, popsicles, black coffee or tea, . 8 ounces. No milk products               No gum, candy or mints.    [x] You may brush your teeth, but do not swallow any water    [x] Take medications as instructed with 1-2 oz of water    [x] Stop herbal supplements and vitamins 5 days prior to procedure    [x] Follow preop dosing of blood thinners per physician instructions    [] Take 1/2 dose of evening insulin, but no insulin after midnight    [] No oral diabetic medications after midnight    [] If diabetic and have low blood sugar or feel symptomatic, take 1-2oz apple juice only    [] Bring inhalers day of surgery    [] Bring C-PAP/ Bi-Pap day of surgery    [] Bring urine specimen day of surgery    [] Shower or bath with soap, lather and rinse well, AM of Surgery, no lotion, powders or creams to surgical site    [] Follow bowel prep as instructed per surgeon    [x] No tobacco products within 24 hours of surgery     [x] No alcohol or illegal drug use within 24 hours of surgery.    [x] Jewelry, body

## 2025-01-14 ENCOUNTER — ANESTHESIA EVENT (OUTPATIENT)
Dept: OPERATING ROOM | Age: 55
End: 2025-01-14
Payer: MEDICARE

## 2025-01-15 ENCOUNTER — ANESTHESIA (OUTPATIENT)
Dept: OPERATING ROOM | Age: 55
End: 2025-01-15
Payer: MEDICARE

## 2025-01-15 ENCOUNTER — HOSPITAL ENCOUNTER (OUTPATIENT)
Age: 55
Setting detail: OUTPATIENT SURGERY
Discharge: HOME | End: 2025-01-15
Attending: OTOLARYNGOLOGY | Admitting: OTOLARYNGOLOGY
Payer: MEDICARE

## 2025-01-15 VITALS
BODY MASS INDEX: 25.52 KG/M2 | SYSTOLIC BLOOD PRESSURE: 114 MMHG | TEMPERATURE: 98.3 F | WEIGHT: 130 LBS | HEART RATE: 71 BPM | DIASTOLIC BLOOD PRESSURE: 58 MMHG | OXYGEN SATURATION: 93 % | RESPIRATION RATE: 18 BRPM | HEIGHT: 60 IN

## 2025-01-15 DIAGNOSIS — J38.6 POSTERIOR GLOTTIC STENOSIS: Primary | ICD-10-CM

## 2025-01-15 PROCEDURE — 2720000010 HC SURG SUPPLY STERILE: Performed by: OTOLARYNGOLOGY

## 2025-01-15 PROCEDURE — 7100000011 HC PHASE II RECOVERY - ADDTL 15 MIN: Performed by: OTOLARYNGOLOGY

## 2025-01-15 PROCEDURE — 94664 DEMO&/EVAL PT USE INHALER: CPT

## 2025-01-15 PROCEDURE — 31571 LARYNGOSCOP W/VC INJ + SCOPE: CPT | Performed by: OTOLARYNGOLOGY

## 2025-01-15 PROCEDURE — 3700000001 HC ADD 15 MINUTES (ANESTHESIA): Performed by: OTOLARYNGOLOGY

## 2025-01-15 PROCEDURE — 3600000013 HC SURGERY LEVEL 3 ADDTL 15MIN: Performed by: OTOLARYNGOLOGY

## 2025-01-15 PROCEDURE — 6370000000 HC RX 637 (ALT 250 FOR IP): Performed by: OTOLARYNGOLOGY

## 2025-01-15 PROCEDURE — 2580000003 HC RX 258: Performed by: OTOLARYNGOLOGY

## 2025-01-15 PROCEDURE — 2500000003 HC RX 250 WO HCPCS

## 2025-01-15 PROCEDURE — 7100000010 HC PHASE II RECOVERY - FIRST 15 MIN: Performed by: OTOLARYNGOLOGY

## 2025-01-15 PROCEDURE — 6360000002 HC RX W HCPCS

## 2025-01-15 PROCEDURE — 7100000001 HC PACU RECOVERY - ADDTL 15 MIN: Performed by: OTOLARYNGOLOGY

## 2025-01-15 PROCEDURE — 6360000002 HC RX W HCPCS: Performed by: OTOLARYNGOLOGY

## 2025-01-15 PROCEDURE — 7100000000 HC PACU RECOVERY - FIRST 15 MIN: Performed by: OTOLARYNGOLOGY

## 2025-01-15 PROCEDURE — 6370000000 HC RX 637 (ALT 250 FOR IP): Performed by: ANESTHESIOLOGY

## 2025-01-15 PROCEDURE — 2580000003 HC RX 258

## 2025-01-15 PROCEDURE — 2709999900 HC NON-CHARGEABLE SUPPLY: Performed by: OTOLARYNGOLOGY

## 2025-01-15 PROCEDURE — 3700000000 HC ANESTHESIA ATTENDED CARE: Performed by: OTOLARYNGOLOGY

## 2025-01-15 PROCEDURE — 31541 LARYNSCOP W/TUMR EXC + SCOPE: CPT | Performed by: OTOLARYNGOLOGY

## 2025-01-15 PROCEDURE — 3600000003 HC SURGERY LEVEL 3 BASE: Performed by: OTOLARYNGOLOGY

## 2025-01-15 RX ORDER — PROCHLORPERAZINE EDISYLATE 5 MG/ML
5 INJECTION INTRAMUSCULAR; INTRAVENOUS
Status: DISCONTINUED | OUTPATIENT
Start: 2025-01-15 | End: 2025-01-15 | Stop reason: HOSPADM

## 2025-01-15 RX ORDER — ONDANSETRON 4 MG/1
4 TABLET, ORALLY DISINTEGRATING ORAL 3 TIMES DAILY PRN
Qty: 21 TABLET | Refills: 0 | Status: SHIPPED | OUTPATIENT
Start: 2025-01-15

## 2025-01-15 RX ORDER — SODIUM CHLORIDE 9 MG/ML
INJECTION, SOLUTION INTRAVENOUS PRN
Status: DISCONTINUED | OUTPATIENT
Start: 2025-01-15 | End: 2025-01-15 | Stop reason: HOSPADM

## 2025-01-15 RX ORDER — SODIUM CHLORIDE 0.9 % (FLUSH) 0.9 %
5-40 SYRINGE (ML) INJECTION PRN
Status: DISCONTINUED | OUTPATIENT
Start: 2025-01-15 | End: 2025-01-15 | Stop reason: HOSPADM

## 2025-01-15 RX ORDER — EPINEPHRINE NASAL SOLUTION 1 MG/ML
SOLUTION NASAL PRN
Status: DISCONTINUED | OUTPATIENT
Start: 2025-01-15 | End: 2025-01-15 | Stop reason: ALTCHOICE

## 2025-01-15 RX ORDER — SODIUM CHLORIDE, SODIUM LACTATE, POTASSIUM CHLORIDE, CALCIUM CHLORIDE 600; 310; 30; 20 MG/100ML; MG/100ML; MG/100ML; MG/100ML
INJECTION, SOLUTION INTRAVENOUS CONTINUOUS
Status: DISCONTINUED | OUTPATIENT
Start: 2025-01-15 | End: 2025-01-15 | Stop reason: HOSPADM

## 2025-01-15 RX ORDER — LIDOCAINE HYDROCHLORIDE 20 MG/ML
INJECTION, SOLUTION EPIDURAL; INFILTRATION; INTRACAUDAL; PERINEURAL
Status: DISCONTINUED | OUTPATIENT
Start: 2025-01-15 | End: 2025-01-15 | Stop reason: SDUPTHER

## 2025-01-15 RX ORDER — SODIUM CHLORIDE 0.9 % (FLUSH) 0.9 %
5-40 SYRINGE (ML) INJECTION EVERY 12 HOURS SCHEDULED
Status: DISCONTINUED | OUTPATIENT
Start: 2025-01-15 | End: 2025-01-15 | Stop reason: HOSPADM

## 2025-01-15 RX ORDER — IPRATROPIUM BROMIDE AND ALBUTEROL SULFATE 2.5; .5 MG/3ML; MG/3ML
1 SOLUTION RESPIRATORY (INHALATION) ONCE
Status: COMPLETED | OUTPATIENT
Start: 2025-01-15 | End: 2025-01-15

## 2025-01-15 RX ORDER — FENTANYL CITRATE 50 UG/ML
INJECTION, SOLUTION INTRAMUSCULAR; INTRAVENOUS
Status: DISCONTINUED | OUTPATIENT
Start: 2025-01-15 | End: 2025-01-15 | Stop reason: SDUPTHER

## 2025-01-15 RX ORDER — MIDAZOLAM HYDROCHLORIDE 1 MG/ML
INJECTION, SOLUTION INTRAMUSCULAR; INTRAVENOUS
Status: DISCONTINUED | OUTPATIENT
Start: 2025-01-15 | End: 2025-01-15 | Stop reason: SDUPTHER

## 2025-01-15 RX ORDER — TRAMADOL HYDROCHLORIDE 50 MG/1
50 TABLET ORAL EVERY 6 HOURS PRN
Qty: 10 TABLET | Refills: 0 | Status: SHIPPED | OUTPATIENT
Start: 2025-01-15 | End: 2025-01-18

## 2025-01-15 RX ORDER — PROPOFOL 10 MG/ML
INJECTION, EMULSION INTRAVENOUS
Status: DISCONTINUED | OUTPATIENT
Start: 2025-01-15 | End: 2025-01-15 | Stop reason: SDUPTHER

## 2025-01-15 RX ORDER — ONDANSETRON 2 MG/ML
INJECTION INTRAMUSCULAR; INTRAVENOUS
Status: DISCONTINUED | OUTPATIENT
Start: 2025-01-15 | End: 2025-01-15 | Stop reason: SDUPTHER

## 2025-01-15 RX ORDER — DEXAMETHASONE SODIUM PHOSPHATE 4 MG/ML
INJECTION, SOLUTION INTRA-ARTICULAR; INTRALESIONAL; INTRAMUSCULAR; INTRAVENOUS; SOFT TISSUE
Status: DISCONTINUED | OUTPATIENT
Start: 2025-01-15 | End: 2025-01-15 | Stop reason: SDUPTHER

## 2025-01-15 RX ORDER — OXYCODONE HYDROCHLORIDE 5 MG/1
5 TABLET ORAL
Status: DISCONTINUED | OUTPATIENT
Start: 2025-01-15 | End: 2025-01-15 | Stop reason: HOSPADM

## 2025-01-15 RX ORDER — FENTANYL CITRATE 50 UG/ML
50 INJECTION, SOLUTION INTRAMUSCULAR; INTRAVENOUS EVERY 5 MIN PRN
Status: DISCONTINUED | OUTPATIENT
Start: 2025-01-15 | End: 2025-01-15 | Stop reason: HOSPADM

## 2025-01-15 RX ORDER — NALOXONE HYDROCHLORIDE 0.4 MG/ML
INJECTION, SOLUTION INTRAMUSCULAR; INTRAVENOUS; SUBCUTANEOUS PRN
Status: DISCONTINUED | OUTPATIENT
Start: 2025-01-15 | End: 2025-01-15 | Stop reason: HOSPADM

## 2025-01-15 RX ORDER — ROCURONIUM BROMIDE 10 MG/ML
INJECTION, SOLUTION INTRAVENOUS
Status: DISCONTINUED | OUTPATIENT
Start: 2025-01-15 | End: 2025-01-15 | Stop reason: SDUPTHER

## 2025-01-15 RX ADMIN — MIDAZOLAM 1 MG: 1 INJECTION INTRAMUSCULAR; INTRAVENOUS at 12:48

## 2025-01-15 RX ADMIN — Medication 0.5 MG: at 14:18

## 2025-01-15 RX ADMIN — ROCURONIUM BROMIDE 20 MG: 10 INJECTION, SOLUTION INTRAVENOUS at 12:41

## 2025-01-15 RX ADMIN — DEXAMETHASONE SODIUM PHOSPHATE 10 MG: 4 INJECTION, SOLUTION INTRAMUSCULAR; INTRAVENOUS at 12:45

## 2025-01-15 RX ADMIN — IPRATROPIUM BROMIDE AND ALBUTEROL SULFATE 1 DOSE: .5; 2.5 SOLUTION RESPIRATORY (INHALATION) at 16:05

## 2025-01-15 RX ADMIN — FENTANYL CITRATE 50 MCG: 50 INJECTION, SOLUTION INTRAMUSCULAR; INTRAVENOUS at 12:48

## 2025-01-15 RX ADMIN — SODIUM CHLORIDE: 9 INJECTION, SOLUTION INTRAVENOUS at 12:33

## 2025-01-15 RX ADMIN — ONDANSETRON 4 MG: 2 INJECTION, SOLUTION INTRAMUSCULAR; INTRAVENOUS at 12:51

## 2025-01-15 RX ADMIN — FENTANYL CITRATE 50 MCG: 50 INJECTION, SOLUTION INTRAMUSCULAR; INTRAVENOUS at 12:41

## 2025-01-15 RX ADMIN — SUGAMMADEX 118 MG: 100 INJECTION, SOLUTION INTRAVENOUS at 13:35

## 2025-01-15 RX ADMIN — LIDOCAINE HYDROCHLORIDE 60 MG: 20 INJECTION, SOLUTION EPIDURAL; INFILTRATION; INTRACAUDAL; PERINEURAL at 12:41

## 2025-01-15 RX ADMIN — HYDROMORPHONE HYDROCHLORIDE 0.5 MG: 1 INJECTION, SOLUTION INTRAMUSCULAR; INTRAVENOUS; SUBCUTANEOUS at 14:18

## 2025-01-15 RX ADMIN — MIDAZOLAM 1 MG: 1 INJECTION INTRAMUSCULAR; INTRAVENOUS at 12:33

## 2025-01-15 RX ADMIN — PROPOFOL 150 MG: 10 INJECTION, EMULSION INTRAVENOUS at 12:41

## 2025-01-15 ASSESSMENT — PAIN DESCRIPTION - PAIN TYPE
TYPE: SURGICAL PAIN

## 2025-01-15 ASSESSMENT — PAIN - FUNCTIONAL ASSESSMENT: PAIN_FUNCTIONAL_ASSESSMENT: 0-10

## 2025-01-15 ASSESSMENT — PAIN DESCRIPTION - LOCATION
LOCATION: THROAT

## 2025-01-15 ASSESSMENT — PAIN DESCRIPTION - DESCRIPTORS
DESCRIPTORS: DISCOMFORT

## 2025-01-15 ASSESSMENT — PAIN DESCRIPTION - ORIENTATION
ORIENTATION: INNER

## 2025-01-15 ASSESSMENT — PAIN SCALES - GENERAL
PAINLEVEL_OUTOF10: 5
PAINLEVEL_OUTOF10: 4
PAINLEVEL_OUTOF10: 5
PAINLEVEL_OUTOF10: 7
PAINLEVEL_OUTOF10: 4
PAINLEVEL_OUTOF10: 7
PAINLEVEL_OUTOF10: 5
PAINLEVEL_OUTOF10: 5
PAINLEVEL_OUTOF10: 4

## 2025-01-15 ASSESSMENT — LIFESTYLE VARIABLES: SMOKING_STATUS: 0

## 2025-01-15 NOTE — ANESTHESIA PRE PROCEDURE
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                  ROS comment: Hx of substance abuse  SLE Abdominal:             Vascular:   + DVT.       Other Findings:           Anesthesia Plan      general     ASA 3       Induction: intravenous.    MIPS: Postoperative opioids intended and Prophylactic antiemetics administered.  Anesthetic plan and risks discussed with patient.    Use of blood products discussed with patient whom consented to blood products.    Plan discussed with CRNA.                  Kailee Rodriguez DO   1/15/2025

## 2025-01-15 NOTE — ANESTHESIA POSTPROCEDURE EVALUATION
Department of Anesthesiology  Postprocedure Note    Patient: Greta Moran  MRN: [id number]  YOB: 1970  Date of evaluation: 1/15/2025    Procedure Summary       Date: 01/15/25 Room / Location: [address]    Anesthesia Start: 1233 Anesthesia Stop: 1352    Procedure: DIRECT LARYNGOSCOPY WITH ABLATION POSTERIOR GLOTTIC STENOSIS (CO2 LASER) (Mouth) Diagnosis:       Posterior glottic stenosis      (Posterior glottic stenosis [J38.6])    Surgeons: Jean-Paul Tilley DO Responsible Provider: Kailee Rodriguez DO    Anesthesia Type: general ASA Status: 3            Anesthesia Type: No value filed.    Alistair Phase I: Alistair Score: 9    Alistair Phase II:      Anesthesia Post Evaluation    Patient location during evaluation: PACU  Patient participation: complete - patient participated  Level of consciousness: awake and alert  Nausea & Vomiting: no vomiting and no nausea  Cardiovascular status: hemodynamically stable  Respiratory status: acceptable and spontaneous ventilation  Hydration status: stable  Pain management: adequate    No notable events documented.

## 2025-01-15 NOTE — H&P
H&P reviewed, no changes. No issues. Questions and concerns were answered to the patient's satisfaction. Will proceed with procedure      Electronically signed by Jean-Paul Tilley DO on 1/15/25 at 12:06 PM EST    
right    LARYNGOSCOPY N/A 12/6/2024     DIRECT LARYNGOSCOPY LASER ABLATION performed by Jean-Paul Tilley DO at [facility] OR    NERVE BLOCK         x4     TOTAL HIP ARTHROPLASTY Right 2/25/14    TOTAL HIP ARTHROPLASTY Left 3/2/2021     LEFT HIP TOTAL ARTHROPLASTY performed by Jordan Stover MD at [facility] [place]    TRACHEOSTOMY N/A 12/6/2024     POSTERIOR GLOTTIC STENOSIS TRACHEOSTOMY (CO2 LASER) performed by Jean-Paul Tilley DO at [facility] OR    TUBAL LIGATION   10/1994    TUMOR REMOVAL         right shoulder fatty tumor    UPPER GASTROINTESTINAL ENDOSCOPY N/A 8/6/2024     ESOPHAGOGASTRODUODENOSCOPY PERCUTANEOUS ENDOSCOPIC GASTROSTOMY TUBE PLACEMENT performed by Jero Steward MD at [facility] ENDOSCOPY           Current Medication      Current Outpatient Medications:     lamoTRIgine (LAMICTAL) 200 MG tablet, Take 1 tablet by mouth 2 times daily (Patient not taking: Reported on 1/2/2025), Disp: 30 tablet, Rfl: 3    fluticasone-umeclidin-vilant (TRELEGY ELLIPTA) 200-62.5-25 MCG/ACT AEPB inhaler, Inhale 1 puff into the lungs daily (Patient not taking: Reported on 1/2/2025), Disp: 1 each, Rfl: 5    alendronate (FOSAMAX) 70 MG tablet, 1 tablet (Patient not taking: Reported on 1/2/2025), Disp: , Rfl:     predniSONE (DELTASONE) 10 MG tablet, 1 tablet (Patient not taking: Reported on 1/2/2025), Disp: , Rfl:     albuterol sulfate HFA (PROVENTIL;VENTOLIN;PROAIR) 108 (90 Base) MCG/ACT inhaler, , Disp: , Rfl:     ipratropium 0.5 mg-albuterol 2.5 mg (DUONEB) 0.5-2.5 (3) MG/3ML SOLN nebulizer solution, Inhale 3 mLs into the lungs every 4 hours (Patient not taking: Reported on 1/2/2025), Disp: 360 mL, Rfl: 3    omeprazole (PRILOSEC) 20 MG delayed release capsule, Take 1 capsule by mouth daily (Patient not taking: Reported on 1/2/2025), Disp: , Rfl:     clonazePAM (KLONOPIN) 0.5 MG tablet, Take 1 tablet by mouth 3 times daily as needed. (Patient not taking: Reported on 1/2/2025), Disp: , Rfl:     metoprolol succinate (TOPROL XL) 25 MG extended release

## 2025-01-15 NOTE — DISCHARGE INSTRUCTIONS
Ok to resume normal diet  Continue trach care  Tylenol/motrin for pain  Suction trach Q2 hrs and as needed       ACTIVITY INSTRUCTIONS:  Increase activity as tolerated    Elevate Head of bed   No heavy lifting or strenuous activity until your cleared at your post-operative appointment   No driving while taking pain medication       MEDICATION INSTRUCTIONS:  Take medication as prescribed.  When taking pain medications, you may experience dizziness or drowsiness.  Do not drink alcohol or drive when taking these medications.  You may take Ibuprofen (over the counter) as per directions for mild pain.  Do not take any other acetaminophen (Tylenol) products while taking your pain medication.  You may experience constipation while taking narcotic pain medication - You may take over the counter stool softeners: docuscate (Colace) or sennosides S (Senokot - S).     Call physician for any of the following or for questions/concerns:   Fever over 101° F    Unrelieved back or chest pain  Unrelieved nausea/vomiting    Foul smelling or cloudy drainage at the wound site (s)   Unrelieved pain or increase in pain     Increase in shortness of breath

## 2025-01-15 NOTE — OP NOTE
Operative Note      Patient: Greta Moran  YOB: 1970  MRN: [id number]    Date of Procedure: 1/15/2025    Pre-Op Diagnosis Codes:      * Posterior glottic stenosis [J38.6]    Post-Op Diagnosis: Same       Procedure(s):  DIRECT LARYNGOSCOPY WITH ABLATION POSTERIOR GLOTTIC STENOSIS, Laryngeal kenalog injection     Surgeon(s):  Jean-Paul Tilley DO    Assistant:   Surgical Assistant: Tayla Bazan  Resident: Cornel Owens DO    Anesthesia: General    Estimated Blood Loss (mL): Minimal    Complications: None    Specimens:   * No specimens in log *    Implants:  * No implants in log *      Drains: * No LDAs found *    Findings:  Infection Present At Time Of Surgery (PATOS) (choose all levels that have infection present):  No infection present  Other Findings: persistent posterior glottic stenosis improved with posterior glottic ablation     Detailed Description of Procedure:   Patient was brought back to the operating room and placed supine on the table. SCDs placed and functioning.  The trach tube was exchanged for ETT  and secured with silk suture, the patient was handed to anesthesia for proper induction. The patient was turned 90 degrees from anesthesia. The patient's face and all skin was covered with wet blue towels to protect during laser.  Wet eye pads were also placed over patients eyes. Tooth guard was placed over patients upper dentition. The Onofre laryngoscope was used to obtain visualize the oral cavity, oropharynx, and larynx. The posterior glottic stenosis appeared to be improved and there was increased diameter of glottic opening. Aadequate visualization of the supraglottis and glottis was obtained and scope suspended from the Hatfield stand with a Jako.  The larynx was first visualized with sapp moise telescope, this was then passed through the cords and subglottic region was evaluated with no evidence of stenosis, and the tube was seen in proper position in the trachea.  The vocal cord

## 2025-01-23 ENCOUNTER — OFFICE VISIT (OUTPATIENT)
Dept: ENT CLINIC | Age: 55
End: 2025-01-23

## 2025-01-23 VITALS
TEMPERATURE: 97 F | SYSTOLIC BLOOD PRESSURE: 138 MMHG | DIASTOLIC BLOOD PRESSURE: 88 MMHG | BODY MASS INDEX: 25.48 KG/M2 | HEIGHT: 60 IN | HEART RATE: 96 BPM | WEIGHT: 129.8 LBS

## 2025-01-23 DIAGNOSIS — J38.2 VOCAL CORD NODULE: ICD-10-CM

## 2025-01-23 DIAGNOSIS — J38.6 POSTERIOR GLOTTIC STENOSIS: Primary | ICD-10-CM

## 2025-01-23 DIAGNOSIS — R06.1 INSPIRATORY STRIDOR: ICD-10-CM

## 2025-01-23 DIAGNOSIS — Z93.0 TRACHEOSTOMY DEPENDENCE (HCC): ICD-10-CM

## 2025-01-23 DIAGNOSIS — J38.3 VOCAL CORD DYSPLASIA: ICD-10-CM

## 2025-02-12 NOTE — PROGRESS NOTES
Department of Otolaryngology  Office Consult Note        Subjective:        Chief Complaint:  had concerns including Follow-up (Follow up trach ).     Patient ID: Greta Moran is a 54 y.o. female.    HPI: Patient presents as  post-op for tracheostomy and posterior glottic stenosis ablation on 12/6/24 and 1/15/25 . She has been doing well. Talking around the trach, no stridor, Eating and drinking without significant difficulty.  She has been doing capping trials and on able to tolerate all day long.  She can tolerate a few hours.    Review of Systems   Constitutional: Negative.    HENT: Negative.     Eyes: Negative.    Respiratory: Negative.     Allergic/Immunologic: Negative.    Neurological: Negative.    All other systems reviewed and are negative.        Past Medical History:   Diagnosis Date    Anxiety     dr matamoros 224 in [facility] counseling    Arthritis     Asthma     Atrial fibrillation (HCC)     Bipolar 2 disorder (HCC)     Bruising tendency     CHF (congestive heart failure) (HCC)     Fibromyalgia     GERD (gastroesophageal reflux disease)     Glucose intolerance (impaired glucose tolerance) 06/15/2020    hyperglycemia    Hx of blood clots     Lupus     Memory difficulty 06/15/2020    Mild cognitive impairment with memory loss 06/15/2020    Osteoporosis     Spondylosis     lumbar    Tracheostomy dependence (HCC)     december 2024     Past Surgical History:   Procedure Laterality Date    ABDOMEN SURGERY      fatty tumor removed     ARTHROPLASTY Left 04/16/2019    LEFT THUMB TRAPEZIECTOMY WITH LIGAMENT RECONSTRUCTION LEFT PARTIAL TRAPEZOIDECTOMY LEFT DEQUERVAINS RELEASE LEFT CARPAL TUNNEL RELEASE performed by Jacky Morrell MD at [facility] OR    CARDIAC PROCEDURE N/A 07/05/2024    Left heart cath / coronary angiography performed by Miles Biggs DO at [facility]    CARPAL TUNNEL RELEASE      right    COLONOSCOPY      ENDOMETRIAL ABLATION      FINGER SURGERY      right su

## 2025-02-13 ENCOUNTER — OFFICE VISIT (OUTPATIENT)
Dept: ENT CLINIC | Age: 55
End: 2025-02-13

## 2025-02-13 VITALS
HEART RATE: 100 BPM | WEIGHT: 135 LBS | RESPIRATION RATE: 18 BRPM | BODY MASS INDEX: 26.5 KG/M2 | OXYGEN SATURATION: 95 % | TEMPERATURE: 97.6 F | HEIGHT: 60 IN | DIASTOLIC BLOOD PRESSURE: 83 MMHG | SYSTOLIC BLOOD PRESSURE: 139 MMHG

## 2025-02-13 DIAGNOSIS — Z93.0 TRACHEOSTOMY DEPENDENCE (HCC): ICD-10-CM

## 2025-02-13 DIAGNOSIS — J38.6 POSTERIOR GLOTTIC STENOSIS: Primary | ICD-10-CM

## 2025-02-13 PROCEDURE — 99024 POSTOP FOLLOW-UP VISIT: CPT | Performed by: OTOLARYNGOLOGY

## 2025-02-13 RX ORDER — MONTELUKAST SODIUM 10 MG/1
10 TABLET ORAL DAILY
Qty: 30 TABLET | Refills: 3 | Status: SHIPPED | OUTPATIENT
Start: 2025-02-13

## 2025-02-13 RX ORDER — PREDNISONE 20 MG/1
40 TABLET ORAL DAILY
Qty: 10 TABLET | Refills: 0 | Status: SHIPPED | OUTPATIENT
Start: 2025-02-13 | End: 2025-02-18

## 2025-02-13 RX ORDER — GUAIFENESIN 600 MG/1
600 TABLET, EXTENDED RELEASE ORAL 2 TIMES DAILY
Qty: 30 TABLET | Refills: 0 | Status: SHIPPED | OUTPATIENT
Start: 2025-02-13 | End: 2025-02-28

## 2025-02-13 RX ORDER — FLUTICASONE PROPIONATE 50 MCG
2 SPRAY, SUSPENSION (ML) NASAL DAILY
Qty: 16 G | Refills: 5 | Status: SHIPPED | OUTPATIENT
Start: 2025-02-13

## 2025-02-24 RX ORDER — METOPROLOL SUCCINATE 25 MG/1
25 TABLET, EXTENDED RELEASE ORAL DAILY
Qty: 90 TABLET | Refills: 3 | Status: SHIPPED | OUTPATIENT
Start: 2025-02-24

## 2025-03-03 ENCOUNTER — TELEPHONE (OUTPATIENT)
Dept: ENT CLINIC | Age: 55
End: 2025-03-03

## 2025-03-03 DIAGNOSIS — Z93.0 TRACHEOSTOMY DEPENDENCE (HCC): Primary | ICD-10-CM

## 2025-03-03 NOTE — TELEPHONE ENCOUNTER
Pt is calling stating that, \"She would like a new trach cap because she lost hers on vacation.\"     Please advise,    Electronically signed by Nimesh Hankins on 3/3/2025 at 9:46 AM

## 2025-03-03 NOTE — TELEPHONE ENCOUNTER
Patient aware Rx was sent to DME.  Gave patient Ph# to see if can  if it will take awhile to be delivered

## 2025-03-11 ENCOUNTER — OFFICE VISIT (OUTPATIENT)
Dept: CARDIOLOGY CLINIC | Age: 55
End: 2025-03-11
Payer: MEDICARE

## 2025-03-11 VITALS
HEART RATE: 103 BPM | DIASTOLIC BLOOD PRESSURE: 80 MMHG | RESPIRATION RATE: 16 BRPM | WEIGHT: 135.2 LBS | HEIGHT: 60 IN | OXYGEN SATURATION: 97 % | SYSTOLIC BLOOD PRESSURE: 126 MMHG | BODY MASS INDEX: 26.55 KG/M2 | TEMPERATURE: 97.2 F

## 2025-03-11 DIAGNOSIS — I51.81 TAKOTSUBO CARDIOMYOPATHY: ICD-10-CM

## 2025-03-11 DIAGNOSIS — I50.42 CHRONIC COMBINED SYSTOLIC AND DIASTOLIC CONGESTIVE HEART FAILURE (HCC): Primary | ICD-10-CM

## 2025-03-11 DIAGNOSIS — I48.0 PAF (PAROXYSMAL ATRIAL FIBRILLATION) (HCC): Chronic | ICD-10-CM

## 2025-03-11 PROBLEM — G93.41 ACUTE METABOLIC ENCEPHALOPATHY: Status: RESOLVED | Noted: 2024-07-04 | Resolved: 2025-03-11

## 2025-03-11 PROBLEM — I25.10 CAD (CORONARY ARTERY DISEASE): Status: RESOLVED | Noted: 2021-09-02 | Resolved: 2025-03-11

## 2025-03-11 PROBLEM — J96.00 ACUTE RESPIRATORY FAILURE (HCC): Status: RESOLVED | Noted: 2024-07-03 | Resolved: 2025-03-11

## 2025-03-11 PROCEDURE — 1036F TOBACCO NON-USER: CPT | Performed by: INTERNAL MEDICINE

## 2025-03-11 PROCEDURE — 99214 OFFICE O/P EST MOD 30 MIN: CPT | Performed by: INTERNAL MEDICINE

## 2025-03-11 PROCEDURE — 93000 ELECTROCARDIOGRAM COMPLETE: CPT | Performed by: INTERNAL MEDICINE

## 2025-03-11 PROCEDURE — G8427 DOCREV CUR MEDS BY ELIG CLIN: HCPCS | Performed by: INTERNAL MEDICINE

## 2025-03-11 PROCEDURE — G8419 CALC BMI OUT NRM PARAM NOF/U: HCPCS | Performed by: INTERNAL MEDICINE

## 2025-03-11 PROCEDURE — 3017F COLORECTAL CA SCREEN DOC REV: CPT | Performed by: INTERNAL MEDICINE

## 2025-03-11 NOTE — PROGRESS NOTES
OUTPATIENT CARDIOLOGY FOLLOW-UP    Name: Greta Moran    Age: 54 y.o.    Primary Care Physician: Brian Houston MD    Date of Service: 3/11/2025    Chief Complaint:   Chief Complaint   Patient presents with    6 Month Follow-Up        Interim History:   Here for follow-up.  Initially seen 8/21/2021 for elevated troponin and abnormal EKG, with echo findings suggestive of a Takotsubo cardiomyopathy.  She had a had a severe panic attack 2 days prior to that admission. She left AMA before institution of therapy for ischemic evaluation.  She presented again a week later where echo showed normal EF.  Stress test showed no ischemia.    Prolonged and complex admission 7/2024 apparently presented unresponsive details not clear, but ended up intubated with cardiogenic shock, elevated troponin and low ejection fraction.  Had a heart cath which showed nonobstructive CAD.  She had difficulty weaning from the ventilator, developed transient A-fib, ended up getting transferred to [place] she was extubated, went to [facility], had a PEG tube placed because of dysphagia but it was never used as her swallowing improved, and she has made a significant recovery.  Repeat echo at [facility] showed improved EF around 43%, and subsequent echo 9/2024 showed normalization of EF.    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  She was seen by ENT and urgently scheduled for tracheostomy and laser of posterior glottic stenosis.  She has had multiple procedures still has a tracheostomy in place.  That should be removed in a few weeks.  Some shortness of breath related to that but otherwise doing well from cardiac standpoint denies chest pain edema.  Under a lot of stress.  Had a panic attack recently.    Review of Systems:   Negative except as described above    Past Medical History:  Past Medical History:   Diagnosis Date    Anxiety     dr matamoros 224 in [facility]

## 2025-03-27 ENCOUNTER — OFFICE VISIT (OUTPATIENT)
Dept: ENT CLINIC | Age: 55
End: 2025-03-27

## 2025-03-27 VITALS
RESPIRATION RATE: 21 BRPM | TEMPERATURE: 98.1 F | HEART RATE: 91 BPM | BODY MASS INDEX: 26.5 KG/M2 | SYSTOLIC BLOOD PRESSURE: 156 MMHG | HEIGHT: 60 IN | DIASTOLIC BLOOD PRESSURE: 98 MMHG | WEIGHT: 135 LBS | OXYGEN SATURATION: 92 %

## 2025-03-27 DIAGNOSIS — J38.6 POSTERIOR GLOTTIC STENOSIS: Primary | ICD-10-CM

## 2025-03-27 DIAGNOSIS — Z93.0 TRACHEOSTOMY DEPENDENCE (HCC): ICD-10-CM

## 2025-03-27 DIAGNOSIS — R06.1 INSPIRATORY STRIDOR: ICD-10-CM

## 2025-03-27 DIAGNOSIS — J38.3 VOCAL CORD DYSPLASIA: ICD-10-CM

## 2025-03-27 DIAGNOSIS — J38.2 VOCAL CORD NODULE: ICD-10-CM

## 2025-03-27 PROCEDURE — 99024 POSTOP FOLLOW-UP VISIT: CPT | Performed by: OTOLARYNGOLOGY

## 2025-03-27 ASSESSMENT — ENCOUNTER SYMPTOMS
EYES NEGATIVE: 1
ALLERGIC/IMMUNOLOGIC NEGATIVE: 1
RESPIRATORY NEGATIVE: 1

## 2025-03-27 NOTE — PROGRESS NOTES
Department of Otolaryngology  Office Consult Note        Subjective:        Chief Complaint:  had concerns including Follow-up (TRACH ).     Patient ID: Greta Moran is a 54 y.o. female.    HPI: Patient presents as  post-op for tracheostomy and posterior glottic stenosis ablation on 12/6/24 and 1/15/25 . She has been doing well. Talking around the trach, no stridor, Eating and drinking without significant difficulty.  She has been doing capping trials.  She is wearing cap for at least 16 hours daily and takes off to sleep so she can wear humidifer on trach at night. No trouble breathing. May get short of breath with exertion however   Has been using flonase  Started new inhalers with pulmology with improvement of shortness of breath     Review of Systems   Constitutional: Negative.    HENT: Negative.     Eyes: Negative.    Respiratory: Negative.     Allergic/Immunologic: Negative.    Neurological: Negative.    All other systems reviewed and are negative.        Past Medical History:   Diagnosis Date    Anxiety     dr matamoros 224 in [facility] counseling    Arthritis     Asthma     Atrial fibrillation (HCC)     Bipolar 2 disorder (HCC)     Bruising tendency     CHF (congestive heart failure) (HCC)     Fibromyalgia     GERD (gastroesophageal reflux disease)     Glucose intolerance (impaired glucose tolerance) 06/15/2020    hyperglycemia    Hx of blood clots     Lupus     Memory difficulty 06/15/2020    Mild cognitive impairment with memory loss 06/15/2020    Osteoporosis     Spondylosis     lumbar    Tracheostomy dependence ([facility])     december 2024     Past Surgical History:   Procedure Laterality Date    ABDOMEN SURGERY      fatty tumor removed     ARTHROPLASTY Left 04/16/2019    LEFT THUMB TRAPEZIECTOMY WITH LIGAMENT RECONSTRUCTION LEFT PARTIAL TRAPEZOIDECTOMY LEFT DEQUERVAINS RELEASE LEFT CARPAL TUNNEL RELEASE performed by Jacky Morrell MD at [facility] OR    CARDIAC PROCEDURE N/A 07/05/2024    Left

## 2025-04-03 ENCOUNTER — TELEPHONE (OUTPATIENT)
Dept: ENT CLINIC | Age: 55
End: 2025-04-03

## 2025-05-01 ENCOUNTER — APPOINTMENT (OUTPATIENT)
Dept: OTOLARYNGOLOGY | Facility: CLINIC | Age: 55
End: 2025-05-01
Payer: MEDICARE

## 2025-05-01 DIAGNOSIS — J38.02 BILATERAL COMPLETE PARALYSIS OF VOCAL CORDS OR LARYNX: ICD-10-CM

## 2025-05-01 DIAGNOSIS — J38.00 VOCAL CORD PARALYSIS: ICD-10-CM

## 2025-05-01 DIAGNOSIS — R49.8 OTHER VOICE AND RESONANCE DISORDERS: ICD-10-CM

## 2025-05-01 DIAGNOSIS — Z01.818 PRE-OPERATIVE CLEARANCE: Primary | ICD-10-CM

## 2025-05-01 DIAGNOSIS — J38.6 STENOSIS OF LARYNX: ICD-10-CM

## 2025-05-01 PROCEDURE — 31615 TRCHEOBRNCHSC EST TRACHS INC: CPT | Performed by: OTOLARYNGOLOGY

## 2025-05-01 PROCEDURE — 31575 DIAGNOSTIC LARYNGOSCOPY: CPT | Performed by: OTOLARYNGOLOGY

## 2025-05-01 PROCEDURE — 99204 OFFICE O/P NEW MOD 45 MIN: CPT | Performed by: OTOLARYNGOLOGY

## 2025-05-01 NOTE — PROGRESS NOTES
ASSESSMENT AND PLAN:   Michelle Kramer is a 54 y.o. female presenting for an initial visit with findings of intubation trauma during a MI. We do not have complete records from the patient available to us. She has interarytenoid scarring and has had surgery x2 to improve airway. She is tolerating capping during the day, but not at night. She has SOB with light exeercise.    Assessment/Plan   Today's examination to include bronchoscopy demonstrates posterior gap that is larger seen from inferior surface with scarring in the right > left. She would benefit from right cordotomy to improve airway, however this may cause voice changes. This may provide safety margins. No know CITLALLI. She has asthma and COPD    I would like to see the patient back for the procedure at a time that is convenient for her.       Maximal Opening      Maximal opening viewed from below        Distal Airway      Reason For Consult  No chief complaint on file.         HISTORY OF PRESENT ILLNESS:  Michelle Kramer is a 54 y.o. female presenting for an initial visit with me for a history of tracheostomy following prolonged intubation. 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 She is able to tolerate capping during the day. She has been doing this for 2 months. She is uncapping at night. She is a former 30 pack year smoker.      The patient reports that she is breathing well.  She has SOB with exertion when capping. This is worse when benidng down and walking down stairs. She is able to rest which improves her breathing.          Past Medical History  She has a past medical history of Bipolar disorder, currently in remission, most recent episode unspecified (Multi) (12/17/2020), Personal history of other diseases of the musculoskeletal system and connective tissue (12/17/2020), Personal history of other mental and behavioral disorders (12/17/2020), Personal history of suicidal behavior, and Systemic lupus erythematosus,  unspecified (Multi) (12/17/2020). Surgical History  She has a past surgical history that includes Other surgical history (12/17/2020); Other surgical history (12/17/2020); and Other surgical history (12/17/2020).   Social History  She has no history on file for tobacco use, alcohol use, and drug use. Allergies  Patient has no allergy information on record.     Family History  Family History[1]     Review of Systems  All 10 systems were reviewed and negative except for above.      Physical Exam    ENT Physical Exam  Constitutional  Appearance: patient appears well-developed and well-nourished,  Head and Face  Appearance: head appears normal and face appears normal;  Ear  Auricles: right auricle normal; left auricle normal;  Nose  External Nose: nares patent bilaterally;  Oral Cavity/Oropharynx  Lips: normal;  Neck  Neck: neck normal; neck palpation normal;  Respiratory  Inspection: no retractions visible;  Cardiovascular  Inspection: no peripheral edema present;  Neurovestibular  Mental Status: alert and oriented;  Psychiatric: mood normal;  Cranial Nerves: cranial nerves intact;      Last Recorded Vitals  There were no vitals taken for this visit.    ----------------------------------------------------------------------  Procedures     Flexible Laryngoscopy w/ Videostroboscopy    VOICE AND SPEECH CHARACTERISTICS:  Normal spoken speech, (+) mild dysphonia, no roughness, (+) mild breathiness, (+) mild asthenia, (+) mild strain.    Intelligibility: normal.   Resonance: balanced.   Vocal Loudness: normal.   Breath Support: poor coordination.    PROCEDURE:    Indications: difficulty breathing  PROCEDURE NOTE: BRONCHOSCOPY VIA TRACHEOSTOMY   I recommended bronchoscopy via tracheostomy based on PE findings, and/or concern for pathology based upon history of airway complaints. Risks, benefits, and alternatives were explained. The patient wishes to proceed and gives verbal consent.  Patient is seated in the exam chair. After  adequate topical anesthesia, I advance the flexible endoscope. The examination included evaluation of the barrett, vallecula, base of tongue, pyriforms, post-cricoid area, larynx and immediate subglottis.  Findings : assessment of the nasopharynx, base of tongue/vallecula, pyriform sinuses, post-cricoid area and pharyngeal walls was without lesion or mass, pharyngeal wall contraction is normal and symmetric, and no pooling of secretions  subglottic edema:2 (present), erythema/hyperemia: 2 (arytenoids), and IA thickenin (mild)  Gross Arytenoid Movement: immobility bilateral.  Arytenoid Height: normal.   Supraglottic Tension: lateral.  Closure: post. gap.  Additional Findings: Mild narrowing of the distal airway/saber sheath phase trachea with a small posterior gap seen on inferior glottic evaluation.     Time Spent  Prep time on day of patient encounter: 5-10 minutes  Time spent directly with patient, family or caregiver: 25 minutes  Additional Time Spent on Patient Care Activities/discuss care plan with SLP: 5 minutes  Documentation Time: 10 minutes  Other Time Spent: 0 minutes  Total: 50 minutes     Scribe Attestation  By signing my name below, IKalyn , Scribe attest that this documentation has been prepared under the direction and in the presence of Damir Bermudez MD.           [1] No family history on file.

## 2025-05-02 PROBLEM — J38.00 VOCAL CORD PARALYSIS: Status: ACTIVE | Noted: 2025-05-01

## 2025-05-12 ENCOUNTER — APPOINTMENT (OUTPATIENT)
Dept: OTOLARYNGOLOGY | Facility: HOSPITAL | Age: 55
End: 2025-05-12
Payer: MEDICARE

## 2025-06-05 ENCOUNTER — TELEPHONE (OUTPATIENT)
Dept: ENT CLINIC | Age: 55
End: 2025-06-05

## 2025-06-05 RX ORDER — AMOXICILLIN 500 MG/1
500 CAPSULE ORAL 2 TIMES DAILY
Qty: 14 CAPSULE | Refills: 0 | Status: SHIPPED | OUTPATIENT
Start: 2025-06-05 | End: 2025-06-12

## 2025-06-05 RX ORDER — PREDNISONE 20 MG/1
40 TABLET ORAL DAILY
Qty: 10 TABLET | Refills: 0 | Status: SHIPPED | OUTPATIENT
Start: 2025-06-05 | End: 2025-06-10

## 2025-06-05 RX ORDER — GUAIFENESIN 600 MG/1
1200 TABLET, EXTENDED RELEASE ORAL 2 TIMES DAILY
Qty: 40 TABLET | Refills: 0 | Status: SHIPPED | OUTPATIENT
Start: 2025-06-05 | End: 2025-06-15

## 2025-06-05 NOTE — TELEPHONE ENCOUNTER
Pt called office and left a voicemail. She said that she is having a lot of irritation underneath her trach.   Please advise.  Electronically signed by Ayesha Magallanes on 6/5/2025 at 11:41 AM

## 2025-06-18 ENCOUNTER — HOSPITAL ENCOUNTER (OUTPATIENT)
Facility: HOSPITAL | Age: 55
Setting detail: OUTPATIENT SURGERY
Discharge: HOME | End: 2025-06-18
Attending: OTOLARYNGOLOGY | Admitting: OTOLARYNGOLOGY
Payer: MEDICARE

## 2025-06-18 ENCOUNTER — ANESTHESIA (OUTPATIENT)
Dept: OPERATING ROOM | Facility: HOSPITAL | Age: 55
End: 2025-06-18
Payer: MEDICARE

## 2025-06-18 ENCOUNTER — ANESTHESIA EVENT (OUTPATIENT)
Dept: OPERATING ROOM | Facility: HOSPITAL | Age: 55
End: 2025-06-18
Payer: MEDICARE

## 2025-06-18 VITALS
TEMPERATURE: 97.5 F | SYSTOLIC BLOOD PRESSURE: 133 MMHG | BODY MASS INDEX: 26.84 KG/M2 | DIASTOLIC BLOOD PRESSURE: 69 MMHG | WEIGHT: 136.69 LBS | OXYGEN SATURATION: 96 % | HEIGHT: 60 IN | RESPIRATION RATE: 18 BRPM | HEART RATE: 70 BPM

## 2025-06-18 DIAGNOSIS — J04.10 TRACHEITIS: ICD-10-CM

## 2025-06-18 DIAGNOSIS — J38.00 VOCAL CORD PARALYSIS: Primary | ICD-10-CM

## 2025-06-18 DIAGNOSIS — J04.2 ACUTE LARYNGOTRACHEITIS: ICD-10-CM

## 2025-06-18 DIAGNOSIS — J04.0 ACUTE LARYNGITIS: ICD-10-CM

## 2025-06-18 PROBLEM — M06.9 RHEUMATOID ARTHRITIS: Status: ACTIVE | Noted: 2025-06-18

## 2025-06-18 PROBLEM — J18.9 PNEUMONIA: Status: ACTIVE | Noted: 2025-06-18

## 2025-06-18 PROBLEM — N93.8 DYSFUNCTIONAL UTERINE BLEEDING: Status: ACTIVE | Noted: 2025-06-18

## 2025-06-18 PROBLEM — R94.31 ABNORMAL EKG: Status: ACTIVE | Noted: 2025-06-18

## 2025-06-18 PROBLEM — M51.9 LUMBAR DISC DISEASE: Status: ACTIVE | Noted: 2025-06-18

## 2025-06-18 PROBLEM — F41.9 ANXIETY: Status: ACTIVE | Noted: 2025-06-18

## 2025-06-18 PROBLEM — F31.9 BIPOLAR DISORDER: Status: ACTIVE | Noted: 2025-06-18

## 2025-06-18 PROBLEM — N18.9 CHRONIC RENAL INSUFFICIENCY: Status: ACTIVE | Noted: 2025-06-18

## 2025-06-18 PROBLEM — M54.50 CHRONIC LOW BACK PAIN: Status: ACTIVE | Noted: 2025-06-18

## 2025-06-18 PROBLEM — B99.9 INFECTION REQUIRING CONTACT ISOLATION PRECAUTIONS: Status: ACTIVE | Noted: 2025-06-18

## 2025-06-18 PROBLEM — I25.10 CAD (CORONARY ARTERY DISEASE): Status: ACTIVE | Noted: 2025-06-18

## 2025-06-18 PROBLEM — R06.09 DYSPNEA ON EXERTION: Status: ACTIVE | Noted: 2025-06-18

## 2025-06-18 PROBLEM — G89.29 CHRONIC LOW BACK PAIN: Status: ACTIVE | Noted: 2025-06-18

## 2025-06-18 PROBLEM — F32.A DEPRESSION: Status: ACTIVE | Noted: 2025-06-18

## 2025-06-18 PROBLEM — E78.5 HYPERLIPIDEMIA: Status: ACTIVE | Noted: 2025-06-18

## 2025-06-18 PROBLEM — K21.9 GASTROESOPHAGEAL REFLUX DISEASE WITHOUT ESOPHAGITIS: Status: ACTIVE | Noted: 2025-06-18

## 2025-06-18 PROBLEM — I82.409 DVT (DEEP VENOUS THROMBOSIS) (MULTI): Status: ACTIVE | Noted: 2025-06-18

## 2025-06-18 PROBLEM — Z79.01 ANTICOAGULANT LONG-TERM USE: Status: ACTIVE | Noted: 2025-06-18

## 2025-06-18 PROBLEM — K70.9 ALCOHOLIC LIVER DISEASE: Status: ACTIVE | Noted: 2025-06-18

## 2025-06-18 PROBLEM — K92.2 GI BLEED: Status: ACTIVE | Noted: 2025-06-18

## 2025-06-18 PROBLEM — J44.9 CHRONIC OBSTRUCTIVE PULMONARY DISEASE (MULTI): Status: ACTIVE | Noted: 2025-06-18

## 2025-06-18 PROBLEM — M79.7 FIBROMYALGIA: Status: ACTIVE | Noted: 2025-06-18

## 2025-06-18 PROBLEM — G56.03 BILATERAL CARPAL TUNNEL SYNDROME: Status: ACTIVE | Noted: 2025-06-18

## 2025-06-18 PROBLEM — I10 HTN (HYPERTENSION): Status: ACTIVE | Noted: 2025-06-18

## 2025-06-18 PROBLEM — D68.9 COAGULOPATHY (MULTI): Status: ACTIVE | Noted: 2025-06-18

## 2025-06-18 PROBLEM — G89.29 CHRONIC NECK PAIN: Status: ACTIVE | Noted: 2025-06-18

## 2025-06-18 PROBLEM — F03.90 DEMENTIA: Status: ACTIVE | Noted: 2025-06-18

## 2025-06-18 PROBLEM — M19.90 OSTEOARTHRITIS: Status: ACTIVE | Noted: 2025-06-18

## 2025-06-18 PROBLEM — R56.9 SEIZURES (MULTI): Status: ACTIVE | Noted: 2025-06-18

## 2025-06-18 PROBLEM — R79.89 ELEVATED LIVER FUNCTION TESTS: Status: ACTIVE | Noted: 2025-06-18

## 2025-06-18 PROBLEM — I50.9 CHF (CONGESTIVE HEART FAILURE): Status: ACTIVE | Noted: 2025-06-18

## 2025-06-18 PROBLEM — M54.2 CHRONIC NECK PAIN: Status: ACTIVE | Noted: 2025-06-18

## 2025-06-18 PROBLEM — I21.9 MI (MYOCARDIAL INFARCTION) (MULTI): Status: ACTIVE | Noted: 2025-06-18

## 2025-06-18 PROBLEM — I48.91 ATRIAL FIBRILLATION (MULTI): Status: ACTIVE | Noted: 2025-06-18

## 2025-06-18 PROBLEM — K76.9 LIVER DISEASE: Status: ACTIVE | Noted: 2025-06-18

## 2025-06-18 PROBLEM — J45.909 ASTHMA: Status: ACTIVE | Noted: 2025-06-18

## 2025-06-18 PROCEDURE — 3700000001 HC GENERAL ANESTHESIA TIME - INITIAL BASE CHARGE: Performed by: OTOLARYNGOLOGY

## 2025-06-18 PROCEDURE — 3600000002 HC OR TIME - INITIAL BASE CHARGE - PROCEDURE LEVEL TWO: Performed by: OTOLARYNGOLOGY

## 2025-06-18 PROCEDURE — 3600000007 HC OR TIME - EACH INCREMENTAL 1 MINUTE - PROCEDURE LEVEL TWO: Performed by: OTOLARYNGOLOGY

## 2025-06-18 PROCEDURE — A31525 PR LARYNGOSCOPY,DIRECT,DIAGNOSTIC

## 2025-06-18 PROCEDURE — 7100000010 HC PHASE TWO TIME - EACH INCREMENTAL 1 MINUTE: Performed by: OTOLARYNGOLOGY

## 2025-06-18 PROCEDURE — 31571 LARYNGOSCOP W/VC INJ + SCOPE: CPT | Performed by: OTOLARYNGOLOGY

## 2025-06-18 PROCEDURE — 2500000005 HC RX 250 GENERAL PHARMACY W/O HCPCS: Performed by: OTOLARYNGOLOGY

## 2025-06-18 PROCEDURE — 31561 LARYNSCOP REMVE CART + SCOP: CPT | Performed by: OTOLARYNGOLOGY

## 2025-06-18 PROCEDURE — 2500000004 HC RX 250 GENERAL PHARMACY W/ HCPCS (ALT 636 FOR OP/ED): Mod: JZ,TB | Performed by: ANESTHESIOLOGY

## 2025-06-18 PROCEDURE — 2500000001 HC RX 250 WO HCPCS SELF ADMINISTERED DRUGS (ALT 637 FOR MEDICARE OP): Performed by: ANESTHESIOLOGY

## 2025-06-18 PROCEDURE — 3700000002 HC GENERAL ANESTHESIA TIME - EACH INCREMENTAL 1 MINUTE: Performed by: OTOLARYNGOLOGY

## 2025-06-18 PROCEDURE — 2500000004 HC RX 250 GENERAL PHARMACY W/ HCPCS (ALT 636 FOR OP/ED)

## 2025-06-18 PROCEDURE — A31525 PR LARYNGOSCOPY,DIRECT,DIAGNOSTIC: Performed by: ANESTHESIOLOGY

## 2025-06-18 PROCEDURE — 2500000004 HC RX 250 GENERAL PHARMACY W/ HCPCS (ALT 636 FOR OP/ED): Performed by: OTOLARYNGOLOGY

## 2025-06-18 PROCEDURE — 2720000007 HC OR 272 NO HCPCS: Performed by: OTOLARYNGOLOGY

## 2025-06-18 PROCEDURE — 31541 LARYNSCOP W/TUMR EXC + SCOPE: CPT | Performed by: OTOLARYNGOLOGY

## 2025-06-18 PROCEDURE — 31599 UNLISTED PROCEDURE LARYNX: CPT | Performed by: OTOLARYNGOLOGY

## 2025-06-18 PROCEDURE — 7100000002 HC RECOVERY ROOM TIME - EACH INCREMENTAL 1 MINUTE: Performed by: OTOLARYNGOLOGY

## 2025-06-18 PROCEDURE — 7100000001 HC RECOVERY ROOM TIME - INITIAL BASE CHARGE: Performed by: OTOLARYNGOLOGY

## 2025-06-18 PROCEDURE — 7100000009 HC PHASE TWO TIME - INITIAL BASE CHARGE: Performed by: OTOLARYNGOLOGY

## 2025-06-18 RX ORDER — ACETAMINOPHEN 325 MG/1
650 TABLET ORAL EVERY 4 HOURS PRN
Status: DISCONTINUED | OUTPATIENT
Start: 2025-06-18 | End: 2025-06-18 | Stop reason: HOSPADM

## 2025-06-18 RX ORDER — ALBUTEROL SULFATE 0.83 MG/ML
2.5 SOLUTION RESPIRATORY (INHALATION) ONCE AS NEEDED
Status: DISCONTINUED | OUTPATIENT
Start: 2025-06-18 | End: 2025-06-18 | Stop reason: HOSPADM

## 2025-06-18 RX ORDER — OXYCODONE HYDROCHLORIDE 5 MG/1
10 TABLET ORAL EVERY 4 HOURS PRN
Status: DISCONTINUED | OUTPATIENT
Start: 2025-06-18 | End: 2025-06-18 | Stop reason: HOSPADM

## 2025-06-18 RX ORDER — SODIUM CHLORIDE 0.9 G/100ML
INJECTION, SOLUTION IRRIGATION AS NEEDED
Status: DISCONTINUED | OUTPATIENT
Start: 2025-06-18 | End: 2025-06-18 | Stop reason: HOSPADM

## 2025-06-18 RX ORDER — FENTANYL CITRATE 50 UG/ML
INJECTION, SOLUTION INTRAMUSCULAR; INTRAVENOUS AS NEEDED
Status: DISCONTINUED | OUTPATIENT
Start: 2025-06-18 | End: 2025-06-18

## 2025-06-18 RX ORDER — SODIUM CHLORIDE, SODIUM LACTATE, POTASSIUM CHLORIDE, CALCIUM CHLORIDE 600; 310; 30; 20 MG/100ML; MG/100ML; MG/100ML; MG/100ML
5 INJECTION, SOLUTION INTRAVENOUS CONTINUOUS
Status: DISCONTINUED | OUTPATIENT
Start: 2025-06-18 | End: 2025-06-18 | Stop reason: HOSPADM

## 2025-06-18 RX ORDER — HYDROMORPHONE HYDROCHLORIDE 0.2 MG/ML
0.2 INJECTION INTRAMUSCULAR; INTRAVENOUS; SUBCUTANEOUS EVERY 5 MIN PRN
Status: DISCONTINUED | OUTPATIENT
Start: 2025-06-18 | End: 2025-06-18 | Stop reason: HOSPADM

## 2025-06-18 RX ORDER — ONDANSETRON HYDROCHLORIDE 2 MG/ML
INJECTION, SOLUTION INTRAVENOUS AS NEEDED
Status: DISCONTINUED | OUTPATIENT
Start: 2025-06-18 | End: 2025-06-18

## 2025-06-18 RX ORDER — ROCURONIUM BROMIDE 10 MG/ML
INJECTION, SOLUTION INTRAVENOUS AS NEEDED
Status: DISCONTINUED | OUTPATIENT
Start: 2025-06-18 | End: 2025-06-18

## 2025-06-18 RX ORDER — HYDROMORPHONE HYDROCHLORIDE 1 MG/ML
INJECTION, SOLUTION INTRAMUSCULAR; INTRAVENOUS; SUBCUTANEOUS AS NEEDED
Status: DISCONTINUED | OUTPATIENT
Start: 2025-06-18 | End: 2025-06-18

## 2025-06-18 RX ORDER — METOCLOPRAMIDE HYDROCHLORIDE 5 MG/ML
10 INJECTION INTRAMUSCULAR; INTRAVENOUS ONCE AS NEEDED
Status: DISCONTINUED | OUTPATIENT
Start: 2025-06-18 | End: 2025-06-18 | Stop reason: HOSPADM

## 2025-06-18 RX ORDER — DEXAMETHASONE SODIUM PHOSPHATE 10 MG/ML
INJECTION INTRAMUSCULAR; INTRAVENOUS AS NEEDED
Status: DISCONTINUED | OUTPATIENT
Start: 2025-06-18 | End: 2025-06-18 | Stop reason: HOSPADM

## 2025-06-18 RX ORDER — LIDOCAINE HYDROCHLORIDE 10 MG/ML
0.1 INJECTION, SOLUTION INFILTRATION; PERINEURAL ONCE
Status: DISCONTINUED | OUTPATIENT
Start: 2025-06-18 | End: 2025-06-18 | Stop reason: HOSPADM

## 2025-06-18 RX ORDER — DROPERIDOL 2.5 MG/ML
0.62 INJECTION, SOLUTION INTRAMUSCULAR; INTRAVENOUS ONCE AS NEEDED
Status: DISCONTINUED | OUTPATIENT
Start: 2025-06-18 | End: 2025-06-18 | Stop reason: HOSPADM

## 2025-06-18 RX ORDER — OXYCODONE AND ACETAMINOPHEN 5; 325 MG/1; MG/1
1 TABLET ORAL EVERY 4 HOURS PRN
Status: DISCONTINUED | OUTPATIENT
Start: 2025-06-18 | End: 2025-06-18 | Stop reason: HOSPADM

## 2025-06-18 RX ORDER — CIPROFLOXACIN AND DEXAMETHASONE 3; 1 MG/ML; MG/ML
SUSPENSION/ DROPS AURICULAR (OTIC)
Qty: 7.5 ML | Refills: 0 | Status: SHIPPED | OUTPATIENT
Start: 2025-06-18

## 2025-06-18 RX ORDER — CIPROFLOXACIN 250 MG/1
250 TABLET, FILM COATED ORAL
Qty: 28 TABLET | Refills: 0 | Status: SHIPPED | OUTPATIENT
Start: 2025-06-18 | End: 2025-07-02

## 2025-06-18 RX ORDER — TRAMADOL HYDROCHLORIDE 50 MG/1
50 TABLET, FILM COATED ORAL EVERY 6 HOURS PRN
Qty: 20 TABLET | Refills: 0 | Status: SHIPPED | OUTPATIENT
Start: 2025-06-18 | End: 2025-06-23

## 2025-06-18 RX ORDER — PROPOFOL 10 MG/ML
INJECTION, EMULSION INTRAVENOUS AS NEEDED
Status: DISCONTINUED | OUTPATIENT
Start: 2025-06-18 | End: 2025-06-18

## 2025-06-18 RX ORDER — LABETALOL HYDROCHLORIDE 5 MG/ML
5 INJECTION, SOLUTION INTRAVENOUS EVERY 10 MIN PRN
Status: DISCONTINUED | OUTPATIENT
Start: 2025-06-18 | End: 2025-06-18 | Stop reason: HOSPADM

## 2025-06-18 RX ORDER — HYDRALAZINE HYDROCHLORIDE 20 MG/ML
5 INJECTION INTRAMUSCULAR; INTRAVENOUS EVERY 10 MIN PRN
Status: DISCONTINUED | OUTPATIENT
Start: 2025-06-18 | End: 2025-06-18 | Stop reason: HOSPADM

## 2025-06-18 RX ORDER — PHENYLEPHRINE HCL IN 0.9% NACL 0.4MG/10ML
SYRINGE (ML) INTRAVENOUS AS NEEDED
Status: DISCONTINUED | OUTPATIENT
Start: 2025-06-18 | End: 2025-06-18

## 2025-06-18 RX ORDER — EPINEPHRINE 1 MG/ML
INJECTION, SOLUTION, CONCENTRATE INTRAVENOUS AS NEEDED
Status: DISCONTINUED | OUTPATIENT
Start: 2025-06-18 | End: 2025-06-18 | Stop reason: HOSPADM

## 2025-06-18 RX ORDER — FENTANYL CITRATE 50 UG/ML
12.5 INJECTION, SOLUTION INTRAMUSCULAR; INTRAVENOUS EVERY 5 MIN PRN
Status: DISCONTINUED | OUTPATIENT
Start: 2025-06-18 | End: 2025-06-18 | Stop reason: HOSPADM

## 2025-06-18 RX ORDER — LIDOCAINE HCL/PF 100 MG/5ML
SYRINGE (ML) INTRAVENOUS AS NEEDED
Status: DISCONTINUED | OUTPATIENT
Start: 2025-06-18 | End: 2025-06-18

## 2025-06-18 RX ADMIN — PROPOFOL 50 MG: 10 INJECTION, EMULSION INTRAVENOUS at 16:47

## 2025-06-18 RX ADMIN — FENTANYL CITRATE 50 MCG: 50 INJECTION, SOLUTION INTRAMUSCULAR; INTRAVENOUS at 17:03

## 2025-06-18 RX ADMIN — ROCURONIUM BROMIDE 10 MG: 10 INJECTION, SOLUTION INTRAVENOUS at 17:27

## 2025-06-18 RX ADMIN — HYDROMORPHONE HYDROCHLORIDE 0.5 MG: 1 INJECTION, SOLUTION INTRAMUSCULAR; INTRAVENOUS; SUBCUTANEOUS at 18:01

## 2025-06-18 RX ADMIN — HYDROMORPHONE HYDROCHLORIDE 0.5 MG: 1 INJECTION, SOLUTION INTRAMUSCULAR; INTRAVENOUS; SUBCUTANEOUS at 18:21

## 2025-06-18 RX ADMIN — LIDOCAINE HYDROCHLORIDE 40 MG: 20 INJECTION INTRAVENOUS at 16:47

## 2025-06-18 RX ADMIN — SUGAMMADEX 200 MG: 100 INJECTION, SOLUTION INTRAVENOUS at 17:40

## 2025-06-18 RX ADMIN — HYDROMORPHONE HYDROCHLORIDE 0.5 MG: 1 INJECTION, SOLUTION INTRAMUSCULAR; INTRAVENOUS; SUBCUTANEOUS at 17:47

## 2025-06-18 RX ADMIN — SODIUM CHLORIDE, SODIUM LACTATE, POTASSIUM CHLORIDE, AND CALCIUM CHLORIDE: 600; 310; 30; 20 INJECTION, SOLUTION INTRAVENOUS at 16:41

## 2025-06-18 RX ADMIN — DEXAMETHASONE SODIUM PHOSPHATE 10 MG: 4 INJECTION INTRA-ARTICULAR; INTRALESIONAL; INTRAMUSCULAR; INTRAVENOUS; SOFT TISSUE at 16:52

## 2025-06-18 RX ADMIN — OXYCODONE 10 MG: 5 TABLET ORAL at 18:41

## 2025-06-18 RX ADMIN — ONDANSETRON 4 MG: 2 INJECTION, SOLUTION INTRAMUSCULAR; INTRAVENOUS at 17:37

## 2025-06-18 RX ADMIN — FENTANYL CITRATE 50 MCG: 50 INJECTION, SOLUTION INTRAMUSCULAR; INTRAVENOUS at 16:47

## 2025-06-18 RX ADMIN — Medication 80 MCG: at 16:50

## 2025-06-18 RX ADMIN — ROCURONIUM BROMIDE 50 MG: 10 INJECTION, SOLUTION INTRAVENOUS at 16:47

## 2025-06-18 SDOH — HEALTH STABILITY: MENTAL HEALTH: CURRENT SMOKER: 0

## 2025-06-18 ASSESSMENT — COLUMBIA-SUICIDE SEVERITY RATING SCALE - C-SSRS
6. HAVE YOU EVER DONE ANYTHING, STARTED TO DO ANYTHING, OR PREPARED TO DO ANYTHING TO END YOUR LIFE?: NO
1. IN THE PAST MONTH, HAVE YOU WISHED YOU WERE DEAD OR WISHED YOU COULD GO TO SLEEP AND NOT WAKE UP?: NO
2. HAVE YOU ACTUALLY HAD ANY THOUGHTS OF KILLING YOURSELF?: NO
6. HAVE YOU EVER DONE ANYTHING, STARTED TO DO ANYTHING, OR PREPARED TO DO ANYTHING TO END YOUR LIFE?: YES

## 2025-06-18 ASSESSMENT — PAIN SCALES - GENERAL
PAINLEVEL_OUTOF10: 7
PAINLEVEL_OUTOF10: 7
PAINLEVEL_OUTOF10: 0 - NO PAIN
PAINLEVEL_OUTOF10: 7
PAINLEVEL_OUTOF10: 7
PAINLEVEL_OUTOF10: 5 - MODERATE PAIN
PAINLEVEL_OUTOF10: 7

## 2025-06-18 ASSESSMENT — PAIN - FUNCTIONAL ASSESSMENT: PAIN_FUNCTIONAL_ASSESSMENT: 0-10

## 2025-06-18 ASSESSMENT — ENCOUNTER SYMPTOMS
FEVER: 0
PSYCHIATRIC NEGATIVE: 1
CARDIOVASCULAR NEGATIVE: 1

## 2025-06-18 NOTE — ANESTHESIA PROCEDURE NOTES
Airway  Date/Time: 6/18/2025 4:50 PM  Reason: elective    Airway not difficult    Staffing  Performed: [facility]AA   Authorized by: Caroline Wu MD    Performed by: HENRY Castellano  Patient location during procedure: OR    Patient Condition  Indications for airway management: anesthesia  Patient position: sniffing  Sedation level: deep     Final Airway Details   Preoxygenated: yes  Final airway type: endotracheal airway  Successful airway: ETT, laser tube and reinforced tube  Cuffed: yes   Endotracheal tube insertion site: tracheostomy  ETT size (mm): 5.0  Placement verified by: chest auscultation, capnometry and palpation of cuff   Measured from: lips  ETT to lips (cm): 12  Number of attempts at approach: 1

## 2025-06-18 NOTE — H&P
History Of Present Illness  Michelle Kramer is a 55 y.o. female presenting with findings of intubation trauma during a MI. We do not have complete records from the patient available to us. She has interarytenoid scarring and has had surgery x2 to improve airway that did not help long term per patient report. She is tolerating capping during the day, but not at night. She has SOB with light exercise.     Assessment/Plan   Today's examination to include bronchoscopy demonstrates small posterior gap that is slightly larger as seen from inferior surface (from bronch via tracheostomy) with scarring in the right > left. She would benefit from right cordotomy to improve airway, however this may cause voice changes and we discussed this at length. She feels relatively comfortable with breathing now at rest but not at night or with increases in effort/exercise.  This planned procedure may provide safety margins for improved breathing with a bilateral TVC immobility/PGS. No CITLALLI Hx. She has asthma and COPD.       Patient understands r/b/a.       Maximal Opening       Maximal opening viewed from below          Distal Airway    Sabre Sheath deformity     Past Medical History  Medical History[1]    Surgical History  Surgical History[2]     Social History  She has no history on file for tobacco use, alcohol use, and drug use.    Family History  Family History[3]     Allergies  Patient has no allergy information on record.    Review of Systems   Constitutional:  Negative for fever.   Cardiovascular: Negative.  Negative for chest pain.   Genitourinary: Negative.    Skin: Negative.    Psychiatric/Behavioral: Negative.     All other systems reviewed and are negative.       Physical Exam  Constitutional:       General: She is not in acute distress.     Appearance: She is not ill-appearing.   HENT:      Right Ear: External ear normal.      Left Ear: External ear normal.      Nose: Nose normal.      Mouth/Throat:      Mouth: Mucous  membranes are moist.   Eyes:      Pupils: Pupils are equal, round, and reactive to light.   Pulmonary:      Effort: Pulmonary effort is normal.   Skin:     General: Skin is warm and dry.   Neurological:      General: No focal deficit present.   Psychiatric:         Mood and Affect: Mood normal.       Assessment & Plan  Vocal cord paralysis      Patient and I discussed the risks, benefits and alternatives to surgery and all questions answered.  Cleared to OR.       Damir Bermudez MD         [1]   Past Medical History:  Diagnosis Date    Bipolar disorder, currently in remission, most recent episode unspecified (Multi) 12/17/2020    History of depressed bipolar disorder    Personal history of other diseases of the musculoskeletal system and connective tissue 12/17/2020    History of fibromyalgia    Personal history of other mental and behavioral disorders 12/17/2020    History of anxiety    Personal history of suicidal behavior     History of suicide attempt    Systemic lupus erythematosus, unspecified (Multi) 12/17/2020    Lupus   [2]   Past Surgical History:  Procedure Laterality Date    OTHER SURGICAL HISTORY  12/17/2020    Anterior cervical vertebral fusion    OTHER SURGICAL HISTORY  12/17/2020    Lumbar vertebral fusion    OTHER SURGICAL HISTORY  12/17/2020    Hip replacement   [3] No family history on file.
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
## 2025-06-19 NOTE — ANESTHESIA POSTPROCEDURE EVALUATION
Patient: Michelle Kramer    Procedure Summary       Date: 06/18/25 Room / Location: [facility] OR 05 / [facility]    Anesthesia Start: 1641 Anesthesia Stop: 1801    Procedure: MICRODIRECT LARYNGOSCOPY, BRONCHOSCOPY, CO2 LASER, BIOPSY, INJECTION, BALLOON DILATION, CORDOTOMY (Bilateral) Diagnosis:       Vocal cord paralysis      (Vocal cord paralysis [J38.00])    Surgeons: Damir Bermudez MD Responsible Provider: Caroline Wu MD    Anesthesia Type: general ASA Status: 3            Anesthesia Type: general    Vitals Value Taken Time   /80 06/18/25 18:15   Temp 36.2 06/18/25 21:41   Pulse 69 06/18/25 18:28   Resp 20 06/18/25 18:28   SpO2 91 % 06/18/25 18:28   Vitals shown include unfiled device data.    Anesthesia Post Evaluation    Patient participation: complete - patient participated  Level of consciousness: awake  Pain management: adequate  Airway patency: patent  Cardiovascular status: acceptable  Respiratory status: acceptable  Hydration status: acceptable  Postoperative Nausea and Vomiting: none        There were no known notable events for this encounter.

## 2025-06-23 NOTE — OP NOTE
MICRODIRECT LARYNGOSCOPY, BRONCHOSCOPY, CO2 LASER PARTIAL ARYTENOIDECTOMY/SUPRAGLOTTOPLASTY, BIOPSY, INJECTION, BALLOON DILATION, CORDOTOMY (Right) Operative Note     Date: 2025  OR Location: [facility] OR    Name: Michelle Kramer, : 1970, Age: 55 y.o., MRN: [id number], Sex: female    Diagnosis  Pre-op Diagnosis      * Vocal cord paralysis [J38.00] Post-op Diagnosis     * Vocal cord paralysis [J38.00]     Procedures  MICRODIRECT LARYNGOSCOPY, BRONCHOSCOPY, CO2 LASER, BIOPSY, INJECTION, BALLOON DILATION, CORDOTOMY  73554 - [facility] LARYNGOSCOPY W/WO TRACHEOSCOPY W/MICRO/TELESCOPE    MICRODIRECT LARYNGOSCOPY, BRONCHOSCOPY, CO2 LASER, BIOPSY, INJECTION, BALLOON DILATION, CORDOTOMY  68573 - [facility] INCL FLUOR GDNCE DX W/CELL WASHG SPX    MICRODIRECT LARYNGOSCOPY, BRONCHOSCOPY, CO2 LASER, BIOPSY, INJECTION, BALLOON DILATION, CORDOTOMY  62594 - [facility] W/TRACHEAL/BRONCHIAL DILAT/CLSD RDCTJ FX    MICRODIRECT LARYNGOSCOPY, BRONCHOSCOPY, CO2 LASER, BIOPSY, INJECTION, BALLOON DILATION, CORDOTOMY  14112 - [facility] LARGSC W/NJX VOCAL CORD THER W/MICRO/TELESCOPE    MICRODIRECT LARYNGOSCOPY, BRONCHOSCOPY, CO2 LASER, BIOPSY, INJECTION, BALLOON DILATION, CORDOTOMY  93661 - [facility] LARYNGOSCOPY W/BIOPSY MICROSCOPE/TELESCOPE    MICRODIRECT LARYNGOSCOPY, BRONCHOSCOPY, CO2 LASER, BIOPSY, INJECTION, BALLOON DILATION, CORDOTOMY  91141 - [facility] LARGSC EXC EMMANUELLE&/STRPG CORDS/EPIGL MCRSCP/TLSCP    [facility] LARGSC W/NJX VOCAL CORD THER W/MICRO/TELESCOPE [86708]  Surgeons      * Damir Bermudez - Primary    Resident/Fellow/Other Assistant:  Surgeons and Role:     * Pako Barnett MD - Resident - Assisting    Staff:   Circulator: Ana Luisa Aaron Person: Brittany Lacey Circulator: Radha    Anesthesia Staff: Anesthesiologist: Caroline Wu MD  C-AA: HENRY Castellano    Procedure Summary  Anesthesia: General  ASA: III  Estimated Blood Loss: <2 mL  Intra-op Medications: Administrations occurring from 1415 to 1600 on 25:  * No intraprocedure medications  in log *           Anesthesia Record               Intraprocedure I/O Totals          Intake    LR bolus 500.00 mL    Total Intake 500 mL          Indications: Michelle Kramer is an 55 y.o. female who is having surgery for Vocal cord paralysis [J38.00].     The patient was seen in the preoperative area. The risks, benefits, complications, treatment options, non-operative alternatives, expected recovery and outcomes were discussed with the patient. The possibilities of reaction to medication, pulmonary aspiration, injury to surrounding structures, bleeding, recurrent infection, the need for additional procedures, failure to diagnose a condition, and creating a complication requiring transfusion or operation were discussed with the patient. The patient concurred with the proposed plan, giving informed consent.  The site of surgery was properly noted/marked if necessary per policy. The patient has been actively warmed in preoperative area. Preoperative antibiotics are not indicated. Venous thrombosis prophylaxis have been ordered including bilateral sequential compression devices    Procedure Details: Hx of symptoms of SOB and findings consistent with multilevel airway collapse.     Findings:  Posterior glottic narrowing/PGS.  Arytenoid position and soft tissue collapse. Normal appearing trachea down to tracheostomy site.  Used laser to perform wedge resection/removal of excess tissue to include partial arytenoidectomy on right. Supraglottplasty performed.  Dilated up to 18 mm.  No malacia noted. Injected with 1 cc Decadron (10mg/cc).  Tracheostomy (4-0 cuffless) replaced at end of case.     Procedure: The patient was brought back to the operating room and transferred to the operating room table.  The patient was pre-oxygenated with 100% oxygen via tracheostomy and replaced with a laser safe ETT.  The bed was turned 90° to the laryngology team.    A dental guard was placed on the upper dentition.  The dedo laryngoscope  was introduced transorally along the right side of the tongue.  No concerning masses or lesions were identified in the oral cavity, oropharynx or pharynx.     A telescope was used to inspect the airway.  Photodocumentation was performed.     The dedo laryngoscope was advanced into a slightly supraglottic position to evaluate the posterior cartilage structures and soft tissue collapse seen previously.  The patient was prepared for CO2 laser use in the airway. A telescope was advanced into distal airway.  No malacia was seen.  Next, the laser was brought into position.     A laser safety time out was performed.  Oxygen concentration was dropped to below 35%, eye protection and wet towels were placed upon the face. No nitrous was in use. The line of sight laser mounted to the microscope with a micromanipulator was used to incise the portions of stenosis and excess supraglottic tissue (supraglottoplasty) in a wedge resection. Next, a right partial arytenoidectomy and cordotomy was performed.     An 18 mm radial expansion balloon with guidewire was inserted from above and inflated to 3 NICOL and subsequently 4 nicol with visualization using the telescope and microscope.   This, coupled with the laser excision, resulted in significant improvement in arway lumen diameter. Additional laser was used to ablate excess tissue in the posterior subglottis.  Injection of steroid (decdron 10mg/cc x 1 cc) into the area of the stenosis was performed.      The patient was then turned back to the anesthesia team for emergence.  The tracheosotmy tube was replaced at the end of the case with a size 4-0 Shiley tracheostomy tube.  The patient was transferred to the PACU in stable condition.      Disposition: SDS.  Anticipate a more widely patent airway.  Voice rest x 3 days.      Evidence of Infection: No   Complications:  None; patient tolerated the procedure well.    Disposition: PACU - hemodynamically stable.  Condition: stable   Attending  Attestation: I was present and scrubbed for the key portions of the procedure.    Damir Bermudez  Phone Number: [phone]

## 2025-06-30 ENCOUNTER — TELEPHONE (OUTPATIENT)
Dept: OTOLARYNGOLOGY | Facility: CLINIC | Age: 55
End: 2025-06-30
Payer: MEDICARE

## 2025-06-30 NOTE — TELEPHONE ENCOUNTER
She states her breathing is okay.  Right side sore throat and right ear ache since surgery 6/18/25.  Has been taking tylenol and tramadol as prescribed.  Has been taking cipro drops to her trach as prescribed.  Has been taking cipro tablets as prescribed.  Had 102 fever for two days last week after surgery, was blowing her nose, was coughing, was sneezing last week after surgery as well - Alkaseltzer plus made her feel better.  Denies fever and these symptoms now. Only c/o right throat pain and right ear pain. I advised pt I would let Dr. Bermudez know and see what else he would like to do. Pt also aware of her POV 7/24/25 and that Donna will be in touch tomorrow in regards to trach supplies ordered on 6/19/25. Dr. Bermudez and Donna notified.

## 2025-07-08 ENCOUNTER — TELEPHONE (OUTPATIENT)
Dept: OTOLARYNGOLOGY | Facility: CLINIC | Age: 55
End: 2025-07-08
Payer: MEDICARE

## 2025-07-08 NOTE — TELEPHONE ENCOUNTER
Spoke with pt spouse regarding trach supplies. Informed him that I faxed the order that was placed by Pako Barnett MD on 7/2 to [facility]. He informed me that he called [facility] and ordered more supplies.

## 2025-07-24 ENCOUNTER — APPOINTMENT (OUTPATIENT)
Dept: OTOLARYNGOLOGY | Facility: CLINIC | Age: 55
End: 2025-07-24
Payer: MEDICARE

## 2025-07-24 VITALS — BODY MASS INDEX: 26.7 KG/M2 | WEIGHT: 136 LBS | HEIGHT: 60 IN

## 2025-07-24 DIAGNOSIS — R49.0 DYSPHONIA: ICD-10-CM

## 2025-07-24 DIAGNOSIS — R49.0 MUSCLE TENSION DYSPHONIA: ICD-10-CM

## 2025-07-24 DIAGNOSIS — R47.89 BREATHY VOICE QUALITY: ICD-10-CM

## 2025-07-24 DIAGNOSIS — J38.6 STENOSIS OF LARYNX: ICD-10-CM

## 2025-07-24 DIAGNOSIS — R49.8 OTHER VOICE AND RESONANCE DISORDERS: ICD-10-CM

## 2025-07-24 DIAGNOSIS — J38.01 PARALYSIS OF RIGHT VOCAL CORD: Primary | ICD-10-CM

## 2025-07-24 DIAGNOSIS — J38.01 PARALYSIS OF LEFT VOCAL FOLD: ICD-10-CM

## 2025-07-24 PROCEDURE — 99215 OFFICE O/P EST HI 40 MIN: CPT | Performed by: OTOLARYNGOLOGY

## 2025-07-24 PROCEDURE — 3008F BODY MASS INDEX DOCD: CPT | Performed by: OTOLARYNGOLOGY

## 2025-07-24 PROCEDURE — 31615 TRCHEOBRNCHSC EST TRACHS INC: CPT | Performed by: OTOLARYNGOLOGY

## 2025-07-24 RX ORDER — BUDESONIDE 0.5 MG/2ML
0.5 INHALANT ORAL
COMMUNITY
Start: 2024-07-24

## 2025-07-24 RX ORDER — IPRATROPIUM BROMIDE AND ALBUTEROL SULFATE 2.5; .5 MG/3ML; MG/3ML
3 SOLUTION RESPIRATORY (INHALATION)
COMMUNITY
Start: 2024-07-24

## 2025-07-24 RX ORDER — FLUTICASONE PROPIONATE 50 MCG
2 SPRAY, SUSPENSION (ML) NASAL DAILY
COMMUNITY
Start: 2025-02-13

## 2025-07-24 RX ORDER — LORATADINE 10 MG/1
1 TABLET ORAL
COMMUNITY
Start: 2024-11-16

## 2025-07-24 RX ORDER — CHOLECALCIFEROL (VITAMIN D3) 25 MCG
1000 TABLET ORAL
COMMUNITY

## 2025-07-24 RX ORDER — ALBUTEROL SULFATE 90 UG/1
1 INHALANT RESPIRATORY (INHALATION)
COMMUNITY
Start: 2024-08-31

## 2025-07-24 RX ORDER — ACETAMINOPHEN 160 MG/5ML
650 LIQUID ORAL EVERY 6 HOURS PRN
COMMUNITY

## 2025-07-24 RX ORDER — METOPROLOL SUCCINATE 25 MG/1
25 TABLET, EXTENDED RELEASE ORAL DAILY
COMMUNITY
Start: 2025-02-24

## 2025-07-24 RX ORDER — ARFORMOTEROL TARTRATE 15 UG/2ML
15 SOLUTION RESPIRATORY (INHALATION)
COMMUNITY
Start: 2025-03-13

## 2025-07-24 RX ORDER — ACETYLCYSTEINE 200 MG/ML
200 SOLUTION ORAL; RESPIRATORY (INHALATION) 3 TIMES DAILY
COMMUNITY
Start: 2024-07-24

## 2025-07-24 RX ORDER — LAMOTRIGINE 200 MG/1
1 TABLET ORAL 2 TIMES DAILY
COMMUNITY
Start: 2024-12-10

## 2025-07-24 RX ORDER — EPINASTINE HYDROCHLORIDE 0.5 MG/ML
1 SOLUTION/ DROPS OPHTHALMIC
COMMUNITY
Start: 2023-12-29

## 2025-07-24 RX ORDER — ALENDRONATE SODIUM 70 MG/1
70 TABLET ORAL
COMMUNITY

## 2025-07-24 RX ORDER — VENLAFAXINE 75 MG/1
75 TABLET ORAL 2 TIMES DAILY
COMMUNITY

## 2025-07-24 RX ORDER — HYDROXYCHLOROQUINE SULFATE 200 MG/1
1 TABLET, FILM COATED ORAL DAILY
COMMUNITY

## 2025-07-24 RX ORDER — CLONAZEPAM 0.5 MG/1
0.5 TABLET ORAL 3 TIMES DAILY PRN
COMMUNITY

## 2025-07-24 ASSESSMENT — PATIENT HEALTH QUESTIONNAIRE - PHQ9
1. LITTLE INTEREST OR PLEASURE IN DOING THINGS: NOT AT ALL
2. FEELING DOWN, DEPRESSED OR HOPELESS: NOT AT ALL
SUM OF ALL RESPONSES TO PHQ9 QUESTIONS 1 AND 2: 0

## 2025-07-24 NOTE — PROGRESS NOTES
ASSESSMENT AND PLAN:   Michelle Kramer is a 55 y.o. 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     Today's examination to include bronchoscopy demonstrates she is breathing better with less narrowing and granulation on the udnersurface of the vocal cords today.     We discussed additional MDL with bronchoscopy with jet ventilation and laser cordotomy.     I would like to see the patient back for this at a time is convenient for her. She will continue capping as desired.       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 Breathing has significantly improved, but some narrowing and granulation tissue on the undersurface of the vocal cords were observed today.    We discussed options for management to include MicroDirect laryngoscopy, bronchoscopy with jet ventilation, and laser cordotomy. Procedure to be scheduled. Continued use of the trach cap as needed.    Treatment plan:   - MicroDirect laryngoscopy, bronchoscopy with jet ventilation, and laser cordotomy recommended.  - Continued use of the trach cap as needed.    Clinical decision making: Discussed risks, benefits, and alternatives of laser cordotomy. Procedure involves creating a gap in the vocal cords to improve airflow, possibly resulting in a breathier voice. Most patients report improved breathing and communication despite voice quality change. Similar to previous surgery, with same-day discharge and follow-up weeks later for healing. Goal is eventual tracheostomy removal once healing is adequate.    Follow-up: Procedure to be scheduled and follow-up weeks later for healing.      Reason For Consult  No chief complaint on file.       HISTORY OF PRESENT ILLNESS:  Michelle Kramer is a 55 y.o. 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  The  patient reports that mucus is her most bothersome symptom. She is able to cough this up. She has noted improvement in her breathing.         Prior History:   Post op 6/18/25:  right >left cordotomy.  Posterior glottic narrowing/PGS.  Rotated arytenoid position and soft tissue collapse.  Wedge resection/removal of excess tissue to include partial arytenoidectomy on right    [facility] Dr[facility] Rigo [facility].  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  Former smoker.  Had ablation of this on 12/6/24 and 1/15/25.  Doing capping trials.Humidified trach collar at night. Some SOB with exertion. Prior Bx: Left TVC - MODERATE TO SEVERE DYSPLASIA.    Second opinion prior to decanulation.     PMHx: anxiety, asthma, A fib, Bipolar 2, CHF, GERD, Lupus, spondylosis. Prior fusion. Former smoker (30 pack year) Quit 2014.      History of Present Illness  The patient is a black female with a history of right greater than left cordotomy due to narrowing and stenosis in the back of her throat. She had a rotated arytenoid position and soft tissue collapse, and a wedge excision, removal of excess tissue including a partial arytenoidectomy on the right was performed on 06/18/2025. She had previous surgeries in December 2024 and January 2025. A prior biopsy of her left vocal cord showed mild to severe dysplasia.    Breathing Issues  - Reports that her breathing is better after the procedure  - Issues with thick mucus production, making it difficult to suction through the tracheostomy  - Coughs up mucus  - Occasionally feels slightly out of breath  - Can speak and breathe, though at a slower pace    PAST SURGICAL HISTORY:  - Cordotomy, 06/18/2025  - Surgeries, 12/2024 and 01/2025      Past Medical History  She has a past medical history of Bipolar disorder, currently in remission, most recent episode unspecified (Multi) (12/17/2020), Personal history of other diseases of the musculoskeletal system and  connective tissue (12/17/2020), Personal history of other mental and behavioral disorders (12/17/2020), Personal history of suicidal behavior, and Systemic lupus erythematosus, unspecified (12/17/2020). Surgical History  She has a past surgical history that includes Other surgical history (12/17/2020); Other surgical history (12/17/2020); and Other surgical history (12/17/2020).   Social History  She has no history on file for tobacco use, alcohol use, and drug use. Allergies  Patient has no known allergies.     Family History  Family History[1]    Review of Systems  All 10 systems were reviewed and negative except for above.      Last Recorded Vitals  There were no vitals taken for this visit.    Physical Exam  ENT Physical Exam  Constitutional  Appearance: patient appears well-developed and well-nourished,  Head and Face  Appearance: head appears normal and face appears normal;  Ear  Auricles: right auricle normal; left auricle normal;  Nose  External Nose: nares patent bilaterally;  Oral Cavity/Oropharynx  Lips: normal;  Neck  Neck: neck palpation normal; tracheostomy noted;  Respiratory  Inspection: no retractions visible;  Cardiovascular  Inspection: no peripheral edema present;  Neurovestibular  Mental Status: alert and oriented;  Psychiatric: mood normal;  Cranial Nerves: cranial nerves intact;     Physical Exam  PHYSICAL EXAMINATION:    Appearance: Patient appears well-developed, well-nourished, no acute distress.  Head/Face: Symmetric facial features, no masses/lesions.  Salivary Glands: Parotid/submandibular glands normal bilaterally.  Ears: Right auricle normal; left auricle normal.  External/Internal Nose: Mucus present.  Oral Cavity: Normal, no lesions/masses.  Pharynx: No tonsillar hypertrophy/erythema.  Neck Palpation: No lymphadenopathy, trachea midline, thyroid non-enlarged.  Respiratory: Breathing comfortably, no stridor/retractions.  CV/Extremities: No clubbing/cyanosis/peripheral edema.  Cranial  Nerves: II-XII grossly intact and symmetric.  Mood/Affect: Appropriate, no agitation.    STROBOSCOPY:    VOICE AND SPEECH CHARACTERISTICS:  Voiced speech characteristics grade 2.  rdGrdrrdarddrderd:rd rd3rd. Roughness: 2.  Breathiness: 0.  Asthenia: 1.  Strain: 1.    Additional Voice Characteristics:  Pitch: Within normal range.  Intelligibility: Reduced.  Resonance: Balanced.  Vocal Loudness: Reduced.  Breath Support: Decreased.    Reflux Finding Score:  Subglottic edema: Present.  Thick Mucus: Absent.  Granuloma: Present.  Ventricular Obliteration: Partial.  Erythema/Hyperemia: Absent.  IA Thickening: Mild.  TVC Edema: None.  Diffuse Laryngitis: None.    PROCEDURE:  Indications: Per HPI.  Technique: Patient seated, topical anesthesia applied; flexible endoscope advanced to evaluate nasopharynx, oropharynx, hypopharynx, larynx, and subglottis.    Gross Arytenoid Movement: Immobile bilaterally.  Height: Normal.  Supraglottic Tension: None.  Symmetry: Symmetric vibration.  Amplitude: Normal.  Closure: Closed glottis during phonation and minimal gap in the posterior airway during respirations.  Mucosal Wave Lateral Excursion/Secondary Wave: Full bilaterally.  Periodicity: Regular.  Closure: Complete.    Results  - Imaging:    - Laryngoscopy: Redness, mucus in the nose, granuloma, and relatively narrow airway in the back    - Bronchoscopy through the tracheostomy: Redness, mucus in the nose, granuloma, and relatively narrow airway in the back      Procedures     Flexible Laryngoscopy w/ Videostroboscopy    VOICE AND SPEECH CHARACTERISTICS:  Normal spoken speech, (+) moderate dysphonia, (+) moderate roughness, no breathiness, (+) mild asthenia, (+) mild strain.    Intelligibility: reduced.   Resonance: balanced.   Vocal Loudness: reduced.   Breath Support: decreased.    PROCEDURE:    Indications: difficulty breathing  PROCEDURE NOTE: BRONCHOSCOPY VIA TRACHEOSTOMY   I recommended bronchoscopy via tracheostomy based on PE findings, and/or  concern for pathology based upon history of airway complaints. Risks, benefits, and alternatives were explained. The patient wishes to proceed and gives verbal consent.  Patient is seated in the exam chair. After adequate topical anesthesia, I advance the flexible endoscope. The examination included evaluation of the barrett, vallecula, base of tongue, pyriforms, post-cricoid area, larynx and immediate subglottis.  Findings : assessment of the nasopharynx, base of tongue/vallecula, pyriform sinuses, post-cricoid area and pharyngeal walls was without lesion or mass, pharyngeal wall contraction is normal and symmetric, and no pooling of secretions  subglottic edema:2 (present), granuloma: 2 (present), ventricular obliteration: 2 (present), and IA thickenin (mild)  Gross Arytenoid Movement: immobility bilateral.  Arytenoid Height: normal.   Supraglottic Tension: none.  Closure: closed and minimal gap posteriorly during respirations.   Additional Findings:     Time Spent  Prep time on day of patient encounter: 10 minutes  Time spent directly with patient, family or caregiver: 15 minutes  Additional Time Spent on Patient Care Activities/Discussion with SLP re care plan: 5 minutes  Documentation Time: 10 minutes  Other Time Spent: 10 minutes  Total: 50 minutes     Scribe Attestation  By signing my name below, I, Kalyn Boyer , Scribe attest that this documentation has been prepared under the direction and in the presence of Damir Bermudez MD.      This medical note was created with the assistance of artificial intelligence (AI) for documentation purposes. The content has been reviewed and confirmed by the healthcare provider for accuracy and completeness. Patient consented to the use of audio recording and use of AI during their visit.          [1] No family history on file.

## 2025-08-04 ENCOUNTER — OFFICE VISIT (OUTPATIENT)
Dept: ENT CLINIC | Age: 55
End: 2025-08-04
Payer: MEDICARE

## 2025-08-04 VITALS
WEIGHT: 135 LBS | DIASTOLIC BLOOD PRESSURE: 93 MMHG | BODY MASS INDEX: 26.5 KG/M2 | HEART RATE: 106 BPM | RESPIRATION RATE: 90 BRPM | TEMPERATURE: 98 F | HEIGHT: 60 IN | SYSTOLIC BLOOD PRESSURE: 159 MMHG

## 2025-08-04 DIAGNOSIS — R06.1 INSPIRATORY STRIDOR: ICD-10-CM

## 2025-08-04 DIAGNOSIS — J38.6 POSTERIOR GLOTTIC STENOSIS: Primary | ICD-10-CM

## 2025-08-04 DIAGNOSIS — Z93.0 TRACHEOSTOMY DEPENDENCE (HCC): ICD-10-CM

## 2025-08-04 PROCEDURE — 99213 OFFICE O/P EST LOW 20 MIN: CPT | Performed by: OTOLARYNGOLOGY

## 2025-08-04 PROCEDURE — 31575 DIAGNOSTIC LARYNGOSCOPY: CPT | Performed by: OTOLARYNGOLOGY

## 2025-08-04 PROCEDURE — G8427 DOCREV CUR MEDS BY ELIG CLIN: HCPCS | Performed by: OTOLARYNGOLOGY

## 2025-08-04 PROCEDURE — G8419 CALC BMI OUT NRM PARAM NOF/U: HCPCS | Performed by: OTOLARYNGOLOGY

## 2025-08-04 PROCEDURE — 3017F COLORECTAL CA SCREEN DOC REV: CPT | Performed by: OTOLARYNGOLOGY

## 2025-08-04 PROCEDURE — 1036F TOBACCO NON-USER: CPT | Performed by: OTOLARYNGOLOGY

## 2025-08-04 ASSESSMENT — ENCOUNTER SYMPTOMS
RESPIRATORY NEGATIVE: 1
EYES NEGATIVE: 1
ALLERGIC/IMMUNOLOGIC NEGATIVE: 1

## 2025-08-24 ENCOUNTER — APPOINTMENT (OUTPATIENT)
Dept: GENERAL RADIOLOGY | Age: 55
End: 2025-08-24
Payer: MEDICARE

## 2025-08-24 ENCOUNTER — APPOINTMENT (OUTPATIENT)
Dept: CT IMAGING | Age: 55
End: 2025-08-24
Payer: MEDICARE

## 2025-08-24 ENCOUNTER — HOSPITAL ENCOUNTER (EMERGENCY)
Age: 55
Discharge: HOME | End: 2025-08-24
Payer: MEDICARE

## 2025-08-24 VITALS
TEMPERATURE: 99.3 F | WEIGHT: 140 LBS | RESPIRATION RATE: 18 BRPM | SYSTOLIC BLOOD PRESSURE: 164 MMHG | OXYGEN SATURATION: 95 % | HEIGHT: 60 IN | BODY MASS INDEX: 27.48 KG/M2 | HEART RATE: 89 BPM | DIASTOLIC BLOOD PRESSURE: 83 MMHG
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
## 2025-08-24 DIAGNOSIS — S09.90XA CLOSED HEAD INJURY, INITIAL ENCOUNTER: ICD-10-CM

## 2025-08-24 DIAGNOSIS — S46.911A STRAIN OF RIGHT SHOULDER, INITIAL ENCOUNTER: Primary | ICD-10-CM

## 2025-08-24 DIAGNOSIS — S50.311A ABRASION OF RIGHT ELBOW, INITIAL ENCOUNTER: ICD-10-CM

## 2025-08-24 PROCEDURE — 90471 IMMUNIZATION ADMIN: CPT | Performed by: NURSE PRACTITIONER

## 2025-08-24 PROCEDURE — 73080 X-RAY EXAM OF ELBOW: CPT

## 2025-08-24 PROCEDURE — 73030 X-RAY EXAM OF SHOULDER: CPT

## 2025-08-24 PROCEDURE — 72125 CT NECK SPINE W/O DYE: CPT

## 2025-08-24 PROCEDURE — 6360000002 HC RX W HCPCS: Performed by: NURSE PRACTITIONER

## 2025-08-24 PROCEDURE — 90714 TD VACC NO PRESV 7 YRS+ IM: CPT | Performed by: NURSE PRACTITIONER

## 2025-08-24 PROCEDURE — 99284 EMERGENCY DEPT VISIT MOD MDM: CPT

## 2025-08-24 PROCEDURE — 73060 X-RAY EXAM OF HUMERUS: CPT

## 2025-08-24 PROCEDURE — 70450 CT HEAD/BRAIN W/O DYE: CPT

## 2025-08-24 PROCEDURE — 6370000000 HC RX 637 (ALT 250 FOR IP): Performed by: NURSE PRACTITIONER

## 2025-08-24 RX ORDER — GINSENG 100 MG
CAPSULE ORAL ONCE
Status: COMPLETED | OUTPATIENT
Start: 2025-08-24 | End: 2025-08-24

## 2025-08-24 RX ORDER — HYDROCODONE BITARTRATE AND ACETAMINOPHEN 5; 325 MG/1; MG/1
1 TABLET ORAL ONCE
Status: COMPLETED | OUTPATIENT
Start: 2025-08-24 | End: 2025-08-24

## 2025-08-24 RX ADMIN — BACITRACIN: 500 OINTMENT TOPICAL at 15:28

## 2025-08-24 RX ADMIN — CLOSTRIDIUM TETANI TOXOID ANTIGEN (FORMALDEHYDE INACTIVATED) AND CORYNEBACTERIUM DIPHTHERIAE TOXOID ANTIGEN (FORMALDEHYDE INACTIVATED) 0.5 ML: 5; 2 INJECTION, SUSPENSION INTRAMUSCULAR at 12:45

## 2025-08-24 RX ADMIN — HYDROCODONE BITARTRATE AND ACETAMINOPHEN 1 TABLET: 5; 325 TABLET ORAL at 12:45

## 2025-08-24 ASSESSMENT — LIFESTYLE VARIABLES
HOW OFTEN DO YOU HAVE A DRINK CONTAINING ALCOHOL: MONTHLY OR LESS
HOW MANY STANDARD DRINKS CONTAINING ALCOHOL DO YOU HAVE ON A TYPICAL DAY: 1 OR 2

## 2025-08-24 ASSESSMENT — PAIN SCALES - GENERAL: PAINLEVEL_OUTOF10: 10

## 2025-08-26 ENCOUNTER — ANESTHESIA EVENT (OUTPATIENT)
Dept: OPERATING ROOM | Facility: HOSPITAL | Age: 55
End: 2025-08-26
Payer: MEDICARE

## 2025-08-27 ENCOUNTER — HOSPITAL ENCOUNTER (OUTPATIENT)
Facility: HOSPITAL | Age: 55
Setting detail: OUTPATIENT SURGERY
Discharge: HOME | End: 2025-08-27
Attending: OTOLARYNGOLOGY | Admitting: OTOLARYNGOLOGY
Payer: MEDICARE

## 2025-08-27 ENCOUNTER — ANESTHESIA (OUTPATIENT)
Dept: OPERATING ROOM | Facility: HOSPITAL | Age: 55
End: 2025-08-27
Payer: MEDICARE

## 2025-08-27 PROCEDURE — 2500000004 HC RX 250 GENERAL PHARMACY W/ HCPCS (ALT 636 FOR OP/ED): Performed by: ANESTHESIOLOGY

## 2025-08-27 PROCEDURE — 2500000004 HC RX 250 GENERAL PHARMACY W/ HCPCS (ALT 636 FOR OP/ED): Performed by: NURSE ANESTHETIST, CERTIFIED REGISTERED

## 2025-08-27 RX ORDER — ROCURONIUM BROMIDE 10 MG/ML
INJECTION, SOLUTION INTRAVENOUS AS NEEDED
Status: DISCONTINUED | OUTPATIENT
Start: 2025-08-27 | End: 2025-08-27

## 2025-08-27 RX ORDER — REMIFENTANIL HYDROCHLORIDE 1 MG/ML
INJECTION, POWDER, LYOPHILIZED, FOR SOLUTION INTRAVENOUS AS NEEDED
Status: DISCONTINUED | OUTPATIENT
Start: 2025-08-27 | End: 2025-08-27

## 2025-08-27 RX ORDER — PROPOFOL 10 MG/ML
INJECTION, EMULSION INTRAVENOUS AS NEEDED
Status: DISCONTINUED | OUTPATIENT
Start: 2025-08-27 | End: 2025-08-27

## 2025-08-27 RX ORDER — SODIUM CHLORIDE, SODIUM LACTATE, POTASSIUM CHLORIDE, CALCIUM CHLORIDE 600; 310; 30; 20 MG/100ML; MG/100ML; MG/100ML; MG/100ML
INJECTION, SOLUTION INTRAVENOUS CONTINUOUS PRN
Status: DISCONTINUED | OUTPATIENT
Start: 2025-08-27 | End: 2025-08-27

## 2025-08-27 RX ORDER — LIDOCAINE HCL/PF 100 MG/5ML
SYRINGE (ML) INTRAVENOUS AS NEEDED
Status: DISCONTINUED | OUTPATIENT
Start: 2025-08-27 | End: 2025-08-27

## 2025-08-27 RX ORDER — ESMOLOL HYDROCHLORIDE 10 MG/ML
INJECTION INTRAVENOUS AS NEEDED
Status: DISCONTINUED | OUTPATIENT
Start: 2025-08-27 | End: 2025-08-27

## 2025-08-27 RX ORDER — MIDAZOLAM HYDROCHLORIDE 2 MG/2ML
INJECTION, SOLUTION INTRAMUSCULAR; INTRAVENOUS AS NEEDED
Status: DISCONTINUED | OUTPATIENT
Start: 2025-08-27 | End: 2025-08-27

## 2025-08-27 RX ADMIN — REMIFENTANIL HYDROCHLORIDE 20 MCG: 1 INJECTION, POWDER, LYOPHILIZED, FOR SOLUTION INTRAVENOUS at 14:42

## 2025-08-27 RX ADMIN — PROPOFOL 100 MCG/KG/MIN: 10 INJECTION, EMULSION INTRAVENOUS at 14:40

## 2025-08-27 RX ADMIN — PROPOFOL 150 MG: 10 INJECTION, EMULSION INTRAVENOUS at 14:26

## 2025-08-27 RX ADMIN — DEXAMETHASONE SODIUM PHOSPHATE 10 MG: 4 INJECTION INTRA-ARTICULAR; INTRALESIONAL; INTRAMUSCULAR; INTRAVENOUS; SOFT TISSUE at 14:29

## 2025-08-27 RX ADMIN — SUGAMMADEX 200 MG: 100 INJECTION, SOLUTION INTRAVENOUS at 15:20

## 2025-08-27 RX ADMIN — MIDAZOLAM HYDROCHLORIDE 2 MG: 2 INJECTION, SOLUTION INTRAMUSCULAR; INTRAVENOUS at 14:06

## 2025-08-27 RX ADMIN — ESMOLOL HYDROCHLORIDE 100 MG: 10 INJECTION, SOLUTION INTRAVENOUS at 14:26

## 2025-08-27 RX ADMIN — LIDOCAINE HYDROCHLORIDE 100 MG: 20 INJECTION INTRAVENOUS at 14:26

## 2025-08-27 RX ADMIN — REMIFENTANIL HYDROCHLORIDE 20 MCG: 1 INJECTION, POWDER, LYOPHILIZED, FOR SOLUTION INTRAVENOUS at 15:02

## 2025-08-27 RX ADMIN — SODIUM CHLORIDE, POTASSIUM CHLORIDE, SODIUM LACTATE AND CALCIUM CHLORIDE: 600; 310; 30; 20 INJECTION, SOLUTION INTRAVENOUS at 14:08

## 2025-08-27 RX ADMIN — PROPOFOL 50 MG: 10 INJECTION, EMULSION INTRAVENOUS at 14:35

## 2025-08-27 RX ADMIN — ROCURONIUM BROMIDE 50 MG: 10 INJECTION INTRAVENOUS at 14:27

## 2025-08-27 RX ADMIN — REMIFENTANIL HYDROCHLORIDE 0.05 MCG/KG/MIN: 1 INJECTION, POWDER, LYOPHILIZED, FOR SOLUTION INTRAVENOUS at 14:29

## 2025-08-27 RX ADMIN — REMIFENTANIL HYDROCHLORIDE 20 MCG: 1 INJECTION, POWDER, LYOPHILIZED, FOR SOLUTION INTRAVENOUS at 15:14

## 2025-08-27 ASSESSMENT — ENCOUNTER SYMPTOMS
CARDIOVASCULAR NEGATIVE: 1
PSYCHIATRIC NEGATIVE: 1
FEVER: 0

## 2025-08-27 ASSESSMENT — PAIN SCALES - GENERAL
PAINLEVEL_OUTOF10: 5 - MODERATE PAIN
PAINLEVEL_OUTOF10: 3
PAINLEVEL_OUTOF10: 8
PAINLEVEL_OUTOF10: 4
PAINLEVEL_OUTOF10: 8

## 2025-08-27 ASSESSMENT — PAIN - FUNCTIONAL ASSESSMENT
PAIN_FUNCTIONAL_ASSESSMENT: 0-10
PAIN_FUNCTIONAL_ASSESSMENT: UNABLE TO SELF-REPORT

## 2025-10-02 ENCOUNTER — APPOINTMENT (OUTPATIENT)
Dept: OTOLARYNGOLOGY | Facility: CLINIC | Age: 55
End: 2025-10-02
Payer: MEDICARE

## (undated) DEVICE — TUBING, SUCTION, CONNECTING, STERILE 0.25 X 120 IN., LF

## (undated) DEVICE — Z INACTIVE USE 2660664 SOLUTION IRRIG 3000ML 0.9% SOD CHL USP UROMATIC PLAS CONT

## (undated) DEVICE — NEEDLE, INJECTION, OROTRACHEAL

## (undated) DEVICE — CANNULA NSL CANN NSL L25FT TBNG AD O2 SUP SFT UC

## (undated) DEVICE — TUBE TRACH UNCUF 7.5X10.8X74 MM 6.5 MM FLX SHILEY REUSE

## (undated) DEVICE — 4-PORT MANIFOLD: Brand: NEPTUNE 2

## (undated) DEVICE — STRAP POS MP 30X3 IN HK LOOP CLOSURE FOAM DISP

## (undated) DEVICE — TUBE ET OD8.5MM ID6MM NSL ORAL LSR RESIST 2 CUF MURPHY EYE

## (undated) DEVICE — KIT ANGIO W/ AT P65 PREM HND CTRL FOR CNTRST DEL ANGIOTOUCH

## (undated) DEVICE — MAGNETIC INSTR DRAPE 20X16: Brand: MEDLINE INDUSTRIES, INC.

## (undated) DEVICE — REST, HEAD, BAGEL, 9 IN

## (undated) DEVICE — SYRINGE, 60 CC, IRRIGATION, BULB, CONTRO-BULB, PAPER POUCH

## (undated) DEVICE — GLOVE ORANGE PI 7   MSG9070

## (undated) DEVICE — 3M™ STERI-DRAPE™ U-DRAPE 1015: Brand: STERI-DRAPE™

## (undated) DEVICE — PAD, EYE, OVAL, 1 5/8 X 2 5/8 IN, STERILE

## (undated) DEVICE — GLIDESHEATH SLENDER ACCESS KIT: Brand: GLIDESHEATH SLENDER

## (undated) DEVICE — SUTURE FIBERLOOP 4-0 L10IN NONABSORBABLE BLU L17.9MM 3/8 AR722920

## (undated) DEVICE — SYRINGE MED 10ML LUERLOCK TIP W/O SFTY DISP

## (undated) DEVICE — PITCHER, GRADUATE, 32 OZ (1200CC), STERILE

## (undated) DEVICE — SPONGE GZ W4XL4IN RAYON POLY CVR W/NONWOVEN FAB STRL 2/PK

## (undated) DEVICE — GLOVE SURG SZ 6 THK91MIL LTX FREE SYN POLYISOPRENE ANTI

## (undated) DEVICE — SCREW BNE L30MM DIA6.5MM STD CANC HIP TI HMSPHR THRD DRVR
Type: IMPLANTABLE DEVICE | Site: HIP | Status: NON-FUNCTIONAL
Removed: 2021-03-02

## (undated) DEVICE — SHIELD, EYE, FOX, CONVEX, ALUMINUM, NS

## (undated) DEVICE — RETRACTOR INIT

## (undated) DEVICE — SET ORTHO STD STORTSTD2

## (undated) DEVICE — GLOVE ORANGE PI 8   MSG9080

## (undated) DEVICE — APPLICATOR PREP 26ML 0.7% IOD POVACRYLEX 74% ISO ALC ST

## (undated) DEVICE — COVER, CART, 45 X 27 X 48 IN, CLEAR

## (undated) DEVICE — ACUSPOT 712 MICROMANIPULATOR

## (undated) DEVICE — PADDING UNDERCAST W4INXL4YD COT FBR LO LINTING WYTEX

## (undated) DEVICE — BAND COMPR L24CM REG CLR PLAS HEMSTAT EXT HK AND LOOP RETEN

## (undated) DEVICE — DRESSING FOAM W3XL3IN POLYUR HYDRPHLC N ADH FEN PD OPTIFOAM

## (undated) DEVICE — HEAD AND NECK: Brand: MEDLINE INDUSTRIES, INC.

## (undated) DEVICE — DRIP REDUCTION MANIFOLD

## (undated) DEVICE — EXTRAS HIP

## (undated) DEVICE — SYRINGE MED 10ML TRNSLUC BRL PLUNG BLK MRK POLYPR CTRL

## (undated) DEVICE — ANGIOGRAPHIC CATHETER: Brand: EXPO™

## (undated) DEVICE — SUTURE VCRL L48IN 2 0 VLT BRAID CART ABSRB CNPJ482C

## (undated) DEVICE — SET ORTHO ACCOLADE TOTAL HIP BROACH

## (undated) DEVICE — GUIDEWIRE VASC L260CM DIA0.035IN S STL PTFE MOD J TIP HI

## (undated) DEVICE — ZIMMER® STERILE DISPOSABLE TOURNIQUET CUFF WITH PLC, DUAL PORT, SINGLE BLADDER, 18 IN. (46 CM)

## (undated) DEVICE — HOLSTER, JET SAFETY

## (undated) DEVICE — COVER,LIGHT HANDLE,FLX,2/PK: Brand: MEDLINE INDUSTRIES, INC.

## (undated) DEVICE — SPONGE,NEURO,0.5"X0.5",XR,STRL,10/PK: Brand: MEDLINE

## (undated) DEVICE — GOWN,SIRUS,FABRNF,XL,20/CS: Brand: MEDLINE

## (undated) DEVICE — DRESSING, GAUZE, 16 PLY, 4 X 4 IN, STERILE

## (undated) DEVICE — PADDING CAST W2INXL4YD COT LO LINTING WYTEX

## (undated) DEVICE — NEEDLE HYPO 18GA L1.5IN PNK POLYPR HUB S STL REG BVL STR

## (undated) DEVICE — TOWEL,OR,DSP,ST,BLUE,STD,6/PK,12PK/CS: Brand: MEDLINE

## (undated) DEVICE — DOUBLE BASIN SET: Brand: MEDLINE INDUSTRIES, INC.

## (undated) DEVICE — PACK SURG CARDIAC CATH

## (undated) DEVICE — SET TRIDENT SCREW INSTR

## (undated) DEVICE — COUNTER, NEEDLE, FOAM BLOCK, W/MAGNET, W/BLADE GUARD, 10 COUNT, RED, LF

## (undated) DEVICE — Device

## (undated) DEVICE — SET ORTHO STD STORTSTD1

## (undated) DEVICE — CRADLE ARM W8.75XH12.5XL16IN FOAM SUPP ELEVATION VENT

## (undated) DEVICE — SOLUTION IV IRRIG POUR BRL 0.9% SODIUM CHL 2F7124

## (undated) DEVICE — MANIFOLD, 4 PORT NEPTUNE STANDARD

## (undated) DEVICE — PAD, DEFIB, ADULT, RADIOTRAN, PHYSIO, LO: Brand: MEDLINE

## (undated) DEVICE — GLOVE SURG SZ 8 L12IN FNGR THK79MIL GRN LTX FREE

## (undated) DEVICE — HANDPIECE SET WITH BONE CLEANING TIP AND SUCTION TUBE: Brand: INTERPULSE

## (undated) DEVICE — NEEDLE FLTR 18GA L1.5IN MEM THK5UM BLNT DISP

## (undated) DEVICE — STANDARD HYPODERMIC NEEDLE,POLYPROPYLENE HUB: Brand: MONOJECT

## (undated) DEVICE — INTEGRA® SATALOFF DISPOSABLE SICKLE KNIFE: Brand: INTEGRA®

## (undated) DEVICE — RETRACTOR KNE PCA

## (undated) DEVICE — SYRINGE 20ML LL S/C 50

## (undated) DEVICE — SOLUTION IV IRRIG WATER 1000ML POUR BRL 2F7114

## (undated) DEVICE — SYRINGE MED 20ML STD CLR PLAS LUERLOCK TIP N CTRL DISP

## (undated) DEVICE — TUBING SUCT 12FR MAL ALUM SHFT FN CAP VENT UNIV CONN W/ OBT

## (undated) DEVICE — HYPODERMIC SAFETY NEEDLE: Brand: MAGELLAN

## (undated) DEVICE — PROCISE LW WAND: Brand: COBLATION

## (undated) DEVICE — SPONGE LAP W18XL18IN WHT COT 4 PLY FLD STRUNG RADPQ DISP ST

## (undated) DEVICE — MARKER,SKIN,WI/RULER AND LABELS: Brand: MEDLINE

## (undated) DEVICE — SYRINGE MED 1ML POLYCARB LUERLOCK HUB

## (undated) DEVICE — REAMER ACET

## (undated) DEVICE — SET ORTHO ACCOLADE TOTAL HIP INSRTION

## (undated) DEVICE — HEWSON SUTURE RETRIEVER: Brand: HEWSON SUTURE RETRIEVER

## (undated) DEVICE — SURGICAL PROCEDURE PACK HND

## (undated) DEVICE — MEDTRONIC JL4.0 DIAGNOSTIC CATHETER

## (undated) DEVICE — TOWEL,OR,DSP,ST,BLUE,DLX,10/PK,8PK/CS: Brand: MEDLINE

## (undated) DEVICE — COVER, TABLE, 44 X 75 IN, DISPOSABLE, LF, STERILE

## (undated) DEVICE — KIT PLT RATIO DISPNS KT 2IN CANN TIP SPRY TIP DISP MAGELLAN

## (undated) DEVICE — SET ORTHO TRI CERAMIC ACET INST

## (undated) DEVICE — GOWN,BREATHABLE SLV,AURORA,XLG,STRL: Brand: MEDLINE

## (undated) DEVICE — PAPER FILTER 1 32CM

## (undated) DEVICE — KIT,ANTI FOG,W/SPONGE & FLUID,SOFT PACK: Brand: MEDLINE

## (undated) DEVICE — STRIP,CLOSURE,WOUND,MEDI-STRIP,1/4X3: Brand: MEDLINE

## (undated) DEVICE — PATIENT RETURN ELECTRODE, SINGLE-USE, CONTACT QUALITY MONITORING, ADULT, WITH 9FT CORD, FOR PATIENTS WEIGING OVER 33LBS. (15KG): Brand: MEGADYNE

## (undated) DEVICE — TUBE TRACH AD SZ 6 L77MM INNR CANN ID65MM OUTER CANN

## (undated) DEVICE — TOTAL HIP PK

## (undated) DEVICE — PADDING,UNDERCAST,COTTON, 3X4YD STERILE: Brand: MEDLINE

## (undated) DEVICE — COVIDIEN WHOLEY 145CM GUIDE WIRE

## (undated) DEVICE — AGENT HEMSTAT W1XL2IN ABSRB SFT LTWT LAYERED ORC FIBRILLAR

## (undated) DEVICE — COVER, MAYO STAND, W/PAD, 23 IN, DISPOSABLE, PLASTIC, LF, STERILE

## (undated) DEVICE — SPLINT ORTH W4XL15IN PLSTR OF PARIS LO EXOTHERM SMOOTH

## (undated) DEVICE — ELECTRODE PT RET AD L9FT HI MOIST COND ADH HYDRGEL CORDED

## (undated) DEVICE — DEVICE, INFLATION, ENDOTEK BIG 60

## (undated) DEVICE — INTENDED FOR TISSUE SEPARATION, AND OTHER PROCEDURES THAT REQUIRE A SHARP SURGICAL BLADE TO PUNCTURE OR CUT.: Brand: BARD-PARKER ® STAINLESS STEEL BLADES

## (undated) DEVICE — WAX SURG 2.5GM HEMSTAT BNE BEESWAX PARAFFIN ISO PALMITATE

## (undated) DEVICE — CUP, SOLUTION

## (undated) DEVICE — SURGICAL PROCEDURE PACK BASIC

## (undated) DEVICE — E-Z CLEAN, NON-STICK, PTFE COATED, ELECTROSURGICAL BLADE ELECTRODE, 6.5 INCH (16.5 CM): Brand: MEGADYNE

## (undated) DEVICE — KIT MFLD ISOLATN NACL CNTRST PRT TBNG SPIK W/ PRSS TRNSDUC

## (undated) DEVICE — NEEDLE HYPO 21GA L1.5IN GRN POLYPR HUB S STL REG BVL STR

## (undated) DEVICE — GAUZE,SPONGE,4"X4",8PLY,STRL,LF,10/TRAY: Brand: MEDLINE

## (undated) DEVICE — MASTISOL ADHESIVE LIQ 2/3ML

## (undated) DEVICE — PAD,EYE,1-5/8X2 5/8,STERILE,LF,1/PK: Brand: MEDLINE

## (undated) DEVICE — DENTITION PROTECTOR, QUICKGUARD, NON-PERF

## (undated) DEVICE — RADIFOCUS OPTITORQUE ANGIOGRAPHIC CATHETER: Brand: OPTITORQUE

## (undated) DEVICE — MEDTRONIC JR4.0 DIAGNOSTIC CATHETER

## (undated) DEVICE — BLADE ES ELASTOMERIC COAT INSUL DURABLE BEND UPTO 90DEG

## (undated) DEVICE — BASIC SINGLE BASIN 1-LF: Brand: MEDLINE INDUSTRIES, INC.

## (undated) DEVICE — 2.0MM ROUND SOLID CARBIDE BUR MEDIUM

## (undated) DEVICE — GUARD TEETH AD PR NYL

## (undated) DEVICE — PAD,NON-ADHERENT,2X3,STERILE,LF,1/PK: Brand: MEDLINE

## (undated) DEVICE — MARKER, SKIN, RULER AND LABEL PACK, CUSTOM

## (undated) DEVICE — HOLDER TRACH TB W1IN FIT UPTO 19.5IN NK 2 PC ADJ BLU

## (undated) DEVICE — SYRINGE MED 50ML LUERLOCK TIP

## (undated) DEVICE — SYRINGE, HYPODERMIC, TB, W/O NEEDLE, 1 CC, SLIP TIP